# Patient Record
Sex: FEMALE | Race: BLACK OR AFRICAN AMERICAN | NOT HISPANIC OR LATINO | Employment: UNEMPLOYED | URBAN - METROPOLITAN AREA
[De-identification: names, ages, dates, MRNs, and addresses within clinical notes are randomized per-mention and may not be internally consistent; named-entity substitution may affect disease eponyms.]

---

## 2019-06-12 PROBLEM — N18.6 END STAGE RENAL DISEASE: Status: ACTIVE | Noted: 2019-06-12

## 2019-07-01 DIAGNOSIS — Z76.82 ORGAN TRANSPLANT CANDIDATE: Primary | ICD-10-CM

## 2019-07-17 ENCOUNTER — TELEPHONE (OUTPATIENT)
Dept: TRANSPLANT | Facility: CLINIC | Age: 54
End: 2019-07-17

## 2019-08-13 DIAGNOSIS — Z76.82 ORGAN TRANSPLANT CANDIDATE: Primary | ICD-10-CM

## 2019-08-21 ENCOUNTER — HOSPITAL ENCOUNTER (OUTPATIENT)
Dept: RADIOLOGY | Facility: HOSPITAL | Age: 54
Discharge: HOME | End: 2019-08-21
Attending: NURSE PRACTITIONER
Payer: MEDICARE

## 2019-08-21 ENCOUNTER — CLINICAL SUPPORT (OUTPATIENT)
Dept: INFECTIOUS DISEASES | Facility: CLINIC | Age: 54
End: 2019-08-21
Payer: MEDICARE

## 2019-08-21 ENCOUNTER — TELEPHONE (OUTPATIENT)
Dept: TRANSPLANT | Facility: CLINIC | Age: 54
End: 2019-08-21

## 2019-08-21 ENCOUNTER — OFFICE VISIT (OUTPATIENT)
Dept: TRANSPLANT | Facility: CLINIC | Age: 54
End: 2019-08-21
Payer: MEDICARE

## 2019-08-21 VITALS
DIASTOLIC BLOOD PRESSURE: 80 MMHG | HEIGHT: 63 IN | RESPIRATION RATE: 16 BRPM | SYSTOLIC BLOOD PRESSURE: 173 MMHG | WEIGHT: 177 LBS | OXYGEN SATURATION: 100 % | BODY MASS INDEX: 31.36 KG/M2 | TEMPERATURE: 97 F | HEART RATE: 61 BPM

## 2019-08-21 DIAGNOSIS — Z01.818 PRE-TRANSPLANT EVALUATION FOR CHRONIC KIDNEY DISEASE: Primary | ICD-10-CM

## 2019-08-21 DIAGNOSIS — N18.5 DIABETES MELLITUS DUE TO UNDERLYING CONDITION WITH STAGE 5 CHRONIC KIDNEY DISEASE: Chronic | ICD-10-CM

## 2019-08-21 DIAGNOSIS — Z99.2 ESRD ON HEMODIALYSIS: ICD-10-CM

## 2019-08-21 DIAGNOSIS — I15.0 RENOVASCULAR HYPERTENSION: ICD-10-CM

## 2019-08-21 DIAGNOSIS — Z01.818 PRE-TRANSPLANT EVALUATION FOR CHRONIC KIDNEY DISEASE: ICD-10-CM

## 2019-08-21 DIAGNOSIS — N18.6 ESRD ON HEMODIALYSIS: ICD-10-CM

## 2019-08-21 DIAGNOSIS — E08.22 DIABETES MELLITUS DUE TO UNDERLYING CONDITION WITH STAGE 5 CHRONIC KIDNEY DISEASE: Chronic | ICD-10-CM

## 2019-08-21 DIAGNOSIS — Z76.82 ORGAN TRANSPLANT CANDIDATE: ICD-10-CM

## 2019-08-21 DIAGNOSIS — N18.6 ANEMIA IN ESRD (END-STAGE RENAL DISEASE): ICD-10-CM

## 2019-08-21 DIAGNOSIS — D63.1 ANEMIA IN ESRD (END-STAGE RENAL DISEASE): ICD-10-CM

## 2019-08-21 DIAGNOSIS — N13.39 OTHER HYDRONEPHROSIS: ICD-10-CM

## 2019-08-21 PROBLEM — N13.30 HYDRONEPHROSIS: Status: ACTIVE | Noted: 2019-08-21

## 2019-08-21 PROCEDURE — 71046 X-RAY EXAM CHEST 2 VIEWS: CPT | Mod: TC,TXP

## 2019-08-21 PROCEDURE — 99999 PR PBB SHADOW E&M-EST. PATIENT-LVL V: ICD-10-PCS | Mod: PBBFAC,TXP,, | Performed by: NURSE PRACTITIONER

## 2019-08-21 PROCEDURE — 71046 XR CHEST PA AND LATERAL: ICD-10-PCS | Mod: 26,TXP,, | Performed by: RADIOLOGY

## 2019-08-21 PROCEDURE — 71046 X-RAY EXAM CHEST 2 VIEWS: CPT | Mod: 26,TXP,, | Performed by: RADIOLOGY

## 2019-08-21 PROCEDURE — 99205 PR OFFICE/OUTPT VISIT, NEW, LEVL V, 60-74 MIN: ICD-10-PCS | Mod: S$PBB,TXP,, | Performed by: NURSE PRACTITIONER

## 2019-08-21 PROCEDURE — 93978 VASCULAR STUDY: CPT | Mod: 26,TXP,, | Performed by: RADIOLOGY

## 2019-08-21 PROCEDURE — 99215 OFFICE O/P EST HI 40 MIN: CPT | Mod: PBBFAC,25,TXP | Performed by: NURSE PRACTITIONER

## 2019-08-21 PROCEDURE — 72170 XR PELVIS ROUTINE AP: ICD-10-PCS | Mod: 26,TXP,, | Performed by: RADIOLOGY

## 2019-08-21 PROCEDURE — 93978 VASCULAR STUDY: CPT | Mod: TC,TXP

## 2019-08-21 PROCEDURE — 72170 X-RAY EXAM OF PELVIS: CPT | Mod: TC,TXP

## 2019-08-21 PROCEDURE — G0010 ADMIN HEPATITIS B VACCINE: HCPCS | Mod: PBBFAC,TXP

## 2019-08-21 PROCEDURE — 72170 X-RAY EXAM OF PELVIS: CPT | Mod: 26,TXP,, | Performed by: RADIOLOGY

## 2019-08-21 PROCEDURE — 90471 IMMUNIZATION ADMIN: CPT | Mod: PBBFAC,TXP

## 2019-08-21 PROCEDURE — 99204 OFFICE O/P NEW MOD 45 MIN: CPT | Mod: S$PBB,TXP,, | Performed by: PHYSICIAN ASSISTANT

## 2019-08-21 PROCEDURE — 99204 PR OFFICE/OUTPT VISIT, NEW, LEVL IV, 45-59 MIN: ICD-10-PCS | Mod: S$PBB,TXP,, | Performed by: PHYSICIAN ASSISTANT

## 2019-08-21 PROCEDURE — 99205 OFFICE O/P NEW HI 60 MIN: CPT | Mod: S$PBB,TXP,, | Performed by: NURSE PRACTITIONER

## 2019-08-21 PROCEDURE — 99203 OFFICE O/P NEW LOW 30 MIN: CPT | Mod: S$PBB,TXP,, | Performed by: TRANSPLANT SURGERY

## 2019-08-21 PROCEDURE — 99999 PR PBB SHADOW E&M-EST. PATIENT-LVL V: CPT | Mod: PBBFAC,TXP,, | Performed by: NURSE PRACTITIONER

## 2019-08-21 PROCEDURE — 99203 PR OFFICE/OUTPT VISIT, NEW, LEVL III, 30-44 MIN: ICD-10-PCS | Mod: S$PBB,TXP,, | Performed by: TRANSPLANT SURGERY

## 2019-08-21 PROCEDURE — 93978 US DOPP ILIACS BILATERAL: ICD-10-PCS | Mod: 26,TXP,, | Performed by: RADIOLOGY

## 2019-08-21 RX ORDER — CALCIUM ACETATE 667 MG/1
2 CAPSULE ORAL
Refills: 3 | COMMUNITY
Start: 2019-08-01

## 2019-08-21 RX ORDER — ONDANSETRON 4 MG/1
4 TABLET, FILM COATED ORAL EVERY 8 HOURS PRN
Refills: 2 | COMMUNITY
Start: 2019-08-12

## 2019-08-21 RX ORDER — CYCLOBENZAPRINE HCL 10 MG
10 TABLET ORAL 2 TIMES DAILY PRN
COMMUNITY
Start: 2019-08-01

## 2019-08-21 RX ORDER — MONTELUKAST SODIUM 10 MG/1
10 TABLET ORAL DAILY
COMMUNITY
Start: 2018-08-21 | End: 2023-10-04

## 2019-08-21 RX ORDER — FLUTICASONE PROPIONATE 50 MCG
1 SPRAY, SUSPENSION (ML) NASAL DAILY PRN
COMMUNITY
Start: 2017-04-15

## 2019-08-21 RX ORDER — INSULIN GLARGINE 100 [IU]/ML
60 INJECTION, SOLUTION SUBCUTANEOUS DAILY
COMMUNITY
End: 2020-01-28

## 2019-08-21 NOTE — LETTER
August 22, 2019        Lilia Malin  [address] [address] [address]  Phone: [phone]  Fax: [phone]             [facility]  [address]  [facility] [address]  Phone: [phone]   Patient: Kiesha Rocha   MR Number: [id number]   YOB: 1965   Date of Visit: 8/21/2019       Dear Dr. Lilia Malin    Thank you for referring Kiesha Rocha to me for evaluation. Attached you will find relevant portions of my assessment and plan of care.    If you have questions, please do not hesitate to call me. I look forward to following Kiesha Rocha along with you.    Sincerely,    Caitlin Stevens, NP    Enclosure    If you would like to receive this communication electronically, please contact [email or web address] or [phone] to request EpiBone Link access.    EpiBone Link is a tool which provides read-only access to select patient information with whom you have a relationship. Its easy to use and provides real time access to review your patients record including encounter summaries, notes, results, and demographic information.    If you feel you have received this communication in error or would no longer like to receive these types of communications, please e-mail [email or web address]

## 2019-08-21 NOTE — PROGRESS NOTES
INITIAL PATIENT EDUCATION NOTE    Ms. Kiesha Rocha was seen in pre-kidney transplant clinic for evaluation for kidney, kidney/pancreas or pancreas only transplant.  The patient attended a group education session that discussed/reviewed the following aspects of transplantation: evaluation and selection committee process, UNOS waitlist management/multiple listings, types of organs offered (KDPI < 85%, KDPI > 85%, PHS increased risk, DCD, HCV+), financial aspects, surgical procedures, dietary instruction pre- and post-transplant, health maintenance pre- and post-transplant, post-transplant hospitalization and outpatient follow-up, potential to participate in a research protocol, and medication management and side effects.  A question and answer session was provided after the presentation.    The patient was seen by all members of the multi-disciplinary team to include: Nephrologist/PA, Surgeon, , Transplant Coordinator, , Pharmacist and Dietician (if applicable).    The patient reviewed and signed all consents for evaluation which were witnessed and sent to scanning into the EPIC chart.    The patient was given an education book and plan for further evaluation based on her individual assessment.      The patient was encouraged to call with any questions or concerns.

## 2019-08-21 NOTE — PROGRESS NOTES
PHARM.D. PRE-TRANSPLANT NOTE:    This patient's medication therapy was evaluated as part of her pre-transplant evaluation.      The following general pharmacologic concerns were noted:   - Takes Flexeril prn for back pain   - Reports non-compliance with Phoslo, says it is difficult to remember to take it     The following pharmacologic concerns related to HCV therapy were noted: none      This patient's medication profile was reviewed for contraindications for DAA Hepatitis C therapy:    [x]  No current inducers of CYP 3A4 or PGP  [x]  No amiodarone on this patient's EMR profile in the last 24 months  [x]  No past or current atrial fibrillation on this patient's EMR profile       Current Outpatient Medications   Medication Sig Dispense Refill    amLODIPine (NORVASC) 10 MG tablet Take 10 mg by mouth once daily.      calcium acetate (PHOSLO) 667 mg capsule Take 2 capsules by mouth 3 (three) times daily with meals. Take 1 capsule with snacks  3    cetirizine (ZYRTEC) 10 MG tablet Take 10 mg by mouth daily as needed for Allergies.      cyclobenzaprine (FLEXERIL) 10 MG tablet Take 10 mg by mouth 2 (two) times daily as needed.      FLUoxetine 20 MG capsule Take 20 mg by mouth once daily.      fluticasone propionate (FLONASE) 50 mcg/actuation nasal spray 1 spray by Each Nostril route daily as needed.      gabapentin (NEURONTIN) 600 MG tablet Take 600 mg by mouth every evening.       glipiZIDE (GLUCOTROL) 10 MG tablet Take 10 mg by mouth daily with breakfast.       insulin (LANTUS SOLOSTAR U-100 INSULIN) glargine 100 units/mL (3mL) SubQ pen Inject 60 Units into the skin once daily.      lisinopril (PRINIVIL,ZESTRIL) 20 MG tablet Take 20 mg by mouth once daily.      methIMAzole (TAPAZOLE) 5 MG Tab Take 5 mg by mouth once daily.       metoprolol tartrate (LOPRESSOR) 50 MG tablet Take 50 mg by mouth 2 (two) times daily.       montelukast (SINGULAIR) 10 mg tablet Take 10 mg by mouth once daily.      multivitamin  with minerals tablet Take 1 tablet by mouth once daily.      ondansetron (ZOFRAN) 4 MG tablet Take 4 mg by mouth every 8 (eight) hours as needed.  2    sodium bicarbonate 650 MG tablet Take 650 mg by mouth 3 (three) times daily.        No current facility-administered medications for this visit.      Facility-Administered Medications Ordered in Other Visits   Medication Dose Route Frequency Provider Last Rate Last Dose    0.9%  NaCl infusion  20 mL/hr Intravenous Continuous Shashi Krishnan  mL/hr at 06/12/19 1205      mupirocin 2 % ointment   Nasal On Call Procedure Bautista Vasques MD             Currently she is responsible for preparing / administering this patient's medications on a daily basis.  I am available for consultation and can be contacted, as needed by the other members of the Kidney Transplant team.

## 2019-08-21 NOTE — PROGRESS NOTES
Transplant Recipient Adult Psychosocial Assessment    Kiesha Rocha  [address] [facility] [address]  Telephone Information:   Mobile [phone]   Home  [phone] (home)  Work  There is no work phone number on file.  E-mail  No e-mail address on record    Sex: female  YOB: 1965  Age: 53 y.o.    Encounter Date: 2019  [place] Citizen: yes  Primary Language: English   Needed: no    Emergency Contact:  Moo Rodrigez, 55 yo common law , Mila SUAREZ, does drive/own car, works full time at [employer]  home. [phone]  Chika Rocha, 49 yo sister, Caty Suarez, does drive/own car, self employed sitter/. [phone]    Family/Social Support:   Number of dependents/: pt denies  Marital history: with Moo for 20 years  Other family dynamics: pt reports father . Pt reports mother Misa is living well in [facility] and is active. Pt reports is disabled and lives with supportive full time employed  Moo in [facility]. Pt reports supportive employed daughter Darlene and 1 yo grand daughter Ananth live in pt's household. Pt reports having 4 grown children with 2 daughters being most available. Pt's sister Chika and her  Arley in pre transplant appointments today. Sister Chika reports plan to be pt's primary transplant caregiver with other supportive family available as back up transplant caregivers.    Household Composition:  Moo Rodrigez, 55 yo common law , Mila SUAREZ, does drive/own car, works full time at [employer]  home. [phone]  Darlene Rocha, 24 yo daughter, Mila SUAREZ, does drive/own car, works full time as CNA at [employer]. [phone]  Ananth Richards, 1 yo grand daughter      Pt's children:  Fazal Rocha, 30 yo daughter, Caty SUAREZ, does drive/own car, [phone]  Darlene Rocha, 24 yo daughter, Mila SUAREZ, does drive/own car, works full time as CNA  at [employer]. [phone]  Wilfredo Rocha, 27 yo son        Do you and your caregivers have access to reliable transportation? yes  PRIMARY CAREGIVER: Chika Rocha, sister, will be primary caregiver, phone number  [phone]     provided in-depth information to patient and caregiver regarding pre- and post-transplant caregiver role.   strongly encourages patient and caregiver to have concrete plan regarding post-transplant care giving, including back-up caregiver(s) to ensure care giving needs are met as needed.    Patient and Caregiver states understanding all aspects of caregiver role/commitment and is able/willing/committed to being caregiver to the fullest extent necessary.    Patient and Caregiver verbalizes understanding of the education provided today.         remains available. Patient and Caregiver agree to contact  in a timely manner if concerns arise.      Able to take time off work without financial concerns: yes.     Additional Significant Others who will Assist with Transplant:  Tere Rocha, 32 yo daughter, Caty NICOLE, does drive/own car, [phone]  Darlene Rocha, 22 yo daughter, Mila NICOLE, does drive/own car, works full time as CNA at [employer]. [phone]  Moo Rodrigez, 55 yo common law , Mila NICOLE, does drive/own car, works full time at [employer]. [phone]    Living Will: no  Healthcare Power of : no  Advance Directives on file: <<no information> per medical record.  Verbally reviewed LW/HCPA information.   provided patient with copy of LW/HCPA documents and provided education on completion of forms.    Living Donors: Education and resource information given to patient.    Highest Education Level: High School (9-12) or GED completed 11th grade  Reading Ability: 8th grade  Reports difficulty with: eyesight is blurry as per patient  Learns Best By:  Reading  about, seeing and doing new task until proficient     Status: no  VA Benefits: no     Working for Income: No  If no, reason not working: Disability  Patient is disabled housewife.    Spouse/Significant Other Employment:   works full time at [employer].    Disabled: yes due to ESRD    Monthly Income:  $500 per month from SS disability. , kids and family can supplement for medical costs if needed.  Able to afford all costs now and if transplanted, including medications: yes  Patient and Caregiver verbalizes understanding of personal responsibilities related to transplant costs and the importance of having a financial plan to ensure that patients transplant costs are fully covered.       provided fundraising information/education. Patient and Caregiververbalizes understanding.   remains available.    Insurance:   Payor/Plan Subscr  Sex Relation Sub. Ins. ID Effective Group Num   1. MEDICARE - ME* JOSSUE JOSE 1965 Female Self [id number]                                    [address]   2. MEDICAID - HE* JOSSUE JOSE 1965 Female  [id number]                                   [address] [address]     Primary Insurance (for UNOS reporting): Public Insurance - Medicare FFS (Fee For Service)  Secondary Insurance (for UNOS reporting): Public Insurance - Medicaid     Pt reports hope [place] continue [place] for [place]. Pt reports hope [place] Medicaid does not count her common law 's income as hers because she is not legally  and does not believe his income should reflect on [place] Medicaid application. Pt reports plan to confirm with [place] Medicaid continued coverage. Pt reports if any insurance change or problem arises she will contact transplant .    Transplant  sent Epic message to transplant  about above pt concerns.      Patient and  Caregiver verbalizes clear understanding that patient may experience difficulty obtaining and/or be denied insurance coverage post-surgery. This includes and is not limited to disability insurance, life insurance, health insurance, burial insurance, long term care insurance, and other insurances.      Patient and Caregiver also reports understanding that future health concerns related to or unrelated to transplantation may not be covered by patient's insurance.  Resources and information provided and reviewed.     Patient and Caregiver provides verbal permission to release any necessary information to outside resources for patient care and discharge planning.  Resources and information provided are reviewed.      [facility], [phone]. Hemodialysis: T, Th, Sat. 3.5 hours    Dialysis Adherence: Patient and Caregiver reports high dialysis compliance within last 3 months.  Dialysis compliance update form sent to unit for completion.    Infusion Service: patient utilizing? no  Home Health: patient utilizing? no  DME: yes bp cuff, glucometer  Pulmonary/Cardiac Rehab: pt denies  ADLS:  Independent with self care and medication management    Adherence:   Pt reports is highly compliant with all medical appointments and instructions.  Adherence education and counseling provided.     Per History Section:  Past Medical History:   Diagnosis Date    Anemia     Anxiety     Diabetes mellitus     Disorder of kidney and ureter     Encounter for blood transfusion     GERD (gastroesophageal reflux disease)     Hydronephrosis     Hyperlipidemia     Hypertension     Hyperthyroidism     Obesity     Thyroid disease      Social History     Tobacco Use    Smoking status: Never Smoker    Smokeless tobacco: Never Used   Substance Use Topics    Alcohol use: Never     Frequency: Never     Social History     Substance and Sexual Activity   Drug Use Never     Social History     Substance and Sexual Activity   Sexual  "Activity Not on file       Per Today's Psychosocial:  Tobacco: pt reports does "dip" tobacco occasionally. Reports does not believe she will ever quit dipping tobacco.  Alcohol: none, patient denies any use.  Illicit Drugs/Non-prescribed Medications: none, patient denies any use.    Patient and Caregiver states clear understanding of the potential impact of substance use as it relates to transplant candidacy and is aware of possible random substance screening.  Substance abstinence/cessation counseling, education and resources provided and reviewed.     Arrests/DWI/Treatment/Rehab: patient denies    Psychiatric History:    Mental Health: Pt reports suffers from "nerves" and takes Prozac 20 mg prescribed by PCP "Dr. Cecily Arcos". Pt reports has been taking Prozac for 5 years.  Pt reports belief anxiety is best managed by planning ahead so she does not become overwhelmed. Pt reports her sister Chika is the most supportive and knows her best than everyone. Pt reports holden through lauren and prayer and attends [employer] regularly. Pt denies any overwhelming feelings of anxiety or depression surrounding organ transplant process at this time. Pt was in good spirits, engaged and participatory throughout the psychosocial session. Pt denies any need for mental health referral at this time.  Psychiatrist/Counselor: pt denies  Medications:  Prozac 20 mg prescribed by PCP "Dr. Cecily Arcos".  Pt reports has been taking Prozac for 5 years.  Suicide/Homicide Issues: pt denies any si/hi at this time  Safety at home: pt reports living in safe home environment.    Knowledge: Patient and Caregiver states having clear understanding and realistic expectations regarding the potential risks and potential benefits of organ transplantation and organ donation and agrees to discuss with health care team members and support system members, as well as to utilize available resources and express questions and/or concerns in order to " further facilitate the pt informed decision-making.  Resources and information provided and reviewed.    Patient and Caregiver is aware of [facility]'s affiliation and/or partnership with agencies in home health care, LTAC, SNF, [facility], and other hospitals and clinics.    Understanding: Patient and Caregiver reports having a clear understanding of the many lifetime commitments involved with being a transplant recipient, including costs, compliance, medications, lab work, procedures, appointments, concrete and financial planning, preparedness, timely and appropriate communication of concerns, abstinence (ETOH, tobacco, illicit non-prescribed drugs), adherence to all health care team recommendations, support system and caregiver involvement, appropriate and timely resource utilization and follow-through, mental health counseling as needed/recommended, and patient and caregiver responsibilities.  Social Service Handbook, resources and detailed educational information provided and reviewed.  Educational information provided.    Patient and Caregiver also reports current and expected compliance with health care regime and states having a clear understanding of the importance of compliance.      Patient and Caregiver reports a clear understanding that risks and benefits may be involved with organ transplantation and with organ donation.       Patient and Caregiver also reports clear understanding that psychosocial risk factors may affect patient, and include but are not limited to feelings of depression, generalized anxiety, anxiety regarding dependence on others, post traumatic stress disorder, feelings of guilt and other emotional and/or mental concerns, and/or exacerbation of existing mental health concerns.  Detailed resources provided and discussed.      Patient and Caregiver agrees to access appropriate resources in a timely manner as needed and/or as recommended, and to communicate concerns appropriately.  Patient and  Caregiver also reports a clear understanding of treatment options available.     Patient and Caregiver received education in a group setting.   reviewed education, provided additional information, and answered questions.    Feelings or Concerns: Pt reports high motivation to pursue organ transplant.    Coping: Pt reports is coping well over all with kidney disease and need for organ transplant. Pt reports holden best through supportive family encouragement and through lauren and prayer.    Goals: Pt reports hope for successful organ transplant so she may discontinue dialysis and have healthier life. Patient referred to Vocational Rehabilitation.    Interview Behavior: Patient and Caregiver presents as alert and oriented x 4, pleasant, good eye contact, well groomed, recall good, concentration/judgement good, average intelligence, calm, communicative, cooperative and asking and answering questions appropriately. Pt's supportive sister Chika and brother in law Arley in pre transplant appointments with pt's permission.         Transplant Social Work - Candidacy  Assessment/Plan:     Psychosocial Suitability: Patient presents as a suitable candidate for kidney transplant at this time. Based on psychosocial risk factors, patient presents as low risk, due to patient reporting having organ transplant caregiver/transportation plan, medical insurance plan and plan to afford transplant costs all in place.    Recommendations/Additional Comments: Dialysis compliance update needed and compliance form sent to unit for completion. Pt reports her caregivers work and may need employer paperwork completed for any time missed due to transplant. Pt lives away and may need transplant lodging; lodging was reviewed and discussed in detail today. Pt [place] continue [place]. Pt reports hope [place] does not [place] her common law 's income as hers because she is not legally   and does not believe his income should reflect on [place] application. Pt reports plan to confirm with [place] continued coverage. Pt reports if any insurance change or problem arises she will contact transplant .    Final determination of transplant candidacy will be made once work up is complete and reviewed by the selection committee.    Karishma LAYTON LCSW

## 2019-08-21 NOTE — TELEPHONE ENCOUNTER
Reviewed pt transplant labs.  Notified dialysis unit dietitian of the following abnormal labs via fax and requested their most recent nutrition note on this pt.  Once this note is received it will be scanned into pt's chart.    HbA1c 7.7  Chol 222    Phos 7.4

## 2019-08-21 NOTE — PROGRESS NOTES
Pre Transplant Infectious Diseases Consult  Kidney Transplant Recipient Evaluation    Requesting Physician:     Reason for Visit:  Pre Transplant Evaluation    Organ:  Kidney    Etiology of Kidney Disease:  HTN, DMII. Has LUE AVF for HD. No problems or complications.     History of Prior Transplant:  No    Currently taking immunosuppressants/steroids:  No    History of Splenectomy:  No    Infectious History:  Current/recent infections or currently taking antibiotics?  No  History of recurrent infections (sinuses, throat, bladder/kidneys, intestines, skin, dental, lung, catheter (HD/PD) related, or peritonitis/SBP)?  No  Any major hospitalizations due to infection?  No  If diabetic, history of diabetic foot infection/osteomyelitis?  No  History of shingles?  No  History of STDs (syphilis, viral hepatitis, HIV)?  No  Exposure to TB or ever had a positive TB skin test?  No  History of residence in coccidioides endemic areas ([facility]?  No  Any foreign travel?  No  Any associated illness?  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  No    Immunization History:  Childhood vaccines:  Yes  Last Flu shot: yearly   Tetanus/TDAP: 12/2018  Hepatitis A: none  Hepatitis B: none  Prevnar-13: 12/2018  Pneumovax-23: none   Shingles (Zostavax/Shingrix): none  Meningococcal:    Serologies:  No results found for: CMVIGGINTERP, HEPAIGG, HEPBCAB, HEPBSAB, HEPBSAG, HEPBCAB, ZDD25MSTO, TBGOLDPLUS, RPR, STRONGANTIGG, TOXOIGG, TOXOG, VARICELLAINT     Review of Systems   Constitution: Negative for chills, decreased appetite, fever, malaise/fatigue, night sweats, weight gain and weight loss.   HENT: Negative for congestion, ear pain, hearing loss, hoarse voice, sore throat and tinnitus.     Eyes: Negative for blurred vision, pain, vision loss in left eye, vision loss in right eye and visual disturbance.   Cardiovascular: Negative for chest pain, dyspnea on exertion, leg swelling and palpitations.   Respiratory: Negative for cough, shortness of breath, sputum production and wheezing.    Skin: Negative for dry skin, itching, rash and suspicious lesions.   Musculoskeletal: Negative for back pain, joint pain, myalgias and neck pain.   Gastrointestinal: Negative for abdominal pain, constipation, diarrhea, heartburn, nausea and vomiting.   Genitourinary: Negative for dysuria, flank pain, frequency, hematuria, hesitancy and urgency.   Neurological: Negative for dizziness, headaches, numbness, paresthesias and weakness.   Psychiatric/Behavioral: Negative for depression and memory loss. The patient does not have insomnia and is not nervous/anxious.      Physical Exam   Constitutional: She is oriented to person, place, and time. She appears well-developed and well-nourished. No distress.       HENT:   Head: Normocephalic and atraumatic.   Eyes: Pupils are equal, round, and reactive to light. EOM are normal.   Neck: Normal range of motion. Neck supple.   Cardiovascular: Normal rate, regular rhythm, normal heart sounds and intact distal pulses. Exam reveals no gallop and no friction rub.   No murmur heard.  Pulmonary/Chest: Effort normal and breath sounds normal. No respiratory distress. She has no wheezes. She has no rales. She exhibits no tenderness.   Abdominal: Soft. Bowel sounds are normal. She exhibits no distension and no mass. There is no tenderness. There is no rebound and no guarding. No hernia.   Musculoskeletal: Normal range of motion. She exhibits no edema, tenderness or deformity.   Neurological: She is alert and oriented to person, place, and time. No cranial nerve deficit. Coordination normal.   Skin: Skin is warm and dry. She is not diaphoretic. No erythema. No pallor.   Psychiatric: She has a  normal mood and affect. Her behavior is normal. Thought content normal.   Nursing note and vitals reviewed.           Counseling:   I discussed with the patient the risk for increased susceptibility to infections following transplantation including increased risk for infection right after transplant and if rejection should occur.  The patient has been counseled on the importance of vaccinations including but not limited to a yearly flu vaccine. Patient was also instructed to encourage that family/caretakers receive their flu vaccine yearly. The patient was encouraged to contact us about any problems that may develop after immunizations and possible side effects were reviewed.     Specific guidance has been provided to the patient regarding the patient's occupation, hobbies and activities to avoid future infectious complications. These include but are not limited to: avoiding raw/undercooked meats and seafood, avoiding unpasteurized milk/cheeses, proper (hand) hygiene, contact with animals and appropriate vaccination of animals, use of mosquito/tick precautions, avoiding walking barefoot, avoiding sick contacts, and seeking medical advice prior to foreign travel (specifically developing countries).     Transplant Candidacy: Based on available information, there are no identified significant barriers to transplantation from an infectious disease standpoint pending acceptable serologies and subject to recommendations below.     Final determination of transplant candidacy will be made once evaluation is complete and reviewed by the Transplant Selection Committee.      ID recommendations:      Quantiferon gold, HIV, Strongyloides, and RPR pending. If positive, please refer to ID clinic.    Vaccines today:  Hepatitis A vaccine today, 6 months  Heplisav B vaccine today, 1 month  shingrix rx given due day 0, 2-6 months

## 2019-08-21 NOTE — PROGRESS NOTES
Transplant Nephrology  Kidney Transplant Recipient Evaluation    Referring Physician: Lilia Malin  Current Nephrologist: Lilia Malin    Subjective:   CC:  Initial evaluation of kidney transplant candidacy.    HPI:  Ms. Rocha is a 53 y.o. year old Black or  female who has presented to be evaluated as a potential kidney transplant recipient.  She has ESRD secondary to diabetic nephropathy.  Patient is currently on hemodialysis started on 2/27/2019. Patient is dialyzing on TTS schedule.  Patient reports that she is tolerating dialysis well.. She has a LUE AV fistula for dialysis access. Dialyzes for 3 1/2 hours     DM 2  Diagnosed~ 1992  On insulin     She walks ~ 2 x/ week for ~ 10-15 minutes, which is about 1 mile.   Denies problems with leg pain, chest pain or SOB.     Previous Transplant: no    Hydronephrosis s/p ureteral stent placement bilaterally in 10/208  Pt f/u with Dr Kim / urology . She reports ureteral stents have been removed  2/20/2019 CTA/P without ( care everywhere)  FINDINGS:  There is diffuse decreased density of the liver consistent with fatty  infiltration.   No focal hepatic lesions.   The gallbladder spleen  pancreas and adrenal glands have a normal noncontrast appearance.      Both kidneys have a lobulated contour. There is moderate  hydronephrosis on the left and prominent left renal pelvis. No  obstructing stone or mass evident. The left ureter is not dilated.  Cortex left kidney appears thinned. No perinephric stranding.    No hydronephrosis on the right.  Appendix is normal. Bowel is not obstructed.  Uterus has been removed.  Abdominal aorta is normal caliber.   No free fluid free air or  abnormal fluid collections.   Bladder appears grossly normal. No acute  bone findings. 3 millimeter noncalcified nodule evident in the  lingula, unchanged.        Care every where  HX Hyperthyroid --Takes tapazole daily   Does not f/u with endocrinology       3/7/2016  care every where Dr YOANDY Wilder         Breast biopsy left   [facility] in [facility]2015 Pt reports a benign breast lesion and MMG since acceptable      2/2/2018 L heart cath for angina (care every where)   CONCLUSIONS:  Normal coronary arteries.    Kidney bx--no  Donors --no       Past Medical and Surgical History: Ms. Rocha  has a past medical history of Anemia, Anxiety, Diabetes mellitus, Disorder of kidney and ureter, Encounter for blood transfusion, GERD (gastroesophageal reflux disease), Hydronephrosis, Hyperlipidemia, Hypertension, Hyperthyroidism, Obesity, and Thyroid disease.  She has a past surgical history that includes Breast surgery (Left); Hysterectomy; Arthroscopy of knee (Right); AV fistula placement (Left, 6/12/2019); Ureteral stent placement (Bilateral); Knee surgery (Right); and Breast biopsy (Left).    Past Social and Family History: Ms. Rocha reports that she has never smoked. She does not have any smokeless tobacco history on file. She reports that she does not drink alcohol or use drugs. Her family history includes Diabetes in her brother and father; Heart disease in her father; Hypertension in her father.    Review of Systems   Constitutional: Positive for fatigue and unexpected weight change. Negative for activity change, appetite change, chills and fever.   HENT: Negative for congestion, facial swelling, postnasal drip, rhinorrhea, sinus pressure, sore throat and trouble swallowing.    Eyes: Positive for visual disturbance. Negative for pain and redness.        Wears glasses    Respiratory: Negative for cough, chest tightness, shortness of breath and wheezing.    Cardiovascular: Positive for leg swelling. Negative for chest pain and palpitations.   Gastrointestinal: Negative for abdominal pain, diarrhea, nausea and vomiting.   Genitourinary: Positive for decreased urine volume. Negative for dysuria, flank pain and urgency.   Musculoskeletal: Positive for arthralgias. Negative for gait  "problem, neck pain and neck stiffness.   Skin: Negative for rash.   Allergic/Immunologic: Negative for environmental allergies, food allergies and immunocompromised state.   Neurological: Negative for dizziness, weakness, light-headedness and headaches.   Psychiatric/Behavioral: Positive for sleep disturbance. Negative for agitation and confusion. The patient is nervous/anxious.         Hx depression       Objective:   Blood pressure (!) 173/80, pulse 61, temperature 96.9 °F (36.1 °C), temperature source Oral, resp. rate 16, height 5' 3.23" (1.606 m), weight 80.3 kg (177 lb 0.5 oz), SpO2 100 %.body mass index is 31.13 kg/m².    Physical Exam   Constitutional: She is oriented to person, place, and time. She appears well-developed and well-nourished.   HENT:   Head: Normocephalic.   Mouth/Throat: Oropharynx is clear and moist. No oropharyngeal exudate.   Eyes: Pupils are equal, round, and reactive to light. Conjunctivae and EOM are normal. No scleral icterus.   Neck: Normal range of motion. Neck supple.   Cardiovascular: Normal rate, regular rhythm and normal heart sounds.   Pulmonary/Chest: Effort normal and breath sounds normal.   Abdominal: Soft. Normal appearance and bowel sounds are normal. She exhibits no distension and no mass. There is no splenomegaly or hepatomegaly. There is no tenderness. There is no rebound, no guarding, no CVA tenderness, no tenderness at McBurney's point and negative Steele's sign.   Musculoskeletal: Normal range of motion. She exhibits no edema.        Arms:  Lymphadenopathy:     She has no cervical adenopathy.   Neurological: She is alert and oriented to person, place, and time. She exhibits normal muscle tone. Coordination normal.   Skin: Skin is warm and dry.   Psychiatric: She has a normal mood and affect. Her behavior is normal.   Vitals reviewed.      Labs:  Lab Results   Component Value Date    WBC 7.09 08/21/2019    HGB 9.5 (L) 08/21/2019    HCT 29.5 (L) 08/21/2019     " 08/21/2019    K 3.8 08/21/2019    CL 95 08/21/2019    CO2 26 08/21/2019    BUN 36 (H) 08/21/2019    CREATININE 6.4 (H) 08/21/2019    EGFRNONAA 6.9 (A) 08/21/2019    CALCIUM 10.0 08/21/2019    PHOS 7.4 (H) 08/21/2019    ALBUMIN 3.7 08/21/2019    AST 14 08/21/2019    ALT 10 08/21/2019    .0 (H) 08/21/2019       No results found for: PREALBUMIN, BILIRUBINUA, GGT, AMYLASE, LIPASE, PROTEINUA, NITRITE, RBCUA, WBCUA    No results found for: HLAABCTYPE    Labs were reviewed with the patient.    Assessment:     1. Pre-transplant evaluation for chronic kidney disease    2. ESRD on hemodialysis    3. Renovascular hypertension    4. Anemia in ESRD (end-stage renal disease)    5. Other hydronephrosis    6. Diabetes mellitus due to underlying condition with stage 5 chronic kidney disease    7. BMI 31.0-31.9,adult        Plan:   2/20/2019 CTA/P without ( care everywhere)--get imaging   Hyperthyroid , takes tapazole daily, endocrinology clearance, any c/o with txp   Add thyroid panel   Urology clearance and any c/o with txp--hydronephrosis bilaterally       Transplant Candidacy:   Based on available information, Ms. Rocha is a suitable kidney transplant candidate.   Meets center eligibility for accepting HCV+ donor offer - yes.  Patient educated on HCV+ donors. Kiesha is willing to accept HCV+ donor offer - no , pt declines for now.   Patient is a candidate for KDPI > 85 kidney donor offer - no d/t weight.  Final determination of transplant candidacy will be made once workup is complete and reviewed by the selection committee.    Caitlin Stevens, COLTEN       UNOS Patient Status  Functional Status: 60% - Requires occasional assistance but is able to care for needs  Physical Capacity: No Limitations

## 2019-08-22 ENCOUNTER — TELEPHONE (OUTPATIENT)
Dept: TRANSPLANT | Facility: CLINIC | Age: 54
End: 2019-08-22

## 2019-08-22 DIAGNOSIS — Z76.82 ORGAN TRANSPLANT CANDIDATE: Primary | ICD-10-CM

## 2019-09-05 DIAGNOSIS — Z76.82 ORGAN TRANSPLANT CANDIDATE: Primary | ICD-10-CM

## 2019-09-09 ENCOUNTER — TELEPHONE (OUTPATIENT)
Dept: CARDIOLOGY | Facility: CLINIC | Age: 54
End: 2019-09-09

## 2019-09-11 ENCOUNTER — CLINICAL SUPPORT (OUTPATIENT)
Dept: CARDIOLOGY | Facility: CLINIC | Age: 54
End: 2019-09-11
Attending: NURSE PRACTITIONER
Payer: MEDICARE

## 2019-09-11 ENCOUNTER — HOSPITAL ENCOUNTER (OUTPATIENT)
Dept: CARDIOLOGY | Facility: CLINIC | Age: 54
Discharge: HOME | End: 2019-09-11
Attending: NURSE PRACTITIONER
Payer: MEDICARE

## 2019-09-11 VITALS
BODY MASS INDEX: 31.36 KG/M2 | DIASTOLIC BLOOD PRESSURE: 90 MMHG | SYSTOLIC BLOOD PRESSURE: 160 MMHG | WEIGHT: 177 LBS | HEART RATE: 84 BPM | HEIGHT: 63 IN

## 2019-09-11 VITALS — BODY MASS INDEX: 31.36 KG/M2 | WEIGHT: 177 LBS | HEIGHT: 63 IN

## 2019-09-11 DIAGNOSIS — Z76.82 ORGAN TRANSPLANT CANDIDATE: ICD-10-CM

## 2019-09-11 LAB
ASCENDING AORTA: 2.83 CM
AV INDEX (PROSTH): 0.72
AV MEAN GRADIENT: 7 MMHG
AV PEAK GRADIENT: 17 MMHG
AV VALVE AREA: 2.33 CM2
AV VELOCITY RATIO: 0.68
BSA FOR ECHO PROCEDURE: 1.89 M2
CV ECHO LV RWT: 0.48 CM
CV PHARM DOSE: 0.4 MG
CV STRESS BASE HR: 75 BPM
DIASTOLIC BLOOD PRESSURE: 91 MMHG
DOP CALC AO PEAK VEL: 2.04 M/S
DOP CALC AO VTI: 40.11 CM
DOP CALC LVOT AREA: 3.2 CM2
DOP CALC LVOT DIAMETER: 2.03 CM
DOP CALC LVOT PEAK VEL: 1.39 M/S
DOP CALC LVOT STROKE VOLUME: 93.36 CM3
DOP CALCLVOT PEAK VEL VTI: 28.86 CM
E WAVE DECELERATION TIME: 273.59 MSEC
E/A RATIO: 0.93
E/E' RATIO: 19.57 M/S
ECHO LV POSTERIOR WALL: 1.1 CM (ref 0.6–1.1)
END DIASTOLIC INDEX-HIGH: 170 ML/M2
END SYSTOLIC INDEX-HIGH: 70 ML/M2
FRACTIONAL SHORTENING: 31 % (ref 28–44)
INTERVENTRICULAR SEPTUM: 1 CM (ref 0.6–1.1)
LA MAJOR: 5.76 CM
LA MINOR: 5.8 CM
LA WIDTH: 4.53 CM
LEFT ATRIUM SIZE: 3.85 CM
LEFT ATRIUM VOLUME INDEX: 46.7 ML/M2
LEFT ATRIUM VOLUME: 85.68 CM3
LEFT INTERNAL DIMENSION IN SYSTOLE: 3.16 CM (ref 2.1–4)
LEFT VENTRICLE DIASTOLIC VOLUME INDEX: 53.01 ML/M2
LEFT VENTRICLE DIASTOLIC VOLUME: 97.32 ML
LEFT VENTRICLE MASS INDEX: 93 G/M2
LEFT VENTRICLE SYSTOLIC VOLUME INDEX: 21.6 ML/M2
LEFT VENTRICLE SYSTOLIC VOLUME: 39.74 ML
LEFT VENTRICULAR INTERNAL DIMENSION IN DIASTOLE: 4.6 CM (ref 3.5–6)
LEFT VENTRICULAR MASS: 169.85 G
LV LATERAL E/E' RATIO: 17.13 M/S
LV SEPTAL E/E' RATIO: 22.83 M/S
MV PEAK A VEL: 1.48 M/S
MV PEAK E VEL: 1.37 M/S
NUC REST DIASTOLIC VOLUME INDEX: 111
NUC REST EJECTION FRACTION: 59
NUC REST SYSTOLIC VOLUME INDEX: 45
OHS CV CPX 1 MINUTE RECOVERY HEART RATE: 95 BPM
OHS CV CPX 85 PERCENT MAX PREDICTED HEART RATE MALE: 137
OHS CV CPX MAX PREDICTED HEART RATE: 161
OHS CV CPX PATIENT IS FEMALE: 1
OHS CV CPX PATIENT IS MALE: 0
OHS CV CPX PEAK DIASTOLIC BLOOD PRESSURE: 91 MMHG
OHS CV CPX PEAK HEAR RATE: 90 BPM
OHS CV CPX PEAK RATE PRESSURE PRODUCT: NORMAL
OHS CV CPX PEAK SYSTOLIC BLOOD PRESSURE: 190 MMHG
OHS CV CPX PERCENT MAX PREDICTED HEART RATE ACHIEVED: 56
OHS CV CPX RATE PRESSURE PRODUCT PRESENTING: NORMAL
PISA TR MAX VEL: 2.72 M/S
RA MAJOR: 3.79 CM
RA PRESSURE: 3 MMHG
RA WIDTH: 2.75 CM
RETIRED EF AND QEF - SEE NOTES: 51 %
RIGHT VENTRICULAR END-DIASTOLIC DIMENSION: 3.13 CM
SINUS: 2.86 CM
STJ: 2.03 CM
STRESS ECHO TARGET HR: 143.65 BPM
STRESS ST DEPRESSION: 1 MM
SYSTOLIC BLOOD PRESSURE: 190 MMHG
TDI LATERAL: 0.08 M/S
TDI SEPTAL: 0.06 M/S
TDI: 0.07 M/S
TR MAX PG: 30 MMHG
TRICUSPID ANNULAR PLANE SYSTOLIC EXCURSION: 2.18 CM
TV REST PULMONARY ARTERY PRESSURE: 33 MMHG

## 2019-09-11 PROCEDURE — 99999 PR PBB SHADOW E&M-EST. PATIENT-LVL I: ICD-10-PCS | Mod: PBBFAC,TXP,,

## 2019-09-11 PROCEDURE — 93016 CV STRESS TEST SUPVJ ONLY: CPT | Mod: S$PBB,TXP,, | Performed by: INTERNAL MEDICINE

## 2019-09-11 PROCEDURE — 93018 STRESS TEST WITH MYOCARDIAL PERFUSION (CUPID ONLY): ICD-10-PCS | Mod: S$PBB,TXP,, | Performed by: INTERNAL MEDICINE

## 2019-09-11 PROCEDURE — 93306 TTE W/DOPPLER COMPLETE: CPT | Mod: 26,TXP,, | Performed by: INTERNAL MEDICINE

## 2019-09-11 PROCEDURE — 93016 STRESS TEST WITH MYOCARDIAL PERFUSION (CUPID ONLY): ICD-10-PCS | Mod: S$PBB,TXP,, | Performed by: INTERNAL MEDICINE

## 2019-09-11 PROCEDURE — 93018 CV STRESS TEST I&R ONLY: CPT | Mod: S$PBB,TXP,, | Performed by: INTERNAL MEDICINE

## 2019-09-11 PROCEDURE — 78452 STRESS TEST WITH MYOCARDIAL PERFUSION (CUPID ONLY): ICD-10-PCS | Mod: 26,S$PBB,TXP, | Performed by: INTERNAL MEDICINE

## 2019-09-11 PROCEDURE — 99999 PR PBB SHADOW E&M-EST. PATIENT-LVL I: CPT | Mod: PBBFAC,TXP,,

## 2019-09-11 PROCEDURE — 78452 HT MUSCLE IMAGE SPECT MULT: CPT | Mod: PBBFAC,TXP | Performed by: INTERNAL MEDICINE

## 2019-09-11 PROCEDURE — 93306 TRANSTHORACIC ECHO (TTE) COMPLETE (CUPID ONLY): ICD-10-PCS | Mod: 26,TXP,, | Performed by: INTERNAL MEDICINE

## 2019-09-11 PROCEDURE — 99211 OFF/OP EST MAY X REQ PHY/QHP: CPT | Mod: PBBFAC,25,TXP

## 2019-09-11 PROCEDURE — 93306 TTE W/DOPPLER COMPLETE: CPT | Mod: TXP

## 2019-09-11 RX ORDER — REGADENOSON 0.08 MG/ML
0.4 INJECTION, SOLUTION INTRAVENOUS
Status: COMPLETED | OUTPATIENT
Start: 2019-09-11 | End: 2019-09-11

## 2019-09-11 RX ADMIN — REGADENOSON 0.4 MG: 0.08 INJECTION, SOLUTION INTRAVENOUS at 11:09

## 2019-09-18 ENCOUNTER — TELEPHONE (OUTPATIENT)
Dept: TRANSPLANT | Facility: CLINIC | Age: 54
End: 2019-09-18

## 2019-10-25 NOTE — PROGRESS NOTES
Subjective:      Patient ID: Kiesha Rocha is a 53 y.o. female.    Chief Complaint:  Thyroid Problem (New patient )    History of Present Illness  Kiesha Rocha presents today for evaluation & management of hyperthyroidism and Type 2 DM.  This is her first visit with me.     Patient is a poor historian.  She was found to have hyperthyroidism around 2010 and she was seeing endocrinology at another facility (unable to remember where). She has not seen an endo in awhile. Thinks she was just communicating with endo online. She is unsure if she has seen an endo. She was referred by her PCP as she needs kidney transplant.     On initial diagnosis, she presented with a goiter. She reported dysphagia, weight loss of about 40 pounds over the last 2 years, palpitations, heat intolerance, frequent BMs, and tremors on initial diagnosis. Current medication MMI 5 mg daily. States she has been on for the last 10 years. She has been on fluctuating doses.     Denies family history of thyroid disease.     current symptoms  + palpations; seeing cardio today   + weight loss  + frequent bm  + Hair loss  + Brittle nails  No skin changes  + tremor   + anxiety  + dysphagia  + mental fogginess    Denies Biotin usage    Denies any recent in the 6-12 months iodine or contrast   Thyroid tenderness and dysphagia. Denies radiation exposure to head or neck. No family history of thyroid disease. She reports she has not had thyroid ultrasound or thyroid uptake and scan.   Smoke-denies     Ref. Range 9/11/2019 11:38   TSH Latest Ref Range: 0.400 - 4.000 uIU/mL 0.323 (L)   T3, Total Latest Ref Range: 60 - 180 ng/dL 57 (L)   T4 Total Latest Ref Range: 4.5 - 11.5 ug/dL 6.1   Free T4 Latest Ref Range: 0.71 - 1.51 ng/dL 1.02     With regards to her Type 2 DM,     Diagnosed: in 1995 she had gestational diabetes and continued with Type 2 DM  Known complications:   DKA- none  RN-none  PN +   Nephropathy +-HD -TTHSat    Current regimen: On initial  visit in Oct 2019, she is taking glipizide 10 mg daily and Lantus 60 units daily.    Missed doses: Forgets Lantus at times but does not take when her BGs are <80. She reports she misses dose of Lantus once or twice monthly.     Other medications tried: Metformin stopped due to diarrhea. She has been on Actos in the past and stopped for unknown reasons.     3 times a day testing  Log reviewed: no, none Oral recall BGs range ; during the middle of the day, reports her BG is in 200's. BG's range 200-400 at night.    Hypoglycemic event? None in awhile  Knows how to correct with 15 grams of carbs- juice, coke, or a peppermint.     Eats 3 meals a day: 1 time daily   Snacks: sometimes on peanut butter jelly sandwich. Discussed that is a meal and not a snack. She is also snacking on fruits and candy bars.   Drinks: water, juice, and diet root beer.     Exercise - tried to stay active. She is sometimes walking.   Education - last visit: states 3-4 months ago (outside facility)    Has a Medic alert tag? none  Glucagon: none    Diabetes Management Status  Statin: Not taking  ACE/ARB: Taking    Denies family history of thyroid cancer or personal history of pancreatitis.     Lab Results   Component Value Date    HGBA1C 7.7 (H) 08/21/2019    HGBA1C 9.4 (H) 06/12/2019     Screening or Prevention Patient's value Goal Complete/Controlled?   HgA1C Testing and Control   Lab Results   Component Value Date    HGBA1C 7.7 (H) 08/21/2019      Annually/Less than 8% Yes   Lipid profile : 08/21/2019 Annually Yes   LDL control Lab Results   Component Value Date    LDLCALC 107.4 08/21/2019    Annually/Less than 100 mg/dl  No   Nephropathy screening No results found for: LABMICR  No results found for: PROTEINUA Annually No   Blood pressure BP Readings from Last 1 Encounters:   10/29/19 (!) 140/82    Less than 140/90 No   Dilated retinal exam Most Recent Eye Exam Date: Not Found Annually Yes   Foot exam   : 10/29/2019 Annually Yes  "    Review of Systems   Constitutional: Positive for fatigue and unexpected weight change (loss).   Eyes: Negative for visual disturbance.   Respiratory: Negative for shortness of breath.    Cardiovascular: Positive for palpitations. Negative for chest pain.   Gastrointestinal: Positive for diarrhea. Negative for abdominal pain.   Musculoskeletal: Negative for arthralgias.   Skin: Negative for wound.   Neurological: Positive for tremors (hand). Negative for headaches.   Hematological: Does not bruise/bleed easily.   Psychiatric/Behavioral: Positive for agitation. Negative for sleep disturbance. The patient is nervous/anxious and is hyperactive.      Objective:   Physical Exam   Constitutional: She is oriented to person, place, and time. She appears well-developed. No distress.   HENT:   Right Ear: External ear normal.   Left Ear: External ear normal.   Hearing Normal     Neck: No tracheal deviation present. No thyromegaly present.   No nodules.   Cardiovascular: Normal rate.   No murmur heard.  No edema present   Pulmonary/Chest: Effort normal and breath sounds normal.   Abdominal: Soft. She exhibits no mass. No hernia.   Genitourinary:   Genitourinary Comments: Left arm fistula + thrill and bruit   Musculoskeletal: Normal range of motion. She exhibits no edema.   Neurological: She is alert and oriented to person, place, and time. No sensory deficit. Coordination normal.   Skin: No rash noted. No pallor.   Psychiatric: She has a normal mood and affect. Judgment normal.   Vitals reviewed.  Diabetes Foot Exam:   Feet no cuts or  scratches  Shoes appropriate  sensation intact to vibration and monofilament    Visit Vitals  BP (!) 140/82   Pulse 99   Ht 5' 4" (1.626 m)   Wt 82.2 kg (181 lb 1.7 oz)   BMI 31.09 kg/m²        Lab Review:   Lab Results   Component Value Date    HGBA1C 7.7 (H) 08/21/2019     Lab Results   Component Value Date    CHOL 222 (H) 08/21/2019    HDL 46 08/21/2019    LDLCALC 107.4 08/21/2019    TRIG " 343 (H) 08/21/2019    CHOLHDL 20.7 08/21/2019     Lab Results   Component Value Date     08/21/2019    K 3.8 08/21/2019    CL 95 08/21/2019    CO2 26 08/21/2019     (H) 08/21/2019    BUN 36 (H) 08/21/2019    CREATININE 6.4 (H) 08/21/2019    CALCIUM 10.0 08/21/2019    PROT 8.4 08/21/2019    ALBUMIN 3.7 08/21/2019    BILITOT 0.6 08/21/2019    ALKPHOS 121 08/21/2019    AST 14 08/21/2019    ALT 10 08/21/2019    ANIONGAP 17 (H) 08/21/2019    ESTGFRAFRICA 8.0 (A) 08/21/2019    EGFRNONAA 6.9 (A) 08/21/2019    TSH 0.323 (L) 09/11/2019     No results found for: [id number]  Assessment and Plan     1. Diabetes mellitus due to underlying condition with stage 5 chronic kidney disease  Comprehensive metabolic panel    Ambulatory Referral to Diabetes Education    linaGLIPtin (TRADJENTA) 5 mg Tab tablet    insulin degludec (TRESIBA FLEXTOUCH U-200) 200 unit/mL (3 mL) In    Comprehensive metabolic panel    Hemoglobin A1c    glucagon, human recombinant, (GLUCAGON EMERGENCY KIT, HUMAN,) 1 mg SolR   2. BMI 31.0-31.9,adult     3. End stage renal disease     4. Hyperthyroidism  TSH    Thyrotropin receptor antibody    Thyroid stimulating immunoglobulin    Thyroid peroxidase antibody    T4, free    CBC auto differential    NM Thyroid Uptake and Scan   5. Hypertriglyceridemia       Diabetes mellitus due to underlying condition with stage 5 chronic kidney disease  -- Reviewed goals of therapy are to get the best control we can without hypoglycemia  -- Refer to diabetes education  Medication changes:   Switch Lantus to Tresiba and decrease to 52 units daily  Start: Tradjenta 5 mg daily  Continue: glipizide 10 mg daily  -- Reviewed patient's current insulin regimen. Clarified proper insulin dose and timing in relation to meals, etc. Insulin injection sites and proper rotation instructed.    -- Advised frequent self blood glucose monitoring.  Patient encouraged to document glucose results and bring them to every clinic visit     -- Hypoglycemia precautions discussed. Instructed on precautions before driving.    -- Call for Bg repeatedly < 90 or > 180.   -- Close adherence to lifestyle changes recommended.   -- Periodic follow ups for eye evaluations, foot care and dental care suggested.  --Encouraged exercise per ADA guidelines of 150 minutes weekly.   --Discussed plate method  --Given list of low carb snacks.    BMI 31.0-31.9,adult  -Encouraged renal and diabetic diet and exercise     End stage renal disease  -Follow up with nephro     Hyperthyroidism  -- Differential diagnosis includes Graves, MNG/toxic nodule, thyroiditis.    -- due to patient being symptomatic, continue methimazole 5 mg daily.  Encouraged her to let me know if he develops a rash, fever, or sore throat while taking this medication.    -- Plan repeat labs with thyroid antibodies on RTC  -- Plan thyroid scan and uptake, instructed to stop taking methimazole 5 days prior to scan.   -- Cause-specific treatment options and associated risks discussed with patient.   -- Reviewed possible options of methimazole, I131 therapy and surgery   -- Handout provided on a low iodine diet and discussed to follow for 7 days prior, day of, and 3 days after for a total of 10 days. Patient verbalized understanding.   -- Handout provided on outpatient i131 treamtent. All questions were answered.  -- RTC after labs and scan done        Hypertriglyceridemia  -Not controlled   -Not on statin per ADA recommendations  -Encouraged to avoid fried fatty foods   -Encouraged exercise       Follow up in about 3 months (around 1/29/2020).  Labs prior to next appointment with me   Thyroid uptake and scan before next visit

## 2019-10-28 ENCOUNTER — TELEPHONE (OUTPATIENT)
Dept: CARDIOLOGY | Facility: CLINIC | Age: 54
End: 2019-10-28

## 2019-10-28 DIAGNOSIS — R00.2 PALPITATIONS: Primary | ICD-10-CM

## 2019-10-29 ENCOUNTER — OFFICE VISIT (OUTPATIENT)
Dept: CARDIOLOGY | Facility: CLINIC | Age: 54
End: 2019-10-29
Payer: MEDICARE

## 2019-10-29 ENCOUNTER — HOSPITAL ENCOUNTER (OUTPATIENT)
Dept: RADIOLOGY | Facility: HOSPITAL | Age: 54
Discharge: HOME | End: 2019-10-29
Attending: NURSE PRACTITIONER
Payer: MEDICARE

## 2019-10-29 ENCOUNTER — OFFICE VISIT (OUTPATIENT)
Dept: ENDOCRINOLOGY | Facility: CLINIC | Age: 54
End: 2019-10-29
Payer: MEDICARE

## 2019-10-29 ENCOUNTER — TELEPHONE (OUTPATIENT)
Dept: ENDOCRINOLOGY | Facility: CLINIC | Age: 54
End: 2019-10-29

## 2019-10-29 VITALS
DIASTOLIC BLOOD PRESSURE: 82 MMHG | HEIGHT: 64 IN | WEIGHT: 181.13 LBS | BODY MASS INDEX: 30.92 KG/M2 | HEART RATE: 99 BPM | SYSTOLIC BLOOD PRESSURE: 140 MMHG

## 2019-10-29 VITALS
WEIGHT: 181.69 LBS | SYSTOLIC BLOOD PRESSURE: 156 MMHG | HEART RATE: 70 BPM | HEIGHT: 64 IN | BODY MASS INDEX: 31.02 KG/M2 | DIASTOLIC BLOOD PRESSURE: 69 MMHG

## 2019-10-29 DIAGNOSIS — N18.5 DIABETES MELLITUS DUE TO UNDERLYING CONDITION WITH STAGE 5 CHRONIC KIDNEY DISEASE: Chronic | ICD-10-CM

## 2019-10-29 DIAGNOSIS — E08.22 DIABETES MELLITUS DUE TO UNDERLYING CONDITION WITH STAGE 5 CHRONIC KIDNEY DISEASE: Chronic | ICD-10-CM

## 2019-10-29 DIAGNOSIS — N18.6 END STAGE RENAL DISEASE: Primary | ICD-10-CM

## 2019-10-29 DIAGNOSIS — E05.90 HYPERTHYROIDISM: ICD-10-CM

## 2019-10-29 DIAGNOSIS — E78.1 HYPERTRIGLYCERIDEMIA: ICD-10-CM

## 2019-10-29 DIAGNOSIS — Z76.82 ORGAN TRANSPLANT CANDIDATE: ICD-10-CM

## 2019-10-29 DIAGNOSIS — N18.6 END STAGE RENAL DISEASE: ICD-10-CM

## 2019-10-29 PROCEDURE — 99214 OFFICE O/P EST MOD 30 MIN: CPT | Mod: PBBFAC,TXP | Performed by: NURSE PRACTITIONER

## 2019-10-29 PROCEDURE — 77067 SCR MAMMO BI INCL CAD: CPT | Mod: TC,TXP

## 2019-10-29 PROCEDURE — 77067 SCR MAMMO BI INCL CAD: CPT | Mod: 26,TXP,, | Performed by: RADIOLOGY

## 2019-10-29 PROCEDURE — 99204 PR OFFICE/OUTPT VISIT, NEW, LEVL IV, 45-59 MIN: ICD-10-PCS | Mod: S$PBB,TXP,, | Performed by: NURSE PRACTITIONER

## 2019-10-29 PROCEDURE — 99999 PR PBB SHADOW E&M-EST. PATIENT-LVL IV: CPT | Mod: PBBFAC,TXP,, | Performed by: NURSE PRACTITIONER

## 2019-10-29 PROCEDURE — 99204 PR OFFICE/OUTPT VISIT, NEW, LEVL IV, 45-59 MIN: ICD-10-PCS | Mod: S$PBB,TXP,, | Performed by: INTERNAL MEDICINE

## 2019-10-29 PROCEDURE — 99999 PR PBB SHADOW E&M-EST. PATIENT-LVL IV: ICD-10-PCS | Mod: PBBFAC,TXP,, | Performed by: NURSE PRACTITIONER

## 2019-10-29 PROCEDURE — 99204 OFFICE O/P NEW MOD 45 MIN: CPT | Mod: S$PBB,TXP,, | Performed by: NURSE PRACTITIONER

## 2019-10-29 PROCEDURE — 99999 PR PBB SHADOW E&M-EST. PATIENT-LVL III: CPT | Mod: PBBFAC,TXP,, | Performed by: INTERNAL MEDICINE

## 2019-10-29 PROCEDURE — 99213 OFFICE O/P EST LOW 20 MIN: CPT | Mod: PBBFAC,27,TXP | Performed by: INTERNAL MEDICINE

## 2019-10-29 PROCEDURE — 99204 OFFICE O/P NEW MOD 45 MIN: CPT | Mod: S$PBB,TXP,, | Performed by: INTERNAL MEDICINE

## 2019-10-29 PROCEDURE — 77067 MAMMO DIGITAL SCREENING BILAT WITH CAD: ICD-10-PCS | Mod: 26,TXP,, | Performed by: RADIOLOGY

## 2019-10-29 PROCEDURE — 99999 PR PBB SHADOW E&M-EST. PATIENT-LVL III: ICD-10-PCS | Mod: PBBFAC,TXP,, | Performed by: INTERNAL MEDICINE

## 2019-10-29 RX ORDER — METHOCARBAMOL 500 MG/1
TABLET, FILM COATED ORAL
COMMUNITY
Start: 2018-10-26 | End: 2023-10-04

## 2019-10-29 RX ORDER — INSULIN DEGLUDEC 200 U/ML
52 INJECTION, SOLUTION SUBCUTANEOUS DAILY
Qty: 5 SYRINGE | Refills: 3 | Status: SHIPPED | OUTPATIENT
Start: 2019-10-29 | End: 2020-01-28

## 2019-10-29 RX ORDER — PEN NEEDLE, DIABETIC 30 GX3/16"
NEEDLE, DISPOSABLE MISCELLANEOUS
COMMUNITY

## 2019-10-29 RX ORDER — FUROSEMIDE 40 MG/1
TABLET ORAL
COMMUNITY
Start: 2019-08-01

## 2019-10-29 RX ORDER — OLOPATADINE HYDROCHLORIDE 2 MG/ML
SOLUTION/ DROPS OPHTHALMIC
Refills: 5 | COMMUNITY
Start: 2019-10-14

## 2019-10-29 RX ORDER — METOPROLOL SUCCINATE 100 MG/1
200 TABLET, EXTENDED RELEASE ORAL DAILY
COMMUNITY
Start: 2015-03-02 | End: 2023-10-04

## 2019-10-29 NOTE — ASSESSMENT & PLAN NOTE
-- Reviewed goals of therapy are to get the best control we can without hypoglycemia  -- Refer to diabetes education  Medication changes:   Switch Lantus to Tresiba and decrease to 52 units daily  Start: Tradjenta 5 mg daily  Continue: glipizide 10 mg daily  -- Reviewed patient's current insulin regimen. Clarified proper insulin dose and timing in relation to meals, etc. Insulin injection sites and proper rotation instructed.    -- Advised frequent self blood glucose monitoring.  Patient encouraged to document glucose results and bring them to every clinic visit    -- Hypoglycemia precautions discussed. Instructed on precautions before driving.    -- Call for Bg repeatedly < 90 or > 180.   -- Close adherence to lifestyle changes recommended.   -- Periodic follow ups for eye evaluations, foot care and dental care suggested.  --Encouraged exercise per ADA guidelines of 150 minutes weekly.   --Discussed plate method  --Given list of low carb snacks.

## 2019-10-29 NOTE — ASSESSMENT & PLAN NOTE
-Not controlled   -Not on statin per ADA recommendations  -Encouraged to avoid fried fatty foods   -Encouraged exercise

## 2019-10-29 NOTE — LETTER
October 29, 2019      Caitlin Stevens, COLTEN  [address]  [facility]  1st Floor Clinic  [facility] [address]           [facility]  [address]  [facility] [address]  Phone: [phone]          Patient: Kiesha Rocha   MR Number: [id number]   YOB: 1965   Date of Visit: 10/29/2019       Dear Caitlin Stevens:    Thank you for referring Kiesha Rocha to me for evaluation. Attached you will find relevant portions of my assessment and plan of care.    If you have questions, please do not hesitate to call me. I look forward to following Kiesha Rocha along with you.    Sincerely,    Shashi Harrison MD    Enclosure  CC:  No Recipients    If you would like to receive this communication electronically, please contact [email or web address] or [phone] to request more information on [employer]Check[employer] Link access.    For providers and/or their staff who would like to refer a patient to [facility], please contact us through our one-stop-shop provider referral line, [facility], at [phone].    If you feel you have received this communication in error or would no longer like to receive these types of communications, please e-mail [email or web address]

## 2019-10-29 NOTE — PATIENT INSTRUCTIONS
Snacks can be an important part of a balanced, healthy meal plan. They allow you to eat more frequently, feeling full and satisfied throughout the day. Also, they allow you to spread carbohydrates evenly, which may stabilize blood sugars.  Plus, snacks are enjoyable!     The amount of carbohydrate needed at snacks varies. Generally, about 15-30 grams of carbohydrate per snack is recommended.  Below you will find some tasty treats.       0-5 gm carb   Crystal Light   Vitamin Water Zero   Herbal tea, unsweetened   2 tsp peanut butter on celery   1./2 cup sugar-free jell-o   1 sugar-free popsicle   ¼ cup blueberries   8oz Blue Karley unsweetened almond milk   5 baby carrots & celery sticks, cucumbers, bell peppers dipped in ¼ cup salsa, 2Tbsp light ranch dressing or 2Tbsp plain Greek yogurt   10 Goldfish crackers   ½ oz low-fat cheese or string cheese   1 closed handful of nuts, unsalted   1 Tbsp of sunflower seeds, unsalted   1 cup Smart Pop popcorn   1 whole grain brown rice cake        15 gm carb   1 small piece of fruit or ½ banana or 1/2 cup lite canned fruit   3 amina cracker squares   3 cups Smart Pop popcorn, top spray butter, [place] lite salt or cinnamon and Truvia   5 Vanilla Wafers   ½ cup low fat, no added sugar ice cream or frozen yogurt (Blue bell, Blue Bunny, Weight Watchers, Skinny Cow)   ½ turkey, ham, or chicken sandwich   ½ c fruit with ½ c Cottage cheese   4-6 unsalted wheat crackers with 1 oz low fat cheese or 1 tbsp peanut butter    30-45 goldfish crackers (depending on flavor)    7-8 Gnosticist mini brown rice cakes (caramel, apple cinnamon, chocolate)    12 Gnosticist mini brown rice cakes (cheddar, bbq, ranch)    1/3 cup hummus dip with raw veg   1/2 whole wheat penny, 1Tbsp hummus   Mini Pizza (1/2 whole wheat English muffin, low-fat  cheese, tomato sauce)   100 calorie snack pack (Oreo, Chips Ahoy, Ritz Mix, Baked Cheetos)   4-6 oz. light or Greek Style yogurt  (Choleylai, Yoplaimedina, [facility])   ½ cup sugar-free pudding     6 in. wheat tortilla or penny oven toasted chips (topped with spray butter flavoring, cinnamon, Truvia OR spray butter, garlic powder, chili powder)    18 BBQ Popchips (available at Target, Whole Foods, Fresh Market)     Switch Lantus to Tresiba and decrease to 52 units   Start Tradjenta 5 mg daily  We will do a thyroid uptake and scan. Stop Methimazole 5 days prior to scan.

## 2019-10-29 NOTE — PROGRESS NOTES
Subjective:    Patient ID:  Kiesha Rocha is a 53 y.o. female who presents for evaluation of Pre-op Exam (kidney transplant )      HPI   Ms. Rocha is a 53 y.o. year old female with insulin dependent diabetes  presents for CV evaluation as a potential kidney transplant recipient.  She has ESRD secondary to diabetic nephropathy.She denies chest pain or FULLER, is a non smoker and has no family history of CAD. She is active walks and exercises regularly.      Conclusion 9/11/19    · Normal left ventricular systolic function. The estimated ejection fraction is 65%  · Concentric left ventricular remodeling.  · Grade II (moderate) left ventricular diastolic dysfunction consistent with pseudonormalization.  · Elevated left atrial pressure.  · No wall motion abnormalities.  · Normal right ventricular systolic function.  · Moderate left atrial enlargement.  · The estimated PA systolic pressure is 33 mm Hg  · Normal central venous pressure (3 mm Hg).        Conclusion 9/11/19         The perfusion scan is free of evidence from myocardial ischemia or injury.    Gated perfusion images showed an ejection fraction of 61.0 % at rest. Normal is >51%.    There is normal wall motion at rest.    LV cavity size is normal at rest.    The EKG portion of this study is abnormal but not diagnostic.    There were no arrhythmias during stress.    The patient reported no chest pain during the stress test.    There are no prior studies for comparison.            Lab Results   Component Value Date     08/21/2019    K 3.8 08/21/2019    CL 95 08/21/2019    CO2 26 08/21/2019    BUN 36 (H) 08/21/2019    CREATININE 6.4 (H) 08/21/2019     (H) 08/21/2019    HGBA1C 7.7 (H) 08/21/2019    AST 14 08/21/2019    ALT 10 08/21/2019    ALBUMIN 3.7 08/21/2019    PROT 8.4 08/21/2019    BILITOT 0.6 08/21/2019    WBC 7.09 08/21/2019    HGB 9.5 (L) 08/21/2019    HCT 29.5 (L) 08/21/2019    MCV 94 08/21/2019     08/21/2019    INR 0.9  08/21/2019    TSH 0.323 (L) 09/11/2019         Lab Results   Component Value Date    CHOL 222 (H) 08/21/2019    HDL 46 08/21/2019    TRIG 343 (H) 08/21/2019       Lab Results   Component Value Date    LDLCALC 107.4 08/21/2019       Past Medical History:   Diagnosis Date    Anemia     Anxiety     Diabetes mellitus     Disorder of kidney and ureter     Encounter for blood transfusion     GERD (gastroesophageal reflux disease)     Hydronephrosis     Hyperlipidemia     Hypertension     Hyperthyroidism     Obesity     Thyroid disease        Current Outpatient Medications:     amLODIPine (NORVASC) 10 MG tablet, Take 10 mg by mouth once daily., Disp: , Rfl:     calcium acetate (PHOSLO) 667 mg capsule, Take 2 capsules by mouth 3 (three) times daily with meals. Take 1 capsule with snacks, Disp: , Rfl: 3    cetirizine (ZYRTEC) 10 MG tablet, Take 10 mg by mouth daily as needed for Allergies., Disp: , Rfl:     cyclobenzaprine (FLEXERIL) 10 MG tablet, Take 10 mg by mouth 2 (two) times daily as needed., Disp: , Rfl:     FLUoxetine 20 MG capsule, Take 20 mg by mouth once daily., Disp: , Rfl:     fluticasone propionate (FLONASE) 50 mcg/actuation nasal spray, 1 spray by Each Nostril route daily as needed., Disp: , Rfl:     furosemide (LASIX) 40 MG tablet, TAKE 1 TABLET BY MOUTH DAILY, Disp: , Rfl:     gabapentin (NEURONTIN) 600 MG tablet, Take 600 mg by mouth every evening. , Disp: , Rfl:     glipiZIDE (GLUCOTROL) 10 MG tablet, Take 10 mg by mouth daily with breakfast. , Disp: , Rfl:     glucagon, human recombinant, (GLUCAGON EMERGENCY KIT, HUMAN,) 1 mg SolR, Inject 1 mg into the muscle as needed., Disp: 1 each, Rfl: 0    insulin (LANTUS SOLOSTAR U-100 INSULIN) glargine 100 units/mL (3mL) SubQ pen, Inject 60 Units into the skin once daily., Disp: , Rfl:     insulin degludec (TRESIBA FLEXTOUCH U-200) 200 unit/mL (3 mL) InPn, Inject 52 Units into the skin once daily., Disp: 5 Syringe, Rfl: 3    linaGLIPtin  "(TRADJENTA) 5 mg Tab tablet, Take 1 tablet (5 mg total) by mouth once daily., Disp: 90 tablet, Rfl: 3    lisinopril (PRINIVIL,ZESTRIL) 20 MG tablet, Take 20 mg by mouth once daily., Disp: , Rfl:     methIMAzole (TAPAZOLE) 5 MG Tab, Take 5 mg by mouth once daily. , Disp: , Rfl:     methocarbamol (ROBAXIN) 500 MG Tab, , Disp: , Rfl:     metoprolol succinate (TOPROL-XL) 100 MG 24 hr tablet, Take 200 mg by mouth once daily. , Disp: , Rfl:     montelukast (SINGULAIR) 10 mg tablet, Take 10 mg by mouth once daily., Disp: , Rfl:     multivitamin with minerals tablet, Take 1 tablet by mouth once daily., Disp: , Rfl:     olopatadine (PATADAY) 0.2 % Drop, place 1 DROP IN EACH EYE EVERY DAY, Disp: , Rfl: 5    ondansetron (ZOFRAN) 4 MG tablet, Take 4 mg by mouth every 8 (eight) hours as needed., Disp: , Rfl: 2    pen needle, diabetic (BD ULTRA-FINE SHORT PEN NEEDLE) 31 gauge x 5/16" Ndle, BD Ultra-Fine Short Pen Needle 31 gauge x 5/16", Disp: , Rfl:     sodium bicarbonate 650 MG tablet, Take 650 mg by mouth 3 (three) times daily. , Disp: , Rfl:     metoprolol tartrate (LOPRESSOR) 50 MG tablet, Take 50 mg by mouth 2 (two) times daily. , Disp: , Rfl:   No current facility-administered medications for this visit.     Facility-Administered Medications Ordered in Other Visits:     0.9%  NaCl infusion, 20 mL/hr, Intravenous, Continuous, Shashi Krishnan MD, Last Rate: 150 mL/hr at 06/12/19 1205    mupirocin 2 % ointment, , Nasal, On Call Procedure, Bautista Vasques MD          Review of Systems   Constitution: Negative for decreased appetite, diaphoresis, fever, malaise/fatigue, weight gain and weight loss.   HENT: Negative for congestion, ear discharge, ear pain and nosebleeds.    Eyes: Negative for blurred vision, double vision and visual disturbance.   Cardiovascular: Negative for chest pain, claudication, cyanosis, dyspnea on exertion, irregular heartbeat, leg swelling, near-syncope, orthopnea, palpitations, " "paroxysmal nocturnal dyspnea and syncope.   Respiratory: Negative for cough, hemoptysis, shortness of breath, sleep disturbances due to breathing, snoring, sputum production and wheezing.    Endocrine: Negative for polydipsia, polyphagia and polyuria.   Hematologic/Lymphatic: Negative for adenopathy and bleeding problem. Does not bruise/bleed easily.   Skin: Negative for color change, nail changes, poor wound healing and rash.   Musculoskeletal: Negative for muscle cramps and muscle weakness.   Gastrointestinal: Negative for abdominal pain, anorexia, change in bowel habit, hematochezia, nausea and vomiting.   Genitourinary: Negative for dysuria, frequency and hematuria.   Neurological: Negative for brief paralysis, difficulty with concentration, excessive daytime sleepiness, dizziness, focal weakness, headaches, light-headedness, seizures, vertigo and weakness.   Psychiatric/Behavioral: Negative for altered mental status and depression.   Allergic/Immunologic: Negative for persistent infections.        Objective:BP (!) 156/69 (BP Location: Right arm, Patient Position: Sitting, BP Method: Large (Automatic))   Pulse 70   Ht 5' 4" (1.626 m)   Wt 82.4 kg (181 lb 10.5 oz)   BMI 31.18 kg/m²             Physical Exam   Constitutional: She is oriented to person, place, and time. She appears well-developed and well-nourished.   HENT:   Head: Normocephalic.   Right Ear: External ear normal.   Left Ear: External ear normal.   Nose: Nose normal.   Inspection of lips, teeth and gums normal   Eyes: Pupils are equal, round, and reactive to light. Conjunctivae and EOM are normal. No scleral icterus.   Neck: Normal range of motion. No JVD present. No tracheal deviation present. No thyromegaly present.   Cardiovascular: Normal rate, regular rhythm, S1 normal, S2 normal and intact distal pulses. Exam reveals no gallop and no friction rub.   No murmur heard.  Pulses:       Dorsalis pedis pulses are 2+ on the right side, and 2+ on " the left side.   Pulmonary/Chest: Effort normal and breath sounds normal. No respiratory distress. She has no wheezes. She has no rales. She exhibits no tenderness.   Abdominal: Soft. Bowel sounds are normal. She exhibits no distension. There is no hepatosplenomegaly. There is no tenderness. There is no guarding.   Musculoskeletal: Normal range of motion. She exhibits no edema or tenderness.   Lymphadenopathy:   Palpation of lymph nodes of neck and groin normal   Neurological: She is oriented to person, place, and time. No cranial nerve deficit. She exhibits normal muscle tone. Coordination normal.   Skin: Skin is warm and dry. No rash noted. No erythema. No pallor.        No ankle nor pretibial edema   Psychiatric: She has a normal mood and affect. Her behavior is normal. Judgment and thought content normal.         Assessment:       1. End stage renal disease    2. Diabetes mellitus due to underlying condition with stage 5 chronic kidney disease    3. BMI 31.0-31.9,adult    4. Hyperthyroidism         Plan:       Kiesha was seen today for pre-op exam.    Diagnoses and all orders for this visit:    End stage renal disease    Diabetes mellitus due to underlying condition with stage 5 chronic kidney disease    BMI 31.0-31.9,adult    Hyperthyroidism    acceptable CV risk for moderate risk surgery

## 2019-10-29 NOTE — ASSESSMENT & PLAN NOTE
-- Differential diagnosis includes Graves, MNG/toxic nodule, thyroiditis.    -- due to patient being symptomatic, continue methimazole 5 mg daily.  Encouraged her to let me know if he develops a rash, fever, or sore throat while taking this medication.    -- Plan repeat labs with thyroid antibodies on RTC  -- Plan thyroid scan and uptake, instructed to stop taking methimazole 5 days prior to scan.   -- Cause-specific treatment options and associated risks discussed with patient.   -- Reviewed possible options of methimazole, I131 therapy and surgery   -- Handout provided on a low iodine diet and discussed to follow for 7 days prior, day of, and 3 days after for a total of 10 days. Patient verbalized understanding.   -- Handout provided on outpatient i131 treamtent. All questions were answered.  -- RTC after labs and scan done

## 2019-10-29 NOTE — TELEPHONE ENCOUNTER
Pt need apt for Thyroid uptake scan . Pt will be doing dayalisys on Tuesday, Thursday and sat. . Please scheduled different days.   Let me know once pt scheduled.     Thank you.

## 2019-10-30 ENCOUNTER — TELEPHONE (OUTPATIENT)
Dept: ENDOCRINOLOGY | Facility: CLINIC | Age: 54
End: 2019-10-30

## 2019-10-30 NOTE — TELEPHONE ENCOUNTER
Called pt and notified both apt date for NM thyroid scan and pt stated that she cant make it for 2 days . Pt having issues with transportation. Pt will call back with time and date that she arrange transportation  schedule.

## 2019-11-07 ENCOUNTER — HOSPITAL ENCOUNTER (OUTPATIENT)
Dept: RADIOLOGY | Facility: HOSPITAL | Age: 54
Discharge: HOME | End: 2019-11-07
Attending: NURSE PRACTITIONER
Payer: MEDICARE

## 2019-11-07 DIAGNOSIS — E05.90 HYPERTHYROIDISM: ICD-10-CM

## 2019-11-11 ENCOUNTER — CLINICAL SUPPORT (OUTPATIENT)
Dept: DIABETES | Facility: CLINIC | Age: 54
End: 2019-11-11
Payer: MEDICARE

## 2019-11-11 DIAGNOSIS — E08.22 DIABETES MELLITUS DUE TO UNDERLYING CONDITION WITH STAGE 5 CHRONIC KIDNEY DISEASE: Chronic | ICD-10-CM

## 2019-11-11 DIAGNOSIS — N18.5 DIABETES MELLITUS DUE TO UNDERLYING CONDITION WITH STAGE 5 CHRONIC KIDNEY DISEASE: Chronic | ICD-10-CM

## 2019-11-11 PROCEDURE — 99999 PR PBB SHADOW E&M-EST. PATIENT-LVL I: ICD-10-PCS | Mod: PBBFAC,,, | Performed by: DIETITIAN, REGISTERED

## 2019-11-11 PROCEDURE — G0108 DIAB MANAGE TRN  PER INDIV: HCPCS | Mod: PBBFAC | Performed by: DIETITIAN, REGISTERED

## 2019-11-11 PROCEDURE — 99999 PR PBB SHADOW E&M-EST. PATIENT-LVL I: CPT | Mod: PBBFAC,,, | Performed by: DIETITIAN, REGISTERED

## 2019-11-11 PROCEDURE — 99211 OFF/OP EST MAY X REQ PHY/QHP: CPT | Mod: PBBFAC | Performed by: DIETITIAN, REGISTERED

## 2019-11-11 NOTE — PROGRESS NOTES
Diabetes Education  Author: Lois Butcher RD, CDE  Date: 11/11/2019    Diabetes Care Management Summary  Diabetes Education Record Assessment: Initial    Current Diabetes Risk Level: Moderate     Last A1c:   Lab Results   Component Value Date    HGBA1C 7.7 (H) 08/21/2019     Last visit with Diabetes Educator: none noted; does meet regularly with BA APODACA    Diabetes Type : Type II  Diabetes Diagnosis: >10 years      Current Treatment: Oral Medication, Insulin  tradjenta 5 mg daily;   tresiba 42-52 units nightly;   glipizide 10 mg daily (stopped about a week ago)            Reviewed Problem List with Patient: Yes    Health Maintenance was reviewed today with patient. Discussed with patient importance of routine eye exams, foot exams/foot care, blood work (i.e.: A1c, microalbumin, and lipid), dental visits, yearly flu vaccine, and pneumonia vaccine as indicated by PCP. Patient verbalized understanding.     Health Maintenance Topics with due status: Not Due       Topic Last Completion Date    Lipid Panel 08/21/2019    Foot Exam 10/29/2019    Mammogram 10/29/2019     Health Maintenance Due   Topic Date Due    TETANUS VACCINE  12/11/1985    Colonoscopy  12/11/2017    Pneumococcal Vaccine (Highest Risk) (2 of 3 - PPSV23) 01/30/2019       Nutrition  Meal Planning: (1 meal daily; snacking on rice krispie treats, candy)  What type of beverages do you drink?: regular soda/tea    Monitoring   Self Monitoring : (SMBG 3-4 times daily)  Blood Glucose Logs: No  Do you use a personal continuous glucose monitor?: No  In the last month, how often have you had a low blood sugar reaction?: once (reports hypoglycemia a few times since changing to Tresiba 52 units nightly; has been lowering Tresiba dose based on evening BG's so she will not drop overnight)    Social History  Preferred Learning Method: Face to Face, Demonstration, Hands On  Primary Support: Self, Daughter (daughter present at visit)  Smoking Status: Never a  Smoker  Barriers to Change: None  Learning Challenges : None  Readiness to Learn : Acceptance  Cultural Influences: No        Diabetes Education Assessment/Progress  -Diabetes Disease Process (diabetes disease process and treatment options): Discussion, Instructed, Individual Session, Written Materials Provided, Comprehends Key Points  Reviewed disease process and general progression. Discussed pt's most likely cause of recently elevated Hgb A1c. Reviewed current treatment plan and discussed all treatment options available, especially dietary and lifestyle modifications, as well as medication additions and/or changes.    -Nutrition (Incorporating nutritional management into one's lifestyle): Discussion, Instructed, Individual Session, Written Materials Provided, Comprehends Key Points  Emphasized importance of eliminating all SSB. Discussed SF drink options. Reviewed all carb vs non-carb containing foods and discussed the appropriate amounts of carb to have at meals vs snacks. Reviewed need to limit total/saturated fats despite lesser effect on BG increase. Encouraged carb sources primarily from whole grains, fresh/frozen fruits, and low-fat milk and yogurt. Discussed meal plans and snack ideas amenable to pt.     -Medications (states correct name, dose, onset, peak, duration, side effects & timing of meds): Discussion, Instructed, Individual Session, Written Materials Provided, Comprehends Key Points  Discussed timing and MOA of tradjenta. Discussed possible side effects and how to avoid these.  Reviewed reported BG's with endo NP S. Scobel - recommended to decrease Tresiba to 42 units nightly and resume glipizide with biggest meal daily. Reviewed need for rotation of injection sites, appropriate insulin storage, and insulin pen use.     -Acute Complications (preventing, detecting, and treating acute complications): Discussion, Instructed, Individual Session, Written Materials Provided, Comprehends Key  Points  Discussed hypoglycemia vs hyperglycemia symptoms and discussed appropriate treatments for each. Reviewed that pt is at high risk of hypo with current medication regimen. Discussed general vs severe hyperglycemia and risk of DKA.    -Chronic Complications (preventing, detecting, and treating chronic complications): Discussion, Instructed, Individual Session, Written Materials Provided, Comprehends Key Points  Reviewed annual diabetes care schedule and patient priorities.    -Clinical (diabetes, other pertinent medical history, and relevant comorbidities reviewed during visit): Discussion    -Cognitive (knowledge of self-management skills, functional health literacy): Individual Session  Arrives with general health management knowledge - little specific knowledge of diabetes management. Leaves with increased knowledge base - will benefit from f/u in 3 months.    -Psychosocial (emotional response to diabetes): Individual Session  No negative feelings toward diabetes dx or disease management noted.    -Behavioral (readiness for change, lifestyle practices, self-care behaviors): Individual Session  Question motivation to make recommended changes.         Goals  Patient has selected/evaluated goals during today's session: Yes, selected    Healthy Eating: Set (Eliminate all SSB)  Start Date: 11/11/19  Target Date: 11/11/20         Diabetes Care Plan/Intervention  Education Plan/Intervention: Individual Follow-Up DSMT      Diabetes Meal Plan  Restrictions: Restricted Carbohydrate, Fluid Restriction, Low Fat, Low Sodium, Low Potassium        Today's Self-Management Care Plan was developed with the patient's input and is based on barriers identified during today's assessment.    The long and short-term goals in the care plan were written with the patient/caregiver's input. The patient has agreed to work toward these goals to improve her overall diabetes control.      The patient received a copy of today's  self-management plan and verbalized understanding of the care plan, goals, and all of today's instructions.      The patient was encouraged to communicate with her physician and care team regarding her condition(s) and treatment.  I provided the patient with my contact information today and encouraged her to contact me via phone or patient portal as needed.         Education Units of Time   Time Spent: 60 min

## 2019-11-13 ENCOUNTER — HOSPITAL ENCOUNTER (OUTPATIENT)
Dept: RADIOLOGY | Facility: HOSPITAL | Age: 54
Discharge: HOME | End: 2019-11-13
Attending: NURSE PRACTITIONER
Payer: MEDICARE

## 2019-11-13 PROCEDURE — A9516 IODINE I-123 SOD IODIDE MIC: HCPCS | Mod: NTX

## 2019-11-13 PROCEDURE — 78014 NM THYROID UPTAKE AND SCAN: ICD-10-PCS | Mod: 26,NTX,, | Performed by: RADIOLOGY

## 2019-11-13 PROCEDURE — 78014 THYROID IMAGING W/BLOOD FLOW: CPT | Mod: 26,NTX,, | Performed by: RADIOLOGY

## 2019-11-14 ENCOUNTER — HOSPITAL ENCOUNTER (OUTPATIENT)
Dept: RADIOLOGY | Facility: HOSPITAL | Age: 54
Discharge: HOME | End: 2019-11-14
Attending: NURSE PRACTITIONER
Payer: MEDICARE

## 2019-11-14 ENCOUNTER — TELEPHONE (OUTPATIENT)
Dept: ENDOCRINOLOGY | Facility: CLINIC | Age: 54
End: 2019-11-14

## 2019-11-14 DIAGNOSIS — E05.90 HYPERTHYROIDISM: Primary | ICD-10-CM

## 2019-11-14 RX ORDER — METHIMAZOLE 5 MG/1
TABLET ORAL
Qty: 180 TABLET | Refills: 11 | Status: SHIPPED | OUTPATIENT
Start: 2019-11-14 | End: 2022-05-05

## 2019-12-27 ENCOUNTER — TELEPHONE (OUTPATIENT)
Dept: ENDOCRINOLOGY | Facility: CLINIC | Age: 54
End: 2019-12-27

## 2019-12-27 NOTE — TELEPHONE ENCOUNTER
----- Message from Tere Mcdonough sent at 12/27/2019  3:35 PM CST -----  Contact: pt  Reason: Pt missed labs today and ask if 01/28/2020 will be too late to get them done?    Communication: [phone]

## 2020-01-02 ENCOUNTER — TELEPHONE (OUTPATIENT)
Dept: TRANSPLANT | Facility: CLINIC | Age: 55
End: 2020-01-02

## 2020-01-02 NOTE — TELEPHONE ENCOUNTER
----- Message from Angelica Garcia sent at 1/2/2020  3:32 PM CST -----  Contact: Marcia patricio/Dr Julio Kennedy stated she sent the letter [facility] Letter back already she wants to know if we do not receive it    Marcia contact [phone]

## 2020-01-22 ENCOUNTER — TELEPHONE (OUTPATIENT)
Dept: ENDOCRINOLOGY | Facility: CLINIC | Age: 55
End: 2020-01-22

## 2020-01-22 DIAGNOSIS — N18.5 DIABETES MELLITUS DUE TO UNDERLYING CONDITION WITH STAGE 5 CHRONIC KIDNEY DISEASE: ICD-10-CM

## 2020-01-22 DIAGNOSIS — E08.22 DIABETES MELLITUS DUE TO UNDERLYING CONDITION WITH STAGE 5 CHRONIC KIDNEY DISEASE: ICD-10-CM

## 2020-01-22 DIAGNOSIS — E05.90 HYPERTHYROIDISM: Primary | ICD-10-CM

## 2020-01-22 NOTE — TELEPHONE ENCOUNTER
----- Message from Afshan Hernandez NP sent at 1/22/2020 10:01 AM CST -----  I will do lab work at this time and let you know.    Hina, please set up labs asap.    Thank you,   Afshan     ----- Message -----  From: Leonora Ritchie  Sent: 1/22/2020   9:47 AM CST  To: Afshan Hernandez NP, COLTEN Tan/Katherine,    This patient is being considered for kidney transplantation. Is she cleared from you all standpoint to be listed for transplant? I know that she has to come back for labs and treatment options but is this contingent for transplant?  Leonora

## 2020-01-23 ENCOUNTER — TELEPHONE (OUTPATIENT)
Dept: TRANSPLANT | Facility: CLINIC | Age: 55
End: 2020-01-23

## 2020-01-24 ENCOUNTER — TELEPHONE (OUTPATIENT)
Dept: TRANSPLANT | Facility: CLINIC | Age: 55
End: 2020-01-24

## 2020-01-24 NOTE — PROGRESS NOTES
Subjective:      Patient ID: Kiesha Rocha is a 54 y.o. female.    Chief Complaint:  Diabetes    History of Present Illness  Kiesha Rocha presents today for follow up of hyperthyroidism and Type 2 DM.     Patient is a poor historian.  She was found to have hyperthyroidism around 2010 and she was seeing endocrinology at another facility (unable to remember where). She has not seen an endo in awhile. Thinks she was just communicating with endo online. She is unsure if she has seen an endo. She was referred by her PCP as she needs kidney transplant.     On initial diagnosis, she presented with a goiter. She reported dysphagia, weight loss of about 40 pounds over the last 2 years, palpitations, heat intolerance, frequent BMs, and tremors on initial diagnosis.     Current medication MMI 7.5 mg daily.    Denies family history of thyroid disease.     current symptoms  + palpations; sees cardio   - weight loss  + frequent bm  + Hair loss  + Brittle nails  No skin changes  - tremor   + anxiety-- son was shot yesterday  + dysphagia- occ   + mental fogginess    Denies Biotin usage    Denies any recent in the 6-12 months iodine or contrast   Thyroid tenderness and dysphagia. Denies radiation exposure to head or neck. No family history of thyroid disease. She reports she has not had thyroid ultrasound or thyroid uptake and scan.   Smoke-denies     Ref. Range 1/28/2020 10:28   TSH Latest Ref Range: 0.400 - 4.000 uIU/mL 0.387 (L)   Free T4 Latest Ref Range: 0.71 - 1.51 ng/dL 1.17   Thyroperoxidase Antibodies Latest Ref Range: <6.0 IU/mL <6.0     Nuclear medicine thyroid uptake & scan 11/14/2019:  FINDINGS:  The 24 hour uptake is elevated at 34.9 % (normal 10-30%).    Normal in size and contour with normal distribution of radiotracer throughout both lobes.  No evidence of hyper or hypofunctioning nodules.      Impression     1. Elevated 24 hour radioiodine uptake by the thyroid, findings could represent Grave's disease.  Correlate findings with clinical lab values.  2. Normal scintigraphic appearance of the thyroid.  I, Nahid Tolentino MD, attest that I reviewed and interpreted the images.     With regards to her Type 2 DM,     Diagnosed: in 1995 she had gestational diabetes and continued with Type 2 DM  Known complications:   DKA- none  RN-none  PN +   Nephropathy +-HD -TTHSat    Current regimen:   Tresiba 42u daily- will forget the injection once or twice a week   Tradejnta 5 mg daily  Glipizide 10 mg daily AM- without food    Other medications tried: Metformin stopped due to diarrhea. She has been on Actos in the past and stopped for unknown reasons.     3 times a day testing  Log reviewed: no, none Oral recall   BGs range 120-140; during the middle of the day, reports her BG is in 200's. BG's range 200-400 at night.    Hypoglycemic event? None in awhile  Knows how to correct with 15 grams of carbs- juice, coke, or a peppermint.     Eats 3 meals a day.  dialysis days: sausage biscuit chicken sandwich rice & gravy  Non Dialysis days: milk & unhealthy cereal rice & gravy, cornbread, red beans & rice.   Snacks: sometimes on peanut butter jelly sandwich. Discussed that is a meal and not a snack. She is also snacking on fruits and candy bars.   Drinks: water, juice, and diet root beer.     Exercise - tried to stay active. She is sometimes walking.   Education - last visit: states 3-4 months ago (outside facility)    Has a Medic alert tag? none  Glucagon: none    Diabetes Management Status  Statin: Not taking  ACE/ARB: Taking  Denies family history of thyroid cancer or personal history of pancreatitis.   Lab Results   Component Value Date    HGBA1C 8.9 (H) 01/28/2020    HGBA1C 7.7 (H) 08/21/2019    HGBA1C 9.4 (H) 06/12/2019     Screening or Prevention Patient's value Goal Complete/Controlled?   HgA1C Testing and Control   Lab Results   Component Value Date    HGBA1C 8.9 (H) 01/28/2020      Annually/Less than 8% Yes   Lipid profile :  "08/21/2019 Annually Yes   LDL control Lab Results   Component Value Date    LDLCALC 107.4 08/21/2019    Annually/Less than 100 mg/dl  No   Nephropathy screening No results found for: LABMICR  No results found for: PROTEINUA Annually No   Blood pressure BP Readings from Last 1 Encounters:   01/28/20 (!) 148/90    Less than 140/90 No   Dilated retinal exam Most Recent Eye Exam Date: Not Found Annually Yes   Foot exam   : 10/29/2019 Annually Yes     Review of Systems   Constitutional: Negative for fatigue.   Eyes: Negative for visual disturbance.   Respiratory: Negative for shortness of breath.    Cardiovascular: Positive for palpitations (occ). Negative for chest pain.   Gastrointestinal: Negative for abdominal pain.   Musculoskeletal: Negative for arthralgias.   Skin: Negative for wound.   Neurological: Negative for headaches.   Hematological: Does not bruise/bleed easily.   Psychiatric/Behavioral: Negative for sleep disturbance.     Objective:   Physical Exam   Constitutional: She appears well-developed.   Neck: No thyromegaly present.   Cardiovascular: Normal rate.   Pulmonary/Chest: Effort normal.   Abdominal: Soft.   Vitals reviewed.  Appropriate footwear, Foot exam deferred, done 10/2019.   No ulcers or wounds.   injection sites are ok. No lipo hypertropthy or atrophy    Visit Vitals  BP (!) 148/90   Pulse 68   Ht 5' 4" (1.626 m)   Wt 82.8 kg (182 lb 8.7 oz)   BMI 31.33 kg/m²        Lab Review:   Lab Results   Component Value Date    HGBA1C 8.9 (H) 01/28/2020     Lab Results   Component Value Date    CHOL 222 (H) 08/21/2019    HDL 46 08/21/2019    LDLCALC 107.4 08/21/2019    TRIG 343 (H) 08/21/2019    CHOLHDL 20.7 08/21/2019     Lab Results   Component Value Date     01/28/2020     01/28/2020    K 4.0 01/28/2020    K 4.0 01/28/2020    CL 98 01/28/2020    CL 98 01/28/2020    CO2 29 01/28/2020    CO2 29 01/28/2020     (H) 01/28/2020     (H) 01/28/2020    BUN 31 (H) 01/28/2020    BUN 31 " (H) 01/28/2020    CREATININE 7.6 (H) 01/28/2020    CREATININE 7.6 (H) 01/28/2020    CALCIUM 9.5 01/28/2020    CALCIUM 9.5 01/28/2020    PROT 8.3 01/28/2020    PROT 8.3 01/28/2020    ALBUMIN 3.8 01/28/2020    ALBUMIN 3.8 01/28/2020    BILITOT 0.6 01/28/2020    BILITOT 0.6 01/28/2020    ALKPHOS 86 01/28/2020    ALKPHOS 86 01/28/2020    AST 15 01/28/2020    AST 15 01/28/2020    ALT 14 01/28/2020    ALT 14 01/28/2020    ANIONGAP 12 01/28/2020    ANIONGAP 12 01/28/2020    ESTGFRAFRICA 6.4 (A) 01/28/2020    ESTGFRAFRICA 6.4 (A) 01/28/2020    EGFRNONAA 5.6 (A) 01/28/2020    EGFRNONAA 5.6 (A) 01/28/2020    TSH 0.387 (L) 01/28/2020     No results found for: WHRAPJJH79DK  Assessment and Plan     1. Diabetes mellitus due to underlying condition with stage 5 chronic kidney disease  insulin lispro (HUMALOG KWIKPEN INSULIN) 100 unit/mL pen    insulin degludec (TRESIBA FLEXTOUCH U-200) 200 unit/mL (3 mL) InPn    Hemoglobin A1c    Comprehensive metabolic panel    Fructosamine   2. Hyperthyroidism  TSH    CBC auto differential   3. Hypertriglyceridemia     4. BMI 31.0-31.9,adult     5. End stage renal disease       Diabetes mellitus due to underlying condition with stage 5 chronic kidney disease  -- Reviewed goals of therapy are to get the best control we can without hypoglycemia  -- sees education  Medication changes:   Decrease Tresiba  to 35 units daily  Continue Tradjenta 5 mg daily  Start humalog 8u AC  Stop  glipzide  -- Reviewed patient's current insulin regimen. Clarified proper insulin dose and timing in relation to meals, etc. Insulin injection sites and proper rotation instructed.    -- Advised frequent self blood glucose monitoring.  Patient encouraged to document glucose results and bring them to every clinic visit    -- Hypoglycemia precautions discussed. Instructed on precautions before driving.    -- Call for Bg repeatedly < 90 or > 180.   -- Close adherence to lifestyle changes recommended.   -- Periodic follow ups  for eye evaluations, foot care and dental care suggested.  --Encouraged exercise per ADA guidelines of 150 minutes weekly.   --Discussed plate method  --Given list of low carb snacks.    Hyperthyroidism  -- continue methimazole 7.55 mg daily.  Encouraged her to let me know if he develops a rash, fever, or sore throat while taking this medication.    -- Plan repeat labs with thyroid antibodies on RTC  -- patient is not interested in GERARD.     Hypertriglyceridemia  -Not controlled   -Not on statin per ADA recommendations  -Encouraged to avoid fried fatty foods   -Encouraged exercise       BMI 31.0-31.9,adult  -Encouraged renal and diabetic diet and exercise     End stage renal disease  -Follow up with nephro   - discussed avoiding insulin stacking    Follow up in about 3 months (around 4/28/2020).  Labs prior to next appointment with me

## 2020-01-24 NOTE — TELEPHONE ENCOUNTER
----- Message from Lena Estrada sent at 1/22/2020  6:16 PM CST -----  Contact: Pt      ----- Message -----  From: Darcie Marin  Sent: 1/22/2020  12:05 PM CST  To: [facility] Pre-Kidney Transplant Clinical    Pt was calling to speak with nurse. Pt has appt at [facility] on 1/27/2020 at 10:30am    Pt# [phone]

## 2020-01-28 ENCOUNTER — OFFICE VISIT (OUTPATIENT)
Dept: ENDOCRINOLOGY | Facility: CLINIC | Age: 55
End: 2020-01-28
Payer: MEDICARE

## 2020-01-28 ENCOUNTER — LAB VISIT (OUTPATIENT)
Dept: LAB | Facility: HOSPITAL | Age: 55
End: 2020-01-28
Payer: MEDICARE

## 2020-01-28 VITALS
DIASTOLIC BLOOD PRESSURE: 90 MMHG | BODY MASS INDEX: 31.17 KG/M2 | WEIGHT: 182.56 LBS | HEIGHT: 64 IN | HEART RATE: 68 BPM | SYSTOLIC BLOOD PRESSURE: 148 MMHG

## 2020-01-28 DIAGNOSIS — E05.90 HYPERTHYROIDISM: ICD-10-CM

## 2020-01-28 DIAGNOSIS — E78.1 HYPERTRIGLYCERIDEMIA: ICD-10-CM

## 2020-01-28 DIAGNOSIS — N18.5 DIABETES MELLITUS DUE TO UNDERLYING CONDITION WITH STAGE 5 CHRONIC KIDNEY DISEASE: Chronic | ICD-10-CM

## 2020-01-28 DIAGNOSIS — N18.6 END STAGE RENAL DISEASE: ICD-10-CM

## 2020-01-28 DIAGNOSIS — E08.22 DIABETES MELLITUS DUE TO UNDERLYING CONDITION WITH STAGE 5 CHRONIC KIDNEY DISEASE: Chronic | ICD-10-CM

## 2020-01-28 DIAGNOSIS — E08.22 DIABETES MELLITUS DUE TO UNDERLYING CONDITION WITH STAGE 5 CHRONIC KIDNEY DISEASE: Primary | Chronic | ICD-10-CM

## 2020-01-28 DIAGNOSIS — N18.5 DIABETES MELLITUS DUE TO UNDERLYING CONDITION WITH STAGE 5 CHRONIC KIDNEY DISEASE: Primary | Chronic | ICD-10-CM

## 2020-01-28 LAB
ALBUMIN SERPL BCP-MCNC: 3.8 G/DL (ref 3.5–5.2)
ALBUMIN SERPL BCP-MCNC: 3.8 G/DL (ref 3.5–5.2)
ALP SERPL-CCNC: 86 U/L (ref 55–135)
ALP SERPL-CCNC: 86 U/L (ref 55–135)
ALT SERPL W/O P-5'-P-CCNC: 14 U/L (ref 10–44)
ALT SERPL W/O P-5'-P-CCNC: 14 U/L (ref 10–44)
ANION GAP SERPL CALC-SCNC: 12 MMOL/L (ref 8–16)
ANION GAP SERPL CALC-SCNC: 12 MMOL/L (ref 8–16)
AST SERPL-CCNC: 15 U/L (ref 10–40)
AST SERPL-CCNC: 15 U/L (ref 10–40)
BASOPHILS # BLD AUTO: 0.06 K/UL (ref 0–0.2)
BASOPHILS NFR BLD: 0.5 % (ref 0–1.9)
BILIRUB SERPL-MCNC: 0.6 MG/DL (ref 0.1–1)
BILIRUB SERPL-MCNC: 0.6 MG/DL (ref 0.1–1)
BUN SERPL-MCNC: 31 MG/DL (ref 6–20)
BUN SERPL-MCNC: 31 MG/DL (ref 6–20)
CALCIUM SERPL-MCNC: 9.5 MG/DL (ref 8.7–10.5)
CALCIUM SERPL-MCNC: 9.5 MG/DL (ref 8.7–10.5)
CHLORIDE SERPL-SCNC: 98 MMOL/L (ref 95–110)
CHLORIDE SERPL-SCNC: 98 MMOL/L (ref 95–110)
CO2 SERPL-SCNC: 29 MMOL/L (ref 23–29)
CO2 SERPL-SCNC: 29 MMOL/L (ref 23–29)
CREAT SERPL-MCNC: 7.6 MG/DL (ref 0.5–1.4)
CREAT SERPL-MCNC: 7.6 MG/DL (ref 0.5–1.4)
DIFFERENTIAL METHOD: ABNORMAL
EOSINOPHIL # BLD AUTO: 0.3 K/UL (ref 0–0.5)
EOSINOPHIL NFR BLD: 2.5 % (ref 0–8)
ERYTHROCYTE [DISTWIDTH] IN BLOOD BY AUTOMATED COUNT: 14.2 % (ref 11.5–14.5)
EST. GFR  (AFRICAN AMERICAN): 6.4 ML/MIN/1.73 M^2
EST. GFR  (AFRICAN AMERICAN): 6.4 ML/MIN/1.73 M^2
EST. GFR  (NON AFRICAN AMERICAN): 5.6 ML/MIN/1.73 M^2
EST. GFR  (NON AFRICAN AMERICAN): 5.6 ML/MIN/1.73 M^2
ESTIMATED AVG GLUCOSE: 209 MG/DL (ref 68–131)
GLUCOSE SERPL-MCNC: 241 MG/DL (ref 70–110)
GLUCOSE SERPL-MCNC: 241 MG/DL (ref 70–110)
HBA1C MFR BLD HPLC: 8.9 % (ref 4–5.6)
HCT VFR BLD AUTO: 36.5 % (ref 37–48.5)
HGB BLD-MCNC: 11.5 G/DL (ref 12–16)
IMM GRANULOCYTES # BLD AUTO: 0.07 K/UL (ref 0–0.04)
IMM GRANULOCYTES NFR BLD AUTO: 0.6 % (ref 0–0.5)
LYMPHOCYTES # BLD AUTO: 3.8 K/UL (ref 1–4.8)
LYMPHOCYTES NFR BLD: 34.5 % (ref 18–48)
MCH RBC QN AUTO: 29.8 PG (ref 27–31)
MCHC RBC AUTO-ENTMCNC: 31.5 G/DL (ref 32–36)
MCV RBC AUTO: 95 FL (ref 82–98)
MONOCYTES # BLD AUTO: 0.6 K/UL (ref 0.3–1)
MONOCYTES NFR BLD: 5.8 % (ref 4–15)
NEUTROPHILS # BLD AUTO: 6.2 K/UL (ref 1.8–7.7)
NEUTROPHILS NFR BLD: 56.1 % (ref 38–73)
NRBC BLD-RTO: 0 /100 WBC
PLATELET # BLD AUTO: 276 K/UL (ref 150–350)
PMV BLD AUTO: 12.3 FL (ref 9.2–12.9)
POTASSIUM SERPL-SCNC: 4 MMOL/L (ref 3.5–5.1)
POTASSIUM SERPL-SCNC: 4 MMOL/L (ref 3.5–5.1)
PROT SERPL-MCNC: 8.3 G/DL (ref 6–8.4)
PROT SERPL-MCNC: 8.3 G/DL (ref 6–8.4)
RBC # BLD AUTO: 3.86 M/UL (ref 4–5.4)
SODIUM SERPL-SCNC: 139 MMOL/L (ref 136–145)
SODIUM SERPL-SCNC: 139 MMOL/L (ref 136–145)
T4 FREE SERPL-MCNC: 1.17 NG/DL (ref 0.71–1.51)
THYROPEROXIDASE IGG SERPL-ACNC: <6 IU/ML
TSH SERPL DL<=0.005 MIU/L-ACNC: 0.39 UIU/ML (ref 0.4–4)
WBC # BLD AUTO: 11.03 K/UL (ref 3.9–12.7)

## 2020-01-28 PROCEDURE — 99999 PR PBB SHADOW E&M-EST. PATIENT-LVL III: CPT | Mod: PBBFAC,TXP,, | Performed by: NURSE PRACTITIONER

## 2020-01-28 PROCEDURE — 85025 COMPLETE CBC W/AUTO DIFF WBC: CPT | Mod: NTX

## 2020-01-28 PROCEDURE — 84439 ASSAY OF FREE THYROXINE: CPT | Mod: TXP

## 2020-01-28 PROCEDURE — 80053 COMPREHEN METABOLIC PANEL: CPT | Mod: NTX

## 2020-01-28 PROCEDURE — 36415 COLL VENOUS BLD VENIPUNCTURE: CPT | Mod: TXP

## 2020-01-28 PROCEDURE — 83036 HEMOGLOBIN GLYCOSYLATED A1C: CPT | Mod: TXP

## 2020-01-28 PROCEDURE — 99214 OFFICE O/P EST MOD 30 MIN: CPT | Mod: S$PBB,NTX,, | Performed by: NURSE PRACTITIONER

## 2020-01-28 PROCEDURE — 99999 PR PBB SHADOW E&M-EST. PATIENT-LVL III: ICD-10-PCS | Mod: PBBFAC,TXP,, | Performed by: NURSE PRACTITIONER

## 2020-01-28 PROCEDURE — 84445 ASSAY OF TSI GLOBULIN: CPT | Mod: TXP

## 2020-01-28 PROCEDURE — 99214 PR OFFICE/OUTPT VISIT, EST, LEVL IV, 30-39 MIN: ICD-10-PCS | Mod: S$PBB,NTX,, | Performed by: NURSE PRACTITIONER

## 2020-01-28 PROCEDURE — 84443 ASSAY THYROID STIM HORMONE: CPT | Mod: TXP

## 2020-01-28 PROCEDURE — 99213 OFFICE O/P EST LOW 20 MIN: CPT | Mod: PBBFAC,NTX | Performed by: NURSE PRACTITIONER

## 2020-01-28 RX ORDER — INSULIN DEGLUDEC 200 U/ML
35 INJECTION, SOLUTION SUBCUTANEOUS DAILY
Qty: 5 SYRINGE | Refills: 11 | Status: SHIPPED | OUTPATIENT
Start: 2020-01-28 | End: 2022-04-14

## 2020-01-28 RX ORDER — INSULIN LISPRO 100 [IU]/ML
8 INJECTION, SOLUTION INTRAVENOUS; SUBCUTANEOUS DAILY
Qty: 1 BOX | Refills: 11 | Status: SHIPPED | OUTPATIENT
Start: 2020-01-28 | End: 2021-01-27

## 2020-01-28 NOTE — ASSESSMENT & PLAN NOTE
-- Reviewed goals of therapy are to get the best control we can without hypoglycemia  -- sees education  Medication changes:   Decrease Tresiba  to 35 units daily  Continue Tradjenta 5 mg daily  Start humalog 8u AC  Stop  glipzide  -- Reviewed patient's current insulin regimen. Clarified proper insulin dose and timing in relation to meals, etc. Insulin injection sites and proper rotation instructed.    -- Advised frequent self blood glucose monitoring.  Patient encouraged to document glucose results and bring them to every clinic visit    -- Hypoglycemia precautions discussed. Instructed on precautions before driving.    -- Call for Bg repeatedly < 90 or > 180.   -- Close adherence to lifestyle changes recommended.   -- Periodic follow ups for eye evaluations, foot care and dental care suggested.  --Encouraged exercise per ADA guidelines of 150 minutes weekly.   --Discussed plate method  --Given list of low carb snacks.

## 2020-01-28 NOTE — ASSESSMENT & PLAN NOTE
-- continue methimazole 7.55 mg daily.  Encouraged her to let me know if he develops a rash, fever, or sore throat while taking this medication.    -- Plan repeat labs with thyroid antibodies on RTC  -- patient is not interested in GERARD.

## 2020-01-29 ENCOUNTER — TELEPHONE (OUTPATIENT)
Dept: TRANSPLANT | Facility: CLINIC | Age: 55
End: 2020-01-29

## 2020-01-29 LAB — TSH RECEP AB SER-ACNC: <1 IU/L (ref 0–1.75)

## 2020-01-29 NOTE — TELEPHONE ENCOUNTER
"Dialysis Adherence:    Cee, nurse at 's dialysis unit reports over the last three months that patient has had 0 AMAs, 0 no shows and no issues with labs, taking medications, transportation or caregiver support. Nurse denied any concerns. Nurse reports "she's a very compliant patient".     SW remains available at [phone].   "

## 2020-01-30 LAB — TSI SER-ACNC: <0.1 IU/L

## 2020-01-31 ENCOUNTER — COMMITTEE REVIEW (OUTPATIENT)
Dept: TRANSPLANT | Facility: CLINIC | Age: 55
End: 2020-01-31

## 2020-01-31 DIAGNOSIS — Z76.82 ORGAN TRANSPLANT CANDIDATE: Primary | ICD-10-CM

## 2020-01-31 NOTE — LETTER
February 4, 2020    Lilia Malin  [id number] [address] [address]     Dear Dr. Malin:    Patient: Kiesha Rocha   MR Number: [id number]   YOB: 1965     Your patient, Kiesha Rocha, was recently discussed at the [facility] Selection Committee meeting on 1/31/2020. I am happy to inform you that Kiesha has been approved for transplantation.  She has met selection criteria for a kidney transplant related to ESRD secondary to primary diagnosis of Diabetes Mellitus - Type II. Your patient will be placed on the cadaveric wait list pending final financial approval from insurance company.     We appreciate your confidence in allowing us to participate in your patients care.  If you have any questions or concerns, please do not hesitate to contact me.    Sincerely,    Rosa Domingo MD  Medical Director, Kidney & Kidney/Pancreas Transplantation    Cc: Lilia Malin MD         [facility]    tj/Enclosure

## 2020-01-31 NOTE — COMMITTEE REVIEW
Native Organ Dx: Diabetes Mellitus - Type II      SELECTION COMMITTEE NOTE    Kiesha Rocha was presented at selection committee on 1/31/2020.  Patient met selection criteria for kidney transplant related to ESRD due to   Diabetes Mellitus - Type II.  No absolute contraindications to transplant at this time.  Patient will be placed on the cadaveric wait list pending final financial approval from insurance company.  Patient will return to clinic for routine appointment in 1 year(s). Patient does not meet criteria for High KDPI kidney offer due to weight. Patient meets HCV+ kidney offer, but has declined at this time. Patient does not meet criteria for dual/enbloc, due to weight.          Note written by Leonora Ritchie RN     ===============================================    I was present at the meeting and attest to the decision of the committee.    Froylan Bowie  02/04/2020

## 2020-02-12 ENCOUNTER — TELEPHONE (OUTPATIENT)
Dept: TRANSPLANT | Facility: CLINIC | Age: 55
End: 2020-02-12

## 2020-02-12 DIAGNOSIS — Z76.82 ORGAN TRANSPLANT CANDIDATE: Primary | ICD-10-CM

## 2020-02-12 NOTE — LETTER
2020  Kiesha Aj  [address]  [facility] [address]    Dear Kiesha Rocha:  MRN: [id number]    Congratulations! On 2020, you were placed on  the waiting list for a  donor transplant.    Your candidacy for kidney transplant is based on the following criteria: ESRD due to Diabetes Mellitus - Type II.    Your transplant coordinator while on the waiting list is Ciarra Brooke RN. They can be reached at [phone] or [phone] with any questions.      What to do now?    ? Ask your living donors to call and begin testing   Give your donors our phone number, [phone]   Make sure your donors have your name and date of birth when they call   You will get transplanted much faster if you have a living donor    ? Have your blood sent to our Transplant Lab every month   If you are on dialysis - our Transplant Lab will work with your dialysis unit to send your blood every month   If you are not on dialysis   - If you live near an [facility] lab, we will schedule you to have blood drawn every month  - If you do not live near an [facility] lab, you will be sent blood kits in the mail. You will need to take a kit to your local lab or doctor to have your blood drawn every month and mail to the Transplant Lab.     ? Call us with ANY CHANGES   Phone numbers - we must be able to reach you anytime of the day or night when a kidney is available   Address   Insurance coverage   Dialysis unit or kidney doctor   [place]: if you have surgery, stay in the hospital, have to get blood, or have an infection    ? Review your Kidney/Kidney-Pancreas Transplant Guide    This will give you detailed information about what happens when  - you are on the waiting list   - you are called when a kidney is available     The [facility] has a transplant surgeon and physician available 365  days a year, 24 hours a day to coordinate organ acceptance, procurement, surgical placement and  to  address urgent patient care issues.  You will be notified in writing of any changes to our Transplant  Centers staffing plan that would impact your ability to receive a transplant.     Attached is a letter from the United Network for Organ Sharing (UNOS). It describes the services and  information offered to patients by UNOS and the Organ Procurement and Transplant Network. We look  forward to working with you while on the waiting list.      Congratulations,     Your Transplant Partner   [facility]    [address] [place] [id number]   [phone]                              tj/Enclosure           The Organ Procurement and Transplantation Network   Toll-free patient services line: Your resource for organ transplant information     If you have a question regarding your own medical care, you always should call your transplant hospital first. However, for general organ transplant-related information, you can call the Organ Procurement and Transplantation Network (OPTN) toll-free patient services line at [phone].     Anyone, including potential transplant candidates, candidates, recipients, family members, friends, living donors, and donor family members, can call this number to:     · Talk about organ donation, living donation, the transplant process, the donation process, and transplant policies.   · Get a free patient information kit with helpful booklets, waiting list and transplant information, and a list of all transplant hospitals.   · Ask questions about the OPTN website (https://optn.transplant.hrsa.gov/), the United Network for Organ Sharings (UNOS) website (https://unos.org/), or the UNOS website for living donors and transplant recipients. (https://www.transplantliving.org/).   · Learn how the OPTN can help you.   · Talk about any concerns that you may have with a transplant hospital.     The nations transplant system, the OPTN, is managed under  federal contract by the United Network for Organ Sharing (UNOS), which is a non-profit charitable organization. The OPTN helps create and define organ sharing policies that make the best use of donated organs. This process continuously evaluating new advances and discoveries so policies can be adapted to best serve patients waiting for transplants. To do so, the OPTN works closely with transplant professionals, transplant patients, transplant candidates, donor families, living donors, and the public. All transplant programs and organ procurement organizations throughout the country are OPTN members and are obligated to follow the policies the OPTN creates for allocating organs.     The OPTN also is responsible for:   · Providing educational material for patients, the public, and professionals.   · Raising awareness of the need for donated organs and tissue.   · Coordinating organ procurement, matching, and placement.   · Collecting information about every organ transplant and donation that occurs in the [place].     Remember, you should contact your transplant hospital directly if you have questions or concerns about your own medical care including medical records, work-up progress, and test results.     We are not your transplant hospital, and our staff will not be able to answer questions about your case, so please keep your transplant hospitals phone number handy.   However, while you research your transplant needs and learn as much as you can about transplantation and donation, we welcome your call to our toll-free patient services line at [phone].

## 2020-02-12 NOTE — TELEPHONE ENCOUNTER
"  KIDNEY WAIT LISTING NOTE    Date of Financial clearance to list: 20    [facility]:     Organ: Kidney  Name:       Kiesha Rocha   : 1965          Gender:     female    MRN#: [id number]                                 State of Permanent Residence:  [address] [address]  Ethnicity: /Black   Race:      Black or     CLINICAL INFORMATION   Candidate Medical Urgency Status: Active  Number of Previous Kidney Transplants: 0  Number of Previous Solid Organ Transplants: 0  Did you enter number of previous kidney or other solid organ transplants? yes  Is this Candidate a Prior Living Donor: no  (If yes, please generate letter to UNOS with patient's date of donation, recipient SSN, signed by Surgical Director after patient is listed in order to receive priority points).      ABO  ABO Blood Group:   O POS     ABO Confirmation: (THESE DATES MUST BE PRIOR TO THE LIST DATE AND SUPPORTED BY SEPARATE LAB REPORTS)    Internal Results    Lab Results   Component Value Date    GROUPTRH O POS 2019    GROUPTRH O POS 2019     No results found for: ABO    External Results    ABO Date 1:    ABO Date 2  Are either of these ABO results based on External Labs? yes  (If Yes, STOP and go to source document in Media Tab for verification).    VITALS  Height:  5' 3"  Weight:177 lbs    (Use height from Transplant clinic visits only).  Did you enter height/weight? Yes    HLA    Class I:  Lab Results   Component Value Date    CWPY1SZ 2 2019    BONN6HB 23 2019    GZHR7MS 7 2019    ZFJV1GZ 71 2019    YPOIY6FJ 6 2019    KJDCZ5CN XX 2019    LNUIA6IL 10 2019    RRDQF5VB 7 2019       Class II:  Lab Results   Component Value Date    KCPQZD99PO 10 2019    WIYGMR77FZ 11 2019    KABVPX048QC 52 2019    CLHOPD9450 XX 2019    YLIBQ7OI 7 2019    SEALK3DL 5 2019       Tested for HLA Antibodies: Yes, antibodies " "detected     If result is "Positive" antibodies are detected     If result is "Negative or questionable" no antibodies detected    Lab Results   Component Value Date    CIPRAS Positive 08/21/2019    CIIPRAS Positive 08/21/2019       DIALYSIS INFORMATION  Is patient Pre-Dialysis: No     GFR Information  Report GFR being used as the criteria for placement on the kidney list. If not, leave blank  GFR < or = 20 ml/min? n/a  If Yes, Specify value  ___   ml/min     Initial date GFR became 20 or less:   Is GFR obtained from an Outside lab Result? n/a  (If YES verify with source document scanned into media)    If patient on Dialysis:    Is candidate currently on dialysis for ESRD? Yes  If Yes,  Date Chronic Dialysis Started:   2/27/2019  (verify with source document in Media Tab)   Dialysis Unit Name: [facility]  [address] [place] [id number]                        Physician Name:  Dr. Rosa Powers  NPI#: [id number]    DIABETES INFORMATION  Primary Native Kidney Diagnosis: Diabetes Mellitus - Type II  C-Peptide Value - No results found for: CPEPTIDE  Current Diabetes Status: Type II    FOR NON-KIDNEY DEPARTMENT USE ONLY:  Additional Organs Registered? none    Maximum Acceptable Number of HLA Mismatches  ABDR:     6      (0-6)               AB:               (0-4)  ADR:   _____  (0-4)              BDR: _____ (0-4)  A:        _____  (0-2)              B:      _____ (0-2)          DR: ______ (0-2)    Will Recipient Accept?   Accept HBcAB Positive Organ:            Yes  Accept HBV LORNE Positive Organ:        no  Accept HCV Antibody Positive Organ: no   Accept HCV LORNE Positive Organ: no    Dual Kidney and En Bloc Opt In : No  Dual  Local:   No  Dual Import:   No  En Bloc Local:   No  En Bloc Import: No     Accept KDPI > 85: Single: No     Local: No     Import: No  Accept KDPI > 85: Dual: No     Local: No     Import: No      ### NURSE TO VERIFY CONSENT AND MAKE ANY NECESSARY CHANGES NEEDED IN UNET AT THE " TIME OF VERIFICATION ###    Unacceptible Antigens  If yes, list     Lab Results   Component Value Date    CIABCLM Inconclusive 08/21/2019    CIABCLM WEAK CW15 08/21/2019    CIIAB DR15,DR16 08/21/2019    ABCMT Inconclusive 08/21/2019       ### DO NOT LIST IF ANTIGEN VALUE WEAK ###

## 2020-02-13 DIAGNOSIS — Z76.82 ORGAN TRANSPLANT CANDIDATE: Primary | ICD-10-CM

## 2020-04-29 ENCOUNTER — LAB VISIT (OUTPATIENT)
Dept: LAB | Facility: HOSPITAL | Age: 55
End: 2020-04-29
Attending: NURSE PRACTITIONER
Payer: MEDICARE

## 2020-04-29 DIAGNOSIS — E05.90 HYPERTHYROIDISM: ICD-10-CM

## 2020-04-29 DIAGNOSIS — N18.5 DIABETES MELLITUS DUE TO UNDERLYING CONDITION WITH STAGE 5 CHRONIC KIDNEY DISEASE: Chronic | ICD-10-CM

## 2020-04-29 DIAGNOSIS — E08.22 DIABETES MELLITUS DUE TO UNDERLYING CONDITION WITH STAGE 5 CHRONIC KIDNEY DISEASE: Chronic | ICD-10-CM

## 2020-04-29 LAB
ALBUMIN SERPL BCP-MCNC: 3.7 G/DL (ref 3.5–5.2)
ALP SERPL-CCNC: 87 U/L (ref 55–135)
ALT SERPL W/O P-5'-P-CCNC: 11 U/L (ref 10–44)
ANION GAP SERPL CALC-SCNC: 12 MMOL/L (ref 8–16)
AST SERPL-CCNC: 13 U/L (ref 10–40)
BASOPHILS # BLD AUTO: 0.06 K/UL (ref 0–0.2)
BASOPHILS NFR BLD: 0.6 % (ref 0–1.9)
BILIRUB SERPL-MCNC: 0.7 MG/DL (ref 0.1–1)
BUN SERPL-MCNC: 29 MG/DL (ref 6–20)
CALCIUM SERPL-MCNC: 9.5 MG/DL (ref 8.7–10.5)
CHLORIDE SERPL-SCNC: 97 MMOL/L (ref 95–110)
CO2 SERPL-SCNC: 33 MMOL/L (ref 23–29)
CREAT SERPL-MCNC: 6.2 MG/DL (ref 0.5–1.4)
DIFFERENTIAL METHOD: ABNORMAL
EOSINOPHIL # BLD AUTO: 0.2 K/UL (ref 0–0.5)
EOSINOPHIL NFR BLD: 1.9 % (ref 0–8)
ERYTHROCYTE [DISTWIDTH] IN BLOOD BY AUTOMATED COUNT: 13.6 % (ref 11.5–14.5)
EST. GFR  (AFRICAN AMERICAN): 8.2 ML/MIN/1.73 M^2
EST. GFR  (NON AFRICAN AMERICAN): 7.2 ML/MIN/1.73 M^2
ESTIMATED AVG GLUCOSE: 163 MG/DL (ref 68–131)
GLUCOSE SERPL-MCNC: 200 MG/DL (ref 70–110)
HBA1C MFR BLD HPLC: 7.3 % (ref 4–5.6)
HCT VFR BLD AUTO: 33.7 % (ref 37–48.5)
HGB BLD-MCNC: 10.3 G/DL (ref 12–16)
IMM GRANULOCYTES # BLD AUTO: 0.06 K/UL (ref 0–0.04)
IMM GRANULOCYTES NFR BLD AUTO: 0.6 % (ref 0–0.5)
LYMPHOCYTES # BLD AUTO: 2.3 K/UL (ref 1–4.8)
LYMPHOCYTES NFR BLD: 22.5 % (ref 18–48)
MCH RBC QN AUTO: 30.1 PG (ref 27–31)
MCHC RBC AUTO-ENTMCNC: 30.6 G/DL (ref 32–36)
MCV RBC AUTO: 99 FL (ref 82–98)
MONOCYTES # BLD AUTO: 0.8 K/UL (ref 0.3–1)
MONOCYTES NFR BLD: 7.9 % (ref 4–15)
NEUTROPHILS # BLD AUTO: 6.7 K/UL (ref 1.8–7.7)
NEUTROPHILS NFR BLD: 66.5 % (ref 38–73)
NRBC BLD-RTO: 0 /100 WBC
PLATELET # BLD AUTO: 286 K/UL (ref 150–350)
PMV BLD AUTO: 12.4 FL (ref 9.2–12.9)
POTASSIUM SERPL-SCNC: 4.7 MMOL/L (ref 3.5–5.1)
PROT SERPL-MCNC: 9 G/DL (ref 6–8.4)
RBC # BLD AUTO: 3.42 M/UL (ref 4–5.4)
SODIUM SERPL-SCNC: 142 MMOL/L (ref 136–145)
TSH SERPL DL<=0.005 MIU/L-ACNC: 0.72 UIU/ML (ref 0.4–4)
WBC # BLD AUTO: 10.08 K/UL (ref 3.9–12.7)

## 2020-04-29 PROCEDURE — 80053 COMPREHEN METABOLIC PANEL: CPT | Mod: NTX

## 2020-04-29 PROCEDURE — 83036 HEMOGLOBIN GLYCOSYLATED A1C: CPT | Mod: NTX,59

## 2020-04-29 PROCEDURE — 85025 COMPLETE CBC W/AUTO DIFF WBC: CPT | Mod: NTX

## 2020-04-29 PROCEDURE — 82985 ASSAY OF GLYCATED PROTEIN: CPT | Mod: TXP

## 2020-04-29 PROCEDURE — 84443 ASSAY THYROID STIM HORMONE: CPT | Mod: TXP

## 2020-05-04 ENCOUNTER — TELEPHONE (OUTPATIENT)
Dept: ENDOCRINOLOGY | Facility: CLINIC | Age: 55
End: 2020-05-04

## 2020-05-04 LAB — FRUCTOSAMINE SERPL-SCNC: 547 UMOL /L (ref 151–300)

## 2020-06-05 DIAGNOSIS — Z76.82 ORGAN TRANSPLANT CANDIDATE: Primary | ICD-10-CM

## 2020-06-11 ENCOUNTER — TELEPHONE (OUTPATIENT)
Dept: TRANSPLANT | Facility: CLINIC | Age: 55
End: 2020-06-11

## 2020-07-20 NOTE — PROGRESS NOTES
Subjective:      Patient ID: Kiesha Rocha is a 54 y.o. female.    Chief Complaint:  Follow-up    History of Present Illness  Kiesha Rocha presents today for follow up of hyperthyroidism and Type 2 DM.     Patient is a poor historian.  She was found to have hyperthyroidism around 2010 and she was seeing endocrinology at another facility (unable to remember where). She has not seen an endo in awhile. Thinks she was just communicating with endo online. She is unsure if she has seen an endo. She was referred by her PCP as she needs kidney transplant.     On initial diagnosis, she presented with a goiter. She reported dysphagia, weight loss of about 40 pounds over the last 2 years, palpitations, heat intolerance, frequent BMs, and tremors on initial diagnosis.     Current medication MMI 7.5 mg daily.    Denies family history of thyroid disease.     current symptoms  + palpations; sees cardio   - weight loss  + frequent bm  + Hair loss  + Brittle nails  No skin changes  - tremor   - anxiety  + dysphagia- occ   + mental fog    Denies Biotin usage    Denies any recent in the 6-12 months iodine or contrast   Thyroid tenderness and dysphagia. Denies radiation exposure to head or neck. No family history of thyroid disease. She reports she has not had thyroid ultrasound or thyroid uptake and scan.   Smoke-denies     Ref. Range 1/28/2020 10:28   TSH Latest Ref Range: 0.400 - 4.000 uIU/mL 0.387 (L)   Free T4 Latest Ref Range: 0.71 - 1.51 ng/dL 1.17   Thyroperoxidase Antibodies Latest Ref Range: <6.0 IU/mL <6.0     Nuclear medicine thyroid uptake & scan 11/14/2019:  FINDINGS:  The 24 hour uptake is elevated at 34.9 % (normal 10-30%).    Normal in size and contour with normal distribution of radiotracer throughout both lobes.  No evidence of hyper or hypofunctioning nodules.      Impression     1. Elevated 24 hour radioiodine uptake by the thyroid, findings could represent Grave's disease. Correlate findings with clinical  lab values.  2. Normal scintigraphic appearance of the thyroid.  I, Nahid Tolentino MD, attest that I reviewed and interpreted the images.     With regards to her Type 2 DM,     Diagnosed: in 1995 she had gestational diabetes and continued with Type 2 DM  Known complications:   DKA- none  RN-none  PN +   Nephropathy +-HD -TTHSat    Current regimen:   Tresiba 42u daily  Tradejnta 5 mg daily  Humalog 4u TID AC     Other medications tried: Metformin stopped due to diarrhea. She has been on Actos in the past and stopped for unknown reasons.     3 times a day testing  Log reviewed: none, Oral recall   BGs range 103-120; during the middle of the day, reports her BG is in 200's.     Hypoglycemic event? None in awhile  Knows how to correct with 15 grams of carbs- juice, coke, or glucose tablets.    Eats 3 meals a day.  dialysis days: sausage biscuit chicken sandwich rice & gravy  Non Dialysis days: milk & unhealthy cereal rice & gravy, cornbread, red beans & rice.   Snacks: sometimes on peanut butter jelly sandwich. Discussed that is a meal and not a snack. She is also snacking on fruits and candy bars.   Drinks: water, juice, and diet root beer.     Exercise - tried to stay active. She is sometimes walking.   Education - last visit: states 3-4 months ago (outside facility)    Has a Medic alert tag? none  Glucagon: none    Diabetes Management Status  Statin: Not taking  ACE/ARB: Taking  Denies family history of thyroid cancer or personal history of pancreatitis.   Lab Results   Component Value Date    HGBA1C 7.3 (H) 04/29/2020    HGBA1C 8.9 (H) 01/28/2020    HGBA1C 7.7 (H) 08/21/2019     Screening or Prevention Patient's value Goal Complete/Controlled?   HgA1C Testing and Control   Lab Results   Component Value Date    HGBA1C 7.3 (H) 04/29/2020      Annually/Less than 8% Yes   Lipid profile : 08/21/2019 Annually Yes   LDL control Lab Results   Component Value Date    LDLCALC 107.4 08/21/2019    Annually/Less than 100 mg/dl  No  "  Nephropathy screening No results found for: LABMICR  No results found for: PROTEINUA Annually No   Blood pressure BP Readings from Last 1 Encounters:   07/22/20 (!) 142/64    Less than 140/90 No   Dilated retinal exam Most Recent Eye Exam Date: Not Found Annually Yes   Foot exam   : 10/29/2019 Annually Yes     Review of Systems   Constitutional: Negative for fatigue.   Eyes: Negative for visual disturbance.   Respiratory: Negative for shortness of breath.    Cardiovascular: Positive for palpitations (occ). Negative for chest pain.   Gastrointestinal: Negative for abdominal pain.   Musculoskeletal: Negative for arthralgias.   Skin: Negative for wound.   Neurological: Negative for headaches.   Hematological: Does not bruise/bleed easily.   Psychiatric/Behavioral: Negative for sleep disturbance.     Objective:   Physical Exam  Vitals signs reviewed.   Constitutional:       Appearance: She is well-developed.   Neck:      Thyroid: No thyromegaly.   Cardiovascular:      Rate and Rhythm: Normal rate.   Pulmonary:      Effort: Pulmonary effort is normal.   Abdominal:      Palpations: Abdomen is soft.     Appropriate footwear, Foot exam deferred, done 10/2019.   No ulcers or wounds.   injection sites are ok. No lipo hypertropthy or atrophy    Visit Vitals  BP (!) 142/64   Pulse 68   Ht 5' 5" (1.651 m)   Wt 81.9 kg (180 lb 8.9 oz)   BMI 30.05 kg/m²        Lab Review:   Lab Results   Component Value Date    HGBA1C 7.3 (H) 04/29/2020     Lab Results   Component Value Date    CHOL 222 (H) 08/21/2019    HDL 46 08/21/2019    LDLCALC 107.4 08/21/2019    TRIG 343 (H) 08/21/2019    CHOLHDL 20.7 08/21/2019     Lab Results   Component Value Date     04/29/2020    K 4.7 04/29/2020    CL 97 04/29/2020    CO2 33 (H) 04/29/2020     (H) 04/29/2020    BUN 29 (H) 04/29/2020    CREATININE 6.2 (H) 04/29/2020    CALCIUM 9.5 04/29/2020    PROT 9.0 (H) 04/29/2020    ALBUMIN 3.7 04/29/2020    BILITOT 0.7 04/29/2020    ALKPHOS 87 " 04/29/2020    AST 13 04/29/2020    ALT 11 04/29/2020    ANIONGAP 12 04/29/2020    ESTGFRAFRICA 8.2 (A) 04/29/2020    EGFRNONAA 7.2 (A) 04/29/2020    TSH 0.722 04/29/2020     No results found for: [id number]  Assessment and Plan     1. Hyperthyroidism  TSH    T4, free    Thyroid stimulating immunoglobulin    Thyroid Peroxidase Antibody   2. Hypertriglyceridemia     3. BMI 31.0-31.9,adult     4. End stage renal disease     5. Type 2 diabetes mellitus without complication, with long-term current use of insulin  Hemoglobin A1C    Comprehensive metabolic panel   6. Diabetes mellitus due to underlying condition with stage 5 chronic kidney disease       Above labs today  Hyperthyroidism  -- continue methimazole 7.55 mg daily.  Encouraged her to let me know if he develops a rash, fever, or sore throat while taking this medication.    -- Plan repeat labs with thyroid antibodies today  -- patient is not interested in GERARD.     Hypertriglyceridemia  -Not controlled   -Not on statin per ADA recommendations  -Encouraged to avoid fried fatty foods   -Encouraged exercise       BMI 31.0-31.9,adult  -Encouraged renal and diabetic diet and exercise     End stage renal disease  -Follow up with nephro   - discussed avoiding insulin stacking    Diabetes mellitus due to underlying condition with stage 5 chronic kidney disease  -- Reviewed goals of therapy are to get the best control we can without hypoglycemia  -- sees education  -- she is a poor historian of BG but states that her fasting BG are lower than her sugars throughout the day.   -- A1c goal 7% due to comorbidities. She is severely mismatched in her insulin doses (plan is to slowly titrate her basal down as she is very hesitant in me decreasing her basal doses)   Medication changes:   Decrease Tresiba  to 35 units daily  Continue Tradjenta 5 mg daily   Increas Novolog 5u AC  -- Reviewed patient's current insulin regimen. Clarified proper insulin dose and timing in relation to  meals, etc. Insulin injection sites and proper rotation instructed.    -- Advised frequent self blood glucose monitoring.  Patient encouraged to document glucose results and bring them to every clinic visit    -- Hypoglycemia precautions discussed. Instructed on precautions before driving.    -- Call for Bg repeatedly < 90 or > 180.   -- Close adherence to lifestyle changes recommended.   -- Periodic follow ups for eye evaluations, foot care and dental care suggested.  --Encouraged exercise per ADA guidelines of 150 minutes weekly.   --Discussed plate method  --Given list of low carb snacks.    Follow up in about 3 months (around 10/22/2020).  Labs prior to next appointment with me

## 2020-07-22 ENCOUNTER — LAB VISIT (OUTPATIENT)
Dept: LAB | Facility: HOSPITAL | Age: 55
End: 2020-07-22
Payer: MEDICARE

## 2020-07-22 ENCOUNTER — OFFICE VISIT (OUTPATIENT)
Dept: ENDOCRINOLOGY | Facility: CLINIC | Age: 55
End: 2020-07-22
Payer: MEDICARE

## 2020-07-22 VITALS
SYSTOLIC BLOOD PRESSURE: 142 MMHG | HEIGHT: 65 IN | WEIGHT: 180.56 LBS | BODY MASS INDEX: 30.08 KG/M2 | DIASTOLIC BLOOD PRESSURE: 64 MMHG | HEART RATE: 68 BPM

## 2020-07-22 DIAGNOSIS — E78.1 HYPERTRIGLYCERIDEMIA: ICD-10-CM

## 2020-07-22 DIAGNOSIS — E05.90 HYPERTHYROIDISM: ICD-10-CM

## 2020-07-22 DIAGNOSIS — E11.9 TYPE 2 DIABETES MELLITUS WITHOUT COMPLICATION, WITH LONG-TERM CURRENT USE OF INSULIN: ICD-10-CM

## 2020-07-22 DIAGNOSIS — E05.90 HYPERTHYROIDISM: Primary | ICD-10-CM

## 2020-07-22 DIAGNOSIS — Z79.4 TYPE 2 DIABETES MELLITUS WITHOUT COMPLICATION, WITH LONG-TERM CURRENT USE OF INSULIN: ICD-10-CM

## 2020-07-22 DIAGNOSIS — N18.6 END STAGE RENAL DISEASE: ICD-10-CM

## 2020-07-22 DIAGNOSIS — N18.5 DIABETES MELLITUS DUE TO UNDERLYING CONDITION WITH STAGE 5 CHRONIC KIDNEY DISEASE: Chronic | ICD-10-CM

## 2020-07-22 DIAGNOSIS — E08.22 DIABETES MELLITUS DUE TO UNDERLYING CONDITION WITH STAGE 5 CHRONIC KIDNEY DISEASE: Chronic | ICD-10-CM

## 2020-07-22 LAB
ALBUMIN SERPL BCP-MCNC: 3.6 G/DL (ref 3.5–5.2)
ALP SERPL-CCNC: 93 U/L (ref 55–135)
ALT SERPL W/O P-5'-P-CCNC: 9 U/L (ref 10–44)
ANION GAP SERPL CALC-SCNC: 11 MMOL/L (ref 8–16)
AST SERPL-CCNC: 10 U/L (ref 10–40)
BILIRUB SERPL-MCNC: 0.8 MG/DL (ref 0.1–1)
BUN SERPL-MCNC: 43 MG/DL (ref 6–20)
CALCIUM SERPL-MCNC: 9.3 MG/DL (ref 8.7–10.5)
CHLORIDE SERPL-SCNC: 99 MMOL/L (ref 95–110)
CO2 SERPL-SCNC: 30 MMOL/L (ref 23–29)
CREAT SERPL-MCNC: 6.3 MG/DL (ref 0.5–1.4)
EST. GFR  (AFRICAN AMERICAN): 8.1 ML/MIN/1.73 M^2
EST. GFR  (NON AFRICAN AMERICAN): 7 ML/MIN/1.73 M^2
ESTIMATED AVG GLUCOSE: 140 MG/DL (ref 68–131)
GLUCOSE SERPL-MCNC: 264 MG/DL (ref 70–110)
HBA1C MFR BLD HPLC: 6.5 % (ref 4–5.6)
POTASSIUM SERPL-SCNC: 3.9 MMOL/L (ref 3.5–5.1)
PROT SERPL-MCNC: 8 G/DL (ref 6–8.4)
SODIUM SERPL-SCNC: 140 MMOL/L (ref 136–145)
T4 FREE SERPL-MCNC: 0.94 NG/DL (ref 0.71–1.51)
THYROPEROXIDASE IGG SERPL-ACNC: <6 IU/ML
TSH SERPL DL<=0.005 MIU/L-ACNC: 0.51 UIU/ML (ref 0.4–4)

## 2020-07-22 PROCEDURE — 99999 PR PBB SHADOW E&M-EST. PATIENT-LVL V: CPT | Mod: PBBFAC,TXP,, | Performed by: NURSE PRACTITIONER

## 2020-07-22 PROCEDURE — 84439 ASSAY OF FREE THYROXINE: CPT | Mod: TXP

## 2020-07-22 PROCEDURE — 36415 COLL VENOUS BLD VENIPUNCTURE: CPT | Mod: TXP

## 2020-07-22 PROCEDURE — 99999 PR PBB SHADOW E&M-EST. PATIENT-LVL V: ICD-10-PCS | Mod: PBBFAC,TXP,, | Performed by: NURSE PRACTITIONER

## 2020-07-22 PROCEDURE — 80053 COMPREHEN METABOLIC PANEL: CPT | Mod: TXP

## 2020-07-22 PROCEDURE — 99214 OFFICE O/P EST MOD 30 MIN: CPT | Mod: S$PBB,NTX,, | Performed by: NURSE PRACTITIONER

## 2020-07-22 PROCEDURE — 99215 OFFICE O/P EST HI 40 MIN: CPT | Mod: PBBFAC,NTX | Performed by: NURSE PRACTITIONER

## 2020-07-22 PROCEDURE — 83036 HEMOGLOBIN GLYCOSYLATED A1C: CPT | Mod: TXP

## 2020-07-22 PROCEDURE — 84445 ASSAY OF TSI GLOBULIN: CPT | Mod: NTX

## 2020-07-22 PROCEDURE — 84443 ASSAY THYROID STIM HORMONE: CPT | Mod: TXP

## 2020-07-22 PROCEDURE — 99214 PR OFFICE/OUTPT VISIT, EST, LEVL IV, 30-39 MIN: ICD-10-PCS | Mod: S$PBB,NTX,, | Performed by: NURSE PRACTITIONER

## 2020-07-22 NOTE — ASSESSMENT & PLAN NOTE
-- continue methimazole 7.55 mg daily.  Encouraged her to let me know if he develops a rash, fever, or sore throat while taking this medication.    -- Plan repeat labs with thyroid antibodies today  -- patient is not interested in GERARD.

## 2020-07-22 NOTE — ASSESSMENT & PLAN NOTE
-- Reviewed goals of therapy are to get the best control we can without hypoglycemia  -- sees education  -- she is a poor historian of BG but states that her fasting BG are lower than her sugars throughout the day.   -- A1c goal 7% due to comorbidities. She is severely mismatched in her insulin doses (plan is to slowly titrate her basal down as she is very hesitant in me decreasing her basal doses)   Medication changes:   Decrease Tresiba  to 35 units daily  Continue Tradjenta 5 mg daily   Increas Novolog 5u AC  -- Reviewed patient's current insulin regimen. Clarified proper insulin dose and timing in relation to meals, etc. Insulin injection sites and proper rotation instructed.    -- Advised frequent self blood glucose monitoring.  Patient encouraged to document glucose results and bring them to every clinic visit    -- Hypoglycemia precautions discussed. Instructed on precautions before driving.    -- Call for Bg repeatedly < 90 or > 180.   -- Close adherence to lifestyle changes recommended.   -- Periodic follow ups for eye evaluations, foot care and dental care suggested.  --Encouraged exercise per ADA guidelines of 150 minutes weekly.   --Discussed plate method  --Given list of low carb snacks.

## 2020-07-29 LAB — TSI SER-ACNC: <0.1 IU/L

## 2020-07-30 ENCOUNTER — TELEPHONE (OUTPATIENT)
Dept: ENDOCRINOLOGY | Facility: CLINIC | Age: 55
End: 2020-07-30

## 2020-07-30 NOTE — TELEPHONE ENCOUNTER
----- Message from Delores Sherman RN sent at 7/30/2020  6:42 AM CDT -----    ----- Message -----  From: Katherien Roldan NP  Sent: 7/30/2020   6:15 AM CDT  To: [facility] Staff    Please call patient and let her know  1. She does not have Graves or Hashimoto's disease   2. Her thyroid level is normal-continue same dose of methimazole   3. Her electrolytes were normal  4,. Her kidney function is abnormal   5 Her blood sugar was high at 264  6 Her liver function was normal  7. Her A1c is falsely low at 6.5% because of her kidney disease

## 2020-07-30 NOTE — TELEPHONE ENCOUNTER
Called pt and notified her lab result.      . She does not have Graves or Hashimoto's disease   2. Her thyroid level is normal-continue same dose of methimazole   3. Her electrolytes were normal   4,. Her kidney function is abnormal   5 Her blood sugar was high at 264   6 Her liver function was normal   7. Her A1c is falsely low at 6.5% because of her kidney disease.     Pt verbalized understand.

## 2020-09-01 DIAGNOSIS — Z76.82 ORGAN TRANSPLANT CANDIDATE: Primary | ICD-10-CM

## 2020-09-03 ENCOUNTER — HOSPITAL ENCOUNTER (OUTPATIENT)
Dept: RADIOLOGY | Facility: HOSPITAL | Age: 55
Discharge: HOME | End: 2020-09-03
Attending: NURSE PRACTITIONER
Payer: MEDICARE

## 2020-09-03 ENCOUNTER — HOSPITAL ENCOUNTER (OUTPATIENT)
Dept: CARDIOLOGY | Facility: HOSPITAL | Age: 55
Discharge: HOME | End: 2020-09-03
Attending: NURSE PRACTITIONER
Payer: MEDICARE

## 2020-09-03 ENCOUNTER — OFFICE VISIT (OUTPATIENT)
Dept: TRANSPLANT | Facility: CLINIC | Age: 55
End: 2020-09-03
Payer: MEDICARE

## 2020-09-03 VITALS
OXYGEN SATURATION: 95 % | TEMPERATURE: 97 F | HEIGHT: 65 IN | BODY MASS INDEX: 29.49 KG/M2 | RESPIRATION RATE: 18 BRPM | WEIGHT: 177 LBS | SYSTOLIC BLOOD PRESSURE: 175 MMHG | HEART RATE: 98 BPM | DIASTOLIC BLOOD PRESSURE: 79 MMHG

## 2020-09-03 VITALS
BODY MASS INDEX: 30.16 KG/M2 | HEIGHT: 65 IN | SYSTOLIC BLOOD PRESSURE: 140 MMHG | WEIGHT: 181 LBS | HEART RATE: 88 BPM | DIASTOLIC BLOOD PRESSURE: 66 MMHG

## 2020-09-03 DIAGNOSIS — E05.90 HYPERTHYROIDISM: ICD-10-CM

## 2020-09-03 DIAGNOSIS — E78.1 HYPERTRIGLYCERIDEMIA: ICD-10-CM

## 2020-09-03 DIAGNOSIS — Z76.82 ORGAN TRANSPLANT CANDIDATE: ICD-10-CM

## 2020-09-03 DIAGNOSIS — E08.22 DIABETES MELLITUS DUE TO UNDERLYING CONDITION WITH STAGE 5 CHRONIC KIDNEY DISEASE: Chronic | ICD-10-CM

## 2020-09-03 DIAGNOSIS — N18.5 DIABETES MELLITUS DUE TO UNDERLYING CONDITION WITH STAGE 5 CHRONIC KIDNEY DISEASE: Chronic | ICD-10-CM

## 2020-09-03 DIAGNOSIS — N18.6 END STAGE RENAL DISEASE: ICD-10-CM

## 2020-09-03 DIAGNOSIS — Z76.82 PATIENT ON WAITING LIST FOR KIDNEY TRANSPLANT: Primary | ICD-10-CM

## 2020-09-03 LAB
ASCENDING AORTA: 2.97 CM
AV INDEX (PROSTH): 0.77
AV MEAN GRADIENT: 8 MMHG
AV PEAK GRADIENT: 16 MMHG
AV VALVE AREA: 2.37 CM2
AV VELOCITY RATIO: 0.77
BSA FOR ECHO PROCEDURE: 1.94 M2
CV ECHO LV RWT: 0.39 CM
DOP CALC AO PEAK VEL: 2.01 M/S
DOP CALC AO VTI: 38.37 CM
DOP CALC LVOT AREA: 3.1 CM2
DOP CALC LVOT DIAMETER: 1.98 CM
DOP CALC LVOT PEAK VEL: 1.54 M/S
DOP CALC LVOT STROKE VOLUME: 91 CM3
DOP CALCLVOT PEAK VEL VTI: 29.57 CM
E WAVE DECELERATION TIME: 114.18 MSEC
E/A RATIO: 1
E/E' RATIO: 17.22 M/S
ECHO LV POSTERIOR WALL: 0.93 CM (ref 0.6–1.1)
FRACTIONAL SHORTENING: 30 % (ref 28–44)
INTERVENTRICULAR SEPTUM: 0.87 CM (ref 0.6–1.1)
IVRT: 55.19 MSEC
LA MAJOR: 5.9 CM
LA MINOR: 6.13 CM
LA WIDTH: 4.65 CM
LEFT ATRIUM SIZE: 4.49 CM
LEFT ATRIUM VOLUME INDEX: 56.3 ML/M2
LEFT ATRIUM VOLUME: 106.71 CM3
LEFT INTERNAL DIMENSION IN SYSTOLE: 3.36 CM (ref 2.1–4)
LEFT VENTRICLE DIASTOLIC VOLUME INDEX: 55.79 ML/M2
LEFT VENTRICLE DIASTOLIC VOLUME: 105.77 ML
LEFT VENTRICLE MASS INDEX: 77 G/M2
LEFT VENTRICLE SYSTOLIC VOLUME INDEX: 24.3 ML/M2
LEFT VENTRICLE SYSTOLIC VOLUME: 45.98 ML
LEFT VENTRICULAR INTERNAL DIMENSION IN DIASTOLE: 4.77 CM (ref 3.5–6)
LEFT VENTRICULAR MASS: 146.25 G
LV LATERAL E/E' RATIO: 17.22 M/S
LV SEPTAL E/E' RATIO: 17.22 M/S
MV PEAK A VEL: 1.55 M/S
MV PEAK E VEL: 1.55 M/S
MV STENOSIS PRESSURE HALF TIME: 33.11 MS
MV VALVE AREA P 1/2 METHOD: 6.64 CM2
PISA TR MAX VEL: 2.85 M/S
PULM VEIN S/D RATIO: 1.01
PV PEAK D VEL: 0.87 M/S
PV PEAK S VEL: 0.88 M/S
RA MAJOR: 5.78 CM
RA PRESSURE: 15 MMHG
RA WIDTH: 4.41 CM
RIGHT VENTRICULAR END-DIASTOLIC DIMENSION: 4.1 CM
RV TISSUE DOPPLER FREE WALL SYSTOLIC VELOCITY 1 (APICAL 4 CHAMBER VIEW): 16.06 CM/S
SINUS: 2.55 CM
TDI LATERAL: 0.09 M/S
TDI SEPTAL: 0.09 M/S
TDI: 0.09 M/S
TR MAX PG: 32 MMHG
TRICUSPID ANNULAR PLANE SYSTOLIC EXCURSION: 3.56 CM
TV REST PULMONARY ARTERY PRESSURE: 47 MMHG

## 2020-09-03 PROCEDURE — 76770 US EXAM ABDO BACK WALL COMP: CPT | Mod: 26,TXP,, | Performed by: RADIOLOGY

## 2020-09-03 PROCEDURE — 99999 PR PBB SHADOW E&M-EST. PATIENT-LVL V: CPT | Mod: PBBFAC,TXP,, | Performed by: NURSE PRACTITIONER

## 2020-09-03 PROCEDURE — 71046 X-RAY EXAM CHEST 2 VIEWS: CPT | Mod: 26,TXP,, | Performed by: RADIOLOGY

## 2020-09-03 PROCEDURE — 71046 XR CHEST PA AND LATERAL: ICD-10-PCS | Mod: 26,TXP,, | Performed by: RADIOLOGY

## 2020-09-03 PROCEDURE — 76770 US EXAM ABDO BACK WALL COMP: CPT | Mod: TC,TXP

## 2020-09-03 PROCEDURE — 99215 OFFICE O/P EST HI 40 MIN: CPT | Mod: PBBFAC,25,TXP | Performed by: NURSE PRACTITIONER

## 2020-09-03 PROCEDURE — 76770 US RETROPERITONEAL COMPLETE: ICD-10-PCS | Mod: 26,TXP,, | Performed by: RADIOLOGY

## 2020-09-03 PROCEDURE — 93306 TTE W/DOPPLER COMPLETE: CPT | Mod: TXP

## 2020-09-03 PROCEDURE — 99999 PR PBB SHADOW E&M-EST. PATIENT-LVL V: ICD-10-PCS | Mod: PBBFAC,TXP,, | Performed by: NURSE PRACTITIONER

## 2020-09-03 PROCEDURE — 93306 TTE W/DOPPLER COMPLETE: CPT | Mod: 26,TXP,, | Performed by: INTERNAL MEDICINE

## 2020-09-03 PROCEDURE — 93306 ECHO (CUPID ONLY): ICD-10-PCS | Mod: 26,TXP,, | Performed by: INTERNAL MEDICINE

## 2020-09-03 PROCEDURE — 71046 X-RAY EXAM CHEST 2 VIEWS: CPT | Mod: TC,TXP

## 2020-09-03 PROCEDURE — 99215 OFFICE O/P EST HI 40 MIN: CPT | Mod: S$PBB,TXP,, | Performed by: NURSE PRACTITIONER

## 2020-09-03 PROCEDURE — 99215 PR OFFICE/OUTPT VISIT, EST, LEVL V, 40-54 MIN: ICD-10-PCS | Mod: S$PBB,TXP,, | Performed by: NURSE PRACTITIONER

## 2020-09-03 RX ORDER — HYDRALAZINE HYDROCHLORIDE 50 MG/1
50 TABLET, FILM COATED ORAL 3 TIMES DAILY
COMMUNITY

## 2020-09-03 NOTE — PROGRESS NOTES
Kidney Transplant Recipient Reevalulation    Referring Physician: Lilia Malin  Current Nephrologist: Lilia Malin  Waitlist Status: active  Dialysis Start Date: 2/27/2019    Subjective:     CC:  Annual reassessment of kidney transplant candidacy.    HPI:  Ms. Rocha is a 54 y.o. year old Black or  female with ESRD secondary to diabetic nephropathy.  She has been on the wait list for a kidney transplant at [facility] since 2/27/2019. Patient is currently on hemodialysis started on 2/2019. Patient is dialyzing on TTS schedule.  Patient reports that she is tolerating dialysis well.. She has a LUE AV fistula. Patient denies any recent hospitalizations or ED visits.    Was Hospitalized from Aug 5th-Aug 31 for COVID-19. Required supplemental oxygen and Bipap. Was inpatient rehab for a short period of time. She is now participating in outpatient therapy (4 weeks). Reports fatigue and SOB from walking from hospital to imaging center.     Reports dental problems: wisdom teeth with pain and loose teeth on left side.      CXR: 9/3/20  There is a reticulonodular pattern present throughout both lungs raising the question of interstitial lung disease; some subtle streaky opacity at the lateral aspect of the left pulmonary apex overlying the anterior portion of the 1st rib at the posterior portions of the 2nd and 3rd intercostal spaces  PXR: 8/21/19 per surgery results favorable for transplant   Renal US: 9/3/20  Left simple cyst  Iliacs : 8/21/19 per surgery results favorable for transplant   Echo: 9/3/20  EF 60%  PAP 47  Stress: 9/2019  No ischemia  Colonoscopy: 1/2017 normal  PTH: pending  PAP: 5/9/17 negative  MMG: 10/29/19: benign repeat in 1 year  GYN follow-up: < 6 months ago        Past Medical History:   Diagnosis Date    Anemia     Anxiety     Diabetes mellitus     Disorder of kidney and ureter     Encounter for blood transfusion     GERD (gastroesophageal reflux disease)      Hydronephrosis     Hyperlipidemia     Hypertension     Hyperthyroidism     Obesity     Thyroid disease        Review of Systems   Constitutional: Positive for fatigue and unexpected weight change. Negative for activity change, appetite change, chills and fever.   HENT: Negative for congestion, facial swelling, postnasal drip, rhinorrhea, sinus pressure, sore throat and trouble swallowing.    Eyes: Positive for visual disturbance. Negative for pain and redness.        Wears glasses    Respiratory: Negative for cough, chest tightness, shortness of breath and wheezing.    Cardiovascular: Positive for leg swelling. Negative for chest pain and palpitations.   Gastrointestinal: Negative for abdominal pain, diarrhea, nausea and vomiting.   Genitourinary: Positive for decreased urine volume. Negative for dysuria, flank pain and urgency.   Musculoskeletal: Positive for arthralgias. Negative for gait problem, neck pain and neck stiffness.   Skin: Negative for rash.   Allergic/Immunologic: Negative for environmental allergies, food allergies and immunocompromised state.   Neurological: Negative for dizziness, weakness, light-headedness and headaches.   Psychiatric/Behavioral: Positive for sleep disturbance. Negative for agitation and confusion. The patient is nervous/anxious.         Hx depression       Objective:   body mass index is 29.46 kg/m².  Wt Readings from Last 3 Encounters:   09/03/20 80.3 kg (177 lb 0.5 oz)   09/03/20 82.1 kg (181 lb)   07/22/20 81.9 kg (180 lb 8.9 oz)     Temp Readings from Last 3 Encounters:   09/03/20 97.2 °F (36.2 °C) (Oral)   08/21/19 96.9 °F (36.1 °C) (Oral)   06/12/19 98 °F (36.7 °C) (Oral)     BP Readings from Last 3 Encounters:   09/03/20 (!) 175/79   09/03/20 (!) 140/66   07/22/20 (!) 142/64     Pulse Readings from Last 3 Encounters:   09/03/20 98   09/03/20 88   07/22/20 68       Physical Exam  Vitals signs reviewed.   Constitutional:       Appearance: Normal appearance. She is  well-developed.   HENT:      Head: Normocephalic.      Mouth/Throat:      Pharynx: No oropharyngeal exudate.   Eyes:      General: No scleral icterus.     Conjunctiva/sclera: Conjunctivae normal.      Pupils: Pupils are equal, round, and reactive to light.   Neck:      Musculoskeletal: Normal range of motion and neck supple.   Cardiovascular:      Rate and Rhythm: Normal rate and regular rhythm.      Heart sounds: Normal heart sounds.   Pulmonary:      Effort: Pulmonary effort is normal.      Breath sounds: Normal breath sounds.   Abdominal:      General: Bowel sounds are normal. There is no distension.      Palpations: Abdomen is soft. There is no hepatomegaly, splenomegaly or mass.      Tenderness: There is no abdominal tenderness. There is no guarding or rebound. Negative signs include Steele's sign and McBurney's sign.   Musculoskeletal: Normal range of motion.        Arms:    Lymphadenopathy:      Cervical: No cervical adenopathy.   Skin:     General: Skin is warm and dry.   Neurological:      Mental Status: She is alert and oriented to person, place, and time.      Motor: No abnormal muscle tone.      Coordination: Coordination normal.   Psychiatric:         Behavior: Behavior normal.         Labs:  Lab Results   Component Value Date    WBC 10.08 04/29/2020    HGB 10.3 (L) 04/29/2020    HCT 33.7 (L) 04/29/2020     07/22/2020    K 3.9 07/22/2020    CL 99 07/22/2020    CO2 30 (H) 07/22/2020    BUN 43 (H) 07/22/2020    CREATININE 6.3 (H) 07/22/2020    EGFRNONAA 7.0 (A) 07/22/2020    CALCIUM 9.3 07/22/2020    PHOS 7.4 (H) 08/21/2019    ALBUMIN 3.6 07/22/2020    AST 10 07/22/2020    ALT 9 (L) 07/22/2020    .0 (H) 08/21/2019       No results found for: PREALBUMIN, BILIRUBINUA, GGT, AMYLASE, LIPASE, PROTEINUA, NITRITE, RBCUA, WBCUA    No results found for: HLAABCTYPE    Lab Results   Component Value Date    CPRA 29 06/23/2020    CIABCLM WEAK---CW2, CW15 06/23/2020    CIIAB DR15,DR16 06/23/2020    ABCMT  WEAK DR51, DR7 04/14/2020       Labs were reviewed with the patient.    Pre-transplant Workup:   Reviewed with the patient.    Assessment:     1. Patient on waiting list for kidney transplant    2. End stage renal disease    3. Diabetes mellitus due to underlying condition with stage 5 chronic kidney disease    4. Hyperthyroidism    5. Hypertriglyceridemia        Plan:   PTH pending  CXR with  There is a reticulonodular pattern present throughout both lungs raising the question of interstitial lung disease; some subtle streaky opacity at the lateral aspect of the left pulmonary apex overlying the anterior portion of the 1st rib at the posterior portions of the 2nd and 3rd intercostal spaces  Requires CT chest without contrast for evaluation; Pulmonary evaluation    Please make future appointments and testing on non-dialysis; patient did not go to dialysis due to today's appointment    MMG due 10/2020    GYN: obtain records    Needs dental evaluation, set time frame of 6 months      Transplant Candidacy:   Ms. Rocha is an unacceptable kidney transplant candidate.  Meets center eligibility for accepting HCV+ donor offer - yes.  Patient educated on HCV+ donors. Kiesha is willing  to accept HCV+ donor offer -  yes   Patient is a candidate for KDPI > 85 kidney donor offer - no. Due to weight  She has experienced health changes that warrant further investigation.  Patient will be placed in an inactive status at present. Hospitalized for COVID-19 now with changes in CXR. Pending CT of chest and Pulmonary evaluation. Patient will loose teeth and has not been seen by a dentist. Instructed to follow-up with dentist for evaluation.     Bridget Betts NP       Follow-up:   In addition to the tests noted in the plan, Ms. Rocha will continue to have reevaluation as per the standing pre-kidney transplant protocol:  1. Monthly blood for PRA  2. Annual return to clinic, except HIV positive, > 65 years of age, or pancreas  transplant candidates who will be scheduled to see transplant every 6 months while in pre-transplant phase  3. Annual re-testing: CXR, EKG, yearly mammograms for women over 40 and PSA for males over 40, cardiology follow-up as recommended by initial cardiology pre-transplant evaluation  4. Renal ultrasound every 2 years  5. Baseline colonoscopy after age 50 and repeated as recommended    UNOS Patient Status  Functional Status: 60% - Requires occasional assistance but is able to care for needs  Physical Capacity: No Limitations

## 2020-09-03 NOTE — PROGRESS NOTES
Kiesha Rocha medical records reviewed with Bridget Betts NP. Provider determined that due to having potential safety issues that the patient will be made inactive on waitlist at this time.   Covid + symptoms requiring PT & further evaluation with CT chest & Pulm consult    Kiesha Rocha notified of safety concerns related to transplant at this time. Patients dialysis unit and Nephrologist notified of above concerns by letter.

## 2020-09-03 NOTE — LETTER
September 8, 2020        Lilia Malin  [address] [address] [address]  Phone: [phone]  Fax: [phone]             [facility] Trans[facility] 1st Fl  [address]  [facility] [address]  Phone: [phone]   Patient: Kiesha Rocha   MR Number: [id number]   YOB: 1965   Date of Visit: 9/3/2020       Dear Dr. Lilia Malin    Thank you for referring Kiesha Rocha to me for evaluation. Attached you will find relevant portions of my assessment and plan of care.    If you have questions, please do not hesitate to call me. I look forward to following Kiesha Rocha along with you.    Sincerely,    Bridget Betts, NP    Enclosure    If you would like to receive this communication electronically, please contact [email or web address] or [phone] to request [employer] access.    [employer] is a tool which provides read-only access to select patient information with whom you have a relationship. Its easy to use and provides real time access to review your patients record including encounter summaries, notes, results, and demographic information.    If you feel you have received this communication in error or would no longer like to receive these types of communications, please e-mail [email or web address]

## 2020-09-03 NOTE — LETTER
September 3, 2020    Kiesha Rocha  [address]  [place] [address]    Dear Kiesha Rocha:  MRN: [id number]    The [facility] team reviewed your transplant candidacy.  It is our programs opinion that you are temporarily not a transplant candidate at our facility as of 9/3/20 because of Covid positive symptoms that require physical therapy and further follow-up such as CT chest and Pulmonology consult.  You will remain listed on the wait list, but will not be able to receive a transplant until this issue is resolved.      Attached is a letter from the United Network for Organ Sharing (UNOS).  It describes the services and information offered to patients by UNOS and the Organ Procurement and Transplant Network.    The [facility] team remains available to answer any questions.  Should you have any questions regarding this decision, please do not hesitate to contact our pre-transplant office.      Sincerely,        oRsa Domingo MD  Medical Director, Kidney & Kidney/Pancreas Transplantation  lh/enclosure    Faxed to:  Dr. Lilia Pavon             The Organ Procurement and Transplantation Network   Toll-free patient services line: Your resource for organ transplant information     If you have a question regarding your own medical care, you always should call your transplant hospital first. However, for general organ transplant-related information, you can call the Organ Procurement and Transplantation Network (OPTN) toll-free patient services line at [phone].     Anyone, including potential transplant candidates, candidates, recipients, family members, friends, living donors, and donor family members, can call this number to:     · Talk about organ donation, living donation, the transplant process, the donation process, and transplant policies.   · Get a free patient information kit with helpful booklets, waiting list and transplant  information, and a list of all transplant hospitals.   · Ask questions about the OPTN website (https://optn.transplant.hrsa.gov/), the United Network for Organ Sharings (UNOS) website (https://unos.org/), or the UNOS website for living donors and transplant recipients. (https://www.transplantliving.org/).   · Learn how the OPTN can help you.   · Talk about any concerns that you may have with a transplant hospital.     The nations transplant system, the OPTN, is managed under federal contract by the United Network for Organ Sharing (UNOS), which is a non-profit charitable organization. The OPTN helps create and define organ sharing policies that make the best use of donated organs. This process continuously evaluating new advances and discoveries so policies can be adapted to best serve patients waiting for transplants. To do so, the OPTN works closely with transplant professionals, transplant patients, transplant candidates, donor families, living donors, and the public. All transplant programs and organ procurement organizations throughout the country are OPTN members and are obligated to follow the policies the OPTN creates for allocating organs.     The OPTN also is responsible for:   · Providing educational material for patients, the public, and professionals.   · Raising awareness of the need for donated organs and tissue.   · Coordinating organ procurement, matching, and placement.   · Collecting information about every organ transplant and donation that occurs in the [place].     Remember, you should contact your transplant hospital directly if you have questions or concerns about your own medical care including medical records, work-up progress, and test results.     We are not your transplant hospital, and our staff will not be able to answer questions about your case, so please keep your transplant hospitals phone number handy.   However, while you research your transplant needs and learn as much  as you can about transplantation and donation, we welcome your call to our toll-free patient services line at [phone].

## 2020-09-04 ENCOUNTER — TELEPHONE (OUTPATIENT)
Dept: TRANSPLANT | Facility: CLINIC | Age: 55
End: 2020-09-04

## 2020-09-04 DIAGNOSIS — Z76.82 ORGAN TRANSPLANT CANDIDATE: Primary | ICD-10-CM

## 2020-09-09 NOTE — PROGRESS NOTES
Transplant Recipient Adult Psychosocial Assessment Update    Kiesha Rocha  [address]  [place] [address]  Telephone Information:   Mobile [phone]   Mobile [phone]   Home  [phone] (home)  Work  There is no work phone number on file.  E-mail  [email or web address]    Sex: female  YOB: 1965  Age: 54 y.o.    Encounter Date: 9/3/2020  [place] Citizen: yes  Primary Language: English   Needed: no    Emergency Contact:  Moo Rodrigez, 56 yo , [place], does drive/own car, works full time at [employer] home. [phone]  Chika Rocha, 50 yo sister, Caty Suarez, does drive/own car, self employed sitter/. [phone]    Family/Social Support:   Number of dependents/: pt denies  Marital history: with Moo since .  Other family dynamics: pt reports father . Pt reports mother Misa is living well in [facility] and is active. Pt reports is disabled and lives with supportive full time employed  Moo in [facility]. Pt reports supportive employed daughter Darlene and 3 yo grand daughter Ananth live in pt's household. Pt reports having 4 grown children with 2 daughters being most available.     Household Composition:  Moo Rodrigez, 56 yo , [place], does drive/own car, works full time at [employer] home. [phone]  Darlene Rocha, 26 yo daughter, [place], does drive/own car, works full time as CNA at [employer]. [phone]  Ananth Richards, 3 yo grand daughter      Pt's children:  Fazal Rocha, 31 yo daughter, [place], does drive/own car, [phone]  Darlene Rocha, 26 yo daughter, [place], does drive/own car, works full time as CNA at [employer]. [phone]  Wilfredo Rocha, 28 yo son        Do you and your caregivers have access to reliable transportation? yes  PRIMARY CAREGIVER: Chika Rocha, sister, will be primary caregiver, phone number   [phone]     provided in-depth information to patient and caregiver regarding pre- and post-transplant caregiver role.   strongly encourages patient and caregiver to have concrete plan regarding post-transplant care giving, including back-up caregiver(s) to ensure care giving needs are met as needed.    Patient and Caregiver states understanding all aspects of caregiver role/commitment and is able/willing/committed to being caregiver to the fullest extent necessary.    Patient and Caregiver verbalizes understanding of the education provided today.         remains available. Patient and Caregiver agree to contact  in a timely manner if concerns arise.      Able to take time off work without financial concerns: yes.     Additional Significant Others who will Assist with Transplant:  Tere Rocha, 33 yo daughter, Caty NICOLE, does drive/own car, [phone]  Darlene Rocha, 24 yo daughter, [place], does drive/own car, works full time as CNA at [employer]. [phone]  Moo Rodrigez, 58 yo , [place], does drive/own car, works full time at [employer]. [phone]    Living Will: no  Healthcare Power of : no  Advance Directives on file: <<no information> per medical record.  Verbally reviewed LW/HCPA information.   provided patient with copy of LW/HCPA documents and provided education on completion of forms.    Living Donors: Education and resource information given to patient.    Highest Education Level: High School (9-12) or GED completed 11th grade  Reading Ability: 8th grade  Reports difficulty with: eyesight is blurry as per patient  Learns Best By:  Reading about, seeing and doing new task until proficient     Status: no  VA Benefits: no     Working for Income: No  If no, reason not working: Disability  Patient is disabled housewife.    Spouse/Significant Other Employment:    works full time at [employer].    Disabled: yes due to ESRD    Monthly Income:  Household total:  $1694  Able to afford all costs now and if transplanted, including medications: yes  Patient and Caregiver verbalizes understanding of personal responsibilities related to transplant costs and the importance of having a financial plan to ensure that patients transplant costs are fully covered.       provided fundraising information/education. Patient and Caregiververbalizes understanding.   remains available.    Insurance:   Payor/Plan Subscr  Sex Relation Sub. Ins. ID Effective Group Num   1. [place] - [place]* JOSSUE JOSE 1965 Female Self [id number]                                    [address]   2. [place] 1965 Female  [id number]* [id number]                                    [address] [address]     Primary Insurance (for UNOS reporting): Public Insurance - Medicare FFS (Fee For Service)  Secondary Insurance (for UNOS reporting): Public Insurance - Medicaid     SW reviewed policy regarding Medicare and Medicaid ending after transplant.  Pt verbalized understanding.      Patient and Caregiver verbalizes clear understanding that patient may experience difficulty obtaining and/or be denied insurance coverage post-surgery. This includes and is not limited to disability insurance, life insurance, health insurance, burial insurance, long term care insurance, and other insurances.      Patient and Caregiver also reports understanding that future health concerns related to or unrelated to transplantation may not be covered by patient's insurance.  Resources and information provided and reviewed.     Patient and Caregiver provides verbal permission to release any necessary information to outside resources for patient care and discharge planning.  Resources and information provided are reviewed.      [facility], [phone].  "Hemodialysis: T, Th, Sat. 3.5 hours    Dialysis Adherence: Patient and Caregiver reports high dialysis compliance within last 3 months.  Dialysis compliance update form sent to unit for completion.    Infusion Service: patient utilizing? no  Home Health: patient utilizing? no  DME: yes bp cuff, glucometer, walker; Pt hospitalized in mid August 2020 for COVID-19 and became deconditioned requiring rehab and left hospital using a walker.  She states she is getting stronger with therapy.  Pulmonary/Cardiac Rehab: pt denies  ADLS:  Independent with self care and medication management    Adherence:   Pt reports is highly compliant with all medical appointments and instructions.  Adherence education and counseling provided.     Per History Section:  Past Medical History:   Diagnosis Date    Anemia     Anxiety     Diabetes mellitus     Disorder of kidney and ureter     Encounter for blood transfusion     GERD (gastroesophageal reflux disease)     Hydronephrosis     Hyperlipidemia     Hypertension     Hyperthyroidism     Obesity     Thyroid disease      Social History     Tobacco Use    Smoking status: Never Smoker    Smokeless tobacco: Never Used   Substance Use Topics    Alcohol use: Never     Frequency: Never     Social History     Substance and Sexual Activity   Drug Use Never     Social History     Substance and Sexual Activity   Sexual Activity Not on file       Per Today's Psychosocial:  Tobacco: pt denies tobacco use.  States she dipped tobacco in the past, but it was "a long time ago".   Alcohol: none, patient denies any use.  Illicit Drugs/Non-prescribed Medications: none, patient denies any use.    Patient and Caregiver states clear understanding of the potential impact of substance use as it relates to transplant candidacy and is aware of possible random substance screening.  Substance abstinence/cessation counseling, education and resources provided and reviewed.     Arrests/DWI/Treatment/Rehab: " "patient denies    Psychiatric History:    Mental Health: Pt reports suffers from "nerves" and takes Prozac 20 mg prescribed by PCP "Dr. Cecily Arcos". Pt reports has been taking Prozac for 5 years.  Pt reports belief anxiety is best managed by planning ahead so she does not become overwhelmed. Pt reports her sister Chika is the most supportive and knows her best than everyone. Pt reports holden through lauren and prayer and attends [employer] regularly. Pt denies any overwhelming feelings of anxiety or depression surrounding organ transplant process at this time. Pt was in good spirits, engaged and participatory throughout the psychosocial session. Pt denies any need for mental health referral at this time.  Psychiatrist/Counselor: pt denies  Medications:  Prozac 20 mg prescribed by PCP "Dr. Cecily Arcos".  Pt reports has been taking Prozac for 5 years.  Suicide/Homicide Issues: Pt denies current or past suicidal/homicidal ideation.  Safety at home: Pt denies any home safety concerns.    Knowledge: Patient and Caregiver states having clear understanding and realistic expectations regarding the potential risks and potential benefits of organ transplantation and organ donation and agrees to discuss with health care team members and support system members, as well as to utilize available resources and express questions and/or concerns in order to further facilitate the pt informed decision-making.  Resources and information provided and reviewed.    Patient and Caregiver is aware of [facility] affiliation and/or partnership with agencies in home health care, LTAC, SNF, [facility], and other hospitals and clinics.    Understanding: Patient and Caregiver reports having a clear understanding of the many lifetime commitments involved with being a transplant recipient, including costs, compliance, medications, lab work, procedures, appointments, concrete and financial planning, preparedness, timely and appropriate " communication of concerns, abstinence (ETOH, tobacco, illicit non-prescribed drugs), adherence to all health care team recommendations, support system and caregiver involvement, appropriate and timely resource utilization and follow-through, mental health counseling as needed/recommended, and patient and caregiver responsibilities.  Social Service Handbook, resources and detailed educational information provided and reviewed.  Educational information provided.    Patient and Caregiver also reports current and expected compliance with health care regime and states having a clear understanding of the importance of compliance.      Patient and Caregiver reports a clear understanding that risks and benefits may be involved with organ transplantation and with organ donation.       Patient and Caregiver also reports clear understanding that psychosocial risk factors may affect patient, and include but are not limited to feelings of depression, generalized anxiety, anxiety regarding dependence on others, post traumatic stress disorder, feelings of guilt and other emotional and/or mental concerns, and/or exacerbation of existing mental health concerns.  Detailed resources provided and discussed.      Patient and Caregiver agrees to access appropriate resources in a timely manner as needed and/or as recommended, and to communicate concerns appropriately.  Patient and Caregiver also reports a clear understanding of treatment options available.     Patient and Caregiver received education in a group setting.   reviewed education, provided additional information, and answered questions.    Feelings or Concerns: Pt reports high motivation to pursue organ transplant.    Coping: Pt reports is coping well over all with kidney disease and need for organ transplant. Pt reports holden best through supportive family encouragement and through lauren and prayer.  States she enjoys reading, shoppint and attending [employer] where  sister in law is a .    Goals: Pt reports hope for successful organ transplant so she may discontinue dialysis and have healthier life. Patient referred to Vocational Rehabilitation.    Interview Behavior: Patient and Caregiver presents as alert and oriented x 4, pleasant, good eye contact, well groomed, recall good, concentration/judgement good, average intelligence, calm, communicative, cooperative and asking and answering questions appropriately.  present during appointment and presents as supportive.           Transplant Social Work - Candidacy  Assessment/Plan:     Psychosocial Suitability: Patient presents as a suitable candidate for kidney transplant at this time. Based on psychosocial risk factors, patient presents as low risk, due to patient reporting having organ transplant caregiver/transportation plan, medical insurance plan and plan to afford transplant costs all in place.    Recommendations/Additional Comments: Dialysis compliance update needed and compliance form sent to unit for completion. Pt reports her caregivers work and may need employer paperwork completed for any time missed due to transplant. Pt lives away and may need transplant lodging; lodging was reviewed and discussed in detail today. Pt reports hope to continue as [place] Medicaid [place] for organ transplant. Pt reports hope [place] Medicaid does not count her common law 's income as hers because she is not legally  and does not believe his income should reflect on [place] Medicaid application. Pt reports plan to confirm with [place] Medicaid continued coverage. Pt reports if any insurance change or problem arises she will contact transplant .    Final determination of transplant candidacy will be made once work up is complete and reviewed by the selection committee.    Linda Cary, CHRISTIANAW MSW LCSW

## 2020-10-28 ENCOUNTER — TELEPHONE (OUTPATIENT)
Dept: PULMONOLOGY | Facility: CLINIC | Age: 55
End: 2020-10-28

## 2020-10-28 NOTE — TELEPHONE ENCOUNTER
Attempted to contact patient in regards to rescheduling her appointment due to bad weather but no one answered patient telephone and no voicemail has been set up to leave patient a message.

## 2020-11-03 NOTE — PROGRESS NOTES
Subjective:      Patient ID: Kiesha Rocha is a 54 y.o. female.    Chief Complaint:  Hyperthyroidism and Diabetes    History of Present Illness  Kiesha Rocha presents today for follow up of hyperthyroidism and Type 2 DM complicated by HD T TH Sat. Previous patient of JORDI Hernandez NP. Last visit in July 2020.     She had COVID in July 2020-was in hospital in [place] for one month. She had PT, OT, and ST for one month.    With regards to hyperthyroidism,     She was found to have hyperthyroidism around 2010 and she was seeing endocrinology at another facility (unable to remember where). She has not seen an endo in awhile. Thinks she was just communicating with endo online. She is unsure if she has seen an endo. She was referred by her PCP as she needs kidney transplant.     On initial diagnosis, she presented with a goiter. She reported dysphagia, weight loss of about 40 pounds over the last 2 years, palpitations, heat intolerance, frequent BMs, and tremors on initial diagnosis.     Current medication MMI 7.5 mg daily.    Denies family history of thyroid disease.     current symptoms  - palpations; sees cardio   + weight loss of 20 pounds since COVID  + frequent bm  + Hair loss  + Brittle nails  No skin changes  + tremor   + anxiety  + dysphagia- occ   + mental fog    Denies Biotin usage    Denies any recent in the 6-12 months iodine or contrast   Thyroid tenderness and dysphagia. Denies radiation exposure to head or neck. No family history of thyroid disease. She reports she has not had thyroid ultrasound or thyroid uptake and scan.   Smoke-denies     Ref. Range 7/22/2020 13:50   TSH Latest Ref Range: 0.400 - 4.000 uIU/mL 0.508   Free T4 Latest Ref Range: 0.71 - 1.51 ng/dL 0.94   Thyroperoxidase Antibodies Latest Ref Range: <6.0 IU/mL <6.0   Thyroid-Stim IG Quantitative Latest Ref Range: <0.10 IU/L <0.10     Nuclear medicine thyroid uptake & scan 11/14/2019:  FINDINGS:  The 24 hour uptake is elevated at 34.9 %  (normal 10-30%).    Normal in size and contour with normal distribution of radiotracer throughout both lobes.  No evidence of hyper or hypofunctioning nodules.      Impression     1. Elevated 24 hour radioiodine uptake by the thyroid, findings could represent Grave's disease. Correlate findings with clinical lab values.  2. Normal scintigraphic appearance of the thyroid.  I, Nahid Tolentino MD, attest that I reviewed and interpreted the images.     With regards to her Type 2 DM,     Diagnosed: in 1995 she had gestational diabetes and continued with Type 2 DM  Known complications:   DKA- none  RN-none  PN +   Nephropathy +-HD -TTHSat    Current regimen:   Lantus 27 u daily  Tradejnta 5 mg daily  Humalog 8 units TID AC     She does not take humalog when blood sugar is normal as she reports she would drop    Other medications tried: Metformin stopped due to diarrhea. She has been on Actos in the past and stopped for unknown reasons.     3 times a day testing  Log reviewed: none, Oral recall   BGs range 160-170's; during the middle of the day, reports her BG is in 200's.     Hypoglycemic event- None in awhile  Knows how to correct with 15 grams of carbs- juice, coke, or glucose tablets.    Eats 3 meals a day.  dialysis days: sausage biscuit chicken sandwich rice & gravy  Non Dialysis days: milk & unhealthy cereal rice & gravy, cornbread, red beans & rice.   Snacks: sometimes on peanut butter jelly sandwich. Discussed that is a meal and not a snack. She is also snacking on fruits and candy bars.   Drinks: water, juice, and diet root beer.     Exercise - tried to stay active. She is sometimes walking.   Education - last visit: states in 2019 (outside facility)    Has a Medic alert tag; does not want  Glucagon: none    Diabetes Management Status  Statin: Not taking  ACE/ARB: Taking    Denies family history of thyroid cancer or personal history of pancreatitis.   Lab Results   Component Value Date    HGBA1C 6.5 (H) 07/22/2020  "   HGBA1C 7.3 (H) 04/29/2020    HGBA1C 8.9 (H) 01/28/2020     Screening or Prevention Patient's value Goal Complete/Controlled?   HgA1C Testing and Control   Lab Results   Component Value Date    HGBA1C 6.5 (H) 07/22/2020      Annually/Less than 8% Yes   Lipid profile : 08/21/2019 Annually Yes   LDL control Lab Results   Component Value Date    LDLCALC 107.4 08/21/2019    Annually/Less than 100 mg/dl  No   Nephropathy screening No results found for: LABMICR  No results found for: PROTEINUA Annually No   Blood pressure BP Readings from Last 1 Encounters:   11/05/20 (!) 140/82    Less than 140/90 No   Dilated retinal exam Most Recent Eye Exam Date: Not Found Annually Yes   Foot exam   : 11/05/2020 Annually Yes     Review of Systems   Constitutional: Negative for fatigue.   Eyes: Negative for visual disturbance.   Respiratory: Negative for shortness of breath.    Cardiovascular: Positive for palpitations (occ). Negative for chest pain.   Gastrointestinal: Negative for abdominal pain.   Musculoskeletal: Negative for arthralgias.   Skin: Negative for wound.   Neurological: Negative for headaches.   Hematological: Does not bruise/bleed easily.   Psychiatric/Behavioral: Negative for sleep disturbance.     Objective:   Physical Exam  Vitals signs reviewed.   Constitutional:       Appearance: She is well-developed.   Neck:      Thyroid: No thyromegaly.   Cardiovascular:      Rate and Rhythm: Normal rate.      Heart sounds: Murmur present.   Pulmonary:      Effort: Pulmonary effort is normal.   Abdominal:      Palpations: Abdomen is soft.     injection sites are ok. No lipo hypertropthy or atrophy  Diabetes Foot Exam:  Feet no cuts or scratches  Shoes appropriate  sensation intact to vibration and monofilament    Visit Vitals  BP (!) 140/82   Ht 5' 5" (1.651 m)   Wt 76.4 kg (168 lb 5.1 oz)   BMI 28.01 kg/m²        Lab Review:   Lab Results   Component Value Date    HGBA1C 6.5 (H) 07/22/2020     Lab Results   Component Value " Date    CHOL 222 (H) 08/21/2019    HDL 46 08/21/2019    LDLCALC 107.4 08/21/2019    TRIG 343 (H) 08/21/2019    CHOLHDL 20.7 08/21/2019     Lab Results   Component Value Date     07/22/2020    K 3.9 07/22/2020    CL 99 07/22/2020    CO2 30 (H) 07/22/2020     (H) 07/22/2020    BUN 43 (H) 07/22/2020    CREATININE 6.3 (H) 07/22/2020    CALCIUM 9.3 07/22/2020    PROT 8.0 07/22/2020    ALBUMIN 3.6 07/22/2020    BILITOT 0.8 07/22/2020    ALKPHOS 93 07/22/2020    AST 10 07/22/2020    ALT 9 (L) 07/22/2020    ANIONGAP 11 07/22/2020    ESTGFRAFRICA 8.1 (A) 07/22/2020    EGFRNONAA 7.0 (A) 07/22/2020    TSH 0.508 07/22/2020     No results found for: [id number]  Assessment and Plan     1. Diabetes mellitus due to underlying condition with stage 5 chronic kidney disease  Comprehensive Metabolic Panel    Hemoglobin A1C    blood-glucose meter,continuous (DEXCOM G6 ) Misc    blood-glucose sensor (DEXCOM G6 SENSOR) Janet    blood-glucose transmitter (DEXCOM G6 TRANSMITTER) Janet   2. Hyperthyroidism  TSH    T4, Free   3. Hypertriglyceridemia     4. BMI 28.0-28.9,adult     5. End stage renal disease       Diabetes mellitus due to underlying condition with stage 5 chronic kidney disease  Complicated by neuropathy and ESRD   -- Reviewed goals of therapy are to get the best control we can without hypoglycemia  -- she is a poor historian of BG  -- A1c goal 7% due to comorbidities. She is severely mismatched in her insulin doses (plan is to slowly titrate her basal down as she is very hesitant in me decreasing her basal doses)   -- A1c does not match self report of BGs, likely related to A1c falsely low due to anemia  Medication changes:   Continue     Lantus 27 u daily   Tradejnta 5 mg daily   Humalog 8 units TID AC   -- Encouraged to give insulin prior to every meal as her BGs are above goal  -- Cannot adjust insulin doses today as we have no data. Consider CGMS in future  -- Because of the COVID -19 pandemic, the  dexcom system is medically necessary for proper glycemic control to prevent hypoglycemic events. Since the pandemic, Medicare has lifted the requirements due to COVID 19.    -- Reviewed patient's current insulin regimen. Clarified proper insulin dose and timing in relation to meals, etc. Insulin injection sites and proper rotation instructed.    -- Advised frequent self blood glucose monitoring.  Patient encouraged to document glucose results and bring them to every clinic visit    -- Hypoglycemia precautions discussed. Instructed on precautions before driving.    -- Call for Bg repeatedly < 90 or > 180.   -- Close adherence to lifestyle changes recommended.   -- Periodic follow ups for eye evaluations, foot care and dental care suggested.  --Encouraged exercise per ADA guidelines of 150 minutes weekly.   -- Previously given list of low carb snacks.    Insulin doses decreased with weight loss     Hyperthyroidism  -- continue methimazole 7.5 mg daily.  Encouraged her to let me know if he develops a rash, fever, or sore throat while taking this medication.    -- Plan repeat labs with thyroid antibodies today  -- patient is not interested in GERARD.     Hypertriglyceridemia  -Not controlled   -Not on statin per ADA recommendations  -Encouraged to avoid fried fatty foods   -Encouraged exercise       BMI 28.0-28.9,adult  -Encouraged renal and diabetic diet and exercise   Body mass index is 28.01 kg/m².      End stage renal disease  -Follow up with nephro   - discussed avoiding insulin stacking    Follow up in about 3 months (around 2/5/2021).  Labs today

## 2020-11-05 ENCOUNTER — TELEPHONE (OUTPATIENT)
Dept: ENDOCRINOLOGY | Facility: CLINIC | Age: 55
End: 2020-11-05

## 2020-11-05 ENCOUNTER — LAB VISIT (OUTPATIENT)
Dept: LAB | Facility: HOSPITAL | Age: 55
End: 2020-11-05
Payer: MEDICARE

## 2020-11-05 ENCOUNTER — OFFICE VISIT (OUTPATIENT)
Dept: ENDOCRINOLOGY | Facility: CLINIC | Age: 55
End: 2020-11-05
Payer: MEDICARE

## 2020-11-05 VITALS
WEIGHT: 168.31 LBS | SYSTOLIC BLOOD PRESSURE: 140 MMHG | DIASTOLIC BLOOD PRESSURE: 82 MMHG | BODY MASS INDEX: 28.04 KG/M2 | HEIGHT: 65 IN

## 2020-11-05 DIAGNOSIS — E08.22 DIABETES MELLITUS DUE TO UNDERLYING CONDITION WITH STAGE 5 CHRONIC KIDNEY DISEASE: Chronic | ICD-10-CM

## 2020-11-05 DIAGNOSIS — N18.5 DIABETES MELLITUS DUE TO UNDERLYING CONDITION WITH STAGE 5 CHRONIC KIDNEY DISEASE: Chronic | ICD-10-CM

## 2020-11-05 DIAGNOSIS — E78.1 HYPERTRIGLYCERIDEMIA: ICD-10-CM

## 2020-11-05 DIAGNOSIS — N18.6 END STAGE RENAL DISEASE: ICD-10-CM

## 2020-11-05 DIAGNOSIS — E05.90 HYPERTHYROIDISM: ICD-10-CM

## 2020-11-05 LAB
ALBUMIN SERPL BCP-MCNC: 3.6 G/DL (ref 3.5–5.2)
ALP SERPL-CCNC: 72 U/L (ref 55–135)
ALT SERPL W/O P-5'-P-CCNC: 9 U/L (ref 10–44)
ANION GAP SERPL CALC-SCNC: 11 MMOL/L (ref 8–16)
AST SERPL-CCNC: 10 U/L (ref 10–40)
BILIRUB SERPL-MCNC: 1.2 MG/DL (ref 0.1–1)
BUN SERPL-MCNC: 35 MG/DL (ref 6–20)
CALCIUM SERPL-MCNC: 8.4 MG/DL (ref 8.7–10.5)
CHLORIDE SERPL-SCNC: 98 MMOL/L (ref 95–110)
CO2 SERPL-SCNC: 29 MMOL/L (ref 23–29)
CREAT SERPL-MCNC: 7.5 MG/DL (ref 0.5–1.4)
EST. GFR  (AFRICAN AMERICAN): 6.6 ML/MIN/1.73 M^2
EST. GFR  (NON AFRICAN AMERICAN): 5.7 ML/MIN/1.73 M^2
ESTIMATED AVG GLUCOSE: 105 MG/DL (ref 68–131)
GLUCOSE SERPL-MCNC: 130 MG/DL (ref 70–110)
HBA1C MFR BLD HPLC: 5.3 % (ref 4–5.6)
POTASSIUM SERPL-SCNC: 3.6 MMOL/L (ref 3.5–5.1)
PROT SERPL-MCNC: 8.2 G/DL (ref 6–8.4)
SODIUM SERPL-SCNC: 138 MMOL/L (ref 136–145)
T4 FREE SERPL-MCNC: 1 NG/DL (ref 0.71–1.51)
TSH SERPL DL<=0.005 MIU/L-ACNC: 0.8 UIU/ML (ref 0.4–4)

## 2020-11-05 PROCEDURE — 99999 PR PBB SHADOW E&M-EST. PATIENT-LVL IV: CPT | Mod: PBBFAC,TXP,, | Performed by: NURSE PRACTITIONER

## 2020-11-05 PROCEDURE — 80053 COMPREHEN METABOLIC PANEL: CPT | Mod: NTX

## 2020-11-05 PROCEDURE — 84439 ASSAY OF FREE THYROXINE: CPT | Mod: NTX

## 2020-11-05 PROCEDURE — 99214 OFFICE O/P EST MOD 30 MIN: CPT | Mod: PBBFAC,TXP | Performed by: NURSE PRACTITIONER

## 2020-11-05 PROCEDURE — 83036 HEMOGLOBIN GLYCOSYLATED A1C: CPT | Mod: NTX

## 2020-11-05 PROCEDURE — 99999 PR PBB SHADOW E&M-EST. PATIENT-LVL IV: ICD-10-PCS | Mod: PBBFAC,TXP,, | Performed by: NURSE PRACTITIONER

## 2020-11-05 PROCEDURE — 84443 ASSAY THYROID STIM HORMONE: CPT | Mod: TXP

## 2020-11-05 PROCEDURE — 99214 PR OFFICE/OUTPT VISIT, EST, LEVL IV, 30-39 MIN: ICD-10-PCS | Mod: S$PBB,NTX,, | Performed by: NURSE PRACTITIONER

## 2020-11-05 PROCEDURE — 99214 OFFICE O/P EST MOD 30 MIN: CPT | Mod: S$PBB,NTX,, | Performed by: NURSE PRACTITIONER

## 2020-11-05 PROCEDURE — 36415 COLL VENOUS BLD VENIPUNCTURE: CPT | Mod: NTX

## 2020-11-05 RX ORDER — BLOOD-GLUCOSE,RECEIVER,CONT
1 EACH MISCELLANEOUS CONTINUOUS PRN
Qty: 1 EACH | Status: SHIPPED | OUTPATIENT
Start: 2020-11-05 | End: 2022-02-03

## 2020-11-05 RX ORDER — BLOOD-GLUCOSE SENSOR
3 EACH MISCELLANEOUS CONTINUOUS PRN
Qty: 3 EACH | Status: SHIPPED | OUTPATIENT
Start: 2020-11-05 | End: 2023-10-04

## 2020-11-05 RX ORDER — BLOOD-GLUCOSE TRANSMITTER
1 EACH MISCELLANEOUS CONTINUOUS PRN
Qty: 1 EACH | Status: SHIPPED | OUTPATIENT
Start: 2020-11-05 | End: 2023-10-04

## 2020-11-05 NOTE — PATIENT INSTRUCTIONS
Hyperthyroidism   Continue methimazole 7.5 mg daily   Check labs     Diabetes  Continue     Lantus 27 u daily   Tradejnta 5 mg daily   Humalog 8 units TID AC     Goal blood sugar is  fasting   Goal blood sugar 1 hour after meal is less than 180  Goal blood sugar 2 hour after meal is less than 140    Discussed Dexcom. Agrees to call us for education if she receives.

## 2020-11-05 NOTE — PROGRESS NOTES
Please call patient and let her know   1 Her A1c reduced from 6.5 to 5.3%-controlled but makes me worry she is on too much insulin. Please send me a log with blood sugar checks before meals next week. Can call in if needed  2/ Her thyroid level is normal on methimazole 7.5 mg daily-continue current dosage  3. Her electrolytes were normal but liver function slightly decreased  4. Her kidneys are not functioning well-she is on dialysis  5. Her bilirubin is elevated-please follow up with primary care for this

## 2020-11-05 NOTE — ASSESSMENT & PLAN NOTE
-- continue methimazole 7.5 mg daily.  Encouraged her to let me know if he develops a rash, fever, or sore throat while taking this medication.    -- Plan repeat labs with thyroid antibodies today  -- patient is not interested in GERARD.

## 2020-11-05 NOTE — ASSESSMENT & PLAN NOTE
Complicated by neuropathy and ESRD   -- Reviewed goals of therapy are to get the best control we can without hypoglycemia  -- she is a poor historian of BG  -- A1c goal 7% due to comorbidities. She is severely mismatched in her insulin doses (plan is to slowly titrate her basal down as she is very hesitant in me decreasing her basal doses)   -- A1c does not match self report of BGs, likely related to A1c falsely low due to anemia  Medication changes:   Continue     Lantus 27 u daily   Tradejnta 5 mg daily   Humalog 8 units TID AC   -- Encouraged to give insulin prior to every meal as her BGs are above goal  -- Cannot adjust insulin doses today as we have no data. Consider CGMS in future  -- Because of the COVID -19 pandemic, the dexcom system is medically necessary for proper glycemic control to prevent hypoglycemic events. Since the pandemic, Medicare has lifted the requirements due to COVID 19.    -- Reviewed patient's current insulin regimen. Clarified proper insulin dose and timing in relation to meals, etc. Insulin injection sites and proper rotation instructed.    -- Advised frequent self blood glucose monitoring.  Patient encouraged to document glucose results and bring them to every clinic visit    -- Hypoglycemia precautions discussed. Instructed on precautions before driving.    -- Call for Bg repeatedly < 90 or > 180.   -- Close adherence to lifestyle changes recommended.   -- Periodic follow ups for eye evaluations, foot care and dental care suggested.  --Encouraged exercise per ADA guidelines of 150 minutes weekly.   -- Previously given list of low carb snacks.    Insulin doses decreased with weight loss

## 2020-11-12 ENCOUNTER — TELEPHONE (OUTPATIENT)
Dept: TRANSPLANT | Facility: CLINIC | Age: 55
End: 2020-11-12

## 2021-02-05 ENCOUNTER — TELEPHONE (OUTPATIENT)
Dept: ENDOCRINOLOGY | Facility: CLINIC | Age: 56
End: 2021-02-05

## 2021-02-20 NOTE — ASSESSMENT & PLAN NOTE
-Not controlled   -Not on statin per ADA recommendations  -Encouraged to avoid fried fatty foods   -Encouraged exercise      Discharge Planning Assessment  [facility]     Patient Name: Gregorio Alejandro  MRN: [id number]  Today's Date: 2/20/2021    Admit Date: 2/4/2021    Discharge Needs Assessment    No documentation.       Discharge Plan     Row Name 02/20/21 1522       Plan    Plan Comments  Zeenat/daughter called and states that her fathers wheel chair was left when he was transported to the facility yesterday and she will come and pick it up. Notified Chrissy/[place] to place patient's name and his daughter's name on it and she states she will. PARVEEN Rob, RN, CCP.    Final Discharge Disposition Code  04 - intermediate care facility        Continued Care and Services - Discharged on 2/19/2021 Admission date: 2/4/2021 - Discharge disposition: Skilled Nursing Facility (DC - External)    Destination Coordination complete    Service Provider Request Status Selected Services Address Phone Fax Patient Preferred    [facility]   Selected Intermediate Care [address] [facility] [address] [phone] --    [facility]  Accepted N/A [address] [facility] [id number] [phone] --    [facility]  Pending - Request Sent N/A [address] [facility] [id number] [phone] --    [facility]  Pending - Request Sent N/A [address] [facility] [id number] [phone] --    [facility]  P[facility] - [facility] Sent N/A [address] [facility] [id number] [phone] --    [facility]  Pending - Request Sent N/A [address] [facility] [facility] [id number] [phone] --    [facility]  Pending - Request Sent N/A [address] [facility] [id number] [phone] --    [facility]  Pending - Request Sent N/A [address] [facility] [address] [phone] --    [facility]  LIV[facility]  Pending - Request Sent N/A [address] [facility] [id number] [phone] --    [facility]  Declined  Patient Insurance Not Accepted N/A [address] [facility] [address] [phone] --    [facility]  Declined  Patient Insurance Not Accepted N/A [address] [facility] [address] [phone] --    TONYA REYEZ  Declined  wife declined facility N/A [address] [facility] [id number] [phone] --    [facility]  Declined  Patient Insurance Not Accepted N/A [address] [facility] [address] [phone] --            Selected Continued Care - Prior Encounters Includes selections from prior encounters from 11/6/2020 to 2/19/2021    Discharged on 12/23/2020 Admission date: 12/18/2020 - [place] [facility]     Service Provider Selected Services Address Phone Fax Patient Preferred    [facility]  [facility] IV [facility] [address] [facility] [address] [phone] --          Home Medical Care     Service Provider Selected Services Address Phone Fax Patient Preferred    [facility] [place]  Home Health Services [address] [address] [phone] --                    Expected Discharge Date and Time     Expected Discharge Date Expected Discharge Time    Feb 19, 2021  6:35 PM        Demographic Summary    No documentation.       Functional Status    No documentation.       Psychosocial    No documentation.       Abuse/Neglect    No documentation.       Legal    No documentation.       Substance Abuse    No documentation.       Patient Forms    No documentation.           Linda Rob, RN

## 2021-03-03 ENCOUNTER — EPISODE CHANGES (OUTPATIENT)
Dept: TRANSPLANT | Facility: CLINIC | Age: 56
End: 2021-03-03

## 2021-03-09 ENCOUNTER — TELEPHONE (OUTPATIENT)
Dept: TRANSPLANT | Facility: CLINIC | Age: 56
End: 2021-03-09

## 2021-07-19 ENCOUNTER — TELEPHONE (OUTPATIENT)
Dept: TRANSPLANT | Facility: CLINIC | Age: 56
End: 2021-07-19

## 2021-07-19 DIAGNOSIS — Z76.82 ORGAN TRANSPLANT CANDIDATE: Primary | ICD-10-CM

## 2021-07-19 DIAGNOSIS — R06.02 SHORTNESS OF BREATH: ICD-10-CM

## 2021-08-10 DIAGNOSIS — Z76.82 ORGAN TRANSPLANT CANDIDATE: Primary | ICD-10-CM

## 2021-08-17 ENCOUNTER — HOSPITAL ENCOUNTER (OUTPATIENT)
Dept: RADIOLOGY | Facility: HOSPITAL | Age: 56
Discharge: HOME | End: 2021-08-17
Attending: NURSE PRACTITIONER
Payer: MEDICAID

## 2021-08-17 DIAGNOSIS — R06.02 SHORTNESS OF BREATH: ICD-10-CM

## 2021-08-17 PROCEDURE — 71250 CT THORAX DX C-: CPT | Mod: 26,TXP,, | Performed by: RADIOLOGY

## 2021-08-17 PROCEDURE — 71250 CT THORAX DX C-: CPT | Mod: TC,NTX

## 2021-08-17 PROCEDURE — 71250 CT CHEST WITHOUT CONTRAST: ICD-10-PCS | Mod: 26,TXP,, | Performed by: RADIOLOGY

## 2021-10-19 ENCOUNTER — LAB VISIT (OUTPATIENT)
Dept: LAB | Facility: HOSPITAL | Age: 56
End: 2021-10-19
Payer: MEDICAID

## 2021-10-19 ENCOUNTER — OFFICE VISIT (OUTPATIENT)
Dept: ENDOCRINOLOGY | Facility: CLINIC | Age: 56
End: 2021-10-19
Payer: MEDICARE

## 2021-10-19 VITALS
DIASTOLIC BLOOD PRESSURE: 80 MMHG | WEIGHT: 177.56 LBS | HEIGHT: 65 IN | SYSTOLIC BLOOD PRESSURE: 132 MMHG | BODY MASS INDEX: 29.58 KG/M2

## 2021-10-19 DIAGNOSIS — E78.1 HYPERTRIGLYCERIDEMIA: ICD-10-CM

## 2021-10-19 DIAGNOSIS — N18.6 END STAGE RENAL DISEASE: ICD-10-CM

## 2021-10-19 DIAGNOSIS — N18.5 DIABETES MELLITUS DUE TO UNDERLYING CONDITION WITH STAGE 5 CHRONIC KIDNEY DISEASE: Chronic | ICD-10-CM

## 2021-10-19 DIAGNOSIS — E08.22 DIABETES MELLITUS DUE TO UNDERLYING CONDITION WITH STAGE 5 CHRONIC KIDNEY DISEASE: Chronic | ICD-10-CM

## 2021-10-19 DIAGNOSIS — E05.90 HYPERTHYROIDISM: ICD-10-CM

## 2021-10-19 DIAGNOSIS — R06.02 SHORTNESS OF BREATH: ICD-10-CM

## 2021-10-19 LAB
T4 FREE SERPL-MCNC: 0.76 NG/DL (ref 0.71–1.51)
TSH SERPL DL<=0.005 MIU/L-ACNC: 2.65 UIU/ML (ref 0.4–4)

## 2021-10-19 PROCEDURE — 84439 ASSAY OF FREE THYROXINE: CPT | Mod: NTX | Performed by: STUDENT IN AN ORGANIZED HEALTH CARE EDUCATION/TRAINING PROGRAM

## 2021-10-19 PROCEDURE — 99999 PR PBB SHADOW E&M-EST. PATIENT-LVL V: CPT | Mod: PBBFAC,GC,TXP, | Performed by: STUDENT IN AN ORGANIZED HEALTH CARE EDUCATION/TRAINING PROGRAM

## 2021-10-19 PROCEDURE — 84443 ASSAY THYROID STIM HORMONE: CPT | Mod: NTX | Performed by: STUDENT IN AN ORGANIZED HEALTH CARE EDUCATION/TRAINING PROGRAM

## 2021-10-19 PROCEDURE — 99999 PR PBB SHADOW E&M-EST. PATIENT-LVL V: ICD-10-PCS | Mod: PBBFAC,GC,TXP, | Performed by: STUDENT IN AN ORGANIZED HEALTH CARE EDUCATION/TRAINING PROGRAM

## 2021-10-19 PROCEDURE — 99214 OFFICE O/P EST MOD 30 MIN: CPT | Mod: S$PBB,GC,TXP, | Performed by: STUDENT IN AN ORGANIZED HEALTH CARE EDUCATION/TRAINING PROGRAM

## 2021-10-19 PROCEDURE — 99215 OFFICE O/P EST HI 40 MIN: CPT | Mod: PBBFAC,TXP | Performed by: STUDENT IN AN ORGANIZED HEALTH CARE EDUCATION/TRAINING PROGRAM

## 2021-10-19 PROCEDURE — 99214 PR OFFICE/OUTPT VISIT, EST, LEVL IV, 30-39 MIN: ICD-10-PCS | Mod: S$PBB,GC,TXP, | Performed by: STUDENT IN AN ORGANIZED HEALTH CARE EDUCATION/TRAINING PROGRAM

## 2021-10-20 LAB — TSH RECEP AB SER-ACNC: <1.1 IU/L (ref 0–1.75)

## 2021-11-11 DIAGNOSIS — Z76.82 ORGAN TRANSPLANT CANDIDATE: Primary | ICD-10-CM

## 2021-11-15 ENCOUNTER — DOCUMENTATION ONLY (OUTPATIENT)
Dept: TRANSPLANT | Facility: CLINIC | Age: 56
End: 2021-11-15
Payer: MEDICAID

## 2022-01-24 ENCOUNTER — OFFICE VISIT (OUTPATIENT)
Dept: OBSTETRICS AND GYNECOLOGY | Facility: CLINIC | Age: 57
End: 2022-01-24
Payer: MEDICARE

## 2022-01-24 VITALS
DIASTOLIC BLOOD PRESSURE: 78 MMHG | BODY MASS INDEX: 29.75 KG/M2 | HEIGHT: 65 IN | SYSTOLIC BLOOD PRESSURE: 130 MMHG | WEIGHT: 178.56 LBS

## 2022-01-24 DIAGNOSIS — Z01.419 ROUTINE GYNECOLOGICAL EXAMINATION: Primary | ICD-10-CM

## 2022-01-24 PROCEDURE — 88175 CYTOPATH C/V AUTO FLUID REDO: CPT | Performed by: OBSTETRICS & GYNECOLOGY

## 2022-01-24 PROCEDURE — 99999 PR PBB SHADOW E&M-EST. PATIENT-LVL IV: ICD-10-PCS | Mod: PBBFAC,,, | Performed by: OBSTETRICS & GYNECOLOGY

## 2022-01-24 PROCEDURE — 1160F PR REVIEW ALL MEDS BY PRESCRIBER/CLIN PHARMACIST DOCUMENTED: ICD-10-PCS | Mod: CPTII,S$GLB,, | Performed by: OBSTETRICS & GYNECOLOGY

## 2022-01-24 PROCEDURE — 3008F PR BODY MASS INDEX (BMI) DOCUMENTED: ICD-10-PCS | Mod: CPTII,S$GLB,, | Performed by: OBSTETRICS & GYNECOLOGY

## 2022-01-24 PROCEDURE — G0101 PR CA SCREEN;PELVIC/BREAST EXAM: ICD-10-PCS | Mod: S$GLB,,, | Performed by: OBSTETRICS & GYNECOLOGY

## 2022-01-24 PROCEDURE — 1159F MED LIST DOCD IN RCRD: CPT | Mod: CPTII,S$GLB,, | Performed by: OBSTETRICS & GYNECOLOGY

## 2022-01-24 PROCEDURE — 3075F PR MOST RECENT SYSTOLIC BLOOD PRESS GE 130-139MM HG: ICD-10-PCS | Mod: CPTII,S$GLB,, | Performed by: OBSTETRICS & GYNECOLOGY

## 2022-01-24 PROCEDURE — 3075F SYST BP GE 130 - 139MM HG: CPT | Mod: CPTII,S$GLB,, | Performed by: OBSTETRICS & GYNECOLOGY

## 2022-01-24 PROCEDURE — 99999 PR PBB SHADOW E&M-EST. PATIENT-LVL IV: CPT | Mod: PBBFAC,,, | Performed by: OBSTETRICS & GYNECOLOGY

## 2022-01-24 PROCEDURE — 3008F BODY MASS INDEX DOCD: CPT | Mod: CPTII,S$GLB,, | Performed by: OBSTETRICS & GYNECOLOGY

## 2022-01-24 PROCEDURE — 3078F PR MOST RECENT DIASTOLIC BLOOD PRESSURE < 80 MM HG: ICD-10-PCS | Mod: CPTII,S$GLB,, | Performed by: OBSTETRICS & GYNECOLOGY

## 2022-01-24 PROCEDURE — 3078F DIAST BP <80 MM HG: CPT | Mod: CPTII,S$GLB,, | Performed by: OBSTETRICS & GYNECOLOGY

## 2022-01-24 PROCEDURE — 1160F RVW MEDS BY RX/DR IN RCRD: CPT | Mod: CPTII,S$GLB,, | Performed by: OBSTETRICS & GYNECOLOGY

## 2022-01-24 PROCEDURE — G0101 CA SCREEN;PELVIC/BREAST EXAM: HCPCS | Mod: S$GLB,,, | Performed by: OBSTETRICS & GYNECOLOGY

## 2022-01-24 PROCEDURE — 1159F PR MEDICATION LIST DOCUMENTED IN MEDICAL RECORD: ICD-10-PCS | Mod: CPTII,S$GLB,, | Performed by: OBSTETRICS & GYNECOLOGY

## 2022-01-24 RX ORDER — TOPIRAMATE 25 MG/1
1 TABLET ORAL 2 TIMES DAILY
COMMUNITY
Start: 2021-05-04 | End: 2022-05-05

## 2022-01-24 RX ORDER — CYPROHEPTADINE HYDROCHLORIDE 4 MG/1
TABLET ORAL
COMMUNITY

## 2022-01-24 RX ORDER — ESTRADIOL 1 MG/1
TABLET ORAL
COMMUNITY
End: 2023-10-04

## 2022-01-24 RX ORDER — INSULIN LISPRO 100 [IU]/ML
INJECTION, SOLUTION INTRAVENOUS; SUBCUTANEOUS
COMMUNITY
Start: 2021-09-15

## 2022-01-24 RX ORDER — ATORVASTATIN CALCIUM 20 MG/1
1 TABLET, FILM COATED ORAL DAILY
COMMUNITY
Start: 2021-03-02 | End: 2022-05-05 | Stop reason: SDUPTHER

## 2022-01-24 RX ORDER — FLUTICASONE PROPIONATE AND SALMETEROL 100; 50 UG/1; UG/1
POWDER RESPIRATORY (INHALATION)
COMMUNITY
End: 2022-05-05 | Stop reason: CLARIF

## 2022-01-24 RX ORDER — HYDROCODONE BITARTRATE AND ACETAMINOPHEN 5; 325 MG/1; MG/1
1 TABLET ORAL EVERY 6 HOURS PRN
COMMUNITY
Start: 2021-02-10 | End: 2022-10-06

## 2022-01-24 RX ORDER — ALBUTEROL SULFATE 90 UG/1
2 AEROSOL, METERED RESPIRATORY (INHALATION) EVERY 6 HOURS PRN
COMMUNITY
Start: 2021-01-30

## 2022-01-24 RX ORDER — VITAMIN E 268 MG
CAPSULE ORAL
COMMUNITY
End: 2023-10-04

## 2022-01-24 RX ORDER — ECHINACEA 400 MG
CAPSULE ORAL
COMMUNITY

## 2022-01-24 RX ORDER — GLUCAGON 1 MG
VIAL (EA) INJECTION
COMMUNITY

## 2022-01-24 RX ORDER — TRAZODONE HYDROCHLORIDE 100 MG/1
100 TABLET ORAL NIGHTLY
COMMUNITY
Start: 2021-10-18 | End: 2023-10-04

## 2022-01-24 RX ORDER — PEN NEEDLE, DIABETIC 30 GX3/16"
NEEDLE, DISPOSABLE MISCELLANEOUS
COMMUNITY

## 2022-01-24 RX ORDER — SEVELAMER CARBONATE 800 MG/1
TABLET, FILM COATED ORAL
COMMUNITY
Start: 2021-10-19 | End: 2023-10-04

## 2022-01-24 RX ORDER — ASPIRIN 325 MG
TABLET, DELAYED RELEASE (ENTERIC COATED) ORAL
COMMUNITY

## 2022-01-24 RX ORDER — LACTULOSE 10 G/15ML
SOLUTION ORAL; RECTAL
COMMUNITY
End: 2023-10-04

## 2022-01-24 RX ORDER — ALBUTEROL SULFATE 90 UG/1
AEROSOL, METERED RESPIRATORY (INHALATION)
COMMUNITY
Start: 2021-12-11 | End: 2022-05-05 | Stop reason: SDUPTHER

## 2022-01-24 RX ORDER — SYRINGE,SAFETY WITH NEEDLE,1ML 25GX1"
SYRINGE (EA) MISCELLANEOUS
COMMUNITY

## 2022-01-24 RX ORDER — MIRTAZAPINE 15 MG/1
TABLET, FILM COATED ORAL
COMMUNITY
End: 2023-10-04

## 2022-01-24 RX ORDER — LORATADINE 10 MG/1
1 TABLET ORAL DAILY
COMMUNITY
Start: 2021-07-26

## 2022-01-24 RX ORDER — GARLIC 1000 MG
1000 CAPSULE ORAL
COMMUNITY

## 2022-01-24 RX ORDER — LABETALOL 100 MG/1
1 TABLET, FILM COATED ORAL DAILY
COMMUNITY
Start: 2021-03-02

## 2022-01-24 NOTE — PROGRESS NOTES
Pt here for annual.  ON TRANSPLANT LIST.    ROS:  GENERAL: No fever, chills, fatigability or weight loss.  VULVAR: No pain, no lesions and no itching.  VAGINAL: No relaxation, no itching, no discharge, no abnormal bleeding and no lesions.  ABDOMEN: No abdominal pain. Denies nausea. Denies vomiting. No diarrhea. No constipation  BREAST: Denies pain. No lumps. No discharge.  URINARY: No incontinence, no nocturia, no frequency and no dysuria.  CARDIOVASCULAR: No chest pain. No shortness of breath. No leg cramps.  NEUROLOGICAL: No headaches. No vision changes.  The remainder of the review of systems was negative.    PE:   General Appearance: overweight Well developed. Well nourished. In no acute distress.  Urethral Meatus: Normal size. Normal location. No lesions. No prolapse.  Vulva: Atrophic. Lesions: No.  Urethra: No masses. No tenderness. No prolapse. No scarring.  Bladder: No masses. No tenderness.  Vagina: Mucosa NI: yes Discharge: no Atrophic: yes Rectocele: no Cystocele: no Vaginal cuff intact: yes  Cervix: Absent.  Uterus: Absent.  Adnexa: Masses: No Tenderness: No       CDS Nodularity: No   Abdomen: overweight  No masses. No tenderness.  Breasts: No bilateral masses. No bilateral discharge. No bilateral tenderness. No bilateral fibrocystic changes.  Neck: No thyroid enlargement. No thyroid masses.  Skin: Rashes: No    PROCEDURES:    DIAGNOSIS:  1. Routine gynecological examination        PLAN:     MEDICATIONS & ORDERS:       Patient was counseled today on the new ACS guidelines for cervical cytology screening as well as the current recommendations for breast cancer screening. She was counseled to follow up with her PCP for other routine health maintenance. Counseling session lasted approximately 10 minutes, and all her questions were answered.         FOLLOW-UP: With me PRN

## 2022-01-25 DIAGNOSIS — Z76.82 ORGAN TRANSPLANT CANDIDATE: Primary | ICD-10-CM

## 2022-01-25 NOTE — PROGRESS NOTES
Attempted to call pt to confirm appt & dental follow-up. Pt did not answer and voice mail not set up.

## 2022-01-29 LAB
FINAL PATHOLOGIC DIAGNOSIS: NORMAL
Lab: NORMAL

## 2022-02-02 NOTE — ASSESSMENT & PLAN NOTE
Key History and Diagnostic Findings    - Differential diagnosis includes autoimmune negative Graves (TRAb < 1.0 on 10/19/2021), MNG/toxic nodule, thyroiditis  - Duration: Since at least 2009  - Etiology determined to be: Ongoing workup  - Current relevant medications:  1. Methimazole 7.5 mg  2. Metoprolol succinate 200 mg daily  - Takes thyroid medication properly    - Normal TSH 2.6 with corresponding normal free T4 of 0.76 on 10/19/2021  - TRAb < 1.0 on 10/19/2021  - Prior thyroid imaging: Has not yet had thyroid ultrasound performed which was ordered 10/19/2021 and again 11/11/2021 by primary  - Last BMD: None on record    Plan    - Repeat TFTs today, if still euthyroid will decrease methimazole dose from 7.5 down to 5 mg daily  - Schedule thyroid scan and uptake to assess for nodule  - Will have thyroid ultrasound today  - Continue methimazole 7.5 mg daily for now  - Placed dermatology referral as patient has increasing concerns of hair loss    - Discussed the chance of Graves remission (30%) after 2 years of ATD therapy with a 50% chance of recurrence  - Cause-specific treatment options (methimazole, Iodine 131 therapy, surgery) and their associated risks were discussed with patient

## 2022-02-02 NOTE — ASSESSMENT & PLAN NOTE
- Caution with longer insulin half-life and greater potential for hypoglycemia  - Patient receives hemodialysis

## 2022-02-02 NOTE — PROGRESS NOTES
Subjective:      Patient ID: Kiesha Rocha is a 56 y.o. female.    Chief Complaint:  Hyperthyroidism    History of Present Illness  55-year-old female with history of hyperthyroidism, type 2 diabetes, ESRD on HD, hyperlipidemia, hypertension presents today for follow up.     Regarding Hyperthyroidism:    - Duration:   - Etiology determined to be:  Workup ongoing  - Current relevant medications:  1. Methimazole 7.5 mg  2. Metoprolol succinate 200 mg daily  - Takes thyroid medication properly    - Normal TSH 2.6 with corresponding normal free T4 of 0.76 on 10/19/2021  - TRAb < 1.0 on 10/19/2021    - Prior thyroid imaging: Has not yet had thyroid ultrasound performed which was ordered 10/19/2021 and again 11/11/2021 by primary    - Last BMD: None on record    - Current symptoms:  Fatigue, anxiety, hair loss, no exophthalmos for palpitations    Regarding Diabetes Mellitus:    - Initially diagnosed with Type 2 diabetes mellitus:  Will ask chronicity at next visit  -A1c 6.7 on 10/21/2021 worsened from 5.8 on 06/22/2021    - Current symptoms:  None  - Denies:  Polyuria/polydipsia, nausea/vomiting, abdominal discomfort, numbness/tingling, visual changes, or weight changes    - Pertinent factors:  - Denies: History of frequent UTI/yeast infections, recent DKA, ketogenic diet, diuretics, history of pancreatitis, recurrent gallstones, daily alcohol use, MEN syndrome, pancreatic tumors, or gastroparesis    - Known diabetic complications: nephropathy and peripheral neuropathy  - Cardiovascular risk factors: diabetes mellitus, dyslipidemia, family history of premature cardiovascular disease and hypertension    - Current diabetic medications include:   1.  Tresiba 35 units daily  2. Lispro 4 units TIDWM  3. Linagliptin 5 mg daily    - Current diet: not asked  - Current exercise: not asked  - Current glucose monitoring regimen:    - Any episodes of hypoglycemia? no    BP Readings from Last 3 Encounters:   02/03/22 120/80  "  01/24/22 130/78   10/19/21 132/80       Wt Readings from Last 10 Encounters:   02/03/22 81.4 kg (179 lb 7.3 oz)   01/24/22 81 kg (178 lb 9.2 oz)   10/19/21 80.6 kg (177 lb 9.3 oz)   11/05/20 76.4 kg (168 lb 5.1 oz)   09/03/20 82.1 kg (181 lb)   09/03/20 80.3 kg (177 lb 0.5 oz)   07/22/20 81.9 kg (180 lb 8.9 oz)   01/28/20 82.8 kg (182 lb 8.7 oz)   10/29/19 82.4 kg (181 lb 10.5 oz)   10/29/19 82.2 kg (181 lb 1.7 oz)       Diabetes Management Status    - Statin: Taking atorvastatin 20 mg every night  - ACE/ARB: Taking lisinopril 20 mg daily  - Seen Optometry/Ophthalmology within last 12 months: no      Screening or Prevention Patient's value Goal Complete/Controlled?   HgA1C Testing and Control   Lab Results   Component Value Date    HGBA1C 6.7 (H) 10/21/2021      Annually/Less than 8% Yes   Lipid profile : 05/04/2021 Annually No   LDL control Lab Results   Component Value Date    LDLCALC 52 05/04/2021    Annually/Less than 100 mg/dl  Yes   Nephropathy screening No results found for: LABMICR  No results found for: PROTEINUA Annually No   Blood pressure BP Readings from Last 1 Encounters:   02/03/22 120/80    Less than 140/90 Yes   Dilated retinal exam Most Recent Eye Exam Date: Not Found Annually Yes   Foot exam   : 02/03/2022 Will need foot exam at next visit No        A complete 14 point review of systems was conducted and negative except for what is stated above.      Review of Systems   Cardiovascular: Negative for palpitations.   Endocrine: Negative for cold intolerance, heat intolerance, polydipsia, polyphagia and polyuria.   Psychiatric/Behavioral: Positive for decreased concentration. Negative for self-injury and suicidal ideas. The patient is nervous/anxious.        Social and family history reviewed  Current medications and allergies reviewed    Objective:   /80   Ht 5' 5" (1.651 m)   Wt 81.4 kg (179 lb 7.3 oz)   BMI 29.86 kg/m²   Physical Exam  Constitutional:       Appearance: Normal appearance. "   Neck:      Comments: Mild goiter with negative [place] sign  Cardiovascular:      Rate and Rhythm: Normal rate and regular rhythm.      Pulses: Normal pulses.      Heart sounds: Normal heart sounds.   Pulmonary:      Effort: Pulmonary effort is normal.      Breath sounds: Normal breath sounds.   Musculoskeletal:      Cervical back: Normal range of motion and neck supple. No tenderness.   Lymphadenopathy:      Cervical: No cervical adenopathy.   Neurological:      General: No focal deficit present.      Mental Status: She is alert and oriented to person, place, and time.   Psychiatric:         Mood and Affect: Mood normal.         Behavior: Behavior normal.       BP Readings from Last 1 Encounters:   02/03/22 120/80      Wt Readings from Last 1 Encounters:   02/03/22 1005 81.4 kg (179 lb 7.3 oz)     Body mass index is 29.86 kg/m².    Lab Review:   Lab Results   Component Value Date    HGBA1C 6.7 (H) 10/21/2021     Lab Results   Component Value Date    CHOL 139 05/04/2021    HDL 42 05/04/2021    LDLCALC 52 05/04/2021    TRIG 226 (H) 05/04/2021    CHOLHDL 3.31 05/04/2021     Lab Results   Component Value Date     11/05/2020    K 3.6 11/05/2020    CL 98 11/05/2020    CO2 29 11/05/2020     (H) 11/05/2020    BUN 35 (H) 11/05/2020    CREATININE 7.5 (H) 11/05/2020    CALCIUM 8.4 (L) 11/05/2020    PROT 8.2 11/05/2020    ALBUMIN 3.6 11/05/2020    BILITOT 1.2 (H) 11/05/2020    ALKPHOS 72 11/05/2020    AST 10 11/05/2020    ALT 9 (L) 11/05/2020    ANIONGAP 11 11/05/2020    ESTGFRAFRICA 6.6 (A) 11/05/2020    EGFRNONAA 5.7 (A) 11/05/2020    TSH 2.650 10/19/2021       All pertinent labs reviewed    Assessment and Plan     Hyperthyroidism  Key History and Diagnostic Findings    - Differential diagnosis includes autoimmune negative Graves (TRAb < 1.0 on 10/19/2021), MNG/toxic nodule, thyroiditis  - Duration: Since at least 2009  - Etiology determined to be: Ongoing workup  - Current relevant  medications:  3. Methimazole 7.5 mg  4. Metoprolol succinate 200 mg daily  - Takes thyroid medication properly    - Normal TSH 2.6 with corresponding normal free T4 of 0.76 on 10/19/2021  - TRAb < 1.0 on 10/19/2021  - Prior thyroid imaging: Has not yet had thyroid ultrasound performed which was ordered 10/19/2021 and again 2021 by primary  - Last BMD: None on record    Plan    - Repeat TFTs today, if still euthyroid will decrease methimazole dose from 7.5 down to 5 mg daily  - Schedule thyroid scan and uptake to assess for nodule  - Will have thyroid ultrasound today  - Continue methimazole 7.5 mg daily for now  - Placed dermatology referral as patient has increasing concerns of hair loss    - Discussed the chance of Graves remission (30%) after 2 years of ATD therapy with a 50% chance of recurrence  - Cause-specific treatment options (methimazole, Iodine 131 therapy, surgery) and their associated risks were discussed with patient    Diabetes mellitus due to underlying condition with stage 5 chronic kidney disease  Key History and Diagnostic Findings  - A1C:  6.7 on 10/21/2021    - Current regimen: Lantus 27 units at night and lispro 4 units t.i.d. with meals and linagliptin 5 daily  - Weight based dosin kg x 0.3 (ESRD) =22 TDD x 0.5 = 11 basal and 11 prandial  - 1700/TDD = 77 (likely much greater given ESRD status)    - Foot exam performed today:   Not done, will perform at next visit  - Optometry seen within 1 year:    No retinopathy per patient  - ANNE not attainable, anuric   On lisinopril 20 mg daily   - The 10-year ASCVD risk score is: The 10-year ASCVD risk score ([place] SALMA Jr., et al., 2013) is: 2.9%    Values used to calculate the score:      Age: 56 years      Sex: Female      Is Non- : Yes      Diabetic: No      Tobacco smoker: No      Systolic Blood Pressure: 130 mmHg      Is BP treated: No      HDL Cholesterol: 42 mg/dL      Total Cholesterol: 139 mg/dL   - Lipids  reviewed      On atorvastatin 20 mg at night    Plan  - Continue Lantus 27 units at night  - Continue lispro 4 units with meals  - Continue Tradjenta (linagliptin) 5 mg daily  - Placed new orders for Dexcom, will follow with Diabetes Education  - Patient having increased stressors at home with increased depressive symptoms, denies suicidal ideation or thoughts of self-harm.  Placed psychiatric referral after discussing benefits with patient.  - Placed optometry referral for dilated eye exam     - Reviewed patient's current insulin regimen  - Insulin injection sites and proper rotation instructed, bolus mealtime insulin 10-15 minutes before meals     - Advised frequent self blood glucose monitoring, document glucose results and bring them to every clinic visit  - Hypoglycemia precautions discussed, correct hypoglycemias with 15g of carbs   - Diet, exercise, lifestyle modifications and A1c Goals discussed     Supplies/Refills:  - No need for refills at this time    Labs Ordered:  - A1c     End stage renal disease  - Caution with longer insulin half-life and greater potential for hypoglycemia  - Patient receives hemodialysis    BMI 28.0-28.9,adult  - Discussed appropriate diet and exercise    Hypertriglyceridemia  - LDL of 52 in May 2021    - Continue atorvastatin 20 mg at night  - Encouraged to avoid fried fatty foods             - Encouraged exercise       Prasanna Ruelas DO  [facility], [address]th [address]  [address] [place], [place], [address]    Office: [phone]  Fax: [phone]    Disclaimer: This note has been generated using voice-recognition software. There may be typographical errors that have been missed during proof-reading.    The above history labs imaging impression and plan were discussed with attending physician who is in agreement and also took part in this patient's care.  I personally reviewed all of the patients available medications, labs, imaging, vitals,  allergies, medical history.

## 2022-02-02 NOTE — ASSESSMENT & PLAN NOTE
Key History and Diagnostic Findings  - A1C:  6.7 on 10/21/2021    - Current regimen: Lantus 27 units at night and lispro 4 units t.i.d. with meals and linagliptin 5 daily  - Weight based dosin kg x 0.3 (ESRD) =22 TDD x 0.5 = 11 basal and 11 prandial  - 1700/TDD = 77 (likely much greater given ESRD status)    - Foot exam performed today:   Not done, will perform at next visit  - Optometry seen within 1 year:    No retinopathy per patient  - ANNE not attainable, anuric   On lisinopril 20 mg daily   - The 10-year ASCVD risk score is: The 10-year ASCVD risk score ([place]ry[place] MARSH Jr., et al., 2013) is: 2.9%    Values used to calculate the score:      Age: 56 years      Sex: Female      Is Non- : Yes      Diabetic: No      Tobacco smoker: No      Systolic Blood Pressure: 130 mmHg      Is BP treated: No      HDL Cholesterol: 42 mg/dL      Total Cholesterol: 139 mg/dL   - Lipids reviewed      On atorvastatin 20 mg at night    Plan  - Continue Lantus 27 units at night  - Continue lispro 4 units with meals  - Continue Tradjenta (linagliptin) 5 mg daily  - Placed new orders for Dexcom, will follow with Diabetes Education. Patient has diabetes mellitus and manages diabetes with intensive insulin regimen with three or more insulin injections daily or CSII.  Patient requires a therapeutic CGM and is willing to use therapeutic CGM for the necessary frequent adjustments of insulin therapy. Patient has been using SMBG for frequent glucose monitoring (4x/day). I have completed an in-person visit during the previous 6 months and will continue to have in-person visits every 6 months to assess adherence to their CGM regimen and diabetes treatment plan.  Due to COVID pandemic and need for remote monitoring this tool is medically necessary  - Patient having increased stressors at home with increased depressive symptoms, denies suicidal ideation or thoughts of self-harm.  Placed psychiatric referral after discussing  benefits with patient.  - Placed optometry referral for dilated eye exam     - Reviewed patient's current insulin regimen  - Insulin injection sites and proper rotation instructed, bolus mealtime insulin 10-15 minutes before meals     - Advised frequent self blood glucose monitoring, document glucose results and bring them to every clinic visit  - Hypoglycemia precautions discussed, correct hypoglycemias with 15g of carbs   - Diet, exercise, lifestyle modifications and A1c Goals discussed     Supplies/Refills:  - No need for refills at this time    Labs Ordered:  - A1c

## 2022-02-02 NOTE — ASSESSMENT & PLAN NOTE
- LDL of 52 in May 2021    - Continue atorvastatin 20 mg at night  - Encouraged to avoid fried fatty foods             - Encouraged exercise

## 2022-02-03 ENCOUNTER — HOSPITAL ENCOUNTER (OUTPATIENT)
Dept: RADIOLOGY | Facility: HOSPITAL | Age: 57
Discharge: HOME | End: 2022-02-03
Attending: NURSE PRACTITIONER
Payer: MEDICARE

## 2022-02-03 ENCOUNTER — OFFICE VISIT (OUTPATIENT)
Dept: ENDOCRINOLOGY | Facility: CLINIC | Age: 57
End: 2022-02-03
Payer: MEDICARE

## 2022-02-03 ENCOUNTER — HOSPITAL ENCOUNTER (OUTPATIENT)
Dept: ENDOCRINOLOGY | Facility: CLINIC | Age: 57
Discharge: HOME | End: 2022-02-03
Attending: STUDENT IN AN ORGANIZED HEALTH CARE EDUCATION/TRAINING PROGRAM
Payer: MEDICARE

## 2022-02-03 VITALS
BODY MASS INDEX: 29.9 KG/M2 | DIASTOLIC BLOOD PRESSURE: 80 MMHG | HEIGHT: 65 IN | SYSTOLIC BLOOD PRESSURE: 120 MMHG | WEIGHT: 179.44 LBS

## 2022-02-03 DIAGNOSIS — Z76.82 ORGAN TRANSPLANT CANDIDATE: ICD-10-CM

## 2022-02-03 DIAGNOSIS — E08.22 DIABETES MELLITUS DUE TO UNDERLYING CONDITION WITH STAGE 5 CHRONIC KIDNEY DISEASE: Chronic | ICD-10-CM

## 2022-02-03 DIAGNOSIS — E05.90 HYPERTHYROIDISM: ICD-10-CM

## 2022-02-03 DIAGNOSIS — N18.5 DIABETES MELLITUS DUE TO UNDERLYING CONDITION WITH STAGE 5 CHRONIC KIDNEY DISEASE: Chronic | ICD-10-CM

## 2022-02-03 DIAGNOSIS — L65.9 HAIR LOSS: ICD-10-CM

## 2022-02-03 DIAGNOSIS — E78.1 HYPERTRIGLYCERIDEMIA: ICD-10-CM

## 2022-02-03 DIAGNOSIS — N18.6 END STAGE RENAL DISEASE: ICD-10-CM

## 2022-02-03 DIAGNOSIS — F32.A DEPRESSION, UNSPECIFIED DEPRESSION TYPE: Primary | ICD-10-CM

## 2022-02-03 PROCEDURE — 76536 US EXAM OF HEAD AND NECK: CPT | Mod: NTX,S$GLB,, | Performed by: INTERNAL MEDICINE

## 2022-02-03 PROCEDURE — 71046 XR CHEST PA AND LATERAL: ICD-10-PCS | Mod: 26,TXP,, | Performed by: RADIOLOGY

## 2022-02-03 PROCEDURE — 71046 X-RAY EXAM CHEST 2 VIEWS: CPT | Mod: 26,TXP,, | Performed by: RADIOLOGY

## 2022-02-03 PROCEDURE — 99999 PR PBB SHADOW E&M-EST. PATIENT-LVL V: ICD-10-PCS | Mod: PBBFAC,GC,TXP, | Performed by: STUDENT IN AN ORGANIZED HEALTH CARE EDUCATION/TRAINING PROGRAM

## 2022-02-03 PROCEDURE — 99999 PR PBB SHADOW E&M-EST. PATIENT-LVL V: CPT | Mod: PBBFAC,GC,TXP, | Performed by: STUDENT IN AN ORGANIZED HEALTH CARE EDUCATION/TRAINING PROGRAM

## 2022-02-03 PROCEDURE — 99214 PR OFFICE/OUTPT VISIT, EST, LEVL IV, 30-39 MIN: ICD-10-PCS | Mod: GC,NTX,S$GLB, | Performed by: GENERAL ACUTE CARE HOSPITAL

## 2022-02-03 PROCEDURE — 76536 US SOFT TISSUE HEAD NECK THYROID: ICD-10-PCS | Mod: NTX,S$GLB,, | Performed by: INTERNAL MEDICINE

## 2022-02-03 PROCEDURE — 71046 X-RAY EXAM CHEST 2 VIEWS: CPT | Mod: TC,TXP

## 2022-02-03 PROCEDURE — 99214 OFFICE O/P EST MOD 30 MIN: CPT | Mod: GC,NTX,S$GLB, | Performed by: GENERAL ACUTE CARE HOSPITAL

## 2022-02-03 RX ORDER — BLOOD-GLUCOSE,RECEIVER,CONT
EACH MISCELLANEOUS
Qty: 1 EACH | Refills: 0 | Status: SHIPPED | OUTPATIENT
Start: 2022-02-03 | End: 2023-10-04

## 2022-02-03 NOTE — PATIENT INSTRUCTIONS
- Thyroid ultrasound today  - Need to schedule thyroid uptake scan to check for over-productive nodules; please do not take methimazole for 5 days before the scan so it will be accurate  - Recheck TSH and free T4 (thyroid function labs) today; will decrease methimazole dose from 7.5 mg down to 5 mg daily    - Start Dexcom once you get the reader, continue same diabetes medications at this time    - Dermatology referral made to evaluate hair loss  - Psychiatry referral made to help with depression  - Optometry referral made to do yearly eye exam  - A1c, TSH, and free T4 bloodwork today  - Please consider thyroid surgery as a possible cure for both hyperthyroidism and compressive symptoms (swallowing difficulties); will have further information after ultrasound        INTRODUCTION OF HYPERTHYROIDISM    - Hyperthyroidism is the medical term for an overactive thyroid (hyper = excessive). In people with hyperthyroidism, the thyroid gland produces too much thyroid hormone. When this occurs, the body's metabolism is increased, which can cause a variety of symptoms.  - The thyroid gland is a butterfly-shaped gland in the middle of the neck, located below the larynx (voice box) and above the clavicles (collarbones). The thyroid produces two hormones, triiodothyronine (T3) and thyroxine (T4), that regulate how the body uses and stores energy (also known as the body's metabolism).   - The thyroid is controlled by a gland just below the brain, known as the pituitary gland. The pituitary produces thyroid-stimulating hormone (TSH), which stimulates the thyroid to produce T3 and T4.      SYMPTOMS OF HYPERTHYROIDISM    - Anxiety, irritability, trouble sleeping  - Weakness (in particular of the upper arms and thighs, making it difficult to lift heavy items or climb stairs or get up from a chair)  - Tremors (of the hands)  - Perspiring more than normal, difficulty tolerating hot weather  - Rapid, forceful, or irregular heartbeats  -  "Fatigue  - Weight loss in spite of a normal or increased appetite  - Frequent bowel movements      CAUSES OF HYPERTHYROIDISM    - Graves' disease is the most common cause of hyperthyroidism. It is not clear why Graves' disease develops in most people, although it is more common in certain families.  In people with Graves' disease, the immune system produces an antibody that stimulates the thyroid gland to produce too much thyroid hormone. This is most common in women between the ages of 20 and 40 years but can occur at any age in men or women. The thyroid gland enlarges (called a goiter) and makes excessive amounts of thyroid hormone, causing symptoms of hyperthyroidism. Some people develop eye problems (called Graves' ophthalmopathy or orbitopathy), which causes dry, irritated, or red eyes and, in severe cases, may cause double vision. Others develop swelling behind or around the eyes that causes the eyes to bulge out or inflammation of muscle in the eyelids that can cause excessive lid opening . The more severe manifestations of Graves' eye disease are uncommon, except in smokers. In its most severe form, people with Graves' ophthalmopathy can develop inflammation of the optic nerves, which can result in loss of vision.    - One or more thyroid nodules (small growths or lumps in the thyroid gland) can produce too much thyroid hormone. The nodule is then called a hot nodule, toxic nodule, or, when there is more than one, a toxic nodular goiter.    - Painless ("silent or lymphocytic") thyroiditis and postpartum thyroiditis are disorders in which the thyroid becomes temporarily inflamed and releases thyroid hormone into the bloodstream, causing hyperthyroidism. Postpartum thyroiditis can occur several months in women after delivering a baby. The hyperthyroid symptoms may last for several months, often followed by several months of hypothyroid symptoms, such as fatigue, muscle cramps, bloating, and weight gain.    - " Subacute (granulomatous) thyroiditis is thought to be caused by a virus. It causes a painful, tender, enlarged thyroid gland. The thyroid becomes inflamed and releases thyroid hormone into the blood stream; the hyperthyroidism resolves when the viral infection improves and may also be followed by several months of hypothyroid symptoms.    - Taking too much thyroid hormone medication for hypothyroidism increases blood levels into the range seen in patients with hyperthyroidism.      DIAGNOSIS OF HYPERTHYROIDISM    - Hyperthyroidism can be diagnosed with blood tests that measure the amount of thyroid hormone and thyroid-stimulating hormone (TSH). Typically, the thyroid hormone level is high, and the TSH level is low. A thyroid scan or a blood test may also be recommended to help determine the cause of hyperthyroidism (Graves' disease, toxic nodular goiter, or thyroiditis).      TREATMENT OF HYPERTHYROIDISM    - Hyperthyroidism can be treated using medicine, radioiodine, or surgery. Many factors, such as your age and the severity and type of hyperthyroidism, as well as your preferences, are important in determining which treatment is best.    - The two main types of medicines used to treat hyperthyroidism are antithyroid drugs and beta blockers. Antithyroid drugs, such as methimazole and propylthiouracil, work by decreasing how much thyroid hormone the body makes. Both are very effective, but methimazole is preferred because of a greater risk of serious side effects with PTU.  - While beta blockers do not reduce thyroid hormone production, they can control many of the bothersome symptoms, such as rapid heart rate, tremors, anxiety, and heat intolerance. Once the hyperthyroidism is under control (with antithyroid drugs, surgery, or radioactive iodine), the beta blocker is stopped.    - Radioiodine ablation is a permanent way to treat hyperthyroidism. The amount of radiation used is small and does not cause cancer,  infertility, or birth defects. Radioiodine is given in liquid or capsule form, and it works by destroying much of the thyroid tissue. This takes approximately 6 to 18 weeks. People with severe symptoms, older adults, and people with heart problems should first be treated with an antithyroid drug to control symptoms. Most people who take radioiodine develop hypothyroidism and will need to take thyroid hormone supplements for the rest of their lives. Risks of radioactive iodine: sometimes, after apparently successful treatment, hyperthyroidism returns, and further treatment is needed. Approximately 14% of people who use radioiodine treatment require a second dose. (These people usually have severe hyperthyroidism or a very large goiter.) Twice as many patients who receive radioiodine experience worsening of their eye disease compared with patients who have surgery. People who are treated with radioiodine should avoid close physical contact, especially with young children and pregnant women, for five to seven days after treatment because of the possibility of exposing them to low doses of radiation.    - Surgical removal of the thyroid is a permanent cure for hyperthyroidism, but it is used less often than antithyroid drugs because of the risks (and expense) associated with thyroid surgery. The risks include damage to the nerves to the voice box and damage to the parathyroid glands, which regulate the body's calcium balance.  However, surgery is recommended when: a large goiter blocks the airways, making it difficult to breathe, you cannot tolerate antithyroid drugs and you do not want to use radioiodine, if there is a nodule in the thyroid gland that could be cancer, or if you have active Graves' eye disease    - People who have very mild Graves' disease may have as high as a 50 to 70 percent chance of remission. It is possible to have a relapse years later, and some people will need to eventually consider permanent  treatment with radioactive iodine or surgery.

## 2022-02-03 NOTE — PROGRESS NOTES
I have seen the patient, reviewed the Fellow's history and physical, assessment, and plan. I have personally interviewed and examined the patient at bedside and agree with the findings.   Will proceed with uptake and scan after holding methimazole for 5 days and neck US that is scheduled for today to evaluate cause of hyperthyroidism due to negative thyroid antibodies in the past and patient having goiter on exam with questionable nodules.  [place] sign negative. No real compressive symptoms.  Pt is not interested in surgery,  will c/w methimazole 7.5mg for now and  to lowest effective dose.  If hot nodules will offer GERARD. If cold nodules seen on uptake and scan, will correlate with Thyroid US and if nodules meet FNA criteria will offer at that time.        No DM medication changes as DM is controlled.  Will send DEXCOM prescription with  and DM education for training.        Orlando Tipton MD   Endocrinology   2/3/2022 11:28 AM

## 2022-02-07 ENCOUNTER — TELEPHONE (OUTPATIENT)
Dept: TRANSPLANT | Facility: CLINIC | Age: 57
End: 2022-02-07
Payer: MEDICARE

## 2022-02-11 ENCOUNTER — EPISODE CHANGES (OUTPATIENT)
Dept: TRANSPLANT | Facility: CLINIC | Age: 57
End: 2022-02-11

## 2022-04-12 ENCOUNTER — TELEPHONE (OUTPATIENT)
Dept: CARDIOLOGY | Facility: HOSPITAL | Age: 57
End: 2022-04-12
Payer: MEDICARE

## 2022-04-12 NOTE — PROGRESS NOTES
Spoke with pt to confirm scheduled appt on 4/14/22. Pt confirmed and stated she had all top teeth extracted and has top plate dentures. I will attempt to get dental clearance from [facility] in [place], [phone].

## 2022-04-14 ENCOUNTER — HOSPITAL ENCOUNTER (OUTPATIENT)
Dept: RADIOLOGY | Facility: HOSPITAL | Age: 57
Discharge: HOME | End: 2022-04-14
Attending: NURSE PRACTITIONER
Payer: MEDICARE

## 2022-04-14 ENCOUNTER — HOSPITAL ENCOUNTER (OUTPATIENT)
Dept: CARDIOLOGY | Facility: HOSPITAL | Age: 57
Discharge: HOME | End: 2022-04-14
Attending: NURSE PRACTITIONER
Payer: MEDICARE

## 2022-04-14 ENCOUNTER — OFFICE VISIT (OUTPATIENT)
Dept: TRANSPLANT | Facility: CLINIC | Age: 57
End: 2022-04-14
Payer: MEDICARE

## 2022-04-14 VITALS
HEIGHT: 64 IN | OXYGEN SATURATION: 97 % | RESPIRATION RATE: 16 BRPM | BODY MASS INDEX: 30.83 KG/M2 | TEMPERATURE: 97 F | HEART RATE: 90 BPM | SYSTOLIC BLOOD PRESSURE: 176 MMHG | WEIGHT: 180.56 LBS | DIASTOLIC BLOOD PRESSURE: 77 MMHG

## 2022-04-14 VITALS
SYSTOLIC BLOOD PRESSURE: 160 MMHG | HEIGHT: 65 IN | HEIGHT: 65 IN | WEIGHT: 179 LBS | HEART RATE: 87 BPM | DIASTOLIC BLOOD PRESSURE: 88 MMHG | SYSTOLIC BLOOD PRESSURE: 181 MMHG | BODY MASS INDEX: 29.82 KG/M2 | HEART RATE: 82 BPM | WEIGHT: 179 LBS | BODY MASS INDEX: 29.82 KG/M2 | DIASTOLIC BLOOD PRESSURE: 84 MMHG

## 2022-04-14 DIAGNOSIS — E05.90 HYPERTHYROIDISM: ICD-10-CM

## 2022-04-14 DIAGNOSIS — I15.0 RENOVASCULAR HYPERTENSION: ICD-10-CM

## 2022-04-14 DIAGNOSIS — N18.6 ESRD ON HEMODIALYSIS: ICD-10-CM

## 2022-04-14 DIAGNOSIS — N18.6 ANEMIA IN ESRD (END-STAGE RENAL DISEASE): ICD-10-CM

## 2022-04-14 DIAGNOSIS — Z76.82 ORGAN TRANSPLANT CANDIDATE: ICD-10-CM

## 2022-04-14 DIAGNOSIS — Z76.82 PATIENT ON WAITING LIST FOR KIDNEY TRANSPLANT: Primary | ICD-10-CM

## 2022-04-14 DIAGNOSIS — N18.5 DIABETES MELLITUS DUE TO UNDERLYING CONDITION WITH STAGE 5 CHRONIC KIDNEY DISEASE: Chronic | ICD-10-CM

## 2022-04-14 DIAGNOSIS — Z99.2 ESRD ON HEMODIALYSIS: ICD-10-CM

## 2022-04-14 DIAGNOSIS — D63.1 ANEMIA IN ESRD (END-STAGE RENAL DISEASE): ICD-10-CM

## 2022-04-14 DIAGNOSIS — E08.22 DIABETES MELLITUS DUE TO UNDERLYING CONDITION WITH STAGE 5 CHRONIC KIDNEY DISEASE: Chronic | ICD-10-CM

## 2022-04-14 LAB
ASCENDING AORTA: 2.48 CM
AV INDEX (PROSTH): 0.72
AV MEAN GRADIENT: 8 MMHG
AV PEAK GRADIENT: 16 MMHG
AV VALVE AREA: 2.3 CM2
AV VELOCITY RATIO: 0.6
BSA FOR ECHO PROCEDURE: 1.93 M2
CV ECHO LV RWT: 0.46 CM
CV STRESS BASE HR: 87 BPM
DIASTOLIC BLOOD PRESSURE: 103 MMHG
DOP CALC AO PEAK VEL: 1.97 M/S
DOP CALC AO VTI: 43.26 CM
DOP CALC LVOT AREA: 3.2 CM2
DOP CALC LVOT DIAMETER: 2.01 CM
DOP CALC LVOT PEAK VEL: 1.19 M/S
DOP CALC LVOT STROKE VOLUME: 99.39 CM3
DOP CALCLVOT PEAK VEL VTI: 31.34 CM
E WAVE DECELERATION TIME: 185.17 MSEC
E/A RATIO: 1.04
E/E' RATIO: 27.09 M/S
ECHO LV POSTERIOR WALL: 1.13 CM (ref 0.6–1.1)
EJECTION FRACTION- HIGH: 65 %
EJECTION FRACTION: 65 %
END DIASTOLIC INDEX-HIGH: 153 ML/M2
END DIASTOLIC INDEX-LOW: 93 ML/M2
END SYSTOLIC INDEX-HIGH: 71 ML/M2
END SYSTOLIC INDEX-LOW: 31 ML/M2
FRACTIONAL SHORTENING: 33 % (ref 28–44)
INTERVENTRICULAR SEPTUM: 0.98 CM (ref 0.6–1.1)
LA MAJOR: 5.43 CM
LA MINOR: 5.1 CM
LA WIDTH: 4.46 CM
LEFT ATRIUM SIZE: 3.55 CM
LEFT ATRIUM VOLUME INDEX MOD: 36.6 ML/M2
LEFT ATRIUM VOLUME INDEX: 37.5 ML/M2
LEFT ATRIUM VOLUME MOD: 69.08 CM3
LEFT ATRIUM VOLUME: 70.79 CM3
LEFT INTERNAL DIMENSION IN SYSTOLE: 3.29 CM (ref 2.1–4)
LEFT VENTRICLE DIASTOLIC VOLUME INDEX: 59.67 ML/M2
LEFT VENTRICLE DIASTOLIC VOLUME: 112.78 ML
LEFT VENTRICLE MASS INDEX: 100 G/M2
LEFT VENTRICLE SYSTOLIC VOLUME INDEX: 23.1 ML/M2
LEFT VENTRICLE SYSTOLIC VOLUME: 43.65 ML
LEFT VENTRICULAR INTERNAL DIMENSION IN DIASTOLE: 4.9 CM (ref 3.5–6)
LEFT VENTRICULAR MASS: 189.32 G
LV LATERAL E/E' RATIO: 18.63 M/S
LV SEPTAL E/E' RATIO: 49.67 M/S
MV A" WAVE DURATION": 9.42 MSEC
MV PEAK A VEL: 1.43 M/S
MV PEAK E VEL: 1.49 M/S
MV STENOSIS PRESSURE HALF TIME: 53.7 MS
MV VALVE AREA P 1/2 METHOD: 4.1 CM2
OHS CV CPX 85 PERCENT MAX PREDICTED HEART RATE MALE: 135
OHS CV CPX MAX PREDICTED HEART RATE: 158
OHS CV CPX PATIENT IS FEMALE: 1
OHS CV CPX PATIENT IS MALE: 0
OHS CV CPX PEAK DIASTOLIC BLOOD PRESSURE: 85 MMHG
OHS CV CPX PEAK HEAR RATE: 97 BPM
OHS CV CPX PEAK RATE PRESSURE PRODUCT: NORMAL
OHS CV CPX PEAK SYSTOLIC BLOOD PRESSURE: 154 MMHG
OHS CV CPX PERCENT MAX PREDICTED HEART RATE ACHIEVED: 61
OHS CV CPX RATE PRESSURE PRODUCT PRESENTING: NORMAL
PISA TR MAX VEL: 2.4 M/S
PULM VEIN S/D RATIO: 0.99
PV PEAK D VEL: 0.8 M/S
PV PEAK S VEL: 0.79 M/S
RA MAJOR: 4.81 CM
RA PRESSURE: 3 MMHG
RA WIDTH: 3.67 CM
RETIRED EF AND QEF - SEE NOTES: 53 %
RIGHT VENTRICULAR END-DIASTOLIC DIMENSION: 3.53 CM
RV TISSUE DOPPLER FREE WALL SYSTOLIC VELOCITY 1 (APICAL 4 CHAMBER VIEW): 13.94 CM/S
SINUS: 2.74 CM
STJ: 2.33 CM
SYSTOLIC BLOOD PRESSURE: 174 MMHG
TDI LATERAL: 0.08 M/S
TDI SEPTAL: 0.03 M/S
TDI: 0.06 M/S
TR MAX PG: 23 MMHG
TRICUSPID ANNULAR PLANE SYSTOLIC EXCURSION: 3.24 CM
TV REST PULMONARY ARTERY PRESSURE: 26 MMHG

## 2022-04-14 PROCEDURE — 99215 OFFICE O/P EST HI 40 MIN: CPT | Mod: S$GLB,TXP,, | Performed by: NURSE PRACTITIONER

## 2022-04-14 PROCEDURE — 93018 STRESS TEST WITH MYOCARDIAL PERFUSION (CUPID ONLY): ICD-10-PCS | Mod: TXP,,, | Performed by: INTERNAL MEDICINE

## 2022-04-14 PROCEDURE — 93306 ECHO (CUPID ONLY): ICD-10-PCS | Mod: 26,TXP,, | Performed by: INTERNAL MEDICINE

## 2022-04-14 PROCEDURE — 72170 XR PELVIS ROUTINE AP: ICD-10-PCS | Mod: 26,TXP,, | Performed by: RADIOLOGY

## 2022-04-14 PROCEDURE — 72170 X-RAY EXAM OF PELVIS: CPT | Mod: 26,TXP,, | Performed by: RADIOLOGY

## 2022-04-14 PROCEDURE — 99999 PR PBB SHADOW E&M-EST. PATIENT-LVL V: ICD-10-PCS | Mod: PBBFAC,TXP,, | Performed by: NURSE PRACTITIONER

## 2022-04-14 PROCEDURE — 78452 STRESS TEST WITH MYOCARDIAL PERFUSION (CUPID ONLY): ICD-10-PCS | Mod: 26,TXP,, | Performed by: INTERNAL MEDICINE

## 2022-04-14 PROCEDURE — 99215 PR OFFICE/OUTPT VISIT, EST, LEVL V, 40-54 MIN: ICD-10-PCS | Mod: S$GLB,TXP,, | Performed by: NURSE PRACTITIONER

## 2022-04-14 PROCEDURE — 93306 TTE W/DOPPLER COMPLETE: CPT | Mod: 26,TXP,, | Performed by: INTERNAL MEDICINE

## 2022-04-14 PROCEDURE — 93306 TTE W/DOPPLER COMPLETE: CPT | Mod: TXP

## 2022-04-14 PROCEDURE — 93016 STRESS TEST WITH MYOCARDIAL PERFUSION (CUPID ONLY): ICD-10-PCS | Mod: TXP,,, | Performed by: INTERNAL MEDICINE

## 2022-04-14 PROCEDURE — 76770 US EXAM ABDO BACK WALL COMP: CPT | Mod: 26,TXP,, | Performed by: RADIOLOGY

## 2022-04-14 PROCEDURE — 93016 CV STRESS TEST SUPVJ ONLY: CPT | Mod: TXP,,, | Performed by: INTERNAL MEDICINE

## 2022-04-14 PROCEDURE — 99999 PR PBB SHADOW E&M-EST. PATIENT-LVL V: CPT | Mod: PBBFAC,TXP,, | Performed by: NURSE PRACTITIONER

## 2022-04-14 PROCEDURE — 76770 US EXAM ABDO BACK WALL COMP: CPT | Mod: TC,TXP

## 2022-04-14 PROCEDURE — 72170 X-RAY EXAM OF PELVIS: CPT | Mod: TC,TXP

## 2022-04-14 PROCEDURE — 93018 CV STRESS TEST I&R ONLY: CPT | Mod: TXP,,, | Performed by: INTERNAL MEDICINE

## 2022-04-14 PROCEDURE — 63600175 PHARM REV CODE 636 W HCPCS: Mod: TXP | Performed by: NURSE PRACTITIONER

## 2022-04-14 PROCEDURE — 76770 US RETROPERITONEAL COMPLETE: ICD-10-PCS | Mod: 26,TXP,, | Performed by: RADIOLOGY

## 2022-04-14 PROCEDURE — 78452 HT MUSCLE IMAGE SPECT MULT: CPT | Mod: 26,TXP,, | Performed by: INTERNAL MEDICINE

## 2022-04-14 PROCEDURE — A9502 TC99M TETROFOSMIN: HCPCS | Mod: TXP

## 2022-04-14 RX ORDER — INSULIN GLARGINE 100 [IU]/ML
35 INJECTION, SOLUTION SUBCUTANEOUS
COMMUNITY
Start: 2022-02-15 | End: 2023-10-04

## 2022-04-14 RX ORDER — AMINOPHYLLINE 25 MG/ML
75 INJECTION, SOLUTION INTRAVENOUS ONCE
Status: COMPLETED | OUTPATIENT
Start: 2022-04-14 | End: 2022-04-14

## 2022-04-14 RX ORDER — REGADENOSON 0.08 MG/ML
0.4 INJECTION, SOLUTION INTRAVENOUS ONCE
Status: COMPLETED | OUTPATIENT
Start: 2022-04-14 | End: 2022-04-14

## 2022-04-14 RX ORDER — INSULIN GLARGINE 100 [IU]/ML
INJECTION, SOLUTION SUBCUTANEOUS
COMMUNITY
Start: 2022-02-22 | End: 2022-04-14

## 2022-04-14 RX ADMIN — AMINOPHYLLINE 75 MG: 25 INJECTION, SOLUTION INTRAVENOUS at 09:04

## 2022-04-14 RX ADMIN — REGADENOSON 0.4 MG: 0.08 INJECTION, SOLUTION INTRAVENOUS at 09:04

## 2022-04-14 NOTE — LETTER
April 14, 2022        Lilia Malin  [address] [address] [address]  Phone: [phone]  Fax: [phone]             [facility]- Transplant 1st Fl  [address]  [facility] [address]  Phone: [phone]   Patient: Kiesha Rocha   MR Number: [id number]   YOB: 1965   Date of Visit: 4/14/2022       Dear Dr. Lilia Malin    Thank you for referring iKesha Rocha to me for evaluation. Attached you will find relevant portions of my assessment and plan of care.    If you have questions, please do not hesitate to call me. I look forward to following Kiesha Rocha along with you.    Sincerely,    Caitlin Stevens, NP    Enclosure    If you would like to receive this communication electronically, please contact [email or web address] or [phone] to request [facility] access.    [facility] is a tool which provides read-only access to select patient information with whom you have a relationship. Its easy to use and provides real time access to review your patients record including encounter summaries, notes, results, and demographic information.    If you feel you have received this communication in error or would no longer like to receive these types of communications, please e-mail [email or web address]

## 2022-04-14 NOTE — PROGRESS NOTES
YEARLY LIST MANAGEMENT NOTE    Kiesha Rocha's kidney transplant listing status reviewed.  Patient is due for follow-up appointments on April 2023.  Appointments will be scheduled per protocol.

## 2022-04-14 NOTE — PROGRESS NOTES
Transplant Recipient Adult Psychosocial Assessment Update    Kiesha Rocha  [address]  [place] [address]  Telephone Information:   Mobile [phone]   Mobile [phone]   Home  [phone] (home)  Work  There is no work phone number on file.  E-mail  [email or web address]    Sex: female  YOB: 1965  Age: 56 y.o.    Encounter Date: 2022  [place] Citizen: yes  Primary Language: English   Needed: no    Emergency Contact:  Moo Rodrigez, 58 yo , [place], does drive/own car, works full time at [employer]. [phone]      Family/Social Support:   Number of dependents/: pt denies  Marital history: with Moo since .  Other family dynamics: pt reports father . Pt reports mother Misa is living well in [facility] and is active. Pt reports is disabled and lives with supportive full time employed  Moo in [facility]. Pt reports supportive employed daughter Darlene and 3 yo grand daughter Ananth live in pt's household. Pt reports having 4 grown children with 2 daughters being most available.     Household Composition:  Moo Rodrigez, 58 yo , [place], does drive/own car, works full time at [employer]. [phone]  Darlene Rocha, 24 yo daughter, [place], does drive/own car, works full time as CNA at [employer]. [phone]  Ananth Richards, 3 yo grand daughter      Pt's children:  Fazal Rocha, 33 yo daughter, Caty [place], does drive/own car, [phone]  Darlene Rocha, 24 yo daughter, [place], does drive/own car, works full time as CNA at [employer]. [phone]  Wilfredo Rocha, 26 yo son        Do you and your caregivers have access to reliable transportation? yes  PRIMARY CAREGIVER: Tere Rocha [phone] will be the primary caregiver(daughter)   Pantera Adams [phone](son-in-law)    provided in-depth information to patient and caregiver  regarding pre- and post-transplant caregiver role.   strongly encourages patient and caregiver to have concrete plan regarding post-transplant care giving, including back-up caregiver(s) to ensure care giving needs are met as needed.    Patient and Caregiver states understanding all aspects of caregiver role/commitment and is able/willing/committed to being caregiver to the fullest extent necessary.    Patient and Caregiver verbalizes understanding of the education provided today.         remains available. Patient and Caregiver agree to contact  in a timely manner if concerns arise.      Able to take time off work without financial concerns: yes.     Additional Significant Others who will Assist with Transplant:  Tere Rocha, 31 yo daughter, Caty [place], does drive/own car, [phone]  Darlene Rocha, 26 yo daughter, [place], does drive/own car, works full time as CNA at [employer]. [phone]  Moo Rodrigez, 58 yo , [place], does drive/own car, works full time at [employer]. [phone]    Living Will: no  Healthcare Power of : no  Advance Directives on file: <<no information> per medical record.  Verbally reviewed LW/HCPA information.   provided patient with copy of LW/HCPA documents and provided education on completion of forms.    Living Donors: Education and resource information given to patient.    Highest Education Level: High School (9-12) or GED completed 11th grade  Reading Ability: 8th grade  Reports difficulty with: eyesight is blurry as per patient  Learns Best By:  Reading about, seeing and doing new task until proficient     Status: no  VA Benefits: no     Working for Income: No  If no, reason not working: Disability  Patient is disabled housewife.    Spouse/Significant Other Employment:   works full time at [employer].    Disabled: yes due to  ESRD    Monthly Income:  Household total:  $1694  Able to afford all costs now and if transplanted, including medications: yes  Patient and Caregiver verbalizes understanding of personal responsibilities related to transplant costs and the importance of having a financial plan to ensure that patients transplant costs are fully covered.       provided fundraising information/education. Patient and Caregiververbalizes understanding.   remains available.    Insurance:   Payor/Plan Subscr  Sex Relation Sub. Ins. ID Effective Group Num   1. [place] - [place]* JOSSUE JOSE 1965 Female Self [id number]                                    [address]   2. [place]SE 1965 Female  [id number]                                    [address] [address]     Primary Insurance (for UNOS reporting): Public Insurance - Medicare FFS (Fee For Service)  Secondary Insurance (for UNOS reporting): Public Insurance - Medicaid     SW reviewed policy regarding Medicare and Medicaid ending after transplant.  Pt verbalized understanding.      Patient and Caregiver verbalizes clear understanding that patient may experience difficulty obtaining and/or be denied insurance coverage post-surgery. This includes and is not limited to disability insurance, life insurance, health insurance, burial insurance, long term care insurance, and other insurances.      Patient and Caregiver also reports understanding that future health concerns related to or unrelated to transplantation may not be covered by patient's insurance.  Resources and information provided and reviewed.     Patient and Caregiver provides verbal permission to release any necessary information to outside resources for patient care and discharge planning.  Resources and information provided are reviewed.      [facility], [phone]. Hemodialysis: T, Th, Sat. 3.5 hours    Dialysis Adherence: Patient and Caregiver  "reports high dialysis compliance within last 3 months.  Dialysis compliance update form sent to unit for completion.    Infusion Service: patient utilizing? no  Home Health: patient utilizing? no  DME: yes bp cuff, glucometer, walker; Pt hospitalized in mid August 2020 for COVID-19 and became deconditioned requiring rehab and left hospital using a walker.  She states she is getting stronger with therapy.  Pulmonary/Cardiac Rehab: pt denies  ADLS:  Independent with self care and medication management    Adherence:   Pt reports is highly compliant with all medical appointments and instructions.  Adherence education and counseling provided.     Per History Section:  Past Medical History:   Diagnosis Date    Anemia     Anxiety     Diabetes mellitus     Disorder of kidney and ureter     Encounter for blood transfusion     GERD (gastroesophageal reflux disease)     Hydronephrosis     Hyperlipidemia     Hypertension     Hyperthyroidism     Obesity     Thyroid disease      Social History     Tobacco Use    Smoking status: Never Smoker    Smokeless tobacco: Never Used   Substance Use Topics    Alcohol use: Never     Social History     Substance and Sexual Activity   Drug Use Never     Social History     Substance and Sexual Activity   Sexual Activity Not on file       Per Today's Psychosocial:  Tobacco: pt denies tobacco use.  States she dipped tobacco in the past, but it was "a long time ago".   Alcohol: none, patient denies any use.  Illicit Drugs/Non-prescribed Medications: none, patient denies any use.    Patient and Caregiver states clear understanding of the potential impact of substance use as it relates to transplant candidacy and is aware of possible random substance screening.  Substance abstinence/cessation counseling, education and resources provided and reviewed.     Arrests/DWI/Treatment/Rehab: patient denies    Psychiatric History:    Mental Health: Pt reports suffers from "nerves" and takes " "Prozac 20 mg prescribed by PCP "Dr. Cecily Acros". Pt reports has been taking Prozac for 5 years.  Pt reports belief anxiety is best managed by planning ahead so she does not become overwhelmed. Pt reports her sister Chika is the most supportive and knows her best than everyone. Pt reports holden through lauren and prayer and attends [employer] regularly. Pt denies any overwhelming feelings of anxiety or depression surrounding organ transplant process at this time. Pt was in good spirits, engaged and participatory throughout the psychosocial session. Pt denies any need for mental health referral at this time.  Psychiatrist/Counselor: pt denies  Medications:  Prozac 20 mg prescribed by PCP "Dr. Cecily Arcos".  Pt reports has been taking Prozac for 5 years.  Suicide/Homicide Issues: Pt denies current or past suicidal/homicidal ideation.  Safety at home: Pt denies any home safety concerns.    Knowledge: Patient and Caregiver states having clear understanding and realistic expectations regarding the potential risks and potential benefits of organ transplantation and organ donation and agrees to discuss with health care team members and support system members, as well as to utilize available resources and express questions and/or concerns in order to further facilitate the pt informed decision-making.  Resources and information provided and reviewed.    Patient and Caregiver is aware of [facility]'s affiliation and/or partnership with agencies in home health care, LTAC, SNF, [facility], and other hospitals and clinics.    Understanding: Patient and Caregiver reports having a clear understanding of the many lifetime commitments involved with being a transplant recipient, including costs, compliance, medications, lab work, procedures, appointments, concrete and financial planning, preparedness, timely and appropriate communication of concerns, abstinence (ETOH, tobacco, illicit non-prescribed drugs), adherence to all health " care team recommendations, support system and caregiver involvement, appropriate and timely resource utilization and follow-through, mental health counseling as needed/recommended, and patient and caregiver responsibilities.  Social Service Handbook, resources and detailed educational information provided and reviewed.  Educational information provided.    Patient and Caregiver also reports current and expected compliance with health care regime and states having a clear understanding of the importance of compliance.      Patient and Caregiver reports a clear understanding that risks and benefits may be involved with organ transplantation and with organ donation.       Patient and Caregiver also reports clear understanding that psychosocial risk factors may affect patient, and include but are not limited to feelings of depression, generalized anxiety, anxiety regarding dependence on others, post traumatic stress disorder, feelings of guilt and other emotional and/or mental concerns, and/or exacerbation of existing mental health concerns.  Detailed resources provided and discussed.      Patient and Caregiver agrees to access appropriate resources in a timely manner as needed and/or as recommended, and to communicate concerns appropriately.  Patient and Caregiver also reports a clear understanding of treatment options available.     Patient and Caregiver received education in a group setting.   reviewed education, provided additional information, and answered questions.    Feelings or Concerns: Pt reports high motivation to pursue organ transplant.    Coping: Pt reported difficulty with coping due to sister passing away recently. Pt reported son-in-law is a  and has been assisting pt with counseling.     Goals: Pt reports hope for successful organ transplant so she may discontinue dialysis and have healthier life. Patient referred to Vocational Rehabilitation.    Interview Behavior: Patient and  Caregiver presents as alert and oriented x 4, pleasant, good eye contact, well groomed, recall good, concentration/judgement good, average intelligence, calm, communicative, cooperative and asking and answering questions appropriately.  present during appointment and presents as supportive.           Transplant Social Work - Candidacy  Assessment/Plan:     Psychosocial Suitability: Patient presents as a suitable candidate for kidney transplant at this time. Based on psychosocial risk factors, patient presents as low risk, due to patient reporting having organ transplant caregiver/transportation plan, medical insurance plan and plan to afford transplant costs all in place.    Recommendations/Additional Comments: Dialysis compliance update needed and compliance form sent to unit for completion. Pt reports her caregivers work and may need employer paperwork completed for any time missed due to transplant. Pt lives away and may need transplant lodging; lodging was reviewed and discussed in detail today. Pt reports hope to continue [place] for [place]. Pt reports hope [place] Medicaid does not count her common law 's income as hers because she is not legally  and does not believe his income should reflect on [place] Medicaid application. Pt reports plan to confirm with [place] Medicaid continued coverage. Pt reports if any insurance change or problem arises she will contact transplant .    Final determination of transplant candidacy will be made once work up is complete and reviewed by the selection committee.    Joan Mane LMSW MSW LCSW

## 2022-04-14 NOTE — PROGRESS NOTES
Kidney Transplant Recipient Reevalulation    Referring Physician: Lilia Malin  Current Nephrologist: Lilia Malin  Waitlist Status: inactive  Dialysis Start Date: 2/27/2019    Subjective:     CC:  Annual reassessment of kidney transplant candidacy.    HPI:  Ms. Rocha is a 56 y.o. year old Black or  female with ESRD secondary to diabetic nephropathy.  She has been on the wait list for a kidney transplant at [facility] since 2/27/2019. Patient is currently on hemodialysis started on 2/2019. Patient is dialyzing on MWF schedule.  Patient reports that she is tolerating dialysis well. She dialyzes for 3 1/2 hours per session. She has a LUE AV fistula. Patient denies any recent hospitalizations or ED visits.     Has been inactive since 9/3/20 because of Covid positive symptoms that require physical therapy and further follow-up such as CT chest and Pulmonology consult.       2/3/2022 CXR FINDINGS:  Previous venous congestion and patchy consolidative opacities are no longer seen.  The cardiomediastinal silhouette appears mildly enlarged.  There is mild calcification of the aortic knob consistent with atherosclerotic stigmata.  The bones appear unremarkable for the age of the patient, with mild hypertrophic and/or degenerative changes.  No other significant interval changes are noted.      fx assessment  Reports her sister passed away last week and she is very tearful and is  still adjusting emotionally to this.   Otherwise  feels well. No health concerns today.   Denies chest pain, SOB, leg pain, abdominal pain or LUTs.  Does not appear frail.    Hyperthyroid--Pt is following with Endocrinology   NM Thyroid Uptake and Scan --not done to date   Currently taking Methimazole   Lab Results   Component Value Date    TSH 2.650 10/19/2021     Thyroid US 2/3/2022  1.  Thyroid gland is normal in size with homogenous echotexture.  2.  Scattered subcentimeter simple cysts in both lobes.            Lab  /diagnostic results reviewed with patient today.      CXR 2/3/2022 No acute pulmonary pathology  PXR:  4/14/2022-No acute process seen  Renal US: 4/14/2022 -Mild left hydronephrosis, new from prior exam, noting bilateral ureteral jets were visualized.  Stable left simple renal cyst  Iliacs : 8/21/19 per surgery results favorable for transplant    Colonoscopy: 1/2017 normal  PAP: 1/24/2022 negative  MMG: 10/29/19: benign repeat in 1 year  GYN follow-up: < 6 months ago      4/14/2022 TTE  · The left ventricle is normal in size with mild concentric hypertrophy and normal systolic function.  · The estimated ejection fraction is 65%.  · Grade II left ventricular diastolic dysfunction.  · Mild left atrial enlargement.  · Normal right ventricular size with normal right ventricular systolic function.  · Normal central venous pressure (3 mmHg).  · The estimated PA systolic pressure is 26 mmHg.  · Trivial posterior pericardial effusion.    4/14/2022 Lexiscan -pending       Lab Results   Component Value Date    HGBA1C 6.8 (H) 03/29/2022       Past Medical History:   Diagnosis Date    Anemia     Anxiety     Diabetes mellitus     Disorder of kidney and ureter     Encounter for blood transfusion     GERD (gastroesophageal reflux disease)     Hydronephrosis     Hyperlipidemia     Hypertension     Hyperthyroidism     Obesity     Thyroid disease        Review of Systems   Constitutional: Positive for fatigue and unexpected weight change. Negative for activity change, appetite change, chills and fever.   HENT: Negative for congestion, facial swelling, postnasal drip, rhinorrhea, sinus pressure, sore throat and trouble swallowing.    Eyes: Positive for visual disturbance. Negative for pain and redness.        Wears glasses    Respiratory: Negative for cough, chest tightness, shortness of breath and wheezing.    Cardiovascular: Negative for chest pain, palpitations and leg swelling.   Gastrointestinal: Negative for  "abdominal pain, diarrhea, nausea and vomiting.   Genitourinary: Positive for decreased urine volume. Negative for dysuria, flank pain and urgency.   Musculoskeletal: Positive for arthralgias. Negative for gait problem, neck pain and neck stiffness.   Skin: Negative for rash.   Allergic/Immunologic: Negative for environmental allergies, food allergies and immunocompromised state.   Neurological: Negative for dizziness, weakness, light-headedness and headaches.   Psychiatric/Behavioral: Positive for sleep disturbance. Negative for agitation and confusion. The patient is nervous/anxious.         Hx depression  tearful today        Objective:   body mass index is 30.99 kg/m².    BP (!) 176/77 (BP Location: Right arm, Patient Position: Sitting, BP Method: Large (Automatic))   Pulse 90   Temp 97.2 °F (36.2 °C) (Temporal)   Resp 16   Ht 5' 4" (1.626 m)   Wt 81.9 kg (180 lb 8.9 oz)   SpO2 97%   BMI 30.99 kg/m²         Physical Exam  Vitals reviewed.   Constitutional:       Appearance: Normal appearance. She is well-developed.      Comments: tearful   HENT:      Head: Normocephalic.      Mouth/Throat:      Pharynx: No oropharyngeal exudate.   Eyes:      General: No scleral icterus.     Conjunctiva/sclera: Conjunctivae normal.      Pupils: Pupils are equal, round, and reactive to light.   Cardiovascular:      Rate and Rhythm: Normal rate and regular rhythm.      Heart sounds: Normal heart sounds.   Pulmonary:      Effort: Pulmonary effort is normal.      Breath sounds: Normal breath sounds.   Abdominal:      General: Bowel sounds are normal. There is no distension.      Palpations: Abdomen is soft. There is no hepatomegaly, splenomegaly or mass.      Tenderness: There is no abdominal tenderness. There is no guarding or rebound. Negative signs include Steele's sign and McBurney's sign.   Musculoskeletal:         General: Normal range of motion.        Arms:       Cervical back: Normal range of motion and neck supple. "   Lymphadenopathy:      Cervical: No cervical adenopathy.   Skin:     General: Skin is warm and dry.   Neurological:      Mental Status: She is alert and oriented to person, place, and time.      Motor: No abnormal muscle tone.      Coordination: Coordination normal.   Psychiatric:         Behavior: Behavior normal.         Labs:  Lab Results   Component Value Date    WBC 10.08 04/29/2020    HGB 10.3 (L) 04/29/2020    HCT 33.7 (L) 04/29/2020     11/05/2020    K 3.6 11/05/2020    CL 98 11/05/2020    CO2 29 11/05/2020    BUN 35 (H) 11/05/2020    CREATININE 7.5 (H) 11/05/2020    EGFRNONAA 5.7 (A) 11/05/2020    CALCIUM 8.4 (L) 11/05/2020    PHOS 7.4 (H) 08/21/2019    ALBUMIN 3.6 11/05/2020    AST 10 11/05/2020    ALT 9 (L) 11/05/2020    PTH 37.0 09/03/2020       No results found for: PREALBUMIN, BILIRUBINUA, GGT, AMYLASE, LIPASE, PROTEINUA, NITRITE, RBCUA, WBCUA    No results found for: HLAABCTYPE    Lab Results   Component Value Date    CPRA 29 01/17/2022    MC2AEBM Negative 11/10/2021    CIABCLM WEAK Cw2 01/17/2022    CIIAB DR15,DR16 01/17/2022    ABCMT WEAK DR7, DRB3*03:01, DR51 05/12/2021       Labs were reviewed with the patient.    Pre-transplant Workup:   Reviewed with the patient.    Assessment:     1. Patient on waiting list for kidney transplant    2. ESRD on hemodialysis    3. Diabetes mellitus due to underlying condition with stage 5 chronic kidney disease    4. Renovascular hypertension    5. Anemia in ESRD (end-stage renal disease)    6. Hyperthyroidism        Plan:        Lexiscan --results pending    MMG over due last one on wkup 10/2019--needs to be scheduled      Hyperthyroid  NM Thyroid Uptake and Scan --not done to date   Remains inactive until cleared by endocrinology -->clearance--any c/o with txp /recs          Transplant Candidacy:   Ms. Rocha is an unacceptable kidney transplant candidate, see above.  Meets center eligibility for accepting HCV+ donor offer - yes.  Patient educated on  HCV+ donors. Kiesha is willing  to accept HCV+ donor offer -  yes   Patient is a candidate for KDPI > 85 kidney donor offer - no. Due to weight  She has experienced health changes that warrant further investigation.  Patient will be placed in an inactive status at present. Hospitalized for COVID-19 now with changes in CXR. Pending CT of chest and Pulmonary evaluation. Patient will loose teeth and has not been seen by a dentist. Instructed to follow-up with dentist for evaluation.     Caitlin Stevens NP       Follow-up:   In addition to the tests noted in the plan, Ms. Rocha will continue to have reevaluation as per the standing pre-kidney transplant protocol:  1. Monthly blood for PRA  2. Annual return to clinic, except HIV positive, > 65 years of age, or pancreas transplant candidates who will be scheduled to see transplant every 6 months while in pre-transplant phase  3. Annual re-testing: CXR, EKG, yearly mammograms for women over 40 and PSA for males over 40, cardiology follow-up as recommended by initial cardiology pre-transplant evaluation  4. Renal ultrasound every 2 years  5. Baseline colonoscopy after age 50 and repeated as recommended    UNOS Patient Status  Functional Status: 60% - Requires occasional assistance but is able to care for needs  Physical Capacity: No Limitations

## 2022-04-14 NOTE — NURSING
"Pt noted SR/ST during stress testing.  Pt c/o SOB, chest tightness, and headache with no significant ST elevation/depression noted.  Pt c/o "being anxious and thinking about family who  from heart problems in past"  Pt noted anxious with tapping/movement of right foot and leg.  Pt denied any complaints after Aminophylline given x 2.  Pt noted with tears prior to post imaging and after post imaging stating "just thinking about family."  Pt denies any complaints prior to discharge.  IV left in for patient for next testing at hospital to avoid being stuck again for IV access.  NAD, VSS, ASG.   "

## 2022-04-18 NOTE — PROGRESS NOTES
Patient's chart reviewed today. Patient due for follow-up in April 2023. Appointments to be scheduled per protocol. HLA sample current, in lab, and being received on a regular basis.

## 2022-05-04 NOTE — PROGRESS NOTES
Subjective:      Patient ID: Kiesha Rocha is a 56 y.o. female.    Chief Complaint: Hyperthyroidism     History of Present Illness  55-year-old female with history of hyperthyroidism, type 2 diabetes, ESRD on HD (being evaluated for renal transplant), hyperlipidemia, hypertension presents today for follow up regarding hyperthyroidism and type 2 diabetes.    - Last seen in Endocrine Clinic on 02/02/2022 at which time evaluation for hyperthyroidism was undertaken with thyroid uptake and scan which has not been done yet and thyroid ultrasound which looks normal aside from scattered subcentimeter simple cysts. Of note, patient's sister passed away in early April. Patient is also scheduled for a pulmonology appointment this afternoon  - Has been out of methimazole for about 1 week and having some palpitations  - Had hysterectomy about 4 years prior, will get DEXA    Regarding Hyperthyroidism:    - Duration:  Since at least late 2019 (has been taking methimazole for roughly 5 years [2015/2016])  - Etiology determined to be: Workup ongoing  - Current relevant medications: Methimazole 7.5 mg (has been off for approximately 1 week since running out) and Metoprolol succinate 200 mg daily  - Normal TSH 2.6 with corresponding normal free T4 of 0.76 on 10/19/2021  - TRAb < 1.0 on 10/19/2021  - TSH has not been updated since last visit  Lab Results   Component Value Date    TSH 2.650 10/19/2021    TSH 0.803 11/05/2020    TSH 0.508 07/22/2020    FREET4 0.76 10/19/2021    FREET4 1.00 11/05/2020    FREET4 0.94 07/22/2020     - Thyroid ultrasound obtained 02/03/2022 unremarkable    - Last BMD: None on record    - Current symptoms: Fatigue, anxiety, hair loss, no exophthalmos or palpitations    Regarding Diabetes Mellitus:    - Initially diagnosed with Type 2 diabetes mellitus: over 25 years prior during pregnancy  - A1c of 6.8 on 03/29/2022 from 6.7 on 10/21/2021  Lab Results   Component Value Date    HGBA1C 6.8 (H) 03/29/2022     HGBA1C 6.7 (H) 10/21/2021    HGBA1C 5.8 (H) 2021     (H) 2020     (H) 2020     (H) 2020     - Current regimen: Lantus 27 units at night and lispro 4 units t.i.d. with meals and linagliptin 5 daily  - Weight based dosin kg x 0.3 (ESRD) =22 TDD x 0.5 = 11 basal and 11 prandial  - 1700/TDD = 77 (likely much greater given ESRD status)  - Occasional morning hypoglycemia (maybe 1-2 episodes a month)    - Current symptoms:  None  - Denies:  Polyuria/polydipsia, nausea/vomiting, abdominal discomfort, numbness/tingling, visual changes, or weight changes    - Pertinent factors:  - Denies: History of frequent UTI/yeast infections, recent DKA, ketogenic diet, diuretics, history of pancreatitis, recurrent gallstones, daily alcohol use, MEN syndrome, pancreatic tumors, or gastroparesis    - Known diabetic complications: nephropathy and peripheral neuropathy  - Cardiovascular risk factors: diabetes mellitus, dyslipidemia, family history of premature cardiovascular disease and hypertension    - Current diabetic medications include: Tresiba 35 units daily, Lispro 4 units TIDWM, and Linagliptin 5 mg daily    - Current diet: not asked  - Current exercise: not asked  - Current glucose monitoring regimen:    - Any episodes of hypoglycemia? no    BP Readings from Last 3 Encounters:   22 (!) 176/77   22 (!) 160/84   22 (!) 181/88       Wt Readings from Last 10 Encounters:   22 81.9 kg (180 lb 8.9 oz)   22 81.2 kg (179 lb)   22 81.2 kg (179 lb)   22 81.4 kg (179 lb 7.3 oz)   22 81 kg (178 lb 9.2 oz)   10/19/21 80.6 kg (177 lb 9.3 oz)   20 76.4 kg (168 lb 5.1 oz)   20 82.1 kg (181 lb)   20 80.3 kg (177 lb 0.5 oz)   20 81.9 kg (180 lb 8.9 oz)       Diabetes Management Status    - Statin: Taking atorvastatin 20 mg every night  - ACE/ARB: Not Taking (per nephrology)  - Seen Optometry/Ophthalmology within last 12 months:  Yes, seen at inde      Screening or Prevention Patient's value Goal Complete/Controlled?   HgA1C Testing and Control   Lab Results   Component Value Date    HGBA1C 6.8 (H) 03/29/2022      Annually/Less than 8% Yes   Lipid profile : 03/29/2022 Annually No   LDL control Lab Results   Component Value Date    LDLCALC 149 (H) 03/29/2022    Annually/Less than 100 mg/dl  Yes   Nephropathy screening No results found for: LABMICR  No results found for: PROTEINUA Annually No   Blood pressure BP Readings from Last 1 Encounters:   04/14/22 (!) 176/77    Less than 140/90 Yes   Dilated retinal exam Most Recent Eye Exam Date: Not Found Annually Yes   Foot exam   : 02/03/2022 Will need foot exam at next visit No        A complete 14 point review of systems was conducted and negative except for what is stated above.    Social and family history reviewed  Current medications and allergies reviewed    Objective:   There were no vitals taken for this visit.  Physical Exam  Constitutional:       Appearance: Normal appearance.   Neck:      Comments: Mild goiter with negative Lee sign  Cardiovascular:      Rate and Rhythm: Normal rate and regular rhythm.      Pulses: Normal pulses.      Heart sounds: Normal heart sounds.   Pulmonary:      Effort: Pulmonary effort is normal.      Breath sounds: Normal breath sounds.   Musculoskeletal:      Cervical back: Normal range of motion and neck supple. No tenderness.   Lymphadenopathy:      Cervical: No cervical adenopathy.   Neurological:      General: No focal deficit present.      Mental Status: She is alert and oriented to person, place, and time.   Psychiatric:         Mood and Affect: Mood normal.         Behavior: Behavior normal.       BP Readings from Last 1 Encounters:   04/14/22 (!) 176/77      Wt Readings from Last 1 Encounters:   04/14/22 1258 81.9 kg (180 lb 8.9 oz)     There is no height or weight on file to calculate BMI.    Lab Review:   Lab Results   Component Value Date     HGBA1C 6.8 (H) 2022     Lab Results   Component Value Date    CHOL 253 (H) 2022    HDL 50 2022    LDLCALC 149 (H) 2022    TRIG 270 (H) 2022    CHOLHDL 5.06 (H) 2022     Lab Results   Component Value Date     2020    K 3.6 2020    CL 98 2020    CO2 29 2020     (H) 2020    BUN 35 (H) 2020    CREATININE 7.5 (H) 2020    CALCIUM 8.4 (L) 2020    PROT 8.2 2020    ALBUMIN 3.6 2020    BILITOT 1.2 (H) 2020    ALKPHOS 72 2020    AST 10 2020    ALT 9 (L) 2020    ANIONGAP 11 2020    ESTGFRAFRICA 6.6 (A) 2020    EGFRNONAA 5.7 (A) 2020    TSH 2.650 10/19/2021       All pertinent labs reviewed    Assessment and Plan     Hyperthyroidism  Key History and Diagnostic Findings      Plan    - Repeat TFTs today, if still euthyroid will decrease methimazole dose from 7.5 down to 5 mg daily  - Schedule thyroid scan and uptake (hold methimazole 7 days prior) to assess for nodule    Diabetes mellitus due to underlying condition with stage 5 chronic kidney disease  Key History and Diagnostic Findings  - A1C:  6.8 on 10/21/2021    - Current regimen: Lantus 27 units at night and lispro 4 units t.i.d. with meals and linagliptin 5 daily  - Weight based dosin kg x 0.3 (ESRD) =22 TDD x 0.5 = 11 basal and 11 prandial  - 1700/TDD = 77 (likely much greater given ESRD status)    Plan  - Continue Lantus 27 units at night  - Continue lispro 4 units with meals  - Continue Tradjenta (linagliptin) 5 mg daily  - Continue Dexcom, obtain share code    BMI 29.0-29.9,adult  - Discussed appropriate diet and exercise    Hypertriglyceridemia  - LDL of 52 in May 2021    - Continue atorvastatin 20 mg at night  - Encouraged to avoid fried fatty foods             - Encouraged exercise       Prasanna Ruelas DO  [facility], [address]th [address]  [address], [place],  [address]    Office: [phone]  Fax: [phone]    Disclaimer: This note has been generated using voice-recognition software. There may be typographical errors that have been missed during proof-reading.    The above history labs imaging impression and plan were discussed with attending physician who is in agreement and also took part in this patient's care.  I personally reviewed all of the patients available medications, labs, imaging, vitals, allergies, medical history.

## 2022-05-05 ENCOUNTER — OFFICE VISIT (OUTPATIENT)
Dept: PULMONOLOGY | Facility: CLINIC | Age: 57
End: 2022-05-05
Payer: MEDICARE

## 2022-05-05 ENCOUNTER — OFFICE VISIT (OUTPATIENT)
Dept: ENDOCRINOLOGY | Facility: CLINIC | Age: 57
End: 2022-05-05
Payer: MEDICARE

## 2022-05-05 ENCOUNTER — LAB VISIT (OUTPATIENT)
Dept: LAB | Facility: HOSPITAL | Age: 57
End: 2022-05-05
Payer: MEDICARE

## 2022-05-05 VITALS
HEART RATE: 95 BPM | WEIGHT: 177.69 LBS | OXYGEN SATURATION: 97 % | DIASTOLIC BLOOD PRESSURE: 78 MMHG | HEIGHT: 64 IN | SYSTOLIC BLOOD PRESSURE: 139 MMHG | BODY MASS INDEX: 30.34 KG/M2

## 2022-05-05 VITALS
DIASTOLIC BLOOD PRESSURE: 82 MMHG | HEIGHT: 64 IN | BODY MASS INDEX: 29.9 KG/M2 | WEIGHT: 175.13 LBS | SYSTOLIC BLOOD PRESSURE: 150 MMHG

## 2022-05-05 DIAGNOSIS — N18.5 DIABETES MELLITUS DUE TO UNDERLYING CONDITION WITH STAGE 5 CHRONIC KIDNEY DISEASE: Chronic | ICD-10-CM

## 2022-05-05 DIAGNOSIS — J30.1 SEASONAL ALLERGIC RHINITIS DUE TO POLLEN: ICD-10-CM

## 2022-05-05 DIAGNOSIS — M81.0 OSTEOPOROSIS, UNSPECIFIED OSTEOPOROSIS TYPE, UNSPECIFIED PATHOLOGICAL FRACTURE PRESENCE: ICD-10-CM

## 2022-05-05 DIAGNOSIS — E08.22 DIABETES MELLITUS DUE TO UNDERLYING CONDITION WITH STAGE 5 CHRONIC KIDNEY DISEASE: Chronic | ICD-10-CM

## 2022-05-05 DIAGNOSIS — Z76.82 ORGAN TRANSPLANT CANDIDATE: ICD-10-CM

## 2022-05-05 DIAGNOSIS — Z99.2 ESRD ON DIALYSIS: ICD-10-CM

## 2022-05-05 DIAGNOSIS — Z13.820 OSTEOPOROSIS SCREENING: Primary | ICD-10-CM

## 2022-05-05 DIAGNOSIS — E05.90 HYPERTHYROIDISM: ICD-10-CM

## 2022-05-05 DIAGNOSIS — N18.6 ESRD ON DIALYSIS: ICD-10-CM

## 2022-05-05 DIAGNOSIS — R06.02 SHORTNESS OF BREATH: Primary | ICD-10-CM

## 2022-05-05 DIAGNOSIS — R91.8 MULTIPLE PULMONARY NODULES: ICD-10-CM

## 2022-05-05 DIAGNOSIS — I70.90 ATHEROSCLEROSIS: ICD-10-CM

## 2022-05-05 DIAGNOSIS — E78.1 HYPERTRIGLYCERIDEMIA: ICD-10-CM

## 2022-05-05 PROBLEM — R91.1 SOLITARY PULMONARY NODULE: Status: ACTIVE | Noted: 2022-05-05

## 2022-05-05 LAB
ESTIMATED AVG GLUCOSE: 114 MG/DL (ref 68–131)
HBA1C MFR BLD: 5.6 % (ref 4–5.6)
T4 FREE SERPL-MCNC: 0.96 NG/DL (ref 0.71–1.51)
TSH SERPL DL<=0.005 MIU/L-ACNC: 1.22 UIU/ML (ref 0.4–4)

## 2022-05-05 PROCEDURE — 3078F PR MOST RECENT DIASTOLIC BLOOD PRESSURE < 80 MM HG: ICD-10-PCS | Mod: CPTII,S$GLB,TXP, | Performed by: INTERNAL MEDICINE

## 2022-05-05 PROCEDURE — 3075F SYST BP GE 130 - 139MM HG: CPT | Mod: CPTII,S$GLB,TXP, | Performed by: INTERNAL MEDICINE

## 2022-05-05 PROCEDURE — 36415 COLL VENOUS BLD VENIPUNCTURE: CPT | Mod: TXP,NTX | Performed by: STUDENT IN AN ORGANIZED HEALTH CARE EDUCATION/TRAINING PROGRAM

## 2022-05-05 PROCEDURE — 3075F PR MOST RECENT SYSTOLIC BLOOD PRESS GE 130-139MM HG: ICD-10-PCS | Mod: CPTII,S$GLB,TXP, | Performed by: INTERNAL MEDICINE

## 2022-05-05 PROCEDURE — 99999 PR PBB SHADOW E&M-EST. PATIENT-LVL V: CPT | Mod: PBBFAC,TXP,, | Performed by: INTERNAL MEDICINE

## 2022-05-05 PROCEDURE — 3008F PR BODY MASS INDEX (BMI) DOCUMENTED: ICD-10-PCS | Mod: CPTII,S$GLB,TXP, | Performed by: INTERNAL MEDICINE

## 2022-05-05 PROCEDURE — 3078F DIAST BP <80 MM HG: CPT | Mod: CPTII,S$GLB,TXP, | Performed by: INTERNAL MEDICINE

## 2022-05-05 PROCEDURE — 3008F BODY MASS INDEX DOCD: CPT | Mod: CPTII,S$GLB,TXP, | Performed by: INTERNAL MEDICINE

## 2022-05-05 PROCEDURE — 84443 ASSAY THYROID STIM HORMONE: CPT | Mod: TXP,NTX | Performed by: STUDENT IN AN ORGANIZED HEALTH CARE EDUCATION/TRAINING PROGRAM

## 2022-05-05 PROCEDURE — 84439 ASSAY OF FREE THYROXINE: CPT | Mod: TXP,NTX | Performed by: STUDENT IN AN ORGANIZED HEALTH CARE EDUCATION/TRAINING PROGRAM

## 2022-05-05 PROCEDURE — 99214 OFFICE O/P EST MOD 30 MIN: CPT | Mod: GC,S$GLB,TXP, | Performed by: STUDENT IN AN ORGANIZED HEALTH CARE EDUCATION/TRAINING PROGRAM

## 2022-05-05 PROCEDURE — 1159F MED LIST DOCD IN RCRD: CPT | Mod: CPTII,S$GLB,TXP, | Performed by: INTERNAL MEDICINE

## 2022-05-05 PROCEDURE — 99999 PR PBB SHADOW E&M-EST. PATIENT-LVL V: ICD-10-PCS | Mod: PBBFAC,TXP,, | Performed by: INTERNAL MEDICINE

## 2022-05-05 PROCEDURE — 99213 OFFICE O/P EST LOW 20 MIN: CPT | Mod: S$GLB,TXP,, | Performed by: INTERNAL MEDICINE

## 2022-05-05 PROCEDURE — 1159F PR MEDICATION LIST DOCUMENTED IN MEDICAL RECORD: ICD-10-PCS | Mod: CPTII,S$GLB,TXP, | Performed by: INTERNAL MEDICINE

## 2022-05-05 PROCEDURE — 99214 PR OFFICE/OUTPT VISIT, EST, LEVL IV, 30-39 MIN: ICD-10-PCS | Mod: GC,S$GLB,TXP, | Performed by: STUDENT IN AN ORGANIZED HEALTH CARE EDUCATION/TRAINING PROGRAM

## 2022-05-05 PROCEDURE — 99999 PR PBB SHADOW E&M-EST. PATIENT-LVL V: CPT | Mod: PBBFAC,GC,TXP, | Performed by: STUDENT IN AN ORGANIZED HEALTH CARE EDUCATION/TRAINING PROGRAM

## 2022-05-05 PROCEDURE — 99213 PR OFFICE/OUTPT VISIT, EST, LEVL III, 20-29 MIN: ICD-10-PCS | Mod: S$GLB,TXP,, | Performed by: INTERNAL MEDICINE

## 2022-05-05 PROCEDURE — 83036 HEMOGLOBIN GLYCOSYLATED A1C: CPT | Mod: TXP | Performed by: STUDENT IN AN ORGANIZED HEALTH CARE EDUCATION/TRAINING PROGRAM

## 2022-05-05 PROCEDURE — 99999 PR PBB SHADOW E&M-EST. PATIENT-LVL V: ICD-10-PCS | Mod: PBBFAC,GC,TXP, | Performed by: STUDENT IN AN ORGANIZED HEALTH CARE EDUCATION/TRAINING PROGRAM

## 2022-05-05 RX ORDER — METHIMAZOLE 5 MG/1
5 TABLET ORAL DAILY
Qty: 90 TABLET | Refills: 3 | Status: SHIPPED | OUTPATIENT
Start: 2022-05-05 | End: 2023-10-04 | Stop reason: SDUPTHER

## 2022-05-05 RX ORDER — ATORVASTATIN CALCIUM 20 MG/1
20 TABLET, FILM COATED ORAL DAILY
Qty: 90 TABLET | Refills: 3 | Status: SHIPPED | OUTPATIENT
Start: 2022-05-05

## 2022-05-05 RX ORDER — FLUOROMETHOLONE 1 MG/ML
SUSPENSION/ DROPS OPHTHALMIC
COMMUNITY
Start: 2022-03-08 | End: 2022-10-06

## 2022-05-05 NOTE — ASSESSMENT & PLAN NOTE
Key History and Diagnostic Findings  - Duration: Since at least late 2019 (has been taking methimazole for roughly 5 years [2015/2016])  - Etiology determined to be: Workup ongoing  - Current relevant medications: Methimazole 7.5 mg (has been off for approximately 1 week since running out) and Metoprolol succinate 200 mg daily  - Normal TSH 2.6 with corresponding normal free T4 of 0.76 on 10/19/2021  - TRAb < 1.0 on 10/19/2021  - TSH has not been updated since last visit  - Thyroid ultrasound obtained 02/03/2022 unremarkable  - Last BMD: None on record  - Possibly antibody negative Graves disease    Plan  - Repeat TFTs today, decrease methimazole dose from 7.5 down to 5 mg daily (refill sent) (TFTs may be abnormal as she has been out of methimazole for approximately 1 week)  - Schedule thyroid scan and uptake (hold methimazole 7 days prior) to assess for nodule  - Schedule DEXA scan

## 2022-05-05 NOTE — ASSESSMENT & PLAN NOTE
Key History and Diagnostic Findings  - A1C:  6.8 on 10/21/2021    - Current regimen: Lantus 27 units at night and lispro 4 units t.i.d. with meals and linagliptin 5 daily  - Weight based dosin kg x 0.3 (ESRD) =22 TDD x 0.5 = 11 basal and 11 prandial  - 1700/TDD = 77 (likely much greater given ESRD status)    Plan  - Continue Lantus 27 units at night  - Continue lispro 4 units with meals  - Continue Tradjenta (linagliptin) 5 mg daily  - Continue Dexcom, obtain share code

## 2022-05-05 NOTE — PATIENT INSTRUCTIONS
- Repeat TSH and free T4 today, will decrease methimazole dose from 7.5 mg down to 5 mg if labs return normal  - Schedule thyroid uptake and scan to complete diagnostic workup regarding cause of hyperthyroidism  - DEXA bone density scan to assess for osteoporosis  - Refills for atorvastatin and methimazole sent to pharmacy

## 2022-05-05 NOTE — PROGRESS NOTES
I have seen the patient, reviewed the Fellow's history and physical, assessment, and plan. I have personally interviewed and examined the patient at bedside and agree with the findings.       MD Kenny Killian [facility]

## 2022-05-05 NOTE — PROGRESS NOTES
Subjective:      Patient ID: Kiesha Rocha is a 56 y.o. female.    Chief Complaint: Hyperthyroidism     History of Present Illness  55-year-old female with history of hyperthyroidism, type 2 diabetes, ESRD on HD (being evaluated for renal transplant), hyperlipidemia, hypertension presents today for follow up regarding hyperthyroidism and type 2 diabetes.    - Last seen in Endocrine Clinic on 02/02/2022 at which time evaluation for hyperthyroidism was undertaken with thyroid uptake and scan which has not been done yet and thyroid ultrasound which looks normal aside from scattered subcentimeter simple cysts. Of note, patient's sister passed away in early April. Patient is also scheduled for a pulmonology appointment this afternoon  - Has been out of methimazole for about 1 week and having some palpitations  - Had hysterectomy about 4 years prior, will get DEXA    Regarding Hyperthyroidism:    - Duration:  Since at least late 2019 (has been taking methimazole for roughly 5 years [2015/2016])  - Etiology determined to be: Workup ongoing  - Current relevant medications: Methimazole 7.5 mg (has been off for approximately 1 week since running out) and Metoprolol succinate 200 mg daily  - Normal TSH 2.6 with corresponding normal free T4 of 0.76 on 10/19/2021  - TRAb < 1.0 on 10/19/2021  - TSH has not been updated since last visit  Lab Results   Component Value Date    TSH 2.650 10/19/2021    TSH 0.803 11/05/2020    TSH 0.508 07/22/2020    FREET4 0.76 10/19/2021    FREET4 1.00 11/05/2020    FREET4 0.94 07/22/2020     - Thyroid ultrasound obtained 02/03/2022 unremarkable    - Last BMD: None on record    - Current symptoms: Fatigue, anxiety, hair loss, no exophthalmos or palpitations    Regarding Diabetes Mellitus:    - Initially diagnosed with Type 2 diabetes mellitus: over 25 years prior during pregnancy  - A1c of 6.8 on 03/29/2022 from 6.7 on 10/21/2021  Lab Results   Component Value Date    HGBA1C 6.8 (H) 03/29/2022     HGBA1C 6.7 (H) 10/21/2021    HGBA1C 5.8 (H) 2021     (H) 2020     (H) 2020     (H) 2020     - Current regimen: Lantus 27 units at night and lispro 4 units t.i.d. with meals and linagliptin 5 daily  - Weight based dosin kg x 0.3 (ESRD) =22 TDD x 0.5 = 11 basal and 11 prandial  - 1700/TDD = 77 (likely much greater given ESRD status)  - Occasional morning hypoglycemia (maybe 1-2 episodes a month)    - Current symptoms:  None  - Denies:  Polyuria/polydipsia, nausea/vomiting, abdominal discomfort, numbness/tingling, visual changes, or weight changes    - Pertinent factors:  - Denies: History of frequent UTI/yeast infections, recent DKA, ketogenic diet, diuretics, history of pancreatitis, recurrent gallstones, daily alcohol use, MEN syndrome, pancreatic tumors, or gastroparesis    - Known diabetic complications: nephropathy and peripheral neuropathy  - Cardiovascular risk factors: diabetes mellitus, dyslipidemia, family history of premature cardiovascular disease and hypertension    - Current diabetic medications include: Tresiba 35 units daily, Lispro 4 units TIDWM, and Linagliptin 5 mg daily    - Current diet: not asked  - Current exercise: not asked  - Current glucose monitoring regimen:    - Any episodes of hypoglycemia? no    BP Readings from Last 3 Encounters:   22 (!) 150/82   22 (!) 176/77   22 (!) 160/84       Wt Readings from Last 10 Encounters:   22 79.5 kg (175 lb 2.5 oz)   22 81.9 kg (180 lb 8.9 oz)   22 81.2 kg (179 lb)   22 81.2 kg (179 lb)   22 81.4 kg (179 lb 7.3 oz)   22 81 kg (178 lb 9.2 oz)   10/19/21 80.6 kg (177 lb 9.3 oz)   20 76.4 kg (168 lb 5.1 oz)   20 82.1 kg (181 lb)   20 80.3 kg (177 lb 0.5 oz)       Diabetes Management Status    - Statin: Taking atorvastatin 20 mg every night  - ACE/ARB: Not Taking (per nephrology)  - Seen Optometry/Ophthalmology within last 12 months:  "Yes, seen at inde      Screening or Prevention Patient's value Goal Complete/Controlled?   HgA1C Testing and Control   Lab Results   Component Value Date    HGBA1C 6.8 (H) 03/29/2022      Annually/Less than 8% Yes   Lipid profile : 03/29/2022 Annually No   LDL control Lab Results   Component Value Date    LDLCALC 149 (H) 03/29/2022    Annually/Less than 100 mg/dl  Yes   Nephropathy screening No results found for: LABMICR  No results found for: PROTEINUA Annually No   Blood pressure BP Readings from Last 1 Encounters:   05/05/22 (!) 150/82    Less than 140/90 Yes   Dilated retinal exam Most Recent Eye Exam Date: Not Found Annually Yes   Foot exam   : 02/03/2022 Will need foot exam at next visit No        A complete 14 point review of systems was conducted and negative except for what is stated above.    Social and family history reviewed  Current medications and allergies reviewed    Objective:   BP (!) 150/82 (BP Location: Right arm, Patient Position: Sitting, BP Method: Medium (Manual))   Ht 5' 4" (1.626 m)   Wt 79.5 kg (175 lb 2.5 oz)   BMI 30.07 kg/m²   Physical Exam  Constitutional:       Appearance: Normal appearance.   Cardiovascular:      Rate and Rhythm: Normal rate and regular rhythm.      Pulses: Normal pulses.      Heart sounds: Normal heart sounds.   Pulmonary:      Effort: Pulmonary effort is normal.      Breath sounds: Normal breath sounds.   Musculoskeletal:      Cervical back: Normal range of motion and neck supple. No tenderness.   Lymphadenopathy:      Cervical: No cervical adenopathy.   Neurological:      General: No focal deficit present.      Mental Status: She is alert and oriented to person, place, and time.   Psychiatric:         Mood and Affect: Mood normal.         Behavior: Behavior normal.       BP Readings from Last 1 Encounters:   05/05/22 (!) 150/82      Wt Readings from Last 1 Encounters:   05/05/22 0948 79.5 kg (175 lb 2.5 oz)     Body mass index is 30.07 kg/m².    Lab Review: "   Lab Results   Component Value Date    HGBA1C 6.8 (H) 03/29/2022     Lab Results   Component Value Date    CHOL 253 (H) 03/29/2022    HDL 50 03/29/2022    LDLCALC 149 (H) 03/29/2022    TRIG 270 (H) 03/29/2022    CHOLHDL 5.06 (H) 03/29/2022     Lab Results   Component Value Date     11/05/2020    K 3.6 11/05/2020    CL 98 11/05/2020    CO2 29 11/05/2020     (H) 11/05/2020    BUN 35 (H) 11/05/2020    CREATININE 7.5 (H) 11/05/2020    CALCIUM 8.4 (L) 11/05/2020    PROT 8.2 11/05/2020    ALBUMIN 3.6 11/05/2020    BILITOT 1.2 (H) 11/05/2020    ALKPHOS 72 11/05/2020    AST 10 11/05/2020    ALT 9 (L) 11/05/2020    ANIONGAP 11 11/05/2020    ESTGFRAFRICA 6.6 (A) 11/05/2020    EGFRNONAA 5.7 (A) 11/05/2020    TSH 2.650 10/19/2021       All pertinent labs reviewed    Assessment and Plan     Hyperthyroidism  Key History and Diagnostic Findings  - Duration: Since at least late 2019 (has been taking methimazole for roughly 5 years [2015/2016])  - Etiology determined to be: Workup ongoing  - Current relevant medications: Methimazole 7.5 mg (has been off for approximately 1 week since running out) and Metoprolol succinate 200 mg daily  - Normal TSH 2.6 with corresponding normal free T4 of 0.76 on 10/19/2021  - TRAb < 1.0 on 10/19/2021  - TSH has not been updated since last visit  - Thyroid ultrasound obtained 02/03/2022 unremarkable  - Last BMD: None on record  - Possibly antibody negative Graves disease    Plan  - Repeat TFTs today, decrease methimazole dose from 7.5 down to 5 mg daily (refill sent) (TFTs may be abnormal as she has been out of methimazole for approximately 1 week)  - Schedule thyroid scan and uptake (hold methimazole 7 days prior) to assess for nodule  - Schedule DEXA scan    Diabetes mellitus due to underlying condition with stage 5 chronic kidney disease  Key History and Diagnostic Findings  - A1C:  6.8 on 10/21/2021    - Current regimen: Lantus 27 units at night and lispro 4 units t.i.d. with meals  and linagliptin 5 daily  - Weight based dosin kg x 0.3 (ESRD) =22 TDD x 0.5 = 11 basal and 11 prandial  - 1700/TDD = 77 (likely much greater given ESRD status)    Plan  - Continue Lantus 27 units at night  - Continue lispro 4 units with meals  - Continue Tradjenta (linagliptin) 5 mg daily  - Continue Dexcom, obtain share code    BMI 29.0-29.9,adult  - Discussed appropriate diet and exercise    Hypertriglyceridemia  - LDL of 52 in May 2021    - Continue atorvastatin 20 mg at night  - Encouraged to avoid fried fatty foods             - Encouraged exercise     ESRD on dialysis  - Caution with longer insulin half-life and greater potential for hypoglycemia  - Patient receives hemodialysis      Prasanna Ruelas DO  [facility], [address]th [address]  [address] [place], [place], [address]    Office: [phone]  Fax: [phone]    Disclaimer: This note has been generated using voice-recognition software. There may be typographical errors that have been missed during proof-reading.    The above history labs imaging impression and plan were discussed with attending physician who is in agreement and also took part in this patient's care.  I personally reviewed all of the patients available medications, labs, imaging, vitals, allergies, medical history.

## 2022-05-05 NOTE — ASSESSMENT & PLAN NOTE
Some intermittent shortness of breath that is not related to exertion but can occur at any time.   - never smoker, she does have allergic rhinitis and seasonal allergies  - PFTs/6 minute walk test ordered

## 2022-05-05 NOTE — ASSESSMENT & PLAN NOTE
Obesity is a complicating factor- she sees a dietician at her dialysis center that is helping her with nutrition

## 2022-05-05 NOTE — PROGRESS NOTES
"Subjective:   Patient ID:  Kiesha Rocha is a 56 y.o. female    Reason for visit: shortness of breath     Ms. Rocha is a 57 yo with ESRD (HD 3 days a week), DM, hyperthyroidism, hypertriglyceridemia and allergic rhinitis that presents to "get her lungs checked". She denies any previous diagnosis of lung disease, shortness of breath or issues prior to having covid in 2020. Since having covid, she has had back pain, some intermittent shortness of breath, and overall feeling poorly. Her back pain is in the upper center of her back in between her shoulder blades. It is not worsened by specific movements but is very bothersome at night. She takes flexeril as needed. She is worried the pain is from her lungs, although it is not changed by her breathing. Her shortness of breath is random and not associated with any specific activity. She did mention that she is worried her nerves are shot too and maybe that is making it worse.   Her sister recently passed away and she soemtimes feels overwhelmed with all her appointments.          Review of Systems   Constitutional: Negative for chills, diaphoresis, fever, malaise/fatigue and weight loss.   HENT: Positive for congestion. Negative for hearing loss, nosebleeds and sore throat.    Eyes: Negative for pain and discharge.   Respiratory: Positive for shortness of breath. Negative for cough, sputum production, wheezing and stridor.    Cardiovascular: Positive for palpitations. Negative for chest pain and leg swelling.   Gastrointestinal: Negative for abdominal pain, nausea and vomiting.   Genitourinary: Negative.    Musculoskeletal: Positive for back pain. Negative for myalgias and neck pain.   Skin: Positive for itching. Negative for rash.   Neurological: Negative for dizziness, tingling, focal weakness, weakness and headaches.   Endo/Heme/Allergies: Negative.    Psychiatric/Behavioral: The patient is nervous/anxious.       Objective:     Vitals:    05/05/22 1301   BP: " "139/78   BP Location: Right arm   Patient Position: Sitting   Pulse: 95   SpO2: 97%   Weight: 80.6 kg (177 lb 11.1 oz)   Height: 5' 4" (1.626 m)         Physical Exam  Constitutional:       General: She is not in acute distress.     Appearance: She is not diaphoretic.   HENT:      Head: Normocephalic and atraumatic.      Right Ear: External ear normal.      Left Ear: External ear normal.   Eyes:      Conjunctiva/sclera: Conjunctivae normal.      Pupils: Pupils are equal, round, and reactive to light.   Neck:      Trachea: No tracheal deviation.   Cardiovascular:      Rate and Rhythm: Normal rate and regular rhythm.      Heart sounds: Normal heart sounds. No murmur heard.  Pulmonary:      Effort: Pulmonary effort is normal. No respiratory distress.      Breath sounds: Normal breath sounds. No stridor. No wheezing or rales.   Abdominal:      General: Bowel sounds are normal. There is no distension.      Palpations: Abdomen is soft.      Tenderness: There is no abdominal tenderness.   Musculoskeletal:         General: Normal range of motion.      Cervical back: Normal range of motion and neck supple.   Skin:     General: Skin is warm and dry.      Findings: Lesion present. No erythema.      Comments: L UE fistula  R chest wall keloids noted from previous catheter   Neurological:      Mental Status: She is alert and oriented to person, place, and time.      Gait: Gait is intact.   Psychiatric:         Mood and Affect: Mood and affect normal.         Cognition and Memory: Memory normal.         Judgment: Judgment normal.          Personal Diagnostic Review and Interpretation  Reviewed previous CT chest that reveals bilateral solid pulmonary nodules - no follow up has been obtained yet       Pertinent Studies Reviewed and Interpreted:     Pulmonary Function Tests:   Pending     6 Minute Walk Tests:   Pending     Echocardiograms:   Previous echo with diastolic dysfunction and evidence of PH with elevated PASP "     Assessment:     1. Shortness of breath  Complete PFT w/ bronchodilator    Stress test, pulmonary   2. Organ transplant candidate  Ambulatory consult to Pulmonology   3. Multiple pulmonary nodules  CT Chest Without Contrast    Complete PFT w/ bronchodilator   4. Seasonal allergic rhinitis due to pollen     5. BMI 29.0-29.9,adult     6. Atherosclerosis         Current Outpatient Medications   Medication Instructions    albuterol (PROVENTIL/VENTOLIN HFA) 90 mcg/actuation inhaler 2 puffs, Inhalation, Every 6 hours PRN    amLODIPine (NORVASC) 10 mg, Oral, Daily    atorvastatin (LIPITOR) 20 mg, Oral, Daily    blood-glucose meter,continuous (DEXCOM G6 ) Misc 1 device to monitor blood glucose with dexcom    blood-glucose sensor (DEXCOM G6 SENSOR) Janet 3 each, Misc.(Non-Drug; Combo Route), Continuous PRN    blood-glucose transmitter (DEXCOM G6 TRANSMITTER) Janet 1 each, Misc.(Non-Drug; Combo Route), Continuous PRN    calcium acetate (PHOSLO) 667 mg capsule 2 capsules, Oral, 3 times daily with meals, Take 1 capsule with snacks    cetirizine (ZYRTEC) 10 mg, Oral, Daily PRN    cholecalciferol, vitamin D3, 1,250 mcg (50,000 unit) capsule cholecalciferol (vitamin D3) 1,250 mcg (50,000 unit) capsule   TAKE 1 CAPSULE BY MOUTH EVERY WEEK    cyclobenzaprine (FLEXERIL) 10 mg, Oral, 2 times daily PRN    cyproheptadine (PERIACTIN) 4 mg tablet cyproheptadine 4 mg tablet   TAKE 1 TABLET BY MOUTH THREE TIMES DAILY BEFORE MEALS    elderberry fruit and flower 460-115 mg Cap elderberry fruit 460 mg-elderberry flower 115 mg capsule   Take 1 capsule by oral route.    estradioL (ESTRACE) 1 MG tablet estradiol 1 mg tablet    fluorometholone 0.1% (FML) 0.1 % DrpS INSTILL ONE DROP IN EACH EYE FOUR TIMES DAILY    FLUoxetine 20 mg, Oral, Daily    fluticasone propionate (FLONASE) 50 mcg/actuation nasal spray 1 spray, Each Nostril, Daily PRN    furosemide (LASIX) 40 MG tablet TAKE 1 TABLET BY MOUTH DAILY    gabapentin  "(NEURONTIN) 600 mg, Oral, Nightly    garlic 1,000 mg    glucagon (GLUCAGON EMERGENCY KIT, HUMAN,) 1 mg SolR glucagon emergency kit 1 mg kit    hydrALAZINE (APRESOLINE) 50 mg, Oral, 3 times daily    HYDROcodone-acetaminophen (NORCO) 5-325 mg per tablet 1 tablet, Oral, Every 6 hours PRN    insulin lispro 100 unit/mL pen INJECT 4 UNITS INTO THE SKIN DAILY *    insulin syringe-needle U-100 1 mL 31 gauge x 5/16 Syrg UltiCare 1 mL 31 gauge x 5/16" syringe    INV INSULIN GLARGINE, LANTUS, 100U/ML SOLN FOR INJ SOLOSTAR lantus solostar 100 unit/ml sopn    labetaloL (NORMODYNE) 100 MG tablet 1 tablet, Oral, Daily    lactulose (CHRONULAC) 10 gram/15 mL solution lactulose 20 gram/30 mL oral solution    LANTUS SOLOSTAR U-100 INSULIN 35 Units, Subcutaneous    linaGLIPtin (TRADJENTA) 5 mg, Oral, Daily    lisinopriL (PRINIVIL,ZESTRIL) 20 mg, Oral, Daily    loratadine (CLARITIN) 10 mg tablet 1 tablet, Oral, Daily    methIMAzole (TAPAZOLE) 5 mg, Oral, Daily    methocarbamol (ROBAXIN) 500 MG Tab No dose, route, or frequency recorded.    metoprolol succinate (TOPROL-XL) 200 mg, Oral, Daily    metoprolol tartrate (LOPRESSOR) 50 mg, Oral, 2 times daily    mirtazapine (REMERON) 15 MG tablet mirtazapine 15 mg tablet    montelukast (SINGULAIR) 10 mg, Oral, Daily    multivitamin with minerals tablet 1 tablet, Oral, Daily    olopatadine (PATADAY) 0.2 % Drop place 1 DROP IN EACH EYE EVERY DAY    ondansetron (ZOFRAN) 4 mg, Oral, Every 8 hours PRN    pen needle, diabetic 31 gauge x 5/16" Ndle BD Ultra-Fine Short Pen Needle 31 gauge x 5/16"    pen needle, diabetic 31 gauge x 5/16" Ndle UltiCare Pen Needle 31 gauge x 5/16"    sevelamer carbonate (RENVELA) 800 mg Tab TAKE 2 TABLETS BY MOUTH THREE TIMES DAILY WITH MEALS    sodium bicarbonate 650 mg, Oral, 3 times daily    topiramate (TOPAMAX) 25 MG tablet 1 tablet, Oral, 2 times daily    traZODone (DESYREL) 100 mg, Oral, Nightly    vitamin E 400 UNIT capsule vitamin E " 400 unit capsule   Take 400 units by oral route.    zinc 50 mg Tab zinc 50 mg tablet   Take 1 tablet by oral route.      Kiesha Rocha had no medications administered during this visit.     Orders Placed This Encounter   Procedures    CT Chest Without Contrast     Standing Status:   Future     Standing Expiration Date:   5/5/2023     Order Specific Question:   May the Radiologist modify the order per protocol to meet the clinical needs of the patient?     Answer:   Yes    Complete PFT w/ bronchodilator     Standing Status:   Future     Standing Expiration Date:   5/5/2023     Order Specific Question:   Release to patient     Answer:   Immediate    Stress test, pulmonary     Standing Status:   Future     Standing Expiration Date:   5/5/2023     Order Specific Question:   Reason for study     Answer:   Functional status     Order Specific Question:   Release to patient     Answer:   Immediate      Plan:       Shortness of breath  Some intermittent shortness of breath that is not related to exertion but can occur at any time.   - never smoker, she does have allergic rhinitis and seasonal allergies  - PFTs/6 minute walk test ordered     Seasonal allergic rhinitis due to pollen  Continue flonase and as needed OTC claritin    BMI 29.0-29.9,adult  Obesity is a complicating factor- she sees a dietician at her dialysis center that is helping her with nutrition     Atherosclerosis  Noted on most recent x ray on lipitor     Carrie Copeland M.D.  Pulmonary/Critical Care

## 2022-05-11 ENCOUNTER — TELEPHONE (OUTPATIENT)
Dept: ENDOCRINOLOGY | Facility: HOSPITAL | Age: 57
End: 2022-05-11
Payer: MEDICARE

## 2022-05-11 DIAGNOSIS — E05.90 HYPERTHYROIDISM: Primary | ICD-10-CM

## 2022-05-11 NOTE — TELEPHONE ENCOUNTER
Spoke with patient on the telephone regarding recent thyroid blood work and discussed that we would proceed with plan discussed at visit going from methimazole 7.5 mg daily down to 5 mg daily and retesting TSH and free T4 in 3 months

## 2022-07-27 ENCOUNTER — TELEPHONE (OUTPATIENT)
Dept: PULMONOLOGY | Facility: CLINIC | Age: 57
End: 2022-07-27
Payer: MEDICARE

## 2022-07-27 DIAGNOSIS — R06.02 SOB (SHORTNESS OF BREATH): Primary | ICD-10-CM

## 2022-08-02 ENCOUNTER — TELEPHONE (OUTPATIENT)
Dept: TRANSPLANT | Facility: CLINIC | Age: 57
End: 2022-08-02
Payer: MEDICARE

## 2022-08-22 NOTE — PROGRESS NOTES
Attempted to call pt regarding WL status. Pt has several items that needs to be addressed for re-activation. Unable to leave msg, mailbox is full.  Endocrinology ordered thyroid scan & uptake, DEXA bone scan - no-showed  CT chest, 6MWT, PFTs, mmg - no-showed  LM for pt with [facility] staff.

## 2022-08-23 DIAGNOSIS — Z76.82 ORGAN TRANSPLANT CANDIDATE: Primary | ICD-10-CM

## 2022-08-24 ENCOUNTER — TELEPHONE (OUTPATIENT)
Dept: ENDOCRINOLOGY | Facility: CLINIC | Age: 57
End: 2022-08-24
Payer: MEDICARE

## 2022-08-24 NOTE — TELEPHONE ENCOUNTER
----- Message from Mi Flynn MA sent at 8/24/2022  3:41 PM CDT -----  Regarding: FW: thyroid uptake and scan and DEXA scan    ----- Message -----  From: Mi Flynn MA  Sent: 8/23/2022   4:38 PM CDT  To: Mi Flynn MA  Subject: FW: thyroid uptake and scan and DEXA scan          ----- Message -----  From: Prasanna Ruelas DO  Sent: 8/23/2022  12:12 PM CDT  To: [facility] Staff  Subject: thyroid uptake and scan and DEXA scan            Please call patient to reschedule thyroid uptake and scan and DEXA scan    ----- Message -----  From: Ciarra Brooke RN  Sent: 8/23/2022  10:15 AM CDT  To: Orlando Hidalgo MD, Prasanna Ruelas,     Good Morning,    This patient is on the kidney transplant waitlist, currently inactive. She was seen by Dr. Ruelas 5/2022 and was scheduled for thyroid uptake & scan for hyperthyroidism evaluation and DEXA scan for hysterectomy about 4 years ago. The pt missed the appt due to death in the family and would like to re-schedule. She also needs to be cleared for re-activation on the kidney transplant waitlist. Can your department reach out to pt to re-schedule.    Thank You in Advance  Ridge Brooke RN  Kidney Transplant Coordinator

## 2022-08-26 ENCOUNTER — TELEPHONE (OUTPATIENT)
Dept: PULMONOLOGY | Facility: CLINIC | Age: 57
End: 2022-08-26
Payer: MEDICARE

## 2022-08-26 DIAGNOSIS — R06.02 SOB (SHORTNESS OF BREATH): Primary | ICD-10-CM

## 2022-09-08 ENCOUNTER — OFFICE VISIT (OUTPATIENT)
Dept: OPTOMETRY | Facility: CLINIC | Age: 57
End: 2022-09-08
Payer: MEDICARE

## 2022-09-08 ENCOUNTER — OFFICE VISIT (OUTPATIENT)
Dept: DERMATOLOGY | Facility: CLINIC | Age: 57
End: 2022-09-08
Payer: MEDICARE

## 2022-09-08 DIAGNOSIS — J30.1 SEASONAL ALLERGIC RHINITIS DUE TO POLLEN: ICD-10-CM

## 2022-09-08 DIAGNOSIS — L81.8 POST INFLAMMATORY HYPOPIGMENTATION: ICD-10-CM

## 2022-09-08 DIAGNOSIS — H25.13 NS (NUCLEAR SCLEROSIS), BILATERAL: ICD-10-CM

## 2022-09-08 DIAGNOSIS — L21.9 SEBORRHEIC DERMATITIS: ICD-10-CM

## 2022-09-08 DIAGNOSIS — N18.5 DIABETES MELLITUS DUE TO UNDERLYING CONDITION WITH STAGE 5 CHRONIC KIDNEY DISEASE: Primary | Chronic | ICD-10-CM

## 2022-09-08 DIAGNOSIS — L65.9 HAIR LOSS: ICD-10-CM

## 2022-09-08 DIAGNOSIS — H35.9 RETINAL PIGMENT EPITHELIUM ABNORMALITY: ICD-10-CM

## 2022-09-08 DIAGNOSIS — H52.4 PRESBYOPIA: ICD-10-CM

## 2022-09-08 DIAGNOSIS — E08.22 DIABETES MELLITUS DUE TO UNDERLYING CONDITION WITH STAGE 5 CHRONIC KIDNEY DISEASE: Primary | Chronic | ICD-10-CM

## 2022-09-08 DIAGNOSIS — E11.3299 NPDR (NONPROLIFERATIVE DIABETIC RETINOPATHY): ICD-10-CM

## 2022-09-08 DIAGNOSIS — L66.9 CICATRICIAL ALOPECIA: Primary | ICD-10-CM

## 2022-09-08 PROCEDURE — 92004 PR EYE EXAM, NEW PATIENT,COMPREHESV: ICD-10-PCS | Mod: S$GLB,,, | Performed by: OPTOMETRIST

## 2022-09-08 PROCEDURE — 3044F PR MOST RECENT HEMOGLOBIN A1C LEVEL <7.0%: ICD-10-PCS | Mod: CPTII,S$GLB,, | Performed by: OPTOMETRIST

## 2022-09-08 PROCEDURE — 3044F HG A1C LEVEL LT 7.0%: CPT | Mod: CPTII,S$GLB,, | Performed by: OPTOMETRIST

## 2022-09-08 PROCEDURE — 3044F PR MOST RECENT HEMOGLOBIN A1C LEVEL <7.0%: ICD-10-PCS | Mod: CPTII,S$GLB,, | Performed by: DERMATOLOGY

## 2022-09-08 PROCEDURE — 99999 PR PBB SHADOW E&M-EST. PATIENT-LVL IV: CPT | Mod: PBBFAC,,, | Performed by: DERMATOLOGY

## 2022-09-08 PROCEDURE — 3044F HG A1C LEVEL LT 7.0%: CPT | Mod: CPTII,S$GLB,, | Performed by: DERMATOLOGY

## 2022-09-08 PROCEDURE — 99204 PR OFFICE/OUTPT VISIT, NEW, LEVL IV, 45-59 MIN: ICD-10-PCS | Mod: S$GLB,,, | Performed by: DERMATOLOGY

## 2022-09-08 PROCEDURE — 92250 COLOR FUNDUS PHOTOGRAPHY - OU - BOTH EYES: ICD-10-PCS | Mod: S$GLB,,, | Performed by: OPTOMETRIST

## 2022-09-08 PROCEDURE — 1160F RVW MEDS BY RX/DR IN RCRD: CPT | Mod: CPTII,S$GLB,, | Performed by: OPTOMETRIST

## 2022-09-08 PROCEDURE — 1160F PR REVIEW ALL MEDS BY PRESCRIBER/CLIN PHARMACIST DOCUMENTED: ICD-10-PCS | Mod: CPTII,S$GLB,, | Performed by: OPTOMETRIST

## 2022-09-08 PROCEDURE — 92004 COMPRE OPH EXAM NEW PT 1/>: CPT | Mod: S$GLB,,, | Performed by: OPTOMETRIST

## 2022-09-08 PROCEDURE — 99999 PR PBB SHADOW E&M-EST. PATIENT-LVL IV: CPT | Mod: PBBFAC,,, | Performed by: OPTOMETRIST

## 2022-09-08 PROCEDURE — 1159F PR MEDICATION LIST DOCUMENTED IN MEDICAL RECORD: ICD-10-PCS | Mod: CPTII,S$GLB,, | Performed by: OPTOMETRIST

## 2022-09-08 PROCEDURE — 92015 PR REFRACTION: ICD-10-PCS | Mod: S$GLB,,, | Performed by: OPTOMETRIST

## 2022-09-08 PROCEDURE — 1159F PR MEDICATION LIST DOCUMENTED IN MEDICAL RECORD: ICD-10-PCS | Mod: CPTII,S$GLB,, | Performed by: DERMATOLOGY

## 2022-09-08 PROCEDURE — 1159F MED LIST DOCD IN RCRD: CPT | Mod: CPTII,S$GLB,, | Performed by: DERMATOLOGY

## 2022-09-08 PROCEDURE — 1159F MED LIST DOCD IN RCRD: CPT | Mod: CPTII,S$GLB,, | Performed by: OPTOMETRIST

## 2022-09-08 PROCEDURE — 92015 DETERMINE REFRACTIVE STATE: CPT | Mod: S$GLB,,, | Performed by: OPTOMETRIST

## 2022-09-08 PROCEDURE — 92250 FUNDUS PHOTOGRAPHY W/I&R: CPT | Mod: S$GLB,,, | Performed by: OPTOMETRIST

## 2022-09-08 PROCEDURE — 99999 PR PBB SHADOW E&M-EST. PATIENT-LVL IV: ICD-10-PCS | Mod: PBBFAC,,, | Performed by: OPTOMETRIST

## 2022-09-08 PROCEDURE — 99999 PR PBB SHADOW E&M-EST. PATIENT-LVL IV: ICD-10-PCS | Mod: PBBFAC,,, | Performed by: DERMATOLOGY

## 2022-09-08 PROCEDURE — 99204 OFFICE O/P NEW MOD 45 MIN: CPT | Mod: S$GLB,,, | Performed by: DERMATOLOGY

## 2022-09-08 RX ORDER — KETOCONAZOLE 20 MG/G
CREAM TOPICAL
Qty: 60 G | Refills: 3 | Status: SHIPPED | OUTPATIENT
Start: 2022-09-08

## 2022-09-08 NOTE — PROGRESS NOTES
Subjective:       Patient ID:  Kiesha Rocha is a 56 y.o. female who presents for   Chief Complaint   Patient presents with    Hair Loss    Skin Discoloration     Face      Hair Loss - Initial  Affected locations: scalp  Duration: 10 years  Signs / symptoms: asymptomatic  Severity: moderate to severe  Timing: constant  Aggravated by: nothing  Treatments tried: vitamin E oils and hair skin, nail vitamins.    Skin Discoloration - Initial  Affected locations: face  Duration: 3 years  Severity: mild to moderate  Timing: constant  Exacerbated by: mask.  Treatments tried: cococa butter, dove soap.  Improvement on treatment: no relief    Review of Systems   Constitutional:  Positive for weight loss. Negative for weight gain, fatigue and malaise.   Genitourinary:  Negative for frequency.   Skin:  Positive for activity-related sunscreen use. Negative for itching, rash, dry skin and daily sunscreen use.   Psychiatric/Behavioral:  Positive for depressed mood, anxiety and high stress.       Objective:    Physical Exam   Constitutional: She appears well-developed and well-nourished. No distress.   Neurological: She is alert and oriented to person, place, and time. She is not disoriented.   Psychiatric: She has a normal mood and affect.   Skin:   Areas Examined (abnormalities noted in diagram):   Scalp / Hair Palpated and Inspected  Head / Face Inspection Performed            Diagram Legend     Erythematous scaling macule/papule c/w actinic keratosis       Vascular papule c/w angioma      Pigmented verrucoid papule/plaque c/w seborrheic keratosis      Yellow umbilicated papule c/w sebaceous hyperplasia      Irregularly shaped tan macule c/w lentigo     1-2 mm smooth white papules consistent with Milia      Movable subcutaneous cyst with punctum c/w epidermal inclusion cyst      Subcutaneous movable cyst c/w pilar cyst      Firm pink to brown papule c/w dermatofibroma      Pedunculated fleshy papule(s) c/w skin tag(s)       Evenly pigmented macule c/w junctional nevus     Mildly variegated pigmented, slightly irregular-bordered macule c/w mildly atypical nevus      Flesh colored to evenly pigmented papule c/w intradermal nevus       Pink pearly papule/plaque c/w basal cell carcinoma      Erythematous hyperkeratotic cursted plaque c/w SCC      Surgical scar with no sign of skin cancer recurrence      Open and closed comedones      Inflammatory papules and pustules      Verrucoid papule consistent consistent with wart     Erythematous eczematous patches and plaques     Dystrophic onycholytic nail with subungual debris c/w onychomycosis     Umbilicated papule    Erythematous-base heme-crusted tan verrucoid plaque consistent with inflamed seborrheic keratosis     Erythematous Silvery Scaling Plaque c/w Psoriasis     See annotation      Assessment / Plan:        Cicatricial alopecia  - counseled patient it is a chronic problem, too much scarring is present in the center there is minimal hope that more will grow will back   Hair loss  -     Ambulatory referral/consult to Dermatology    Seborrheic dermatitis  -     ketoconazole (NIZORAL) 2 % cream; AAA bid  Dispense: 60 g; Refill: 3  Recommend wash face daily with SebaMed or Vanicream Z-bar. You may find these in local drugstores or search online.    Recommend OTC medicated shampoo containing active ingredients of either selenium sulfide, zinc pyrithione, tar, salicylic acid, or ketoconazole. Patient should rotate the active ingredients to avoid building tolerance. It is recommended that patient wash hair at least 2 times per week and allow shampoo to sit on scalp at least 5 minutes prior to rinsing.      Post inflammatory hypopigmentation  Counseled patient that lesions will fade with time, okay to use cerave ointment or Vaseline to lesions, if in sun exposed area use SPF 30 or higher.     Eye dyschromia  - can use preparation H ointment to area at night   Prominent underlying vasculature  may also cause increased pigmentation. In some patients, the eyelids are relatively translucent leading to prominence of the subcutaneous and/or muscular vascular plexus, which causes a violaceous hue under the eyes. Thus preparation H ointment will shrink vasculature  - can also use SPF 50 or above to eyes  - soft tissue fillers can be considered (cosmetic)  - topical over the counter retinol for eyes/eye creams and over the counter vitamin C creams may be helpful (Cerave has these OTC)  - topical over the counter azelic acid twice daily can help such as the ordinary 10% ( can buy online)        F/u 4 mo     No follow-ups on file.

## 2022-09-08 NOTE — PATIENT INSTRUCTIONS
Seborrheic dermatitis  -     ketoconazole (NIZORAL) 2 % cream; AAA bid  Dispense: 60 g; Refill: 3  Recommend wash face daily with SebaMed or Vanicream Z-bar. You may find these in local drugstores or search online.    Recommend OTC medicated shampoo containing active ingredients of either selenium sulfide, zinc pyrithione, tar, salicylic acid, or ketoconazole. Patient should rotate the active ingredients to avoid building tolerance. It is recommended that patient wash hair at least 2 times per week and allow shampoo to sit on scalp at least 5 minutes prior to rinsing.      Post inflammatory hypopigmentation  Counseled patient that lesions will fade with time, okay to use cerave ointment or Vaseline to lesions, if in sun exposed area use SPF 30 or higher.     Eye dyschromia  - can use preparation H ointment to area at night   Prominent underlying vasculature may also cause increased pigmentation. In some patients, the eyelids are relatively translucent leading to prominence of the subcutaneous and/or muscular vascular plexus, which causes a violaceous hue under the eyes. Thus preparation H ointment will shrink vasculature  - can also use SPF 50 or above to eyes  - soft tissue fillers can be considered (cosmetic)  - topical over the counter retinol for eyes/eye creams and over the counter vitamin C creams may be helpful (Cerave has these OTC)  - topical over the counter azelic acid twice daily can help such as the ordinary 10% ( can buy online)

## 2022-09-08 NOTE — PROGRESS NOTES
HPI    55 Y/o female is here for diabetic eye exam with C/o pt states she see's a   change in both near and distance states she is interested in getting PAL   Pt denies pain and discomfort   No f/f    Eye med: Visine OU PRN   Last edited by Cameron Whyte MA on 9/8/2022  1:07 PM.            Assessment /Plan     For exam results, see Encounter Report.  NS (nuclear sclerosis), bilateral   Mild, monitor    Seasonal allergic rhinitis due to pollen   Pataday XS QAM/PRN    Presbyopia   Rx specs    Diabetes mellitus due to underlying condition with stage 5 chronic kidney disease  -     Ambulatory referral/consult to Optometry  -     Color Fundus Photography - OU - Both Eyes  NPDR (nonproliferative diabetic retinopathy)  OS>OD  Retinal pigment epithelium abnormality OS  Consult retina

## 2022-09-15 ENCOUNTER — HOSPITAL ENCOUNTER (OUTPATIENT)
Dept: RADIOLOGY | Facility: HOSPITAL | Age: 57
Discharge: HOME | End: 2022-09-15
Attending: NURSE PRACTITIONER
Payer: MEDICARE

## 2022-09-15 VITALS — HEIGHT: 65 IN | BODY MASS INDEX: 29.49 KG/M2 | WEIGHT: 177 LBS

## 2022-09-15 DIAGNOSIS — Z76.82 ORGAN TRANSPLANT CANDIDATE: ICD-10-CM

## 2022-09-15 PROCEDURE — 71250 CT THORAX DX C-: CPT | Mod: TC,TXP

## 2022-09-15 PROCEDURE — 77067 MAMMO DIGITAL SCREENING BILAT WITH TOMO: ICD-10-PCS | Mod: 26,TXP,, | Performed by: RADIOLOGY

## 2022-09-15 PROCEDURE — 71250 CT THORAX DX C-: CPT | Mod: 26,TXP,, | Performed by: RADIOLOGY

## 2022-09-15 PROCEDURE — 77067 SCR MAMMO BI INCL CAD: CPT | Mod: TC,TXP

## 2022-09-15 PROCEDURE — 71250 CT CHEST WITHOUT CONTRAST: ICD-10-PCS | Mod: 26,TXP,, | Performed by: RADIOLOGY

## 2022-09-15 PROCEDURE — 77063 MAMMO DIGITAL SCREENING BILAT WITH TOMO: ICD-10-PCS | Mod: 26,TXP,, | Performed by: RADIOLOGY

## 2022-09-15 PROCEDURE — 77063 BREAST TOMOSYNTHESIS BI: CPT | Mod: 26,TXP,, | Performed by: RADIOLOGY

## 2022-09-15 PROCEDURE — 77067 SCR MAMMO BI INCL CAD: CPT | Mod: 26,TXP,, | Performed by: RADIOLOGY

## 2022-09-28 ENCOUNTER — OFFICE VISIT (OUTPATIENT)
Dept: PULMONOLOGY | Facility: CLINIC | Age: 57
End: 2022-09-28
Payer: MEDICARE

## 2022-09-28 ENCOUNTER — HOSPITAL ENCOUNTER (OUTPATIENT)
Dept: PULMONOLOGY | Facility: CLINIC | Age: 57
Discharge: HOME | End: 2022-09-28
Payer: MEDICARE

## 2022-09-28 VITALS
BODY MASS INDEX: 29.38 KG/M2 | HEIGHT: 65 IN | SYSTOLIC BLOOD PRESSURE: 145 MMHG | DIASTOLIC BLOOD PRESSURE: 86 MMHG | HEART RATE: 85 BPM | OXYGEN SATURATION: 96 % | WEIGHT: 176.38 LBS

## 2022-09-28 VITALS — BODY MASS INDEX: 31.25 KG/M2 | WEIGHT: 176.38 LBS | HEIGHT: 63 IN

## 2022-09-28 DIAGNOSIS — R06.02 SHORTNESS OF BREATH: ICD-10-CM

## 2022-09-28 DIAGNOSIS — R91.8 MULTIPLE PULMONARY NODULES: ICD-10-CM

## 2022-09-28 DIAGNOSIS — R06.02 SOB (SHORTNESS OF BREATH): ICD-10-CM

## 2022-09-28 DIAGNOSIS — Z99.2 ESRD ON DIALYSIS: ICD-10-CM

## 2022-09-28 DIAGNOSIS — R91.1 PULMONARY NODULE 1 CM OR GREATER IN DIAMETER: ICD-10-CM

## 2022-09-28 DIAGNOSIS — I70.90 ATHEROSCLEROSIS: ICD-10-CM

## 2022-09-28 DIAGNOSIS — N18.6 ESRD ON DIALYSIS: ICD-10-CM

## 2022-09-28 DIAGNOSIS — Z76.82 ORGAN TRANSPLANT CANDIDATE: ICD-10-CM

## 2022-09-28 DIAGNOSIS — J30.1 SEASONAL ALLERGIC RHINITIS DUE TO POLLEN: ICD-10-CM

## 2022-09-28 DIAGNOSIS — Z76.82 ORGAN TRANSPLANT CANDIDATE: Primary | ICD-10-CM

## 2022-09-28 PROCEDURE — 3044F PR MOST RECENT HEMOGLOBIN A1C LEVEL <7.0%: ICD-10-PCS | Mod: CPTII,S$GLB,TXP, | Performed by: STUDENT IN AN ORGANIZED HEALTH CARE EDUCATION/TRAINING PROGRAM

## 2022-09-28 PROCEDURE — 1159F PR MEDICATION LIST DOCUMENTED IN MEDICAL RECORD: ICD-10-PCS | Mod: CPTII,S$GLB,TXP, | Performed by: STUDENT IN AN ORGANIZED HEALTH CARE EDUCATION/TRAINING PROGRAM

## 2022-09-28 PROCEDURE — 3079F DIAST BP 80-89 MM HG: CPT | Mod: CPTII,S$GLB,TXP, | Performed by: STUDENT IN AN ORGANIZED HEALTH CARE EDUCATION/TRAINING PROGRAM

## 2022-09-28 PROCEDURE — 94618 PULMONARY STRESS TESTING: ICD-10-PCS | Mod: S$GLB,TXP,, | Performed by: INTERNAL MEDICINE

## 2022-09-28 PROCEDURE — 94618 PULMONARY STRESS TESTING: CPT | Mod: S$GLB,TXP,, | Performed by: INTERNAL MEDICINE

## 2022-09-28 PROCEDURE — 94729 DIFFUSING CAPACITY: CPT | Mod: S$GLB,TXP,, | Performed by: INTERNAL MEDICINE

## 2022-09-28 PROCEDURE — 3044F HG A1C LEVEL LT 7.0%: CPT | Mod: CPTII,S$GLB,TXP, | Performed by: STUDENT IN AN ORGANIZED HEALTH CARE EDUCATION/TRAINING PROGRAM

## 2022-09-28 PROCEDURE — 94727 PR PULM FUNCTION TEST BY GAS: ICD-10-PCS | Mod: S$GLB,TXP,, | Performed by: INTERNAL MEDICINE

## 2022-09-28 PROCEDURE — 99214 OFFICE O/P EST MOD 30 MIN: CPT | Mod: S$GLB,TXP,, | Performed by: STUDENT IN AN ORGANIZED HEALTH CARE EDUCATION/TRAINING PROGRAM

## 2022-09-28 PROCEDURE — 99214 PR OFFICE/OUTPT VISIT, EST, LEVL IV, 30-39 MIN: ICD-10-PCS | Mod: S$GLB,TXP,, | Performed by: STUDENT IN AN ORGANIZED HEALTH CARE EDUCATION/TRAINING PROGRAM

## 2022-09-28 PROCEDURE — 99999 PR PBB SHADOW E&M-EST. PATIENT-LVL V: ICD-10-PCS | Mod: PBBFAC,TXP,, | Performed by: STUDENT IN AN ORGANIZED HEALTH CARE EDUCATION/TRAINING PROGRAM

## 2022-09-28 PROCEDURE — 94010 BREATHING CAPACITY TEST: ICD-10-PCS | Mod: S$GLB,TXP,, | Performed by: INTERNAL MEDICINE

## 2022-09-28 PROCEDURE — 3008F PR BODY MASS INDEX (BMI) DOCUMENTED: ICD-10-PCS | Mod: CPTII,S$GLB,TXP, | Performed by: STUDENT IN AN ORGANIZED HEALTH CARE EDUCATION/TRAINING PROGRAM

## 2022-09-28 PROCEDURE — 3077F SYST BP >= 140 MM HG: CPT | Mod: CPTII,S$GLB,TXP, | Performed by: STUDENT IN AN ORGANIZED HEALTH CARE EDUCATION/TRAINING PROGRAM

## 2022-09-28 PROCEDURE — 94727 GAS DIL/WSHOT DETER LNG VOL: CPT | Mod: S$GLB,TXP,, | Performed by: INTERNAL MEDICINE

## 2022-09-28 PROCEDURE — 1159F MED LIST DOCD IN RCRD: CPT | Mod: CPTII,S$GLB,TXP, | Performed by: STUDENT IN AN ORGANIZED HEALTH CARE EDUCATION/TRAINING PROGRAM

## 2022-09-28 PROCEDURE — 3008F BODY MASS INDEX DOCD: CPT | Mod: CPTII,S$GLB,TXP, | Performed by: STUDENT IN AN ORGANIZED HEALTH CARE EDUCATION/TRAINING PROGRAM

## 2022-09-28 PROCEDURE — 94010 BREATHING CAPACITY TEST: CPT | Mod: S$GLB,TXP,, | Performed by: INTERNAL MEDICINE

## 2022-09-28 PROCEDURE — 3079F PR MOST RECENT DIASTOLIC BLOOD PRESSURE 80-89 MM HG: ICD-10-PCS | Mod: CPTII,S$GLB,TXP, | Performed by: STUDENT IN AN ORGANIZED HEALTH CARE EDUCATION/TRAINING PROGRAM

## 2022-09-28 PROCEDURE — 99999 PR PBB SHADOW E&M-EST. PATIENT-LVL V: CPT | Mod: PBBFAC,TXP,, | Performed by: STUDENT IN AN ORGANIZED HEALTH CARE EDUCATION/TRAINING PROGRAM

## 2022-09-28 PROCEDURE — 3077F PR MOST RECENT SYSTOLIC BLOOD PRESSURE >= 140 MM HG: ICD-10-PCS | Mod: CPTII,S$GLB,TXP, | Performed by: STUDENT IN AN ORGANIZED HEALTH CARE EDUCATION/TRAINING PROGRAM

## 2022-09-28 PROCEDURE — 94729 PR C02/MEMBANE DIFFUSE CAPACITY: ICD-10-PCS | Mod: S$GLB,TXP,, | Performed by: INTERNAL MEDICINE

## 2022-09-28 NOTE — PROGRESS NOTES
"Subjective:     Reason for visit:  Transplant clearance    Patient ID:  Kiesha Rocha is a 56 y.o. female with allergic rhinitis, ESRD on HD, DM 2 followed in clinic for pulmonary nodules and shortness of breath    Patient is new to me.  Last saw Dr. Copeland in May 2022    Interval History:  Continues to have intermittent shortness of breath since her last visit.  Overall is not any worse.  Now here as part of kidney transplant workup.  No cough or sputum production.    Additional Pulmonary History:  Childhood Illnesses:  None  Occupational Exposures:  Worked in housekeeping  Environmental Exposures:  No pets  Tobacco/Smoking History:  Never smoker  Travel History:  No recent out of [place] travel    Review of Systems   Constitutional:  Negative for chills, fever, malaise/fatigue and weight loss.   Respiratory:  Positive for shortness of breath. Negative for cough, sputum production and wheezing.    Cardiovascular:  Negative for chest pain and leg swelling.   Gastrointestinal:  Negative for abdominal pain, nausea and vomiting.   Endo/Heme/Allergies:  Positive for environmental allergies.      Objective:     Vitals:    09/28/22 0847   BP: (!) 145/86   BP Location: Right arm   Patient Position: Sitting   Pulse: 85   SpO2: 96%   Weight: 80 kg (176 lb 5.9 oz)   Height: 5' 5" (1.651 m)         Physical Exam  Vitals and nursing note reviewed.   Constitutional:       General: She is not in acute distress.     Appearance: She is not ill-appearing, toxic-appearing or diaphoretic.   HENT:      Head: Normocephalic and atraumatic.   Eyes:      General: No scleral icterus.     Extraocular Movements: Extraocular movements intact.   Cardiovascular:      Rate and Rhythm: Normal rate and regular rhythm.   Pulmonary:      Effort: No tachypnea, accessory muscle usage, respiratory distress or retractions.      Breath sounds: No rales.   Abdominal:      General: There is no distension.   Skin:     General: Skin is warm and dry.      " Coloration: Skin is not jaundiced.      Findings: No rash.   Neurological:      General: No focal deficit present.      Mental Status: Mental status is at baseline.        Personal Diagnostic Review and Interpretation  09/15/2022 CT chest without contrast:  Grossly stable appearance of innumerable bilateral pulmonary nodules; continued demonstration of paratracheal lymph node; enlargement of LLL nodule, now measuring 3.7 x 2.1 x 2.7 cm (previously 2.4 cm)    08/17/2021 CT chest without contrast:  Scattered bilateral subcentimeter pulmonary nodules with some beating along the fissure; enlarged right paratracheal lymph node; 2.4 cm LLL soft tissue density    03/03/2020 CTA PE: Scattered micro nodules with interlobular septal thickening    Pertinent Studies Reviewed and Interpreted:     Pulmonary Function Tests:   09/28/2022:  FEV1 74%, FVC 71%.  TLC 62%, ERV 30%.  DLCO 40%    6 Minute Walk Tests:   Ordered     Echocardiograms:   04/14/2022: LVEF 65% with concentric LVH, LAE and G2 DD; PASP 26    Assessment:     1. Organ transplant candidate        2. Seasonal allergic rhinitis due to pollen        3. Multiple pulmonary nodules        4. Atherosclerosis        5. ESRD on dialysis        6. Shortness of breath        7. Pulmonary nodule 1 cm or greater in diameter  NM PET CT Routine FDG    CANCELED: NM PET CT Routine FDG          Current Outpatient Medications   Medication Instructions    albuterol (PROVENTIL/VENTOLIN HFA) 90 mcg/actuation inhaler 2 puffs, Inhalation, Every 6 hours PRN    amLODIPine (NORVASC) 10 mg, Oral, Daily    atorvastatin (LIPITOR) 20 mg, Oral, Daily    blood-glucose meter,continuous (DEXCOM G6 ) Misc 1 device to monitor blood glucose with dexcom    blood-glucose sensor (DEXCOM G6 SENSOR) Janet 3 each, Misc.(Non-Drug; Combo Route), Continuous PRN    blood-glucose transmitter (DEXCOM G6 TRANSMITTER) Janet 1 each, Misc.(Non-Drug; Combo Route), Continuous PRN    calcium acetate (PHOSLO) 667 mg  "capsule 2 capsules, Oral, 3 times daily with meals, Take 1 capsule with snacks    cetirizine (ZYRTEC) 10 mg, Oral, Daily PRN    cholecalciferol, vitamin D3, 1,250 mcg (50,000 unit) capsule cholecalciferol (vitamin D3) 1,250 mcg (50,000 unit) capsule   TAKE 1 CAPSULE BY MOUTH EVERY WEEK    cyclobenzaprine (FLEXERIL) 10 mg, Oral, 2 times daily PRN    cyproheptadine (PERIACTIN) 4 mg tablet cyproheptadine 4 mg tablet   TAKE 1 TABLET BY MOUTH THREE TIMES DAILY BEFORE MEALS    elderberry fruit and flower 460-115 mg Cap elderberry fruit 460 mg-elderberry flower 115 mg capsule   Take 1 capsule by oral route.    estradioL (ESTRACE) 1 MG tablet estradiol 1 mg tablet    fluorometholone 0.1% (FML) 0.1 % DrpS INSTILL ONE DROP IN EACH EYE FOUR TIMES DAILY    FLUoxetine 20 mg, Oral, Daily    fluticasone propionate (FLONASE) 50 mcg/actuation nasal spray 1 spray, Each Nostril, Daily PRN    furosemide (LASIX) 40 MG tablet TAKE 1 TABLET BY MOUTH DAILY    gabapentin (NEURONTIN) 600 mg, Oral, Nightly    garlic 1,000 mg    glucagon (GLUCAGON EMERGENCY KIT, HUMAN,) 1 mg SolR glucagon emergency kit 1 mg kit    hydrALAZINE (APRESOLINE) 50 mg, Oral, 3 times daily    HYDROcodone-acetaminophen (NORCO) 5-325 mg per tablet 1 tablet, Oral, Every 6 hours PRN    insulin lispro 100 unit/mL pen INJECT 4 UNITS INTO THE SKIN DAILY *    insulin syringe-needle U-100 1 mL 31 gauge x 5/16 Syrg UltiCare 1 mL 31 gauge x 5/16" syringe    INV INSULIN GLARGINE, LANTUS, 100U/ML SOLN FOR INJ SOLOSTAR lantus solostar 100 unit/ml sopn    ketoconazole (NIZORAL) 2 % cream AAA bid    labetaloL (NORMODYNE) 100 MG tablet 1 tablet, Oral, Daily    lactulose (CHRONULAC) 10 gram/15 mL solution lactulose 20 gram/30 mL oral solution    LANTUS SOLOSTAR U-100 INSULIN 35 Units, Subcutaneous    linaGLIPtin (TRADJENTA) 5 mg, Oral, Daily    lisinopriL (PRINIVIL,ZESTRIL) 20 mg, Oral, Daily    loratadine (CLARITIN) 10 mg tablet 1 tablet, Oral, Daily    methIMAzole (TAPAZOLE) 5 mg, " "Oral, Daily    methocarbamol (ROBAXIN) 500 MG Tab No dose, route, or frequency recorded.    metoprolol succinate (TOPROL-XL) 200 mg, Oral, Daily    metoprolol tartrate (LOPRESSOR) 50 mg, Oral, 2 times daily    mirtazapine (REMERON) 15 MG tablet mirtazapine 15 mg tablet    montelukast (SINGULAIR) 10 mg, Oral, Daily    multivitamin with minerals tablet 1 tablet, Oral, Daily    olopatadine (PATADAY) 0.2 % Drop place 1 DROP IN EACH EYE EVERY DAY    ondansetron (ZOFRAN) 4 mg, Oral, Every 8 hours PRN    pen needle, diabetic 31 gauge x 5/16" Ndle BD Ultra-Fine Short Pen Needle 31 gauge x 5/16"    pen needle, diabetic 31 gauge x 5/16" Ndle UltiCare Pen Needle 31 gauge x 5/16"    sevelamer carbonate (RENVELA) 800 mg Tab TAKE 2 TABLETS BY MOUTH THREE TIMES DAILY WITH MEALS    sodium bicarbonate 650 mg, Oral, 3 times daily    traZODone (DESYREL) 100 mg, Oral, Nightly    vitamin E 400 UNIT capsule vitamin E 400 unit capsule   Take 400 units by oral route.    zinc 50 mg Tab zinc 50 mg tablet   Take 1 tablet by oral route.      Kiesha Rocha had no medications administered during this visit.     Orders Placed This Encounter   Procedures    NM PET CT Routine FDG     Standing Status:   Future     Standing Expiration Date:   9/28/2023     Order Specific Question:   May the Radiologist modify the order per protocol to meet the clinical needs of the patient?     Answer:   Yes      Plan:       Problem List Items Addressed This Visit          ENT    Seasonal allergic rhinitis due to pollen    Overview     Continue Flonase 1 spray daily and daily cetirizine 10 mg            Pulmonary    Multiple pulmonary nodules    Overview     Innumerable scattered micro nodules dating as far back as 03/2020.  Most recent CT with overall stable appearance of micro nodules.  Possibly represents some remote insult.  Never smoker.  See the section on solitary pulmonary nodule for LLL opacity           Shortness of breath    Overview     Never smoker.  " Chronic allergic rhinitis and seasonal allergies.  CT chest with enlarging LLL mass and scattered micro nodules with some mild lymphadenopathy.  Sarcoidosis remains on the differential and although low probability, malignancy as well.  PFTs with moderate restriction and severely reduced DLCO.  6 MWT ordered as well as PET-CT.            Cardiac/Vascular    Atherosclerosis    Overview     Noted on serial CT.  Currently on atorvastatin 10 mg daily.            Renal/    ESRD on dialysis    Overview     4/14/2022 DR. Stevens.  [facility], [phone]. Hemodialysis: T, Th, Sat. 3.5 hours            Palliative Care    Organ transplant candidate - Primary    Overview     Moderate restriction with severely reduced DLCO on PFTs.  6 MWT ordered.  Enlarging left lower lobe nodule now > 3 cm at its widest dimension.  Patient will need to complete 6 MWT and PET-CT prior to clearance for transplant and subsequent immunosuppression          Other Visit Diagnoses       Pulmonary nodule 1 cm or greater in diameter        Relevant Orders    NM PET CT Routine FDG             30 minutes of total time spent on the encounter, which includes face-to-face time and non-face-to-face time preparing to see the patient (eg, review of tests), obtaining and/or reviewing separately obtained history, documenting clinical information in the electronic or other health record, independently interpreting results (not separately reported), and communicating results to the patient/family/caregiver, or care coordination (not separately reported).     Portions of the record may have been created with voice-recognition software. Occasional wrong-word or sound-a-like substitutions may have occurred due to the inherent limitations of voice-recognition software. Read the chart carefully and recognize, using context, where substitutions have occurred.

## 2022-09-29 NOTE — PROCEDURES
Kiesha Rocha is a 56 y.o.  female patient, who presents for a 6 minute walk test ordered by COLTEN Turner.  The diagnosis is Shortness of Breath; Pre-operative Evaluation.  The patient's BMI is 31.3 kg/m2.  Predicted distance (lower limit of normal) is 367.87 meters.      Test Results:    The test was completed without stopping.  The total time walked was 360 seconds.  During walking, the patient reported:  Dyspnea, Leg pain.  The patient used no assistive devices during testing.     09/28/2022---------Distance: 396.24 meters (1300 feet)     O2 Sat % Supplemental Oxygen Heart Rate Blood Pressure Ghislaine Scale   Pre-exercise  (Resting) 99 % Room Air 78 bpm 177/77 mmHg 0   During Exercise 97 % Room Air 118 bpm 186/81 mmHg 3   Post-exercise  (Recovery) 99 % Room Air  90 bpm       Recovery Time: 126 seconds    Performing nurse/tech: Sidney RUVALCABA      PREVIOUS STUDY:   The patient has not had a previous study.      CLINICAL INTERPRETATION:  Six minute walk distance is 396.24 meters (1300 feet) with moderate dyspnea.  During exercise, there was no significant desaturation while breathing room air.  Blood pressure remained stable and Heart rate increased significantly with walking.  Hypertension was present prior to exercise.  The patient reported non-pulmonary symptoms during exercise.  No previous study performed.  Based upon age and body mass index, exercise capacity is normal.

## 2022-10-05 ENCOUNTER — HOSPITAL ENCOUNTER (OUTPATIENT)
Dept: RADIOLOGY | Facility: HOSPITAL | Age: 57
Discharge: HOME | End: 2022-10-05
Attending: STUDENT IN AN ORGANIZED HEALTH CARE EDUCATION/TRAINING PROGRAM
Payer: MEDICARE

## 2022-10-05 DIAGNOSIS — R91.1 PULMONARY NODULE 1 CM OR GREATER IN DIAMETER: ICD-10-CM

## 2022-10-05 LAB — GLUCOSE SERPL-MCNC: 143 MG/DL (ref 70–110)

## 2022-10-05 PROCEDURE — 78815 PET IMAGE W/CT SKULL-THIGH: CPT | Mod: TC,PN,TXP

## 2022-10-05 PROCEDURE — 78815 PET IMAGE W/CT SKULL-THIGH: CPT | Mod: 26,TXP,, | Performed by: RADIOLOGY

## 2022-10-05 PROCEDURE — 78815 NM PET CT ROUTINE: ICD-10-PCS | Mod: 26,TXP,, | Performed by: RADIOLOGY

## 2022-10-05 NOTE — PROGRESS NOTES
PET Imaging Questionnaire    Are you a Diabetic? Recent Blood Sugar level? Yes    Are you anemic? Bone Marrow Stimulation Meds? No    Have you had a CT Scan, if so when & where was your last one? Yes -     Have you had a PET Scan, if so when & where was your last one? No    Chemotherapy or currently on Chemotherapy? No    Radiation therapy? No    Surgical History:   Past Surgical History:   Procedure Laterality Date    ARTHROSCOPY OF KNEE Right     AV FISTULA PLACEMENT Left 06/12/2019    Procedure: CREATION, AV FISTULA - Basilic;  Surgeon: Bautista Vasques MD;  Location: [facility];  Service: Vascular;  Laterality: Left;    BREAST BIOPSY Left     BREAST SURGERY Left     biopsy    HYSTERECTOMY  2017    TLH/BSO    KNEE SURGERY Right     URETERAL STENT PLACEMENT Bilateral     10/2018        Have you been fasting for at least 6 hours? Yes    Is there any chance you may be pregnant or breastfeeding? No    Assay: 10.84 MCi@:11:36   Injection Site:RT AC    Residual: .235 mCi@: 11:39   Technologist: Boubacar Crocker Injected:10.61mCi

## 2022-10-06 ENCOUNTER — OFFICE VISIT (OUTPATIENT)
Dept: OPHTHALMOLOGY | Facility: CLINIC | Age: 57
End: 2022-10-06
Payer: MEDICARE

## 2022-10-06 DIAGNOSIS — H25.13 AGE-RELATED NUCLEAR CATARACT OF BOTH EYES: ICD-10-CM

## 2022-10-06 DIAGNOSIS — E11.3393 MODERATE NONPROLIFERATIVE DIABETIC RETINOPATHY OF BOTH EYES WITHOUT MACULAR EDEMA ASSOCIATED WITH TYPE 2 DIABETES MELLITUS: Primary | ICD-10-CM

## 2022-10-06 DIAGNOSIS — H31.002 CHORIORETINAL SCAR, LEFT: ICD-10-CM

## 2022-10-06 PROCEDURE — 1159F PR MEDICATION LIST DOCUMENTED IN MEDICAL RECORD: ICD-10-PCS | Mod: CPTII,S$GLB,, | Performed by: OPHTHALMOLOGY

## 2022-10-06 PROCEDURE — 92134 CPTRZ OPH DX IMG PST SGM RTA: CPT | Mod: S$GLB,,, | Performed by: OPHTHALMOLOGY

## 2022-10-06 PROCEDURE — 1160F RVW MEDS BY RX/DR IN RCRD: CPT | Mod: CPTII,S$GLB,, | Performed by: OPHTHALMOLOGY

## 2022-10-06 PROCEDURE — 99204 OFFICE O/P NEW MOD 45 MIN: CPT | Mod: S$GLB,,, | Performed by: OPHTHALMOLOGY

## 2022-10-06 PROCEDURE — 99999 PR PBB SHADOW E&M-EST. PATIENT-LVL IV: CPT | Mod: PBBFAC,,, | Performed by: OPHTHALMOLOGY

## 2022-10-06 PROCEDURE — 1160F PR REVIEW ALL MEDS BY PRESCRIBER/CLIN PHARMACIST DOCUMENTED: ICD-10-PCS | Mod: CPTII,S$GLB,, | Performed by: OPHTHALMOLOGY

## 2022-10-06 PROCEDURE — 3044F PR MOST RECENT HEMOGLOBIN A1C LEVEL <7.0%: ICD-10-PCS | Mod: CPTII,S$GLB,, | Performed by: OPHTHALMOLOGY

## 2022-10-06 PROCEDURE — 3044F HG A1C LEVEL LT 7.0%: CPT | Mod: CPTII,S$GLB,, | Performed by: OPHTHALMOLOGY

## 2022-10-06 PROCEDURE — 99999 PR PBB SHADOW E&M-EST. PATIENT-LVL IV: ICD-10-PCS | Mod: PBBFAC,,, | Performed by: OPHTHALMOLOGY

## 2022-10-06 PROCEDURE — 99204 PR OFFICE/OUTPT VISIT, NEW, LEVL IV, 45-59 MIN: ICD-10-PCS | Mod: S$GLB,,, | Performed by: OPHTHALMOLOGY

## 2022-10-06 PROCEDURE — 92134 OCT, RETINA - OU - BOTH EYES: ICD-10-PCS | Mod: S$GLB,,, | Performed by: OPHTHALMOLOGY

## 2022-10-06 PROCEDURE — 1159F MED LIST DOCD IN RCRD: CPT | Mod: CPTII,S$GLB,, | Performed by: OPHTHALMOLOGY

## 2022-10-06 NOTE — PROGRESS NOTES
HPI    Dls: 9/8/22 Dr. Kenney     55 y/o female presents today for retinal eval for NPDR os>od per Dr. Kenney.  Pt c/o red ou. No pain no flashes no floaters + glare ou.      this am   Eye meds  Visine OU PRN       Last edited by Aida Ramirez MA on 10/6/2022 11:06 AM.            A/P    ICD-10-CM ICD-9-CM   1. Moderate nonproliferative diabetic retinopathy of both eyes without macular edema associated with type 2 diabetes mellitus  E11.3393 250.50     362.05   2. Chorioretinal scar, left  H31.002 363.30   3. Age-related nuclear cataract of both eyes  H25.13 366.16       1. Moderate nonproliferative diabetic retinopathy of both eyes without macular edema associated with type 2 diabetes mellitus  Referral from Dr. Kenney for DR isra  PCP Laverne Azevedo, COLTEN   05/05/2022  5.6  A1C     ESRD on HD    OD: no injections  [facility] 20/20, mild-mod DR, no DME  Plan: Observation    OS: no inejctions  VA 20/20, mod DR, no DME no NV  Plan: Observation      2. Chorioretinal scar, left  Peripheral CR scarring, no remote trauma  No cell/flare   Has micronodules on CT scan  Plan: Observation     3. Age-related nuclear cataract of both eyes  Mod NS, NVS  Plan: Observation  Update Mrx prn    RTC 1 year DFE/OCTm OU    I saw and examined the patient and reviewed in detail the findings documented. The final examination findings, image interpretations, and plan as documented in the record represent my personal judgment and conclusions.    Pino Stringer MD  Vitreoretinal Surgery   [facility]

## 2022-10-10 NOTE — PROGRESS NOTES
Attempted to call pt, no ans regarding pt outstanding for DEXA scan, thyroid scan & uptake. Endocrinology dept also attempted to call and noted pt did not answer. Unable to LM, mailbox full.

## 2022-11-03 NOTE — PROGRESS NOTES
Spoke with pt and informed her that she needs to reach out to Endocrinology dept to schedule testing needed for clearance. Pt verbalized understanding. Message sent to Shahrzad Jara MA in endocrinology.

## 2023-01-24 ENCOUNTER — TELEPHONE (OUTPATIENT)
Dept: ENDOCRINOLOGY | Facility: CLINIC | Age: 58
End: 2023-01-24
Payer: MEDICARE

## 2023-01-24 DIAGNOSIS — M81.8 OTHER OSTEOPOROSIS WITHOUT CURRENT PATHOLOGICAL FRACTURE: ICD-10-CM

## 2023-01-24 DIAGNOSIS — Z76.82 ORGAN TRANSPLANT CANDIDATE: Primary | ICD-10-CM

## 2023-01-24 DIAGNOSIS — E05.90 HYPERTHYROIDISM: Primary | ICD-10-CM

## 2023-01-24 NOTE — TELEPHONE ENCOUNTER
Received message from renal transplant nurse saying that she needs to have the DEXA scan and thyroid uptake and scan performed for her hyperthyroidism workup prior to being considered further for renal transplant.    Placing new orders for thyroid uptake and scan and DEXA scan

## 2023-02-15 ENCOUNTER — TELEPHONE (OUTPATIENT)
Dept: ENDOCRINOLOGY | Facility: CLINIC | Age: 58
End: 2023-02-15
Payer: MEDICARE

## 2023-03-20 DIAGNOSIS — Z76.82 ORGAN TRANSPLANT CANDIDATE: Primary | ICD-10-CM

## 2023-03-23 ENCOUNTER — TELEPHONE (OUTPATIENT)
Dept: TRANSPLANT | Facility: CLINIC | Age: 58
End: 2023-03-23
Payer: MEDICARE

## 2023-06-12 ENCOUNTER — TELEPHONE (OUTPATIENT)
Dept: TRANSPLANT | Facility: CLINIC | Age: 58
End: 2023-06-12
Payer: MEDICARE

## 2023-06-12 NOTE — TELEPHONE ENCOUNTER
----- Message from Isai Yanes MD sent at 6/12/2023  2:07 PM CDT -----  Regarding: RE: Clearance for kidney transplant  I haven't see her in almost a year.  Her exercise tolerance was age appropriate and she had no desaturation on 6 MWT.  Her PFTs have a severely reduced DLCO with restriction that is likely maureen the consequence of obesity and chronic diastolic heart failure more than the nodules themselves.  Not sure where the nodules come from, but they appear stable and are not FDG-avid.  Based solely on DLCO, she is above average risk.    Dr ARROYO  ----- Message -----  From: Tana Paul RN  Sent: 6/8/2023  10:19 AM CDT  To: Isai Yanes MD  Subject: Clearance for kidney transplant                  Dr. Yanes,    I recently acquired this patient as her Transplant Coordinator. Just following up on clearance for transplant. You saw her on 9/28/23 for Pulm Nodules and shortness of breath. She had PFT's and a 6 minute walk. Please advise on clearance with risk stratification. Thank you.    Tana Paul RN

## 2023-06-16 ENCOUNTER — HOSPITAL ENCOUNTER (OUTPATIENT)
Dept: RADIOLOGY | Facility: HOSPITAL | Age: 58
Discharge: HOME | End: 2023-06-16
Attending: NURSE PRACTITIONER
Payer: MEDICARE

## 2023-06-16 ENCOUNTER — OFFICE VISIT (OUTPATIENT)
Dept: TRANSPLANT | Facility: CLINIC | Age: 58
End: 2023-06-16
Payer: MEDICARE

## 2023-06-16 ENCOUNTER — HOSPITAL ENCOUNTER (OUTPATIENT)
Dept: CARDIOLOGY | Facility: HOSPITAL | Age: 58
Discharge: HOME | End: 2023-06-16
Attending: NURSE PRACTITIONER
Payer: MEDICARE

## 2023-06-16 VITALS
BODY MASS INDEX: 31.18 KG/M2 | HEIGHT: 63 IN | DIASTOLIC BLOOD PRESSURE: 85 MMHG | SYSTOLIC BLOOD PRESSURE: 140 MMHG | WEIGHT: 176 LBS | HEART RATE: 70 BPM

## 2023-06-16 VITALS
OXYGEN SATURATION: 98 % | WEIGHT: 170 LBS | DIASTOLIC BLOOD PRESSURE: 87 MMHG | BODY MASS INDEX: 30.12 KG/M2 | SYSTOLIC BLOOD PRESSURE: 197 MMHG | HEART RATE: 80 BPM | HEIGHT: 63 IN | TEMPERATURE: 98 F | RESPIRATION RATE: 16 BRPM

## 2023-06-16 DIAGNOSIS — N18.6 ESRD ON DIALYSIS: Primary | ICD-10-CM

## 2023-06-16 DIAGNOSIS — Z99.2 ESRD ON DIALYSIS: Primary | ICD-10-CM

## 2023-06-16 DIAGNOSIS — Z76.82 ORGAN TRANSPLANT CANDIDATE: ICD-10-CM

## 2023-06-16 DIAGNOSIS — N18.5 DIABETES MELLITUS DUE TO UNDERLYING CONDITION WITH STAGE 5 CHRONIC KIDNEY DISEASE: Chronic | ICD-10-CM

## 2023-06-16 DIAGNOSIS — E08.22 DIABETES MELLITUS DUE TO UNDERLYING CONDITION WITH STAGE 5 CHRONIC KIDNEY DISEASE: Chronic | ICD-10-CM

## 2023-06-16 DIAGNOSIS — Z76.82 PATIENT ON WAITING LIST FOR KIDNEY TRANSPLANT: ICD-10-CM

## 2023-06-16 LAB
ASCENDING AORTA: 2.79 CM
AV INDEX (PROSTH): 0.66
AV MEAN GRADIENT: 6 MMHG
AV PEAK GRADIENT: 12 MMHG
AV VALVE AREA: 1.41 CM2
AV VELOCITY RATIO: 0.59
BSA FOR ECHO PROCEDURE: 1.88 M2
CV ECHO LV RWT: 0.44 CM
DOP CALC AO PEAK VEL: 1.71 M/S
DOP CALC AO VTI: 36.96 CM
DOP CALC LVOT AREA: 2.1 CM2
DOP CALC LVOT DIAMETER: 1.65 CM
DOP CALC LVOT PEAK VEL: 1.01 M/S
DOP CALC LVOT STROKE VOLUME: 52.15 CM3
DOP CALCLVOT PEAK VEL VTI: 24.4 CM
E WAVE DECELERATION TIME: 197.55 MSEC
E/A RATIO: 1.04
E/E' RATIO: 22 M/S
ECHO LV POSTERIOR WALL: 1.12 CM (ref 0.6–1.1)
EJECTION FRACTION: 55 %
FRACTIONAL SHORTENING: 34 % (ref 28–44)
HR MV ECHO: 82 BPM
INTERVENTRICULAR SEPTUM: 0.89 CM (ref 0.6–1.1)
LA MAJOR: 5.34 CM
LA MINOR: 4.98 CM
LA WIDTH: 4.52 CM
LEFT ATRIUM SIZE: 4.55 CM
LEFT ATRIUM VOLUME INDEX MOD: 45.8 ML/M2
LEFT ATRIUM VOLUME INDEX: 49.2 ML/M2
LEFT ATRIUM VOLUME MOD: 83.87 CM3
LEFT ATRIUM VOLUME: 90.09 CM3
LEFT INTERNAL DIMENSION IN SYSTOLE: 3.38 CM (ref 2.1–4)
LEFT VENTRICLE DIASTOLIC VOLUME INDEX: 67.94 ML/M2
LEFT VENTRICLE DIASTOLIC VOLUME: 124.33 ML
LEFT VENTRICLE MASS INDEX: 104 G/M2
LEFT VENTRICLE SYSTOLIC VOLUME INDEX: 25.6 ML/M2
LEFT VENTRICLE SYSTOLIC VOLUME: 46.88 ML
LEFT VENTRICULAR INTERNAL DIMENSION IN DIASTOLE: 5.11 CM (ref 3.5–6)
LEFT VENTRICULAR MASS: 189.89 G
LV LATERAL E/E' RATIO: 20.43 M/S
LV SEPTAL E/E' RATIO: 23.83 M/S
MV MEAN GRADIENT: 5 MMHG
MV PEAK A VEL: 1.37 M/S
MV PEAK E VEL: 1.43 M/S
MV STENOSIS PRESSURE HALF TIME: 57.29 MS
MV VALVE AREA P 1/2 METHOD: 3.84 CM2
QEF: 53 %
RA MAJOR: 4.54 CM
RA PRESSURE: 3 MMHG
RA WIDTH: 3.91 CM
RIGHT VENTRICULAR END-DIASTOLIC DIMENSION: 3.56 CM
SINUS: 2.35 CM
STJ: 2.06 CM
TDI LATERAL: 0.07 M/S
TDI SEPTAL: 0.06 M/S
TDI: 0.07 M/S
TRICUSPID ANNULAR PLANE SYSTOLIC EXCURSION: 3.08 CM

## 2023-06-16 PROCEDURE — 3079F DIAST BP 80-89 MM HG: CPT | Mod: CPTII,S$GLB,TXP, | Performed by: NURSE PRACTITIONER

## 2023-06-16 PROCEDURE — 1159F PR MEDICATION LIST DOCUMENTED IN MEDICAL RECORD: ICD-10-PCS | Mod: CPTII,S$GLB,TXP, | Performed by: NURSE PRACTITIONER

## 2023-06-16 PROCEDURE — 99999 PR PBB SHADOW E&M-EST. PATIENT-LVL V: ICD-10-PCS | Mod: PBBFAC,TXP,, | Performed by: NURSE PRACTITIONER

## 2023-06-16 PROCEDURE — 3008F PR BODY MASS INDEX (BMI) DOCUMENTED: ICD-10-PCS | Mod: CPTII,S$GLB,TXP, | Performed by: NURSE PRACTITIONER

## 2023-06-16 PROCEDURE — 93306 TTE W/DOPPLER COMPLETE: CPT | Mod: TXP

## 2023-06-16 PROCEDURE — 1159F MED LIST DOCD IN RCRD: CPT | Mod: CPTII,S$GLB,TXP, | Performed by: NURSE PRACTITIONER

## 2023-06-16 PROCEDURE — 76770 US RETROPERITONEAL COMPLETE: ICD-10-PCS | Mod: 26,TXP,, | Performed by: STUDENT IN AN ORGANIZED HEALTH CARE EDUCATION/TRAINING PROGRAM

## 2023-06-16 PROCEDURE — 71046 XR CHEST PA AND LATERAL: ICD-10-PCS | Mod: 26,TXP,, | Performed by: RADIOLOGY

## 2023-06-16 PROCEDURE — 76770 US EXAM ABDO BACK WALL COMP: CPT | Mod: TC,TXP

## 2023-06-16 PROCEDURE — 99215 OFFICE O/P EST HI 40 MIN: CPT | Mod: S$GLB,TXP,, | Performed by: NURSE PRACTITIONER

## 2023-06-16 PROCEDURE — 3008F BODY MASS INDEX DOCD: CPT | Mod: CPTII,S$GLB,TXP, | Performed by: NURSE PRACTITIONER

## 2023-06-16 PROCEDURE — 99215 PR OFFICE/OUTPT VISIT, EST, LEVL V, 40-54 MIN: ICD-10-PCS | Mod: S$GLB,TXP,, | Performed by: NURSE PRACTITIONER

## 2023-06-16 PROCEDURE — 3077F PR MOST RECENT SYSTOLIC BLOOD PRESSURE >= 140 MM HG: ICD-10-PCS | Mod: CPTII,S$GLB,TXP, | Performed by: NURSE PRACTITIONER

## 2023-06-16 PROCEDURE — 3079F PR MOST RECENT DIASTOLIC BLOOD PRESSURE 80-89 MM HG: ICD-10-PCS | Mod: CPTII,S$GLB,TXP, | Performed by: NURSE PRACTITIONER

## 2023-06-16 PROCEDURE — 76770 US EXAM ABDO BACK WALL COMP: CPT | Mod: 26,TXP,, | Performed by: STUDENT IN AN ORGANIZED HEALTH CARE EDUCATION/TRAINING PROGRAM

## 2023-06-16 PROCEDURE — 93306 ECHO (CUPID ONLY): ICD-10-PCS | Mod: 26,TXP,, | Performed by: INTERNAL MEDICINE

## 2023-06-16 PROCEDURE — 93306 TTE W/DOPPLER COMPLETE: CPT | Mod: 26,TXP,, | Performed by: INTERNAL MEDICINE

## 2023-06-16 PROCEDURE — 71046 X-RAY EXAM CHEST 2 VIEWS: CPT | Mod: TC,TXP

## 2023-06-16 PROCEDURE — 3077F SYST BP >= 140 MM HG: CPT | Mod: CPTII,S$GLB,TXP, | Performed by: NURSE PRACTITIONER

## 2023-06-16 PROCEDURE — 71046 X-RAY EXAM CHEST 2 VIEWS: CPT | Mod: 26,TXP,, | Performed by: RADIOLOGY

## 2023-06-16 PROCEDURE — 99999 PR PBB SHADOW E&M-EST. PATIENT-LVL V: CPT | Mod: PBBFAC,TXP,, | Performed by: NURSE PRACTITIONER

## 2023-06-16 NOTE — LETTER
June 19, 2023        Lilia Malin  [address] [address] [address]  Phone: [phone]  Fax: [phone]             [facility]- Transplant 1st Fl  [address]  [facility] [address]  Phone: [phone]   Patient: Kiesha Rocha   MR Number: [id number]   YOB: 1965   Date of Visit: 6/16/2023       Dear Dr. Lilia Malin    Thank you for referring Kiesha Rocha to me for evaluation. Attached you will find relevant portions of my assessment and plan of care.    If you have questions, please do not hesitate to call me. I look forward to following Kiesha Rocha along with you.    Sincerely,    Bridget Betts, NP    Enclosure    If you would like to receive this communication electronically, please contact [email or web address] or [phone] to request [employer] Link access.    [employer] Link is a tool which provides read-only access to select patient information with whom you have a relationship. Its easy to use and provides real time access to review your patients record including encounter summaries, notes, results, and demographic information.    If you feel you have received this communication in error or would no longer like to receive these types of communications, please e-mail [email or web address]

## 2023-06-16 NOTE — PROGRESS NOTES
INITIAL PATIENT EDUCATION NOTE    Ms. Kiesha Rocha was seen in pre-kidney transplant clinic for evaluation for kidney, kidney/pancreas or pancreas only transplant.  The patient attended a an individual video education session that discussed/reviewed the following aspects of transplantation: evaluation including diagnostic and laboratory testing,( Chemistries, Hematology, Serologies including HIV and Hepatitis and HLA) required for transplantation and selection committee process, UNOS waitlist management/multiple listings, types of organs offered (KDPI < 85%, KDPI > 85%, PHS risk, DCD, HCV+, HIV+ for HIV+ recipients and enbloc/dual), financial aspects, surgical procedures, dietary instruction pre- and post-transplant, health maintenance pre- and post-transplant, post-transplant hospitalization and outpatient follow-up, potential to participate in a research protocol, and medication management and side effects.  A question and answer session was provided after the presentation.    The patient was seen by all members of the multi-disciplinary team to include: Nephrologist/ELBERT, Surgeon, , Transplant Coordinator, , Pharmacist and Dietician (if applicable).    The patient reviewed and signed all consents for evaluation which were witnessed and sent to scanning into the [facility] chart.    The patient was given an education book and plan for further evaluation based on her individual assessment.      Reviewed program requirement for complete COVID vaccination with documentation prior to listing.  COVID education information reviewed with patient. Patient encouraged to be up to date on all vaccinations.       The patient was informed that the transplant team would manage immediate post op pain. If the patient requires long term pain management, they will need to have that pain management addressed by their PCP or previous provider who wrote for long term pain medicines.    The patient was  encouraged to call with any questions or concerns.

## 2023-06-16 NOTE — PATIENT INSTRUCTIONS
Your are inactive on the transplant list.   You need to follow-up with the Endocrinologist and have the thyroid test thyroid uptake test)

## 2023-06-16 NOTE — PROGRESS NOTES
Kidney Transplant Recipient Reevalulation    Referring Physician: Lilia Malin  Current Nephrologist: Lilia Malin  Waitlist Status: inactive  Dialysis Start Date: 2/27/2019    Subjective:     CC:  Annual reassessment of kidney transplant candidacy.    HPI:  Ms. Rocha is a 57 y.o. year old Black or  female with ESRD secondary to diabetic nephropathy.  She has been on the wait list for a kidney transplant at [facility] since 2/27/2019. Patient is currently on hemodialysis started on 2/2019. Patient is dialyzing on MWF schedule.  Patient reports that she is tolerating dialysis well. She dialyzes for 3 1/2 hours per session. She has a LUE AV fistula. Patient denies any recent hospitalizations or ED visits.     Has been inactive since 9/3/20 because of Covid positive symptoms that require physical therapy and further follow-up such as CT chest and Pulmonology consult.   fx assessment    Reports her sister passed away last week and she is very tearful and is  still adjusting emotionally to this.   Otherwise  feels well. No health concerns today.   Denies chest pain, SOB, leg pain, abdominal pain or LUTs.  Does not appear frail.    11/10/2022 CT chest  Impression     1.  No evidence of pulmonary embolus.    2.  Numerous bilateral pulmonary nodules which have increased when compared to previous study. This may be secondary to an infectious/inflammatory process although pulmonary metastases may not be excluded. Abnormal left infrahilar lymphadenopathy    10/5/2022 PET CT  Impression:  1. FDG avid subcentimeter left adrenal nodule.  No discrete nodule is identified on the CT portion of the exam.  2. No other FDG avid disease.  3. Sub 3 cm left lower lobe nodule demonstrates no FDG uptake above background.  The other subcentimeter pulmonary nodules demonstrate no FDG uptake.  These nodules appear similar in size and extent.  4. No FDG avid thoracic lymphadenopathy.  Previously seen and described  lymph nodes are similar in size compared to 09/15/2022.      Hyperthyroid--Pt is following with Endocrinology ---- Pt no showed Endocrine appt 23 (Still needs thyroid uptake scan and DEXA) and cancelled gyn appt 23.  NM Thyroid Uptake and Scan --not done to date   Currently taking Methimazole   Lab Results   Component Value Date    TSH 1.218 2022     Thyroid US 2/3/2022  1.  Thyroid gland is normal in size with homogenous echotexture.  2.  Scattered subcentimeter simple cysts in both lobes.            Lab /diagnostic results reviewed with patient today.      CXR 2023 No acute pulmonary pathology  PXR:  2022-No acute process seen  Renal US: 2023 Mildly complex left superior pole renal cyst with thickened peripheral walls. Bilateral simple renal cysts. Punctate nonobstructing left renal stone.  Iliacs : 19 per surgery results favorable for transplant    Colonoscopy: 2017 normal  PAP: 2022 negative  MM/15/22 negative   GYN follow-up: < 6 months ago  Results for orders placed during the hospital encounter of 23    Echo    Interpretation Summary  · The left ventricle is normal in size with concentric hypertrophy and normal systolic function.  · The estimated ejection fraction is 55%.  · Grade II left ventricular diastolic dysfunction.  · Moderate left atrial enlargement.  · Normal right ventricular size with normal right ventricular systolic function.  · The quantitatively derived ejection fraction is 53%.  · Trivial pericardial effusion.  · Normal central venous pressure (3 mmHg).    Results for orders placed during the hospital encounter of 22    Lexiscan Card Interp    Interpretation Summary    Normal myocardial perfusion scan. There is no evidence of myocardial ischemia or infarction.    The visually estimated ejection fraction is normal at rest and normal during stress.    There is normal wall motion at rest and post stress.    LV cavity size is normal at  rest and normal at stress.    The EKG portion of this study is negative for ischemia.    The patient reported chest pain during the stress test.    There were no arrhythmias during stress.    When compared to the previous study from 9/11/2019, there are no significant changes.        Lab Results   Component Value Date    HGBA1C 5.9 (H) 04/18/2023       Past Medical History:   Diagnosis Date    Anemia     Anxiety     Diabetes mellitus     Disorder of kidney and ureter     Encounter for blood transfusion     GERD (gastroesophageal reflux disease)     Hydronephrosis     Hyperlipidemia     Hypertension     Hyperthyroidism     Obesity     Thyroid disease        Review of Systems   Constitutional:  Positive for fatigue and unexpected weight change. Negative for activity change, appetite change, chills and fever.   HENT:  Negative for congestion, facial swelling, postnasal drip, rhinorrhea, sinus pressure, sore throat and trouble swallowing.    Eyes:  Positive for visual disturbance. Negative for pain and redness.        Wears glasses    Respiratory:  Negative for cough, chest tightness, shortness of breath and wheezing.    Cardiovascular:  Negative for chest pain, palpitations and leg swelling.   Gastrointestinal:  Negative for abdominal pain, diarrhea, nausea and vomiting.   Genitourinary:  Positive for decreased urine volume. Negative for dysuria, flank pain and urgency.   Musculoskeletal:  Positive for arthralgias. Negative for gait problem, neck pain and neck stiffness.   Skin:  Negative for rash.   Allergic/Immunologic: Negative for environmental allergies, food allergies and immunocompromised state.   Neurological:  Negative for dizziness, weakness, light-headedness and headaches.   Psychiatric/Behavioral:  Positive for sleep disturbance. Negative for agitation and confusion. The patient is nervous/anxious.         Hx depression  tearful today      Objective:   body mass index is 29.82 kg/m².    BP (!) 197/87 (BP  "Location: Right arm, Patient Position: Sitting, BP Method: Medium (Automatic))   Pulse 80   Temp 97.7 °F (36.5 °C) (Temporal)   Resp 16   Ht 5' 3.31" (1.608 m)   Wt 77.1 kg (169 lb 15.6 oz)   SpO2 98%   BMI 29.82 kg/m²         Physical Exam  Vitals reviewed.   Constitutional:       Appearance: Normal appearance. She is well-developed.      Comments: tearful   HENT:      Head: Normocephalic.      Mouth/Throat:      Pharynx: No oropharyngeal exudate.   Eyes:      General: No scleral icterus.     Conjunctiva/sclera: Conjunctivae normal.      Pupils: Pupils are equal, round, and reactive to light.   Cardiovascular:      Rate and Rhythm: Normal rate and regular rhythm.      Heart sounds: Normal heart sounds.   Pulmonary:      Effort: Pulmonary effort is normal.      Breath sounds: Normal breath sounds.   Abdominal:      General: Bowel sounds are normal. There is no distension.      Palpations: Abdomen is soft. There is no hepatomegaly, splenomegaly or mass.      Tenderness: There is no abdominal tenderness. There is no guarding or rebound. Negative signs include Steele's sign and McBurney's sign.   Musculoskeletal:         General: Normal range of motion.        Arms:       Cervical back: Normal range of motion and neck supple.   Lymphadenopathy:      Cervical: No cervical adenopathy.   Skin:     General: Skin is warm and dry.   Neurological:      Mental Status: She is alert and oriented to person, place, and time.      Motor: No abnormal muscle tone.      Coordination: Coordination normal.   Psychiatric:         Behavior: Behavior normal.       Labs:  Lab Results   Component Value Date    WBC 10.08 04/29/2020    HGB 10.3 (L) 04/29/2020    HCT 33.7 (L) 04/29/2020     11/05/2020    K 3.6 11/05/2020    CL 98 11/05/2020    CO2 29 11/05/2020    BUN 35 (H) 11/05/2020    CREATININE 7.5 (H) 11/05/2020    EGFRNONAA 5.7 (A) 11/05/2020    CALCIUM 8.4 (L) 11/05/2020    PHOS 7.4 (H) 08/21/2019    ALBUMIN 3.6 11/05/2020 "    AST 10 11/05/2020    ALT 9 (L) 11/05/2020    .0 (H) 04/14/2022       No results found for: PREALBUMIN, BILIRUBINUA, GGT, AMYLASE, LIPASE, PROTEINUA, NITRITE, RBCUA, WBCUA    No results found for: HLAABCTYPE    Lab Results   Component Value Date    CPRA 28 04/10/2023    YA0VGIQ Negative 04/10/2023    CIABCLM WEAK CW2 10/10/2022    CIIAB DR15,DR16 04/10/2023    ABCMT WEAK DP6, DR51 10/10/2022       Labs were reviewed with the patient.    Pre-transplant Workup:   Reviewed with the patient.    Assessment:     1. ESRD on dialysis    2. Patient on waiting list for kidney transplant    3. Diabetes mellitus due to underlying condition with stage 5 chronic kidney disease        Plan:   Per pulmonary:  Not sure where the nodules come from, but they appear stable and are not FDG-avid.  Based solely on DLCO, she is above average risk.  Pt no showed Endocrine appt 2/16/23 (Still needs thyroid uptake scan and DEXA)    Once received endocrine testing and clearance, will need to be discussed prior to making active.      Transplant Candidacy:   Ms. Rocha is an unacceptable kidney transplant candidate, see above.  Meets center eligibility for accepting HCV+ donor offer - yes.  Patient educated on HCV+ donors. Kiesha is willing  to accept HCV+ donor offer -  yes   Patient is a candidate for KDPI > 85 kidney donor offer - no. Due to weight  She has experienced health changes that warrant further investigation.  Patient will be placed in an inactive status at present. Pending thyroid uptake and dexa . Patient will loose teeth and has not been seen by a dentist. Instructed to follow-up with dentist for evaluation.     Bridget Betts NP       Follow-up:   In addition to the tests noted in the plan, Ms. Rocha will continue to have reevaluation as per the standing pre-kidney transplant protocol:  Monthly blood for PRA  Annual return to clinic, except HIV positive, > 65 years of age, or pancreas transplant candidates who  will be scheduled to see transplant every 6 months while in pre-transplant phase  Annual re-testing: CXR, EKG, yearly mammograms for women over 40 and PSA for males over 40, cardiology follow-up as recommended by initial cardiology pre-transplant evaluation  Renal ultrasound every 2 years  Baseline colonoscopy after age 50 and repeated as recommended    [employer] Patient Status  Functional Status: 60% - Requires occasional assistance but is able to care for needs  Physical Capacity: No Limitations

## 2023-06-19 ENCOUNTER — TELEPHONE (OUTPATIENT)
Dept: TRANSPLANT | Facility: CLINIC | Age: 58
End: 2023-06-19
Payer: MEDICARE

## 2023-06-19 NOTE — TELEPHONE ENCOUNTER
Transplant Social Work Note:    SW called and spoke with pt 2x regarding mental health resources. First call pt was at dialysis and unable to talk, second time pt was in car and could not write down any names.     Pt agreed to call SW in morning as pt does not have dialysis on Tuesdays.     Jeanie Curtis LMSW

## 2023-06-19 NOTE — PROGRESS NOTES
"Transplant Psychosocial Evaluation Update:  Last psychosocial evaluation for transplant was completed on 2022 by SAMEERA Mane LMSW    Pt presents today in company of pt's daughter/primary caregiver, Juliette Rocha. Pt and caregiver present as alert, oriented to person, place, time, purpose of visit.     Pt presents as anxious and cried throughout majority of interview. Pt's daughter reports multiple losses in pt's family recently with pt's sister dying within the last month. Pt reports being unaware of inactive status on waitlist. SW confirmed with pt's nurse coordinator, Tana Paul RN while in room with pt that pt has been inactive for 3 years. Pt appeared overwhelmed and reports not knowing reason for being inactive and pt unsure of what steps to take to be active again. SW confirmed pt's nurse coordinator, Tana Paul RN will reach out to pt to discuss becoming active again, pt voiced understanding.     Mental Health: Pt has history of anxiety and depression. Pt and daughter report pt's anxiety was well managed until recently when pt's sister . Pt reports having "meltdowns" at home with crying and feeling overwhelmed with grief. Pt taking 40 mg Prozac daily, prescribed by PCP. Pt reports Prozac not as effective lately and pt reports having an "anxiety attack" earlier today. SW, pt, and pt's daughter discussed pt needing to manage mental health concerns and pt expressed interest in engaging in outpatient mental health services. Pt requested information on any spiritual therapists in her area, SW agreed to call pt by end of day 2023 to discuss therapy resources.     All concerns and education points as per transplant psychosocial evaluation process addressed (also refer to psychosocial dated 2022).     Pt actively participated in today's interview, with pt's daughter, Juliette participating as caregiver. Pt asking questions appropriately and denies changes to previous psychosocial evaluation for " transplantation which we reviewed line by line with pt and, per pt choice, with pts caregiver today.    Primary Caregivers and Transportation for Transplant:  1. Juliette Huggins, [place], [place]; 35 yo daughter, unemployed/housewife, [address], [phone]  2. Pantera Huggins, son-in-law/ of Juliette, FT , [address], [phone]  3. Darlene Rocha, 31 yo daughter, lives w/ pt has 2 young kids, [address], FT CNA at [employer], [phone]  4. Moo Rodrigez, 58 yo , retired, [address], [phone]    Both pt and caregiver/s plan to stay locally at  apartments or hotel of choice - information reviewed in depth. Caregivers plan to stay at hospital as appropriate for transplant and post-transplant process.    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 Pt receives $800/month in SSD and $600/month in SNAP.     DME: [facility], glucometer, cane- uses as needed     ADLS:  Pt reports independent with all ADLS, drives, and handles own medication management.      Household and Dependents: pt resides with 58 yo  Moo Rodrigez, 31 yo daughter Darlene Rocha, 5 yo granddaughter Ananth Richards and 2 yo granddaughter Rachelle. Children will be cared for by their mother, Darlene, during and after transplant.     Income: Pt states ability to afford all monthly expenses and costs including for medications now and if transplanted based on income and on savings and assets.    Dialysis Adherence: [facility] ([phone]) MWF. Dialysis adherence form faxed to NIURKA and RICHARD awaits reply. RICHARD spoke with dialysis social worker Atiya who reports aware of pt's recent losses and agreed to assist and encourage pt to engage in therapy.     Suitability for Transplant:   Pt is high risk for transplant at this time based on reported mental health concerns. Pt's mental health will need to be re-evaluated before being re-activated on kidney transplant waiting list and when  receiving any organ offers.     SW strongly recommends pt engage in outpatient mental health services.     Jeanie Curtis LMSW

## 2023-06-22 ENCOUNTER — TELEPHONE (OUTPATIENT)
Dept: TRANSPLANT | Facility: CLINIC | Age: 58
End: 2023-06-22
Payer: MEDICARE

## 2023-06-22 NOTE — TELEPHONE ENCOUNTER
"Dialysis Compliance Check:    SW received compliance check from pt's dialysis unit [facility] ([phone]). In the last 3 moths pt had:  AMAs: "none"  No-sows: 5/15/23 no show; 5/26/23 no show; 6/2/2023 cancelled by pt; 6/5/23 cancelled by pt; 6/16/23 no show  No concerns noted regarding caregivers, transportation, or mental health.     Pt's dialysis compliance is satisfactory.    Jeanie Curtis LMSW    "

## 2023-07-06 ENCOUNTER — TELEPHONE (OUTPATIENT)
Dept: TRANSPLANT | Facility: CLINIC | Age: 58
End: 2023-07-06
Payer: MEDICARE

## 2023-07-06 NOTE — TELEPHONE ENCOUNTER
Social Work Transplant Note:    SW returned call to pt to discuss mental health resources. Pt asked SW to mail resources, SW confirmed pt's mailing address and mailed resources. SW advised pt call pt's insurance directly if additional resources are needed, pt agreed. Pt agreed to update SW once appointment is made.     RICHARD called and spoke with Atiya pt's dialysis unit . SW informed DUSW pt will be receiving mental health resources in the mail. DUSW had questions on pt's outstanding medical testing needed to make pt active on kidney waiting list. SW advised pt and/or DUSW speak with pt's nurse coordinator to discuss medial testing.     RICHARD sent pt's nurse coordinator, Tana Paul RN, secure chat message asking for nurse coordinator to call pt to discuss outstanding testing.    Jeanie Curtis LMSW

## 2023-07-06 NOTE — TELEPHONE ENCOUNTER
Spoke to patient, informed her that she needs Endocrine appt (October 4th), gyn exam, and to be established with a counselor. After all if received, will discuss with doctors if she can be reactivated on waitlist.

## 2023-07-26 ENCOUNTER — DOCUMENTATION ONLY (OUTPATIENT)
Dept: TRANSPLANT | Facility: CLINIC | Age: 58
End: 2023-07-26
Payer: MEDICARE

## 2023-07-26 NOTE — PROGRESS NOTES
Medical record, including care everywhere, reviewed for lab results which meet criteria for an eGFR Waiting Time Modification based on OPTN Policy 8.3.A  Lab testing from 8/5/18 found to meet criteria. Reviewed with nephrology and Waiting Time Modification Form was submitted to [employer].

## 2023-08-01 ENCOUNTER — DOCUMENTATION ONLY (OUTPATIENT)
Dept: TRANSPLANT | Facility: CLINIC | Age: 58
End: 2023-08-01
Payer: MEDICARE

## 2023-08-01 NOTE — PROGRESS NOTES
The review of their medical record was completed and it was found that additional time may be due based on past lab results. Informed that the information was submitted to [employer] and approved.    Their waiting time for a kidney transplant previously began on 2/27/19. This has now been adjusted to 8/5/18 based on past lab values.

## 2023-08-29 ENCOUNTER — EPISODE CHANGES (OUTPATIENT)
Dept: TRANSPLANT | Facility: CLINIC | Age: 58
End: 2023-08-29

## 2023-10-02 NOTE — PROGRESS NOTES
Subjective:      Patient ID: Kiesha Rocha is a 57 y.o. female.    Chief Complaint: Hyperthyroidism     History of Present Illness  57 y.o.  female with history of hyperthyroidism, type 2 diabetes, ESRD on HD (being evaluated for renal transplant), hyperlipidemia, hypertension presents today for follow up regarding hyperthyroidism and type 2 diabetes.Previous patient of Dr Ivan. Last visit May 2022    With regards to hyperthyroidism,     - Duration:  Since at least late 2019   - Etiology determined to be: Graves negative antibodies     She presented with palpitations and weight loss.    Wt Readings from Last 3 Encounters:   10/04/23 1002 75.3 kg (165 lb 14.3 oz)   06/16/23 1057 79.8 kg (176 lb)   06/16/23 1306 77.1 kg (169 lb 15.6 oz)     current symptoms  [x] palpations  [x] weight loss and is on periactin   [x] frequent bm  [x] Hair loss  [x] Brittle nails  [x] skin changes  [x] tremor   [x] anxiety  [x] Heat intolerance    Current medication   Methimazole 5 daily for at least 6 months but reports she feels above symptoms and would like to go back up to 7.5 mg daily.  Denies missing doses    Denies Biotin usage      Any recent in the 6-12 months iodine or contrast- denies    Thyroid tenderness?-sometimes feels swollen     Graves Eye Clinical Activity: 3/7=Active  Eye pain?-yes at times  Eye pain with movement?-yes at times  Eyelid erythema?- yes at times   Erythema of conjunctiva?yes at times  Caruncle inflammation?yes at times  Eyelid swelling?yes at times    Smoke-denies    Lab Results   Component Value Date    TSH 0.96 08/15/2023    D0ZCCWW 57 (L) 09/11/2019    E0TDTBU 6.1 09/11/2019    FREET4 0.96 05/05/2022     - Thyroid ultrasound obtained 02/03/2022 unremarkable    10/5/2022 NM scan  COMPARISON:  None.     FINDINGS:  The 24 hour uptake is elevated at 34.9 % (normal 10-30%).     Normal in size and contour with normal distribution of radiotracer throughout both lobes.  No evidence of hyper or  hypofunctioning nodules.     Impression:     1. Elevated 24 hour radioiodine uptake by the thyroid, findings could represent Grave's disease. Correlate findings with clinical lab values.  2. Normal scintigraphic appearance of the thyroid.    - Last BMD: None on record  Hysterectomy around 2017    - Current symptoms: Fatigue, anxiety, hair loss, no exophthalmos or palpitations    Regarding Diabetes Mellitus:    - Initially diagnosed with Type 2 diabetes mellitus: over 25 years prior during pregnancy    With regards to the diabetes:    Diagnosed with Type 2 DM >25 years ago  Family History of Type 2 DM: mother and her maternal aunts  Known complications:  DKA/HHS: denies  RN: + 10/2022  PN: +  Nephropathy: + ESRD dialysis MWF  Gastroparesis: denies but always feels full  CAD: denies     Current regimen:  linagliptin 5 daily  Lantus 35 units at night   lispro 4 units three times daily as needed. States she has not taken in awhile as her blood sugars have been at goal    She is changing needle every time. She is rotating injection sites -gives in arm and abdomen.    Missed doses? -denies    Other medications tried:  Metformin    Has been on dexcom G6 in the past but did not like  4 # times a day testing va fingerstick  Log reviewed: none  Oral recall states up to 200s  States AM blood sugars in the 70s   Hypoglycemic event-denies and denies s/s of hypoglycemia   Knows how to correct with 15 grams of carbs- juice, coke, or a peppermint.     Dietary recall: She feels that she is not always following diabetic diet.   Eats 3 meals a day.     Exercise - She is doing exercise bike and/or treadmill 2 days a week    Education - last visit: 2/2022    Has a Medic alert tag?- does not have  Glucagon/Baqsimi-denies need    - Denies: History of frequent UTI/yeast infections, recent DKA, ketogenic diet, diuretics, history of pancreatitis, recurrent gallstones, daily alcohol use, MEN syndrome, pancreatic tumors, or  "gastroparesis    Diabetes Management Status  Statin: Taking  ACE/ARB: Not taking    Lab Results   Component Value Date    HGBA1C 5.1 08/15/2023    HGBA1C 5.9 (H) 04/18/2023    HGBA1C 5.8 (H) 11/15/2022     Screening or Prevention Patient's value Goal Complete/Controlled?   HgA1C Testing and Control   Lab Results   Component Value Date    HGBA1C 5.1 08/15/2023      Annually/Less than 8% Yes   Lipid profile : 08/15/2023 Annually No   LDL control Lab Results   Component Value Date    LDLCALC 56 08/15/2023    Annually/Less than 100 mg/dl  Yes   Nephropathy screening No results found for: "LABMICR"  No results found for: "PROTEINUA" Annually No   Blood pressure BP Readings from Last 1 Encounters:   10/04/23 136/76    Less than 140/90 No   Dilated retinal exam : 10/06/2022 Annually Yes   Foot exam   : 10/04/2023 Annually No     BP Readings from Last 3 Encounters:   10/04/23 136/76   06/16/23 (!) 140/85   06/16/23 (!) 197/87     Wt Readings from Last 10 Encounters:   10/04/23 75.3 kg (165 lb 14.3 oz)   06/16/23 79.8 kg (176 lb)   06/16/23 77.1 kg (169 lb 15.6 oz)   09/28/22 80 kg (176 lb 5.9 oz)   09/28/22 80 kg (176 lb 5.9 oz)   09/15/22 80.3 kg (177 lb)   05/05/22 80.6 kg (177 lb 11.1 oz)   05/05/22 79.5 kg (175 lb 2.5 oz)   04/14/22 81.9 kg (180 lb 8.9 oz)   04/14/22 81.2 kg (179 lb)     Review of Systems   Constitutional:  Positive for appetite change and unexpected weight change. Negative for fatigue.   Eyes:  Positive for visual disturbance.   Gastrointestinal:  Positive for diarrhea.   Endocrine: Negative for polydipsia, polyphagia and polyuria.   Neurological:  Negative for tremors.     Objective:   /76   Pulse 71   Ht 5' 3" (1.6 m)   Wt 75.3 kg (165 lb 14.3 oz)   SpO2 99%   BMI 29.39 kg/m²   Physical Exam  Constitutional:       Appearance: Normal appearance.   Neck:      Thyroid: No thyromegaly.   Pulmonary:      Effort: Pulmonary effort is normal.   Neurological:      Mental Status: She is alert. "   injection sites are without edema or erythema. No lipo hypertropthy or atrophy  Diabetes Foot Exam:   Denies sores or lesions to bilateral feet  Shoes appropriate  DM foot exam next time    BP Readings from Last 1 Encounters:   10/04/23 136/76      Wt Readings from Last 1 Encounters:   10/04/23 1002 75.3 kg (165 lb 14.3 oz)     Body mass index is 29.39 kg/m².    Lab Review:   Lab Results   Component Value Date    HGBA1C 5.1 08/15/2023     Lab Results   Component Value Date    CHOL 142 08/15/2023    HDL 57 08/15/2023    LDLCALC 56 08/15/2023    TRIG 144 08/15/2023    CHOLHDL 2.49 08/15/2023     Lab Results   Component Value Date     04/18/2023    K 4.0 04/18/2023    CL 98 11/05/2020    CO2 29 04/18/2023     (H) 11/05/2020    BUN 24.0 04/18/2023    CREATININE 6.82 (H) 04/18/2023    CALCIUM 8.8 04/18/2023    PROT 6.9 06/05/2021    ALBUMIN 3.1 (L) 06/05/2021    BILITOT 1.4 06/05/2021    ALKPHOS 46 06/05/2021    AST 22 06/05/2021    ALT 11 06/05/2021    ANIONGAP 15 11/10/2022    ESTGFRAFRICA 7 (L) 04/18/2023    EGFRNONAA 5.7 (A) 11/05/2020    TSH 0.96 08/15/2023     All pertinent labs reviewed    Assessment and Plan     Diabetes mellitus due to underlying condition with stage 5 chronic kidney disease  -- Reviewed goals of therapy are to get the best control we can without hypoglycemia    Medication Changes   Continue linagliptin 5 daily  Decrease Lantus 30 units at night   Restart Humalog/lispro 4 units three times daily 5 minutes before meals     -- Reviewed patient's current insulin regimen. Clarified proper insulin dose and timing in relation to meals, etc. Insulin injection sites and proper rotation instructed.    -- Advised frequent self blood glucose monitoring.  Patient encouraged to document glucose results and bring them to every clinic visit    -- Hypoglycemia precautions discussed. Instructed on precautions before driving.    -- Call for Bg repeatedly < 90 or > 180.   -- Close adherence to  lifestyle changes recommended.   -- Periodic follow ups for eye evaluations, foot care and dental care suggested.      Hyperthyroidism   Due to negative antibody Graves   Continue methimazole 5 mg daily   Check TSH fT4    Consider increase to 7.5 mg daily depending on her level as she is having symptoms versus addition of metoprolol    US unremarkable    NM scan in 2022 showed no nodules   Check bone density     Hypertriglyceridemia  - Continue atorvastatin 20 mg at night  - Encouraged to avoid fried fatty foods             - Encouraged exercise     ESRD on dialysis  - Caution with longer insulin half-life and greater potential for hypoglycemia  - Patient receives hemodialysis    BMI 29.0-29.9,adult  - Discussed appropriate diet and exercise    Hyperparathyroidism  Managed by nephrology    Follow up in about 4 months (around 2/4/2024).  Labs today

## 2023-10-03 NOTE — ASSESSMENT & PLAN NOTE
- Continue atorvastatin 20 mg at night  - Encouraged to avoid fried fatty foods             - Encouraged exercise

## 2023-10-03 NOTE — ASSESSMENT & PLAN NOTE
-- Reviewed goals of therapy are to get the best control we can without hypoglycemia    Medication Changes   Continue linagliptin 5 daily  Decrease Lantus 30 units at night   Restart Humalog/lispro 4 units three times daily 5 minutes before meals     -- Reviewed patient's current insulin regimen. Clarified proper insulin dose and timing in relation to meals, etc. Insulin injection sites and proper rotation instructed.    -- Advised frequent self blood glucose monitoring.  Patient encouraged to document glucose results and bring them to every clinic visit    -- Hypoglycemia precautions discussed. Instructed on precautions before driving.    -- Call for Bg repeatedly < 90 or > 180.   -- Close adherence to lifestyle changes recommended.   -- Periodic follow ups for eye evaluations, foot care and dental care suggested.

## 2023-10-04 ENCOUNTER — LAB VISIT (OUTPATIENT)
Dept: LAB | Facility: HOSPITAL | Age: 58
End: 2023-10-04
Payer: MEDICARE

## 2023-10-04 ENCOUNTER — TELEPHONE (OUTPATIENT)
Dept: ENDOCRINOLOGY | Facility: CLINIC | Age: 58
End: 2023-10-04

## 2023-10-04 ENCOUNTER — OFFICE VISIT (OUTPATIENT)
Dept: ENDOCRINOLOGY | Facility: CLINIC | Age: 58
End: 2023-10-04
Payer: MEDICARE

## 2023-10-04 VITALS
DIASTOLIC BLOOD PRESSURE: 76 MMHG | HEIGHT: 63 IN | OXYGEN SATURATION: 99 % | SYSTOLIC BLOOD PRESSURE: 136 MMHG | HEART RATE: 71 BPM | WEIGHT: 165.88 LBS | BODY MASS INDEX: 29.39 KG/M2

## 2023-10-04 DIAGNOSIS — N18.5 DIABETES MELLITUS DUE TO UNDERLYING CONDITION WITH STAGE 5 CHRONIC KIDNEY DISEASE: Chronic | ICD-10-CM

## 2023-10-04 DIAGNOSIS — N18.5 DIABETES MELLITUS DUE TO UNDERLYING CONDITION WITH STAGE 5 CHRONIC KIDNEY DISEASE: Primary | ICD-10-CM

## 2023-10-04 DIAGNOSIS — E05.90 HYPERTHYROIDISM: ICD-10-CM

## 2023-10-04 DIAGNOSIS — N18.5 DIABETES MELLITUS DUE TO UNDERLYING CONDITION WITH STAGE 5 CHRONIC KIDNEY DISEASE: Primary | Chronic | ICD-10-CM

## 2023-10-04 DIAGNOSIS — E21.3 HYPERPARATHYROIDISM: ICD-10-CM

## 2023-10-04 DIAGNOSIS — N18.6 ESRD ON DIALYSIS: ICD-10-CM

## 2023-10-04 DIAGNOSIS — E08.22 DIABETES MELLITUS DUE TO UNDERLYING CONDITION WITH STAGE 5 CHRONIC KIDNEY DISEASE: Primary | ICD-10-CM

## 2023-10-04 DIAGNOSIS — E78.1 HYPERTRIGLYCERIDEMIA: ICD-10-CM

## 2023-10-04 DIAGNOSIS — E08.22 DIABETES MELLITUS DUE TO UNDERLYING CONDITION WITH STAGE 5 CHRONIC KIDNEY DISEASE: Primary | Chronic | ICD-10-CM

## 2023-10-04 DIAGNOSIS — R00.2 PALPITATIONS: ICD-10-CM

## 2023-10-04 DIAGNOSIS — Z13.820 OSTEOPOROSIS SCREENING: ICD-10-CM

## 2023-10-04 DIAGNOSIS — Z78.0 POSTMENOPAUSAL: ICD-10-CM

## 2023-10-04 DIAGNOSIS — Z99.2 ESRD ON DIALYSIS: ICD-10-CM

## 2023-10-04 DIAGNOSIS — E08.22 DIABETES MELLITUS DUE TO UNDERLYING CONDITION WITH STAGE 5 CHRONIC KIDNEY DISEASE: Chronic | ICD-10-CM

## 2023-10-04 LAB
ALBUMIN SERPL BCP-MCNC: 3.8 G/DL (ref 3.5–5.2)
ALP SERPL-CCNC: 74 U/L (ref 55–135)
ALT SERPL W/O P-5'-P-CCNC: 14 U/L (ref 10–44)
ANION GAP SERPL CALC-SCNC: 15 MMOL/L (ref 8–16)
AST SERPL-CCNC: 17 U/L (ref 10–40)
BILIRUB SERPL-MCNC: 1.3 MG/DL (ref 0.1–1)
BUN SERPL-MCNC: 37 MG/DL (ref 6–20)
CALCIUM SERPL-MCNC: 8.8 MG/DL (ref 8.7–10.5)
CHLORIDE SERPL-SCNC: 99 MMOL/L (ref 95–110)
CO2 SERPL-SCNC: 21 MMOL/L (ref 23–29)
CREAT SERPL-MCNC: 9.1 MG/DL (ref 0.5–1.4)
EST. GFR  (NO RACE VARIABLE): 4.7 ML/MIN/1.73 M^2
ESTIMATED AVG GLUCOSE: 131 MG/DL (ref 68–131)
GLUCOSE SERPL-MCNC: 184 MG/DL (ref 70–110)
HBA1C MFR BLD: 6.2 % (ref 4–5.6)
POTASSIUM SERPL-SCNC: 4.7 MMOL/L (ref 3.5–5.1)
PROT SERPL-MCNC: 8.8 G/DL (ref 6–8.4)
SODIUM SERPL-SCNC: 135 MMOL/L (ref 136–145)
T4 FREE SERPL-MCNC: 0.94 NG/DL (ref 0.71–1.51)
TSH SERPL DL<=0.005 MIU/L-ACNC: 1.15 UIU/ML (ref 0.4–4)

## 2023-10-04 PROCEDURE — 84439 ASSAY OF FREE THYROXINE: CPT | Mod: TXP | Performed by: NURSE PRACTITIONER

## 2023-10-04 PROCEDURE — 3044F HG A1C LEVEL LT 7.0%: CPT | Mod: CPTII,NTX,S$GLB, | Performed by: NURSE PRACTITIONER

## 2023-10-04 PROCEDURE — 99999 PR PBB SHADOW E&M-EST. PATIENT-LVL V: CPT | Mod: PBBFAC,TXP,, | Performed by: NURSE PRACTITIONER

## 2023-10-04 PROCEDURE — 3078F PR MOST RECENT DIASTOLIC BLOOD PRESSURE < 80 MM HG: ICD-10-PCS | Mod: CPTII,NTX,S$GLB, | Performed by: NURSE PRACTITIONER

## 2023-10-04 PROCEDURE — 83036 HEMOGLOBIN GLYCOSYLATED A1C: CPT | Mod: TXP | Performed by: NURSE PRACTITIONER

## 2023-10-04 PROCEDURE — 3008F BODY MASS INDEX DOCD: CPT | Mod: CPTII,NTX,S$GLB, | Performed by: NURSE PRACTITIONER

## 2023-10-04 PROCEDURE — 3008F PR BODY MASS INDEX (BMI) DOCUMENTED: ICD-10-PCS | Mod: CPTII,NTX,S$GLB, | Performed by: NURSE PRACTITIONER

## 2023-10-04 PROCEDURE — 99214 PR OFFICE/OUTPT VISIT, EST, LEVL IV, 30-39 MIN: ICD-10-PCS | Mod: NTX,S$GLB,, | Performed by: NURSE PRACTITIONER

## 2023-10-04 PROCEDURE — 1159F MED LIST DOCD IN RCRD: CPT | Mod: CPTII,NTX,S$GLB, | Performed by: NURSE PRACTITIONER

## 2023-10-04 PROCEDURE — 36415 COLL VENOUS BLD VENIPUNCTURE: CPT | Mod: NTX | Performed by: NURSE PRACTITIONER

## 2023-10-04 PROCEDURE — 80053 COMPREHEN METABOLIC PANEL: CPT | Mod: TXP | Performed by: NURSE PRACTITIONER

## 2023-10-04 PROCEDURE — 3078F DIAST BP <80 MM HG: CPT | Mod: CPTII,NTX,S$GLB, | Performed by: NURSE PRACTITIONER

## 2023-10-04 PROCEDURE — 99214 OFFICE O/P EST MOD 30 MIN: CPT | Mod: NTX,S$GLB,, | Performed by: NURSE PRACTITIONER

## 2023-10-04 PROCEDURE — 1159F PR MEDICATION LIST DOCUMENTED IN MEDICAL RECORD: ICD-10-PCS | Mod: CPTII,NTX,S$GLB, | Performed by: NURSE PRACTITIONER

## 2023-10-04 PROCEDURE — 99999 PR PBB SHADOW E&M-EST. PATIENT-LVL V: ICD-10-PCS | Mod: PBBFAC,TXP,, | Performed by: NURSE PRACTITIONER

## 2023-10-04 PROCEDURE — 84443 ASSAY THYROID STIM HORMONE: CPT | Mod: NTX | Performed by: NURSE PRACTITIONER

## 2023-10-04 PROCEDURE — 1160F RVW MEDS BY RX/DR IN RCRD: CPT | Mod: CPTII,NTX,S$GLB, | Performed by: NURSE PRACTITIONER

## 2023-10-04 PROCEDURE — 3044F PR MOST RECENT HEMOGLOBIN A1C LEVEL <7.0%: ICD-10-PCS | Mod: CPTII,NTX,S$GLB, | Performed by: NURSE PRACTITIONER

## 2023-10-04 PROCEDURE — 1160F PR REVIEW ALL MEDS BY PRESCRIBER/CLIN PHARMACIST DOCUMENTED: ICD-10-PCS | Mod: CPTII,NTX,S$GLB, | Performed by: NURSE PRACTITIONER

## 2023-10-04 PROCEDURE — 3075F SYST BP GE 130 - 139MM HG: CPT | Mod: CPTII,NTX,S$GLB, | Performed by: NURSE PRACTITIONER

## 2023-10-04 PROCEDURE — 3075F PR MOST RECENT SYSTOLIC BLOOD PRESS GE 130-139MM HG: ICD-10-PCS | Mod: CPTII,NTX,S$GLB, | Performed by: NURSE PRACTITIONER

## 2023-10-04 RX ORDER — METHIMAZOLE 5 MG/1
5 TABLET ORAL DAILY
Qty: 90 TABLET | Refills: 3 | Status: SHIPPED | OUTPATIENT
Start: 2023-10-04 | End: 2024-10-03

## 2023-10-04 RX ORDER — INSULIN GLARGINE 100 [IU]/ML
30 INJECTION, SOLUTION SUBCUTANEOUS NIGHTLY
Qty: 9 ML | Refills: 11 | Status: SHIPPED | OUTPATIENT
Start: 2023-10-04 | End: 2024-10-03

## 2023-10-04 NOTE — PROGRESS NOTES
Please call patient and let her know I reviewed her lab results.  - Her sodium was slightly low =we will watch this   - Blood sugar was elevated at 184- she needs meal time insulin as discussed at her visit   - Her kidney function was low- expected as she is on dialysis  - Liver function was normal   - Thyroid level at goal on methimazole 5 mg daily -she does not need more methimazole. Please ask her if she ran out of propanolol or did her kidney doctor stop it. Let me know.

## 2023-10-04 NOTE — PATIENT INSTRUCTIONS
Hyperthyroidism    Due to negative antibody Graves   Continue methimazole 5 mg daily   Check TSH fT4    Consider increase to 7.5 mg daily depending on her level as she is having symptoms versus addition of metoprolol    US unremarkable    NM scan in 2022 showed no nodules   Check bone density     Diabetes   A1c falsely low due to CKD. Blood sugar is below goal in AM when she wakes up. Will decrease long acting insulin. Discussed she needs humalog to help blood sugars after meals and avoid blood sugars in the 200s  Discussed she should be checking blood sugar before meals  Discussed there is no way for you to know if you need humalog unless you are check blood sugar    Medication Changes   Continue linagliptin 5 daily  Decrease Lantus 30 units at night   Restart Humalog/lispro 4 units three times daily 5 minutes before meals     Goal blood sugar is  fasting   Goal blood sugar 1 hour after meal is less than 180  Goal blood sugar 2 hour after meal is less than 140    Hypoglycemia - Low Blood Sugar    What can you do?  Rule of 15:   Test your blood sugar  If glucose is between 50-70 mg/dL then ingest 15 grams of fast-acting carbs  If glucose is less than 50 mg/dL then ingest 30 grams of fast-acting carbs  Ingest 15 grams of fast-acting carbohydrate - such as:   3-4 glucose tablets  4 oz juice  ½ can regular soda pop  15 skittles or mini jelly beans   Re-check your blood sugar in 15 minutes. If its less than 70mg/dl, repeat steps 2 and 3.  If your next meal is more than 1 hour away, eat an additional 15 grams of carbohydrate and 1 ounce of protein (examples include crackers with cheese or one-half of a sandwich with peanut butter). It is important not to eat too much because this can raise your blood sugar above the target level.      After your blood sugar has normalized, think about why you went low. If you notice a pattern of low blood sugars, contact your Diabetes Team. We may need to adjust your  medication.

## 2023-10-04 NOTE — TELEPHONE ENCOUNTER
Reached out to the patient about her medication that the provider called in.    We will restart the metoprolol. I will call in now. Please let her know it should help with the palpitations. SHE DOES NOT NEED MORE METHIMAZOLE. Please continue methimazole 5 mg daily.           Ask her if she is taking labetolol

## 2023-10-04 NOTE — PROGRESS NOTES
MA called patient with results  Please call patient and let her know I reviewed her lab results.  - Her sodium was slightly low =we will watch this   - Blood sugar was elevated at 184- she needs meal time insulin as discussed at her visit   - Her kidney function was low- expected as she is on dialysis  - Liver function was normal   - Thyroid level at goal on methimazole 5 mg daily -she does not need more methimazole. Please ask her if she ran out of metoprolol or did her kidney doctor stop it. Let me know.    Patient stated shes out of it. She also said she doesn't know what its for.

## 2023-10-05 ENCOUNTER — TELEPHONE (OUTPATIENT)
Dept: ENDOCRINOLOGY | Facility: CLINIC | Age: 58
End: 2023-10-05
Payer: MEDICARE

## 2023-10-10 ENCOUNTER — HOSPITAL ENCOUNTER (OUTPATIENT)
Dept: RADIOLOGY | Facility: CLINIC | Age: 58
Discharge: HOME | End: 2023-10-10
Attending: NURSE PRACTITIONER
Payer: MEDICARE

## 2023-10-10 DIAGNOSIS — Z78.0 POSTMENOPAUSAL: ICD-10-CM

## 2023-10-10 PROCEDURE — 77080 DXA BONE DENSITY AXIAL: CPT | Mod: 26,TXP,, | Performed by: INTERNAL MEDICINE

## 2023-10-10 PROCEDURE — 77080 DXA BONE DENSITY AXIAL SKELETON 1 OR MORE SITES: ICD-10-PCS | Mod: 26,TXP,, | Performed by: INTERNAL MEDICINE

## 2023-10-10 PROCEDURE — 77080 DXA BONE DENSITY AXIAL: CPT | Mod: TC,TXP

## 2023-10-12 ENCOUNTER — TELEPHONE (OUTPATIENT)
Dept: ENDOCRINOLOGY | Facility: CLINIC | Age: 58
End: 2023-10-12
Payer: MEDICARE

## 2023-10-12 DIAGNOSIS — Z78.0 POSTMENOPAUSAL: Primary | ICD-10-CM

## 2023-10-12 DIAGNOSIS — Z13.820 OSTEOPOROSIS SCREENING: ICD-10-CM

## 2023-10-20 ENCOUNTER — HOSPITAL ENCOUNTER (OUTPATIENT)
Dept: RADIOLOGY | Facility: HOSPITAL | Age: 58
Discharge: HOME | End: 2023-10-20
Attending: NURSE PRACTITIONER
Payer: MEDICARE

## 2023-10-20 DIAGNOSIS — Z13.820 OSTEOPOROSIS SCREENING: ICD-10-CM

## 2023-10-20 PROCEDURE — 72100 X-RAY EXAM L-S SPINE 2/3 VWS: CPT | Mod: 26,TXP,, | Performed by: INTERNAL MEDICINE

## 2023-10-20 PROCEDURE — 72100 XR LUMBAR SPINE 2 OR 3 VIEWS: ICD-10-PCS | Mod: 26,TXP,, | Performed by: INTERNAL MEDICINE

## 2023-10-20 PROCEDURE — 72100 X-RAY EXAM L-S SPINE 2/3 VWS: CPT | Mod: TC,TXP

## 2023-10-26 ENCOUNTER — TELEPHONE (OUTPATIENT)
Dept: TRANSPLANT | Facility: CLINIC | Age: 58
End: 2023-10-26
Payer: MEDICARE

## 2023-10-26 NOTE — TELEPHONE ENCOUNTER
Patient states that when she was here in June she was grieving the loss of her sister. The SW recommended mental health services. Today, the patient states she is doing much better and does not feel like she needs mental health services. She has a very supportive daughter and is doing well. She has completed her Endocrinology evaluation and has a mammogram scheduled for December 19, 2023 at [facility] ([facility] Will send message to Transplant Nephrology to assess for reactivation.

## 2023-10-27 ENCOUNTER — COMMITTEE REVIEW (OUTPATIENT)
Dept: TRANSPLANT | Facility: CLINIC | Age: 58
End: 2023-10-27
Payer: MEDICARE

## 2023-10-27 DIAGNOSIS — Z76.82 ORGAN TRANSPLANT CANDIDATE: ICD-10-CM

## 2023-10-27 DIAGNOSIS — R91.1 SOLITARY PULMONARY NODULE: Primary | ICD-10-CM

## 2023-10-27 NOTE — COMMITTEE REVIEW
Native Organ Dx: Diabetes Mellitus - Type II    Discussion Only with Committee: 10/27/23    Unable to determine transplant candidacy at this time due to need for repeat chest CT to re-evaluate pulmonary nodules. Once CT reviewed by physician and deemed acceptable, patient can be reactivated on the kidney wait list.       Note written by Tana Paul RN    ===============================================    I was present at the meeting and attest to the decision of the committee.    Froylan Bowie  10/27/2023

## 2023-10-31 ENCOUNTER — TELEPHONE (OUTPATIENT)
Dept: TRANSPLANT | Facility: CLINIC | Age: 58
End: 2023-10-31
Payer: MEDICARE

## 2023-10-31 NOTE — TELEPHONE ENCOUNTER
I have attempted without success to contact this patient by phone to schedule appt for CT scan. I could not leave message due to mailbox being full.

## 2023-10-31 NOTE — TELEPHONE ENCOUNTER
----- Message from Divya Martinez RN sent at 10/30/2023  1:24 PM CDT -----  Regarding: FW: Patient advice  Contact: Pt [phone]    ----- Message -----  From: Judie Jensen  Sent: 10/30/2023  11:35 AM CDT  To: Kidney Waitlisted Coordinator  Subject: Patient advice                                               Name of Caller:  Kiesha     Contact Preference: [phone]    Nature of Call: Patient requesting a call back would like to get list status wants to make sure she competed all test needed

## 2023-11-13 ENCOUNTER — TELEPHONE (OUTPATIENT)
Dept: TRANSPLANT | Facility: CLINIC | Age: 58
End: 2023-11-13
Payer: MEDICARE

## 2023-12-12 ENCOUNTER — HOSPITAL ENCOUNTER (OUTPATIENT)
Dept: RADIOLOGY | Facility: HOSPITAL | Age: 58
Discharge: HOME | End: 2023-12-12
Attending: NURSE PRACTITIONER
Payer: MEDICARE

## 2023-12-12 DIAGNOSIS — R91.1 SOLITARY PULMONARY NODULE: ICD-10-CM

## 2023-12-12 DIAGNOSIS — Z76.82 ORGAN TRANSPLANT CANDIDATE: ICD-10-CM

## 2023-12-12 PROCEDURE — 71250 CT THORAX DX C-: CPT | Mod: TC,PO,TXP

## 2023-12-12 PROCEDURE — 71250 CT CHEST WITHOUT CONTRAST: ICD-10-PCS | Mod: 26,TXP,, | Performed by: RADIOLOGY

## 2023-12-12 PROCEDURE — 71250 CT THORAX DX C-: CPT | Mod: 26,TXP,, | Performed by: RADIOLOGY

## 2023-12-14 DIAGNOSIS — Z76.82 ORGAN TRANSPLANT CANDIDATE: Primary | ICD-10-CM

## 2023-12-15 ENCOUNTER — TELEPHONE (OUTPATIENT)
Dept: TRANSPLANT | Facility: CLINIC | Age: 58
End: 2023-12-15
Payer: MEDICARE

## 2023-12-15 DIAGNOSIS — Z76.82 ORGAN TRANSPLANT CANDIDATE: ICD-10-CM

## 2023-12-15 DIAGNOSIS — R91.8 PULMONARY MASS: Primary | ICD-10-CM

## 2023-12-15 NOTE — TELEPHONE ENCOUNTER
I have attempted without success to contact this patient by phone to schedule Pulmonary consult visit. I could not leave message due to voicemail being full.

## 2023-12-18 ENCOUNTER — TELEPHONE (OUTPATIENT)
Dept: TRANSPLANT | Facility: CLINIC | Age: 58
End: 2023-12-18
Payer: MEDICARE

## 2024-02-08 ENCOUNTER — OFFICE VISIT (OUTPATIENT)
Dept: PULMONOLOGY | Facility: CLINIC | Age: 59
End: 2024-02-08
Payer: MEDICARE

## 2024-02-08 ENCOUNTER — TELEPHONE (OUTPATIENT)
Dept: ENDOCRINOLOGY | Facility: CLINIC | Age: 59
End: 2024-02-08
Payer: MEDICARE

## 2024-02-08 VITALS
HEART RATE: 86 BPM | DIASTOLIC BLOOD PRESSURE: 62 MMHG | OXYGEN SATURATION: 96 % | BODY MASS INDEX: 29.38 KG/M2 | HEIGHT: 63 IN | SYSTOLIC BLOOD PRESSURE: 120 MMHG | WEIGHT: 165.81 LBS

## 2024-02-08 DIAGNOSIS — Z76.82 ORGAN TRANSPLANT CANDIDATE: ICD-10-CM

## 2024-02-08 DIAGNOSIS — R91.8 PULMONARY MASS: Primary | ICD-10-CM

## 2024-02-08 DIAGNOSIS — R91.1 SOLITARY PULMONARY NODULE: ICD-10-CM

## 2024-02-08 PROCEDURE — 99999 PR PBB SHADOW E&M-EST. PATIENT-LVL V: CPT | Mod: PBBFAC,TXP,, | Performed by: INTERNAL MEDICINE

## 2024-02-08 PROCEDURE — 99214 OFFICE O/P EST MOD 30 MIN: CPT | Mod: S$GLB,TXP,, | Performed by: INTERNAL MEDICINE

## 2024-02-08 PROCEDURE — 4010F ACE/ARB THERAPY RXD/TAKEN: CPT | Mod: CPTII,S$GLB,TXP, | Performed by: INTERNAL MEDICINE

## 2024-02-08 PROCEDURE — 1159F MED LIST DOCD IN RCRD: CPT | Mod: CPTII,S$GLB,TXP, | Performed by: INTERNAL MEDICINE

## 2024-02-08 PROCEDURE — 3074F SYST BP LT 130 MM HG: CPT | Mod: CPTII,S$GLB,TXP, | Performed by: INTERNAL MEDICINE

## 2024-02-08 PROCEDURE — 3078F DIAST BP <80 MM HG: CPT | Mod: CPTII,S$GLB,TXP, | Performed by: INTERNAL MEDICINE

## 2024-02-08 PROCEDURE — 3008F BODY MASS INDEX DOCD: CPT | Mod: CPTII,S$GLB,TXP, | Performed by: INTERNAL MEDICINE

## 2024-02-08 PROCEDURE — 1160F RVW MEDS BY RX/DR IN RCRD: CPT | Mod: CPTII,S$GLB,TXP, | Performed by: INTERNAL MEDICINE

## 2024-02-08 RX ORDER — METOPROLOL TARTRATE 50 MG/1
50 TABLET ORAL 2 TIMES DAILY
COMMUNITY

## 2024-02-08 RX ORDER — TOPIRAMATE 25 MG/1
25 TABLET ORAL 2 TIMES DAILY PRN
COMMUNITY

## 2024-02-08 RX ORDER — OXYCODONE AND ACETAMINOPHEN 5; 325 MG/1; MG/1
TABLET ORAL
COMMUNITY

## 2024-02-08 RX ORDER — PANTOPRAZOLE SODIUM 40 MG/1
40 TABLET, DELAYED RELEASE ORAL EVERY MORNING
COMMUNITY

## 2024-02-08 RX ORDER — DEXTROMETHORPHAN HYDROBROMIDE, GUAIFENESIN 5; 100 MG/5ML; MG/5ML
LIQUID ORAL
COMMUNITY

## 2024-02-08 RX ORDER — FLUOROMETHOLONE 1 MG/ML
SUSPENSION/ DROPS OPHTHALMIC
COMMUNITY

## 2024-02-08 RX ORDER — LISINOPRIL 20 MG/1
TABLET ORAL
COMMUNITY

## 2024-02-08 NOTE — PROGRESS NOTES
"Subjective:       Patient ID: Kiesha Rocha is a 58 y.o. female.    Chief Complaint:Left infrahilar mass.    Previously seen by Sandra Copeland and Farzana, she is new to me.    57 yo lifetime nonsmoker with ESRD, on Kidney tx list.  DM, HTN, Chronic FULLER with normal PFTs.  Patient has had a left infrahilar mass for years but meets size criteria for bx.  Not vascular as outside CTA confrims it is a mass.  Patient has HD MWF.  No history of sarcoid, but does have keloids.      No prior h/o malignancy Lives in [place].  Daughter provides transportaation.    Review of Systems    Objective:      Vitals:    02/08/24 1136   BP: 120/62   BP Location: Left arm   Patient Position: Sitting   BP Method: Medium (Manual)   Pulse: 86   SpO2: 96%   Weight: 75.2 kg (165 lb 12.6 oz)   Height: 5' 3" (1.6 m)      Physical Exam   Constitutional: She is oriented to person, place, and time. She appears well-developed and well-nourished.   HENT:   Head: Normocephalic.   Cardiovascular: Normal rate and regular rhythm.   Pulmonary/Chest: She has no wheezes. She has no rales.   Musculoskeletal:         General: No edema. Normal range of motion.      Cervical back: Normal range of motion and neck supple.   Neurological: She is alert and oriented to person, place, and time.   Psychiatric: She has a normal mood and affect.   Personal Diagnostic Review:    Normal walk:    09/28/2022---------Distance: 396.24 meters (1300 feet)       O2 Sat % Supplemental Oxygen Heart Rate Blood Pressure Ghislaine Scale   Pre-exercise  (Resting) 99 % Room Air 78 bpm 177/77 mmHg 0   During Exercise 97 % Room Air 118 bpm 186/81 mmHg 3   Post-exercise  (Recovery) 99 % Room Air  90 bpm            Review of images and reports.  She has had a CT[facility] 11/[facility] at [facility] which describes a LLL infrahilar mass.  Essentially seems unchanged.  PET has been negative.          Latest CT of chest 12/2023 with persistent infrahilar mass          2/8/2024    11:36 AM 10/4/2023    10:02 AM " "6/16/2023     1:06 PM 6/16/2023    10:57 AM 9/28/2022     9:59 AM 9/28/2022     8:47 AM 9/15/2022    10:35 AM   Pulmonary Function Tests   SpO2 96 % 99 % 98 %   96 %    Ordering Provider     COLTEN Turner     Performing nurse/tech/RT     Sidney RRT     Diagnosis     Shortness of Breath     Height 5' 3" (1.6 m) 5' 3" (1.6 m) 5' 3.31" (1.608 m) 5' 3" (1.6 m) 5' 3" (1.6 m) 5' 5" (1.651 m) 5' 5" (1.651 m)   Weight 75.2 kg (165 lb 12.6 oz) 75.3 kg (165 lb 14.3 oz) 77.1 kg (169 lb 15.6 oz) 79.8 kg (176 lb) 80 kg (176 lb 5.9 oz) 80 kg (176 lb 5.9 oz) 80.3 kg (177 lb)   BMI (Calculated) 29.4 29.4 29.8 31.2 31.3 29.3 29.5   Patient Race          6MWT Status     completed without stopping     Patient Reported     Dyspnea;Leg pain     Was O2 used?     No     6MW Distance walked (feet)     1300 feet     Distance walked (meters)     396.24 meters     Did patient stop?     No     Type of assistive device(s) used?     no assistive devices     Oxygen Saturation     99 %     Supplemental Oxygen     Room Air     Heart Rate     78 bpm     Blood Pressure     177/77     Ghislaine Dyspnea Rating      nothing at all     Oxygen Saturation     97 %     Supplemental Oxygen     Room Air     Heart Rate     118 bpm     Blood Pressure     186/81     Ghislaine Dyspnea Rating      moderate     Recovery Time (seconds)     126 seconds     Oxygen Saturation     99 %     Supplemental Oxygen     Room Air     Heart Rate     90 bpm     Is procedure ready for interpretation?     Yes     Oxygen Qualification?     No           Assessment:       1. Pulmonary mass    2. Organ transplant candidate        Outpatient Encounter Medications as of 2/8/2024   Medication Sig Dispense Refill    acetaminophen (TYLENOL ARTHRITIS PAIN) 650 MG TbSR Take 2 tablets every 8 hours by oral route.      albuterol (PROVENTIL/VENTOLIN HFA) 90 mcg/actuation inhaler Inhale 2 puffs into the lungs every 6 (six) hours as needed.      amLODIPine (NORVASC) 10 MG tablet Take 10 mg by " "mouth once daily.      atorvastatin (LIPITOR) 20 MG tablet Take 1 tablet (20 mg total) by mouth once daily. 90 tablet 3    calcium acetate (PHOSLO) 667 mg capsule Take 2 capsules by mouth 3 (three) times daily with meals. Take 1 capsule with snacks  3    cholecalciferol, vitamin D3, 1,250 mcg (50,000 unit) capsule cholecalciferol (vitamin D3) 1,250 mcg (50,000 unit) capsule   TAKE 1 CAPSULE BY MOUTH EVERY WEEK      cyclobenzaprine (FLEXERIL) 10 MG tablet Take 10 mg by mouth 2 (two) times daily as needed.      cyproheptadine (PERIACTIN) 4 mg tablet cyproheptadine 4 mg tablet   TAKE 1 TABLET BY MOUTH THREE TIMES DAILY BEFORE MEALS      elderberry fruit and flower 460-115 mg Cap elderberry fruit 460 mg-elderberry flower 115 mg capsule   Take 1 capsule by oral route.      fluorometholone 0.1% (FML) 0.1 % DrpS INSTILL ONE DROP IN EACH EYE FOUR TIMES DAILY      FLUoxetine 20 MG capsule Take 40 mg by mouth once daily.      fluticasone propionate (FLONASE) 50 mcg/actuation nasal spray 1 spray by Each Nostril route daily as needed.      furosemide (LASIX) 40 MG tablet TAKE 1 TABLET BY MOUTH DAILY      gabapentin (NEURONTIN) 600 MG tablet Take 600 mg by mouth every evening.       garlic 1,000 mg Cap 1,000 mg.      glucagon (GLUCAGON EMERGENCY KIT, HUMAN,) 1 mg SolR glucagon emergency kit 1 mg kit      hydrALAZINE (APRESOLINE) 50 MG tablet Take 50 mg by mouth 3 (three) times daily.      insulin (LANTUS SOLOSTAR U-100 INSULIN) glargine 100 units/mL SubQ pen Inject 30 Units into the skin every evening. 9 mL 11    insulin lispro 100 unit/mL pen INJECT 4 UNITS INTO THE SKIN DAILY *      insulin syringe-needle U-100 1 mL 31 gauge x 5/16 Syrg UltiCare 1 mL 31 gauge x 5/16" syringe      INV INSULIN GLARGINE, LANTUS, 100U/ML SOLN FOR INJ SOLOSTAR lantus solostar 100 unit/ml sopn      ketoconazole (NIZORAL) 2 % cream AAA bid 60 g 3    labetaloL (NORMODYNE) 100 MG tablet Take 1 tablet by mouth once daily.      lisinopriL " "(PRINIVIL,ZESTRIL) 20 MG tablet Take 1 tablet (20 mg total) by mouth daily      loratadine (CLARITIN) 10 mg tablet Take 1 tablet by mouth once daily.      methIMAzole (TAPAZOLE) 5 MG Tab Take 1 tablet (5 mg total) by mouth once daily. 90 tablet 3    metoprolol tartrate (LOPRESSOR) 50 MG tablet Take 50 mg by mouth 2 (two) times daily.      multivitamin with minerals tablet Take 1 tablet by mouth once daily.      olopatadine (PATADAY) 0.2 % Drop place 1 DROP IN EACH EYE EVERY DAY  5    ondansetron (ZOFRAN) 4 MG tablet Take 4 mg by mouth every 8 (eight) hours as needed.  2    oxyCODONE-acetaminophen (PERCOCET) 5-325 mg per tablet TAKE 1 TABLET BY MOUTH EVERY 4 HOURS AS NEEDED FOR PAIN for up to 12 doses      pantoprazole (PROTONIX) 40 MG tablet Take 40 mg by mouth every morning.      pen needle, diabetic 31 gauge x 5/16" Ndle BD Ultra-Fine Short Pen Needle 31 gauge x 5/16"      pen needle, diabetic 31 gauge x 5/16" Ndle UltiCare Pen Needle 31 gauge x 5/16"      topiramate (TOPAMAX) 25 MG tablet Take 25 mg by mouth 2 (two) times daily as needed.      linaGLIPtin (TRADJENTA) 5 mg Tab tablet Take 1 tablet (5 mg total) by mouth once daily. 90 tablet 3     Facility-Administered Encounter Medications as of 2/8/2024   Medication Dose Route Frequency Provider Last Rate Last Admin    0.9%  NaCl infusion  20 mL/hr Intravenous Continuous Shashi Krishnan  mL/hr at 06/12/19 1205 Rate Change at 06/12/19 1205    mupirocin 2 % ointment   Nasal On Call Procedure Bautista Vasques MD   Given at 06/12/19 0947     No orders of the defined types were placed in this encounter.    Plan:     Problem List Items Addressed This Visit       Organ transplant candidate    Overview     Moderate restriction with severely reduced DLCO on PFTs.  6 MWT ordered.  Enlarging left lower lobe nodule now > 3 cm at its widest dimension.  Patient will need to complete 6 MWT and PET-CT prior to clearance for transplant and subsequent " immunosuppression          Other Visit Diagnoses       Pulmonary mass            Will perform diagnostic robotic bronchoscopy with EBUS per Dr Harding.  Will need repeat CT of chest for navigation planning.    I have explained the risks, benefits and alternatives of the procedure in detail.  The patient voices understanding and all questions have been answered.  The patient agrees to proceed as planned.

## 2024-02-08 NOTE — TELEPHONE ENCOUNTER
Patient had an appointment on 2/8/2024 patient was an hour late was told she had to reschedule. I reached out to her she was unavailable.She need to get rescheduled..

## 2024-02-08 NOTE — H&P (VIEW-ONLY)
"Subjective:       Patient ID: Kiesha Rocha is a 58 y.o. female.    Chief Complaint:Left infrahilar mass.    Previously seen by Sandra Copeland and Farzana, she is new to me.    57 yo lifetime nonsmoker with ESRD, on Kidney tx list.  DM, HTN, Chronic FULLER with normal PFTs.  Patient has had a left infrahilar mass for years but meets size criteria for bx.  Not vascular as outside CTA confrims it is a mass.  Patient has HD MWF.  No history of sarcoid, but does have keloids.      No prior h/o malignancy Lives in [place].  Daughter provides transportaation.    Review of Systems    Objective:      Vitals:    02/08/24 1136   BP: 120/62   BP Location: Left arm   Patient Position: Sitting   BP Method: Medium (Manual)   Pulse: 86   SpO2: 96%   Weight: 75.2 kg (165 lb 12.6 oz)   Height: 5' 3" (1.6 m)      Physical Exam   Constitutional: She is oriented to person, place, and time. She appears well-developed and well-nourished.   HENT:   Head: Normocephalic.   Cardiovascular: Normal rate and regular rhythm.   Pulmonary/Chest: She has no wheezes. She has no rales.   Musculoskeletal:         General: No edema. Normal range of motion.      Cervical back: Normal range of motion and neck supple.   Neurological: She is alert and oriented to person, place, and time.   Psychiatric: She has a normal mood and affect.   Personal Diagnostic Review:    Normal walk:    09/28/2022---------Distance: 396.24 meters (1300 feet)       O2 Sat % Supplemental Oxygen Heart Rate Blood Pressure Ghislaine Scale   Pre-exercise  (Resting) 99 % Room Air 78 bpm 177/77 mmHg 0   During Exercise 97 % Room Air 118 bpm 186/81 mmHg 3   Post-exercise  (Recovery) 99 % Room Air  90 bpm            Review of images and reports.  She has had a CTA 11/2022 at [facility] which describes a LLL infrahilar mass.  Essentially seems unchanged.  PET has been negative.          Latest CT of chest 12/2023 with persistent infrahilar mass          2/8/2024    11:36 AM 10/4/2023    10:02 AM " "6/16/2023     1:06 PM 6/16/2023    10:57 AM 9/28/2022     9:59 AM 9/28/2022     8:47 AM 9/15/2022    10:35 AM   Pulmonary Function Tests   SpO2 96 % 99 % 98 %   96 %    Ordering Provider     COLTEN Turner     Performing nurse/tech/RT     Sidney RRT     Diagnosis     Shortness of Breath     Height 5' 3" (1.6 m) 5' 3" (1.6 m) 5' 3.31" (1.608 m) 5' 3" (1.6 m) 5' 3" (1.6 m) 5' 5" (1.651 m) 5' 5" (1.651 m)   Weight 75.2 kg (165 lb 12.6 oz) 75.3 kg (165 lb 14.3 oz) 77.1 kg (169 lb 15.6 oz) 79.8 kg (176 lb) 80 kg (176 lb 5.9 oz) 80 kg (176 lb 5.9 oz) 80.3 kg (177 lb)   BMI (Calculated) 29.4 29.4 29.8 31.2 31.3 29.3 29.5   Patient Race          6MWT Status     completed without stopping     Patient Reported     Dyspnea;Leg pain     Was O2 used?     No     6MW Distance walked (feet)     1300 feet     Distance walked (meters)     396.24 meters     Did patient stop?     No     Type of assistive device(s) used?     no assistive devices     Oxygen Saturation     99 %     Supplemental Oxygen     Room Air     Heart Rate     78 bpm     Blood Pressure     177/77     Ghislaine Dyspnea Rating      nothing at all     Oxygen Saturation     97 %     Supplemental Oxygen     Room Air     Heart Rate     118 bpm     Blood Pressure     186/81     Ghislaine Dyspnea Rating      moderate     Recovery Time (seconds)     126 seconds     Oxygen Saturation     99 %     Supplemental Oxygen     Room Air     Heart Rate     90 bpm     Is procedure ready for interpretation?     Yes     Oxygen Qualification?     No           Assessment:       1. Pulmonary mass    2. Organ transplant candidate        Outpatient Encounter Medications as of 2/8/2024   Medication Sig Dispense Refill    acetaminophen (TYLENOL ARTHRITIS PAIN) 650 MG TbSR Take 2 tablets every 8 hours by oral route.      albuterol (PROVENTIL/VENTOLIN HFA) 90 mcg/actuation inhaler Inhale 2 puffs into the lungs every 6 (six) hours as needed.      amLODIPine (NORVASC) 10 MG tablet Take 10 mg by " "mouth once daily.      atorvastatin (LIPITOR) 20 MG tablet Take 1 tablet (20 mg total) by mouth once daily. 90 tablet 3    calcium acetate (PHOSLO) 667 mg capsule Take 2 capsules by mouth 3 (three) times daily with meals. Take 1 capsule with snacks  3    cholecalciferol, vitamin D3, 1,250 mcg (50,000 unit) capsule cholecalciferol (vitamin D3) 1,250 mcg (50,000 unit) capsule   TAKE 1 CAPSULE BY MOUTH EVERY WEEK      cyclobenzaprine (FLEXERIL) 10 MG tablet Take 10 mg by mouth 2 (two) times daily as needed.      cyproheptadine (PERIACTIN) 4 mg tablet cyproheptadine 4 mg tablet   TAKE 1 TABLET BY MOUTH THREE TIMES DAILY BEFORE MEALS      elderberry fruit and flower 460-115 mg Cap elderberry fruit 460 mg-elderberry flower 115 mg capsule   Take 1 capsule by oral route.      fluorometholone 0.1% (FML) 0.1 % DrpS INSTILL ONE DROP IN EACH EYE FOUR TIMES DAILY      FLUoxetine 20 MG capsule Take 40 mg by mouth once daily.      fluticasone propionate (FLONASE) 50 mcg/actuation nasal spray 1 spray by Each Nostril route daily as needed.      furosemide (LASIX) 40 MG tablet TAKE 1 TABLET BY MOUTH DAILY      gabapentin (NEURONTIN) 600 MG tablet Take 600 mg by mouth every evening.       garlic 1,000 mg Cap 1,000 mg.      glucagon (GLUCAGON EMERGENCY KIT, HUMAN,) 1 mg SolR glucagon emergency kit 1 mg kit      hydrALAZINE (APRESOLINE) 50 MG tablet Take 50 mg by mouth 3 (three) times daily.      insulin (LANTUS SOLOSTAR U-100 INSULIN) glargine 100 units/mL SubQ pen Inject 30 Units into the skin every evening. 9 mL 11    insulin lispro 100 unit/mL pen INJECT 4 UNITS INTO THE SKIN DAILY *      insulin syringe-needle U-100 1 mL 31 gauge x 5/16 Syrg UltiCare 1 mL 31 gauge x 5/16" syringe      INV INSULIN GLARGINE, LANTUS, 100U/ML SOLN FOR INJ SOLOSTAR lantus solostar 100 unit/ml sopn      ketoconazole (NIZORAL) 2 % cream AAA bid 60 g 3    labetaloL (NORMODYNE) 100 MG tablet Take 1 tablet by mouth once daily.      lisinopriL " "(PRINIVIL,ZESTRIL) 20 MG tablet Take 1 tablet (20 mg total) by mouth daily      loratadine (CLARITIN) 10 mg tablet Take 1 tablet by mouth once daily.      methIMAzole (TAPAZOLE) 5 MG Tab Take 1 tablet (5 mg total) by mouth once daily. 90 tablet 3    metoprolol tartrate (LOPRESSOR) 50 MG tablet Take 50 mg by mouth 2 (two) times daily.      multivitamin with minerals tablet Take 1 tablet by mouth once daily.      olopatadine (PATADAY) 0.2 % Drop place 1 DROP IN EACH EYE EVERY DAY  5    ondansetron (ZOFRAN) 4 MG tablet Take 4 mg by mouth every 8 (eight) hours as needed.  2    oxyCODONE-acetaminophen (PERCOCET) 5-325 mg per tablet TAKE 1 TABLET BY MOUTH EVERY 4 HOURS AS NEEDED FOR PAIN for up to 12 doses      pantoprazole (PROTONIX) 40 MG tablet Take 40 mg by mouth every morning.      pen needle, diabetic 31 gauge x 5/16" Ndle BD Ultra-Fine Short Pen Needle 31 gauge x 5/16"      pen needle, diabetic 31 gauge x 5/16" Ndle UltiCare Pen Needle 31 gauge x 5/16"      topiramate (TOPAMAX) 25 MG tablet Take 25 mg by mouth 2 (two) times daily as needed.      linaGLIPtin (TRADJENTA) 5 mg Tab tablet Take 1 tablet (5 mg total) by mouth once daily. 90 tablet 3     Facility-Administered Encounter Medications as of 2/8/2024   Medication Dose Route Frequency Provider Last Rate Last Admin    0.9%  NaCl infusion  20 mL/hr Intravenous Continuous Shashi Krishnan  mL/hr at 06/12/19 1205 Rate Change at 06/12/19 1205    mupirocin 2 % ointment   Nasal On Call Procedure Bautista Vasques MD   Given at 06/12/19 0947     No orders of the defined types were placed in this encounter.    Plan:     Problem List Items Addressed This Visit       Organ transplant candidate    Overview     Moderate restriction with severely reduced DLCO on PFTs.  6 MWT ordered.  Enlarging left lower lobe nodule now > 3 cm at its widest dimension.  Patient will need to complete 6 MWT and PET-CT prior to clearance for transplant and subsequent " immunosuppression          Other Visit Diagnoses       Pulmonary mass            Will perform diagnostic robotic bronchoscopy with EBUS per Dr Harding.  Will need repeat CT of chest for navigation planning.    I have explained the risks, benefits and alternatives of the procedure in detail.  The patient voices understanding and all questions have been answered.  The patient agrees to proceed as planned.

## 2024-02-09 ENCOUNTER — TELEPHONE (OUTPATIENT)
Dept: PULMONOLOGY | Facility: CLINIC | Age: 59
End: 2024-02-09
Payer: MEDICARE

## 2024-02-09 ENCOUNTER — TELEPHONE (OUTPATIENT)
Dept: TRANSPLANT | Facility: CLINIC | Age: 59
End: 2024-02-09
Payer: MEDICARE

## 2024-02-09 NOTE — TELEPHONE ENCOUNTER
Phone call to patient to confirm appointment and instructions given in clinic for EBUS/Robotic procedure scheduled for 02/27/24.   Patient instructed to arrive to the 2nd floor [facility] check in desk at 0700  on 02/27/24 with designated .  NPO after midnight the night prior to scheduled procedure  .Patient states not currently taking any GLP1 or antiplatelet/anticoags.  Patient verbalizes understanding of all above instructions given.

## 2024-02-09 NOTE — TELEPHONE ENCOUNTER
----- Message from Mayuri Valdez MD sent at 2/9/2024 10:54 AM CST -----      Pulmonary evaluation is below. Please inactivate her till we get all these tests and clearance from pulmonary     Moderate restriction with severely reduced DLCO on PFTs.  6 MWT ordered.  Enlarging left lower lobe nodule now > 3 cm at its widest dimension.  Patient will need to complete 6 MWT and PET-CT prior to clearance for transplant and subsequent immunosuppression      ----- Message -----  From: Michelle Hatfield MD  Sent: 2/8/2024   3:52 PM CST  To: Mayuri Valdez MD

## 2024-02-14 ENCOUNTER — TELEPHONE (OUTPATIENT)
Dept: TRANSPLANT | Facility: CLINIC | Age: 59
End: 2024-02-14
Payer: MEDICARE

## 2024-02-20 ENCOUNTER — HOSPITAL ENCOUNTER (OUTPATIENT)
Dept: PULMONOLOGY | Facility: CLINIC | Age: 59
Discharge: HOME | End: 2024-02-20
Payer: MEDICARE

## 2024-02-20 ENCOUNTER — HOSPITAL ENCOUNTER (OUTPATIENT)
Dept: RADIOLOGY | Facility: HOSPITAL | Age: 59
Discharge: HOME | End: 2024-02-20
Attending: INTERNAL MEDICINE
Payer: MEDICARE

## 2024-02-20 VITALS — WEIGHT: 165.81 LBS | BODY MASS INDEX: 28.31 KG/M2 | HEIGHT: 64 IN

## 2024-02-20 DIAGNOSIS — R91.8 PULMONARY MASS: ICD-10-CM

## 2024-02-20 DIAGNOSIS — R91.1 SOLITARY PULMONARY NODULE: ICD-10-CM

## 2024-02-20 LAB
DLCO SINGLE BREATH LLN: 16.98
DLCO SINGLE BREATH PRE REF: 54.3 %
DLCO SINGLE BREATH REF: 22.71
DLCOC SBVA LLN: 3.2
DLCOC SBVA REF: 4.76
DLCOC SINGLE BREATH LLN: 16.98
DLCOC SINGLE BREATH REF: 22.71
DLCOCSBVAULN: 6.32
DLCOCSINGLEBREATHULN: 28.44
DLCOSINGLEBREATHULN: 28.44
DLCOVA LLN: 3.2
DLCOVA PRE REF: 81.2 %
DLCOVA PRE: 3.86 ML/(MIN*MMHG*L) (ref 3.2–6.32)
DLCOVA REF: 4.76
DLCOVAULN: 6.32
ERV LLN: -16449.15
ERV PRE REF: 81.2 %
ERV REF: 0.85
ERVULN: ABNORMAL
FEF 25 75 LLN: 0.96
FEF 25 75 PRE REF: 121.7 %
FEF 25 75 REF: 2.11
FET100 CHG: -8.7 %
FEV05 LLN: 0.99
FEV05 REF: 1.85
FEV1 CHG: 1 %
FEV1 FVC LLN: 69
FEV1 FVC PRE REF: 106.8 %
FEV1 FVC REF: 80
FEV1 LLN: 1.61
FEV1 PRE REF: 86.9 %
FEV1 REF: 2.17
FEV1 VOL CHG: 0.02
FRCPLETH LLN: 1.82
FRCPLETH PREREF: 92.2 %
FRCPLETH REF: 2.64
FRCPLETHULN: 3.46
FVC CHG: -0 %
FVC LLN: 2.03
FVC PRE REF: 81.1 %
FVC REF: 2.71
FVC VOL CHG: -0
IVC PRE: 2.13 L (ref 2.03–3.42)
IVC SINGLE BREATH LLN: 2.03
IVC SINGLE BREATH PRE REF: 78.7 %
IVC SINGLE BREATH REF: 2.71
IVCSINGLEBREATHULN: 3.42
LLN IC: -16448.02
PEF LLN: 3.75
PEF PRE REF: 102.2 %
PEF REF: 5.7
PHYSICIAN COMMENT: ABNORMAL
POST FEF 25 75: 2.86 L/S (ref 0.96–3.71)
POST FET 100: 6.22 SEC
POST FEV1 FVC: 86.71 % (ref 69.04–90.01)
POST FEV1: 1.91 L (ref 1.61–2.71)
POST FEV5: 1.66 L (ref 0.99–2.71)
POST FVC: 2.2 L (ref 2.03–3.42)
POST PEF: 5.49 L/S (ref 3.75–7.64)
PRE DLCO: 12.33 ML/(MIN*MMHG) (ref 16.98–28.44)
PRE ERV: 0.69 L (ref -16449.15–16450.85)
PRE FEF 25 75: 2.57 L/S (ref 0.96–3.71)
PRE FET 100: 6.81 SEC
PRE FEV05 REF: 87.9 %
PRE FEV1 FVC: 85.82 % (ref 69.04–90.01)
PRE FEV1: 1.89 L (ref 1.61–2.71)
PRE FEV5: 1.63 L (ref 0.99–2.71)
PRE FRC PL: 2.44 L (ref 1.82–3.46)
PRE FVC: 2.2 L (ref 2.03–3.42)
PRE IC: 1.53 L (ref -16448.02–16451.98)
PRE PEF: 5.82 L/S (ref 3.75–7.64)
PRE REF IC: 77 %
PRE RV: 1.75 L (ref 1.22–2.37)
PRE TLC: 3.96 L (ref 3.78–5.76)
RAW PRE REF: 77.8 %
RAW PRE: 2.38 CMH2O*S/L (ref 3.06–3.06)
RAW REF: 3.06
REF IC: 1.98
RV LLN: 1.22
RV PRE REF: 97.4 %
RV REF: 1.79
RVTLC LLN: 28
RVTLC PRE REF: 116 %
RVTLC PRE: 44.06 % (ref 28.41–47.59)
RVTLC REF: 38
RVTLCULN: 48
RVULN: 2.37
SGAW PRE REF: 151.4 %
SGAW PRE: 0.15 1/(CMH2O*S) (ref 0.1–0.1)
SGAW REF: 0.1
TLC LLN: 3.78
TLC PRE REF: 83.1 %
TLC REF: 4.77
TLC ULN: 5.76
ULN IC: ABNORMAL
VA PRE: 3.19 L (ref 4.62–4.62)
VA SINGLE BREATH LLN: 4.62
VA SINGLE BREATH PRE REF: 69.1 %
VA SINGLE BREATH REF: 4.62
VASINGLEBREATHULN: 4.62
VC LLN: 2.03
VC PRE REF: 81.8 %
VC PRE: 2.22 L (ref 2.03–3.42)
VC REF: 2.71
VC ULN: 3.42

## 2024-02-20 PROCEDURE — 94726 PLETHYSMOGRAPHY LUNG VOLUMES: CPT | Mod: S$GLB,TXP,, | Performed by: INTERNAL MEDICINE

## 2024-02-20 PROCEDURE — 94060 EVALUATION OF WHEEZING: CPT | Mod: S$GLB,TXP,, | Performed by: INTERNAL MEDICINE

## 2024-02-20 PROCEDURE — 94729 DIFFUSING CAPACITY: CPT | Mod: S$GLB,TXP,, | Performed by: INTERNAL MEDICINE

## 2024-02-20 PROCEDURE — 71250 CT THORAX DX C-: CPT | Mod: TC,NTX

## 2024-02-20 PROCEDURE — 71250 CT THORAX DX C-: CPT | Mod: 26,TXP,, | Performed by: STUDENT IN AN ORGANIZED HEALTH CARE EDUCATION/TRAINING PROGRAM

## 2024-02-20 PROCEDURE — 94618 PULMONARY STRESS TESTING: CPT | Mod: S$GLB,TXP,, | Performed by: INTERNAL MEDICINE

## 2024-02-21 ENCOUNTER — TELEPHONE (OUTPATIENT)
Dept: ENDOCRINOLOGY | Facility: CLINIC | Age: 59
End: 2024-02-21
Payer: MEDICARE

## 2024-02-21 NOTE — PROCEDURES
Kiesha Rocha is a 58 y.o.  female patient, who presents for a 6 minute walk test ordered by MD Alysia.  The diagnosis is Shortness of Breath; Pre-operative Evaluation.  The patient's BMI is 28.4 kg/m2.  Predicted distance (lower limit of normal) is 374.3 meters.      Test Results:    The test was completed without stopping. The total time walked was 360 seconds. During walking, the patient reported:  No complaints. The patient used no assistive devices during testing.     02/20/2024---------Distance: 365.76 meters (1200 feet)     O2 Sat % Supplemental Oxygen Heart Rate Blood Pressure Ghislaine Scale   Pre-exercise  (Resting) 98 % Room Air 72 bpm 124/63 mmHg 0   During Exercise 98 % Room Air 102 bpm 127/62 mmHg 2   Post-exercise  (Recovery) 98 % Room Air  84 bpm       Recovery Time: 78 seconds    Performing nurse/tech: Estopinal RRT      PREVIOUS STUDY:   09/28/2022---------Distance: 396.24 meters (1300 feet)       O2 Sat % Supplemental Oxygen Heart Rate Blood Pressure Ghislaine Scale   Pre-exercise  (Resting) 99 % Room Air 78 bpm 177/77 mmHg 0   During Exercise 97 % Room Air 118 bpm 186/81 mmHg 3   Post-exercise  (Recovery) 99 % Room Air  90 bpm           CLINICAL INTERPRETATION:  Six minute walk distance is 365.76 meters (1200 feet) with light dyspnea.  During exercise, there was no desaturation while breathing room air.  Blood pressure remained stable and Heart rate increased significantly with walking.  The patient did not report non-pulmonary symptoms during exercise.  Since the previous study in September 2022, exercise capacity is unchanged.  Based upon age and body mass index, exercise capacity is less than predicted.

## 2024-02-23 ENCOUNTER — TELEPHONE (OUTPATIENT)
Dept: PULMONOLOGY | Facility: CLINIC | Age: 59
End: 2024-02-23
Payer: MEDICARE

## 2024-02-23 NOTE — TELEPHONE ENCOUNTER
Spoke with patient regarding changed arrival time at 0530 on 02/27/24 at [facility]. NPO past midnight the night before. Patient stated daughter will be her designated . Patient confirmed understanding.

## 2024-02-26 ENCOUNTER — ANESTHESIA EVENT (OUTPATIENT)
Dept: SURGERY | Facility: HOSPITAL | Age: 59
End: 2024-02-26
Payer: MEDICARE

## 2024-02-26 NOTE — ANESTHESIA PREPROCEDURE EVALUATION
[facility]  Anesthesia Pre-Operative Evaluation         Patient Name: Kiesha Rocha  YOB: 1965  MRN: [id number]    SUBJECTIVE:     Pre-operative evaluation for Procedure(s) (LRB):  ROBOTIC BRONCHOSCOPY (N/A)     02/26/2024    Kiesha Rocha is a 58 y.o. female w/ a significant PMHx of ESRD, on Kidney tx list. DM, HTN, Chronic FULLER .    Patient now presents for the above procedure(s).    Echo 6/2023  · The left ventricle is normal in size with concentric hypertrophy and normal systolic function.  · The estimated ejection fraction is 55%.  · Grade II left ventricular diastolic dysfunction.  · Moderate left atrial enlargement.  · Normal right ventricular size with normal right ventricular systolic function.  · The quantitatively derived ejection fraction is 53%.  · Trivial pericardial effusion.  · Normal central venous pressure (3 mmHg).    LDA:       Tunneled Central Line Insertion/Assessment - Double Lumen  right subclavian (Active)   Number of days:      Prev airway: None documented.      Patient Active Problem List   Diagnosis    ESRD on dialysis    Hydronephrosis    Diabetes mellitus due to underlying condition with stage 5 chronic kidney disease    BMI 29.0-29.9,adult    Hyperthyroidism    Hypertriglyceridemia    Organ transplant candidate    Multiple pulmonary nodules    Shortness of breath    Seasonal allergic rhinitis due to pollen    Atherosclerosis    Moderate nonproliferative diabetic retinopathy of both eyes without macular edema associated with type 2 diabetes mellitus    Chorioretinal scar, left    Age-related nuclear cataract of both eyes    Patient on waiting list for kidney transplant    Hyperparathyroidism       Review of patient's allergies indicates:   Allergen Reactions    Latex Itching    Penicillins Hives and Rash       Current Inpatient Medications:      Current Facility-Administered Medications on File Prior to Encounter   Medication Dose Route Frequency  Provider Last Rate Last Admin    0.9%  NaCl infusion  20 mL/hr Intravenous Continuous Shashi Krishnan  mL/hr at 06/12/19 1205 Rate Change at 06/12/19 1205    mupirocin 2 % ointment   Nasal On Call Procedure Bautista Vasques MD   Given at 06/12/19 0998     Current Outpatient Medications on File Prior to Encounter   Medication Sig Dispense Refill    acetaminophen (TYLENOL ARTHRITIS PAIN) 650 MG TbSR Take 2 tablets every 8 hours by oral route.      albuterol (PROVENTIL/VENTOLIN HFA) 90 mcg/actuation inhaler Inhale 2 puffs into the lungs every 6 (six) hours as needed.      amLODIPine (NORVASC) 10 MG tablet Take 10 mg by mouth once daily.      atorvastatin (LIPITOR) 20 MG tablet Take 1 tablet (20 mg total) by mouth once daily. 90 tablet 3    calcium acetate (PHOSLO) 667 mg capsule Take 2 capsules by mouth 3 (three) times daily with meals. Take 1 capsule with snacks  3    cholecalciferol, vitamin D3, 1,250 mcg (50,000 unit) capsule cholecalciferol (vitamin D3) 1,250 mcg (50,000 unit) capsule   TAKE 1 CAPSULE BY MOUTH EVERY WEEK      cyclobenzaprine (FLEXERIL) 10 MG tablet Take 10 mg by mouth 2 (two) times daily as needed.      cyproheptadine (PERIACTIN) 4 mg tablet cyproheptadine 4 mg tablet   TAKE 1 TABLET BY MOUTH THREE TIMES DAILY BEFORE MEALS      elderberry fruit and flower 460-115 mg Cap elderberry fruit 460 mg-elderberry flower 115 mg capsule   Take 1 capsule by oral route.      fluorometholone 0.1% (FML) 0.1 % DrpS INSTILL ONE DROP IN EACH EYE FOUR TIMES DAILY      FLUoxetine 20 MG capsule Take 40 mg by mouth once daily.      fluticasone propionate (FLONASE) 50 mcg/actuation nasal spray 1 spray by Each Nostril route daily as needed.      furosemide (LASIX) 40 MG tablet TAKE 1 TABLET BY MOUTH DAILY      gabapentin (NEURONTIN) 600 MG tablet Take 600 mg by mouth every evening.       garlic 1,000 mg Cap 1,000 mg.      glucagon (GLUCAGON EMERGENCY KIT, HUMAN,) 1 mg SolR glucagon emergency kit 1 mg kit       "hydrALAZINE (APRESOLINE) 50 MG tablet Take 50 mg by mouth 3 (three) times daily.      insulin (LANTUS SOLOSTAR U-100 INSULIN) glargine 100 units/mL SubQ pen Inject 30 Units into the skin every evening. 9 mL 11    insulin lispro 100 unit/mL pen INJECT 4 UNITS INTO THE SKIN DAILY *      insulin syringe-needle U-100 1 mL 31 gauge x 5/16 Syrg UltiCare 1 mL 31 gauge x 5/16" syringe      INV INSULIN GLARGINE, LANTUS, 100U/ML SOLN FOR INJ SOLOSTAR lantus solostar 100 unit/ml sopn      ketoconazole (NIZORAL) 2 % cream AAA bid 60 g 3    labetaloL (NORMODYNE) 100 MG tablet Take 1 tablet by mouth once daily.      linaGLIPtin (TRADJENTA) 5 mg Tab tablet Take 1 tablet (5 mg total) by mouth once daily. 90 tablet 3    lisinopriL (PRINIVIL,ZESTRIL) 20 MG tablet Take 1 tablet (20 mg total) by mouth daily      loratadine (CLARITIN) 10 mg tablet Take 1 tablet by mouth once daily.      methIMAzole (TAPAZOLE) 5 MG Tab Take 1 tablet (5 mg total) by mouth once daily. 90 tablet 3    metoprolol tartrate (LOPRESSOR) 50 MG tablet Take 50 mg by mouth 2 (two) times daily.      multivitamin with minerals tablet Take 1 tablet by mouth once daily.      olopatadine (PATADAY) 0.2 % Drop place 1 DROP IN EACH EYE EVERY DAY  5    ondansetron (ZOFRAN) 4 MG tablet Take 4 mg by mouth every 8 (eight) hours as needed.  2    oxyCODONE-acetaminophen (PERCOCET) 5-325 mg per tablet TAKE 1 TABLET BY MOUTH EVERY 4 HOURS AS NEEDED FOR PAIN for up to 12 doses      pantoprazole (PROTONIX) 40 MG tablet Take 40 mg by mouth every morning.      pen needle, diabetic 31 gauge x 5/16" Ndle BD Ultra-Fine Short Pen Needle 31 gauge x 5/16"      pen needle, diabetic 31 gauge x 5/16" Ndle UltiCare Pen Needle 31 gauge x 5/16"      topiramate (TOPAMAX) 25 MG tablet Take 25 mg by mouth 2 (two) times daily as needed.         Past Surgical History:   Procedure Laterality Date    ARTHROSCOPY OF KNEE Right     AV FISTULA PLACEMENT Left 06/12/2019    Procedure: CREATION, AV FISTULA - " Basilic;  Surgeon: Bautista Vasques MD;  Location: [facility];  Service: Vascular;  Laterality: Left;    BREAST BIOPSY Left     BREAST SURGERY Left     biopsy    HYSTERECTOMY  2017    TLH/BSO    KNEE SURGERY Right     URETERAL STENT PLACEMENT Bilateral     10/2018       Social History     Socioeconomic History    Marital status: Single   Tobacco Use    Smoking status: Never    Smokeless tobacco: Never   Substance and Sexual Activity    Alcohol use: Never    Drug use: Never       OBJECTIVE:     Vital Signs Range (Last 24H):         Significant Labs:  Lab Results   Component Value Date    WBC 8.1 08/15/2023    HGB 9.7 (L) 08/15/2023    HCT 30.6 (L) 08/15/2023     2020    CHOL 142 08/15/2023    TRIG 144 08/15/2023    HDL 57 08/15/2023    ALT 15 2023    AST 19 2023     2023    K 3.4 (L) 2023    CL 99 10/04/2023    CREATININE 4.00 (H) 2023    BUN 11 2023    CO2 27 2023    TSH 1.145 10/04/2023    INR 0.9 2019    HGBA1C 6.2 (H) 10/04/2023       Diagnostic Studies: No relevant studies.    EKG:   Results for orders placed or performed during the hospital encounter of 19   EKG 12-lead    Collection Time: 19  9:12 AM    Narrative                             [facility]                                                                                  Test Date:    2019  Pat Name:     JOSSUE JOSE         Department:     Patient ID:   [id number]                 Room:         [facility]  [facility]  Gender:       Female                   Technician:   ELSA  :          1965               Requested By:   Order Number:                          Reading MD:   Elan Vargas                                   Measurements  Intervals                              Axis            Rate:         57                       P:            32  NE:           182                      QRS:          1  QRSD:         84                        T:            138  QT:           502                                      QTc:          488                                                                 Interpretive Statements  Sinus bradycardia  Anteroseptal infarct, age undetermined  ST & T wave abnormality, consider lateral ischemia  Abnormal ECG  No previous ECG available for comparison    Electronically Signed On 6- 9:44:59 CDT by Elan Vargas         2D ECHO:  TTE:  Results for orders placed or performed during the hospital encounter of 06/16/23   Echo   Result Value Ref Range    Ascending aorta 2.79 cm    STJ 2.06 cm    AV mean gradient 6 mmHg    Ao peak lv 1.71 m/s    Ao VTI 36.96 cm    IVS 0.89 0.6 - 1.1 cm    LA size 4.55 cm    Left Atrium Major Axis 5.34 cm    Left Atrium Minor Axis 4.98 cm    LVIDd 5.11 3.5 - 6.0 cm    LVIDs 3.38 2.1 - 4.0 cm    LVOT diameter 1.65 cm    LVOT peak VTI 24.40 cm    Posterior Wall 1.12 (A) 0.6 - 1.1 cm    MV Peak A Lv 1.37 m/s    E wave deceleration time 197.55 msec    MV Peak E Lv 1.43 m/s    RA Major Axis 4.54 cm    RA Width 3.91 cm    RVDD 3.56 cm    Sinus 2.35 cm    TAPSE 3.08 cm    LA WIDTH 4.52 cm    MV stenosis pressure 1/2 time 57.29 ms    LV Diastolic Volume 124.33 mL    LV Systolic Volume 46.88 mL    LVOT peak lv 1.01 m/s    TDI LATERAL 0.07 m/s    TDI SEPTAL 0.06 m/s    LA volume (mod) 83.87 cm3    LV LATERAL E/E' RATIO 20.43 m/s    LV SEPTAL E/E' RATIO 23.83 m/s    FS 34 %    LA volume 90.09 cm3    LV mass 189.89 g    Left Ventricle Relative Wall Thickness 0.44 cm    AV valve area 1.41 cm2    AV Velocity Ratio 0.59     AV index (prosthetic) 0.66     MV valve area p 1/2 method 3.84 cm2    E/A ratio 1.04     Mean e' 0.07 m/s    LVOT area 2.1 cm2    LVOT stroke volume 52.15 cm3    AV peak gradient 12 mmHg    E/E' ratio 22.00 m/s    BSA 1.88 m2    LV Systolic Volume Index 25.6 mL/m2    LV Diastolic Volume Index 67.94 mL/m2    LA Volume Index 49.2 mL/m2    LV Mass Index 104 g/m2    LA Volume  Index (Mod) 45.8 mL/m2    Right Atrial Pressure (from IVC) 3 mmHg    QEF 53 %    EF 55 %    MV mean gradient 5 mmHg    Mitral Valve Heart Rate 82 bpm    Narrative    · The left ventricle is normal in size with concentric hypertrophy and   normal systolic function.  · The estimated ejection fraction is 55%.  · Grade II left ventricular diastolic dysfunction.  · Moderate left atrial enlargement.  · Normal right ventricular size with normal right ventricular systolic   function.  · The quantitatively derived ejection fraction is 53%.  · Trivial pericardial effusion.  · Normal central venous pressure (3 mmHg).          TIMOTHY:  No results found for this or any previous visit.    ASSESSMENT/PLAN:           Pre-op Assessment    I have reviewed the Patient Summary Reports.     I have reviewed the Nursing Notes.    I have reviewed the Medications.     Review of Systems  Anesthesia Hx:  No problems with previous Anesthesia             Denies Family Hx of Anesthesia complications.    Denies Personal Hx of Anesthesia complications.                    Social:  Non-Smoker, No Alcohol Use       Hematology/Oncology:       -- Denies Anemia:                  Denies Current/Recent Cancer                Cardiovascular:     Hypertension    Denies CAD.     Denies Dysrhythmias.    Denies CHF.    no hyperlipidemia                             Pulmonary:    Denies COPD.  Denies Asthma.  Shortness of breath   Denies Sleep Apnea.                Renal/:  Chronic Renal Disease, ESRD, Dialysis                Hepatic/GI:      Denies GERD. Denies Liver Disease.            Musculoskeletal:  Denies Arthritis.               Neurological:    Denies CVA. Denies Neuromuscular Disease.   Denies Seizures.          Denies Chronic Pain Syndrome                         Endocrine:  Diabetes  Hyperthyroidism       Denies Obesity / BMI > 30  Psych:   denies anxiety denies depression                Physical Exam  General: Well nourished, Cooperative, Alert and  Oriented    Airway:  Mallampati: II   Mouth Opening: Normal  TM Distance: Normal  Tongue: Normal  Neck ROM: Normal ROM    Dental:  Partial Dentures        Anesthesia Plan  Type of Anesthesia, risks & benefits discussed:    Anesthesia Type: Gen ETT  Intra-op Monitoring Plan: Standard ASA Monitors  Post Op Pain Control Plan: multimodal analgesia and IV/PO Opioids PRN  Induction:  IV  Airway Plan: Direct and Video, Post-Induction  Informed Consent: Informed consent signed with the Patient and all parties understand the risks and agree with anesthesia plan.  All questions answered.   ASA Score: 3  Day of Surgery Review of History & Physical: H&P Update referred to the surgeon/provider.    Ready For Surgery From Anesthesia Perspective.     .

## 2024-02-27 ENCOUNTER — HOSPITAL ENCOUNTER (OUTPATIENT)
Facility: HOSPITAL | Age: 59
Discharge: HOME | End: 2024-02-27
Attending: EMERGENCY MEDICINE | Admitting: EMERGENCY MEDICINE
Payer: MEDICARE

## 2024-02-27 ENCOUNTER — ANESTHESIA (OUTPATIENT)
Dept: SURGERY | Facility: HOSPITAL | Age: 59
End: 2024-02-27
Payer: MEDICARE

## 2024-02-27 ENCOUNTER — TELEPHONE (OUTPATIENT)
Dept: PULMONOLOGY | Facility: CLINIC | Age: 59
End: 2024-02-27
Payer: MEDICARE

## 2024-02-27 VITALS
OXYGEN SATURATION: 93 % | HEART RATE: 81 BPM | BODY MASS INDEX: 28.17 KG/M2 | TEMPERATURE: 98 F | HEIGHT: 64 IN | DIASTOLIC BLOOD PRESSURE: 74 MMHG | SYSTOLIC BLOOD PRESSURE: 172 MMHG | WEIGHT: 165 LBS | RESPIRATION RATE: 19 BRPM

## 2024-02-27 DIAGNOSIS — J98.4 BRONCHOGENIC CYST: Primary | ICD-10-CM

## 2024-02-27 DIAGNOSIS — R91.8 PULMONARY MASS: ICD-10-CM

## 2024-02-27 DIAGNOSIS — Z76.82 ORGAN TRANSPLANT CANDIDATE: ICD-10-CM

## 2024-02-27 LAB
ANION GAP SERPL CALC-SCNC: 19 MMOL/L (ref 8–16)
BUN SERPL-MCNC: 22 MG/DL (ref 6–20)
CALCIUM SERPL-MCNC: 8.4 MG/DL (ref 8.7–10.5)
CHLORIDE SERPL-SCNC: 96 MMOL/L (ref 95–110)
CO2 SERPL-SCNC: 28 MMOL/L (ref 23–29)
CREAT SERPL-MCNC: 6.7 MG/DL (ref 0.5–1.4)
EST. GFR  (NO RACE VARIABLE): 6.7 ML/MIN/1.73 M^2
GLUCOSE SERPL-MCNC: 170 MG/DL (ref 70–110)
GRAM STN SPEC: NORMAL
GRAM STN SPEC: NORMAL
POCT GLUCOSE: 189 MG/DL (ref 70–110)
POCT GLUCOSE: 206 MG/DL (ref 70–110)
POTASSIUM SERPL-SCNC: 4.7 MMOL/L (ref 3.5–5.1)
SODIUM SERPL-SCNC: 143 MMOL/L (ref 136–145)

## 2024-02-27 PROCEDURE — 37000009 HC ANESTHESIA EA ADD 15 MINS: Mod: TXP | Performed by: EMERGENCY MEDICINE

## 2024-02-27 PROCEDURE — C1726 CATH, BAL DIL, NON-VASCULAR: HCPCS | Mod: TXP | Performed by: EMERGENCY MEDICINE

## 2024-02-27 PROCEDURE — 87205 SMEAR GRAM STAIN: CPT | Mod: NTX | Performed by: EMERGENCY MEDICINE

## 2024-02-27 PROCEDURE — 36000704 HC OR TIME LEV I 1ST 15 MIN: Mod: TXP | Performed by: EMERGENCY MEDICINE

## 2024-02-27 PROCEDURE — 25000003 PHARM REV CODE 250: Mod: TXP | Performed by: EMERGENCY MEDICINE

## 2024-02-27 PROCEDURE — 87070 CULTURE OTHR SPECIMN AEROBIC: CPT | Mod: TXP | Performed by: EMERGENCY MEDICINE

## 2024-02-27 PROCEDURE — 25000003 PHARM REV CODE 250: Mod: NTX

## 2024-02-27 PROCEDURE — 87075 CULTR BACTERIA EXCEPT BLOOD: CPT | Mod: NTX | Performed by: EMERGENCY MEDICINE

## 2024-02-27 PROCEDURE — 87102 FUNGUS ISOLATION CULTURE: CPT | Mod: TXP | Performed by: EMERGENCY MEDICINE

## 2024-02-27 PROCEDURE — 87206 SMEAR FLUORESCENT/ACID STAI: CPT | Mod: NTX | Performed by: EMERGENCY MEDICINE

## 2024-02-27 PROCEDURE — 31629 BRONCHOSCOPY/NEEDLE BX EACH: CPT | Mod: LT,TXP,, | Performed by: EMERGENCY MEDICINE

## 2024-02-27 PROCEDURE — 88173 CYTOPATH EVAL FNA REPORT: CPT | Mod: TXP | Performed by: PATHOLOGY

## 2024-02-27 PROCEDURE — 88305 TISSUE EXAM BY PATHOLOGIST: CPT | Mod: TXP | Performed by: PATHOLOGY

## 2024-02-27 PROCEDURE — D9220A PRA ANESTHESIA: Mod: NTX,,, | Performed by: ANESTHESIOLOGY

## 2024-02-27 PROCEDURE — 87116 MYCOBACTERIA CULTURE: CPT | Mod: NTX | Performed by: EMERGENCY MEDICINE

## 2024-02-27 PROCEDURE — 71000015 HC POSTOP RECOV 1ST HR: Mod: NTX | Performed by: EMERGENCY MEDICINE

## 2024-02-27 PROCEDURE — 80048 BASIC METABOLIC PNL TOTAL CA: CPT | Mod: NTX

## 2024-02-27 PROCEDURE — 88173 CYTOPATH EVAL FNA REPORT: CPT | Mod: 26,TXP,, | Performed by: PATHOLOGY

## 2024-02-27 PROCEDURE — 88305 TISSUE EXAM BY PATHOLOGIST: CPT | Mod: 26,TXP,, | Performed by: PATHOLOGY

## 2024-02-27 PROCEDURE — 36000705 HC OR TIME LEV I EA ADD 15 MIN: Mod: NTX | Performed by: EMERGENCY MEDICINE

## 2024-02-27 PROCEDURE — 37000008 HC ANESTHESIA 1ST 15 MINUTES: Mod: NTX | Performed by: EMERGENCY MEDICINE

## 2024-02-27 PROCEDURE — 71000044 HC DOSC ROUTINE RECOVERY FIRST HOUR: Mod: NTX | Performed by: EMERGENCY MEDICINE

## 2024-02-27 PROCEDURE — 63600175 PHARM REV CODE 636 W HCPCS: Mod: NTX

## 2024-02-27 PROCEDURE — 27201423 OPTIME MED/SURG SUP & DEVICES STERILE SUPPLY: Mod: TXP | Performed by: EMERGENCY MEDICINE

## 2024-02-27 PROCEDURE — 63600175 PHARM REV CODE 636 W HCPCS: Mod: TXP

## 2024-02-27 PROCEDURE — 31654 BRONCH EBUS IVNTJ PERPH LES: CPT | Mod: TXP,,, | Performed by: EMERGENCY MEDICINE

## 2024-02-27 RX ORDER — HALOPERIDOL 5 MG/ML
0.5 INJECTION INTRAMUSCULAR ONCE
Status: COMPLETED | OUTPATIENT
Start: 2024-02-27 | End: 2024-02-27

## 2024-02-27 RX ORDER — SODIUM CHLORIDE 0.9 % (FLUSH) 0.9 %
10 SYRINGE (ML) INJECTION
Status: DISCONTINUED | OUTPATIENT
Start: 2024-02-27 | End: 2024-02-27 | Stop reason: HOSPADM

## 2024-02-27 RX ORDER — MIDAZOLAM HYDROCHLORIDE 1 MG/ML
INJECTION, SOLUTION INTRAMUSCULAR; INTRAVENOUS
Status: DISCONTINUED | OUTPATIENT
Start: 2024-02-27 | End: 2024-02-27

## 2024-02-27 RX ORDER — ROCURONIUM BROMIDE 10 MG/ML
INJECTION, SOLUTION INTRAVENOUS
Status: DISCONTINUED | OUTPATIENT
Start: 2024-02-27 | End: 2024-02-27

## 2024-02-27 RX ORDER — PROPOFOL 10 MG/ML
VIAL (ML) INTRAVENOUS
Status: DISCONTINUED | OUTPATIENT
Start: 2024-02-27 | End: 2024-02-27

## 2024-02-27 RX ORDER — DEXAMETHASONE SODIUM PHOSPHATE 4 MG/ML
INJECTION, SOLUTION INTRA-ARTICULAR; INTRALESIONAL; INTRAMUSCULAR; INTRAVENOUS; SOFT TISSUE
Status: DISCONTINUED | OUTPATIENT
Start: 2024-02-27 | End: 2024-02-27

## 2024-02-27 RX ORDER — FENTANYL CITRATE 50 UG/ML
INJECTION, SOLUTION INTRAMUSCULAR; INTRAVENOUS
Status: DISCONTINUED | OUTPATIENT
Start: 2024-02-27 | End: 2024-02-27

## 2024-02-27 RX ORDER — ONDANSETRON HYDROCHLORIDE 2 MG/ML
INJECTION, SOLUTION INTRAVENOUS
Status: DISCONTINUED | OUTPATIENT
Start: 2024-02-27 | End: 2024-02-27

## 2024-02-27 RX ORDER — ACETAMINOPHEN 500 MG
1000 TABLET ORAL
Status: COMPLETED | OUTPATIENT
Start: 2024-02-27 | End: 2024-02-27

## 2024-02-27 RX ORDER — LIDOCAINE HYDROCHLORIDE 20 MG/ML
INJECTION INTRAVENOUS
Status: DISCONTINUED | OUTPATIENT
Start: 2024-02-27 | End: 2024-02-27

## 2024-02-27 RX ORDER — LIDOCAINE HYDROCHLORIDE 10 MG/ML
INJECTION, SOLUTION EPIDURAL; INFILTRATION; INTRACAUDAL; PERINEURAL
Status: DISCONTINUED | OUTPATIENT
Start: 2024-02-27 | End: 2024-02-27 | Stop reason: HOSPADM

## 2024-02-27 RX ADMIN — PROPOFOL 150 MG: 10 INJECTION, EMULSION INTRAVENOUS at 07:02

## 2024-02-27 RX ADMIN — DEXAMETHASONE SODIUM PHOSPHATE 4 MG: 4 INJECTION, SOLUTION INTRAMUSCULAR; INTRAVENOUS at 07:02

## 2024-02-27 RX ADMIN — SODIUM CHLORIDE: 0.9 INJECTION, SOLUTION INTRAVENOUS at 06:02

## 2024-02-27 RX ADMIN — FENTANYL CITRATE 100 MCG: 50 INJECTION, SOLUTION INTRAMUSCULAR; INTRAVENOUS at 07:02

## 2024-02-27 RX ADMIN — ACETAMINOPHEN 1000 MG: 500 TABLET ORAL at 06:02

## 2024-02-27 RX ADMIN — ONDANSETRON 4 MG: 2 INJECTION INTRAMUSCULAR; INTRAVENOUS at 07:02

## 2024-02-27 RX ADMIN — HALOPERIDOL LACTATE 0.5 MG: 5 INJECTION, SOLUTION INTRAMUSCULAR at 08:02

## 2024-02-27 RX ADMIN — PROPOFOL 50 MCG/KG/MIN: 10 INJECTION, EMULSION INTRAVENOUS at 07:02

## 2024-02-27 RX ADMIN — SUGAMMADEX 200 MG: 100 INJECTION, SOLUTION INTRAVENOUS at 07:02

## 2024-02-27 RX ADMIN — MIDAZOLAM HYDROCHLORIDE 2 MG: 1 INJECTION, SOLUTION INTRAMUSCULAR; INTRAVENOUS at 06:02

## 2024-02-27 RX ADMIN — LIDOCAINE HYDROCHLORIDE 100 MG: 20 INJECTION INTRAVENOUS at 07:02

## 2024-02-27 RX ADMIN — ROCURONIUM BROMIDE 50 MG: 10 INJECTION, SOLUTION INTRAVENOUS at 07:02

## 2024-02-27 NOTE — INTERVAL H&P NOTE
The patient has been examined and the H&P has been reviewed:    I concur with the findings and no changes have occurred since H&P was written.    Procedure risks, benefits and alternative options discussed and understood by patient/family.      Dick Harding MD

## 2024-02-27 NOTE — ANESTHESIA PROCEDURE NOTES
Intubation    Date/Time: 2/27/2024 7:08 AM    Performed by: Nahid Magana MD  Authorized by: Prasanna Mcdaniel MD    Intubation:     Induction:  Intravenous    Intubated:  Postinduction    Mask Ventilation:  Easy with oral airway    Attempts:  1    Attempted By:  Resident anesthesiologist    Method of Intubation:  Video laryngoscopy    Blade:  Herrera 3    Laryngeal View Grade: Grade I - full view of cords      Difficult Airway Encountered?: No      Complications:  None    Airway Device:  Oral endotracheal tube    Airway Device Size:  9.0    Style/Cuff Inflation:  Cuffed (inflated to minimal occlusive pressure)    Tube secured:  21    Secured at:  The gums    Placement Verified By:  Capnometry    Complicating Factors:  None    Findings Post-Intubation:  BS equal bilateral and atraumatic/condition of teeth unchanged

## 2024-02-27 NOTE — TRANSFER OF CARE
"Anesthesia Transfer of Care Note    Patient: Kiesha Rocha    Procedure(s) Performed: Procedure(s) (LRB):  ENDOBRONCHIAL ULTRASOUND (EBUS) (N/A)    Patient location: [facility]    Anesthesia Type: general    Transport from OR: Transported from OR on 6-10 L/min O2 by face mask with adequate spontaneous ventilation    Post pain: adequate analgesia    Post assessment: no apparent anesthetic complications    Post vital signs: stable    Level of consciousness: awake and alert    Nausea/Vomiting: no nausea/vomiting    Complications: none    Transfer of care protocol was followed      Last vitals: Visit Vitals  BP (!) 142/68 (BP Location: Right arm, Patient Position: Lying)   Pulse 78   Temp 36.7 °C (98.1 °F) (Temporal)   Resp 20   Ht 5' 4" (1.626 m)   Wt 74.8 kg (165 lb)   SpO2 99%   Breastfeeding No   BMI 28.32 kg/m²     "

## 2024-02-27 NOTE — PROGRESS NOTES
Pt OC=548. Consulted MD Jonas on findings. MD ok to proceed with D/C since Pt took BG control medication this AM. Patient is ready for discharge. Patient stable alert and oriented. IVs removed. No complaints of pain. Discussed discharge plan. Reviewed medications and side effects, appointments, and answered questions with patient and family.

## 2024-02-27 NOTE — DISCHARGE SUMMARY
[facility] (2nd Fl)  Discharge Note  Short Stay    Procedure(s) (LRB):  ENDOBRONCHIAL ULTRASOUND (EBUS) (N/A)      OUTCOME: Patient tolerated treatment/procedure well without complication and is now ready for discharge.    DISPOSITION: Home or Self Care    FINAL DIAGNOSIS:  <principal problem not specified>    FOLLOWUP:  will call with results    DISCHARGE INSTRUCTIONS:  No discharge procedures on file.     TIME SPENT ON DISCHARGE: 10 minutes    Gretel Mcclelland MD  [employer]   02/27/2024 7:41 AM

## 2024-02-27 NOTE — TELEPHONE ENCOUNTER
Bronchoscopy performed today c/w bronchogenic cyst per Dr Harding.  Will discuss at [facility] and consider thoracic surgery consult

## 2024-02-28 ENCOUNTER — TELEPHONE (OUTPATIENT)
Dept: TRANSPLANT | Facility: CLINIC | Age: 59
End: 2024-02-28
Payer: MEDICARE

## 2024-02-28 NOTE — ANESTHESIA POSTPROCEDURE EVALUATION
Anesthesia Post Evaluation    Patient: Kiesha Rocha    Procedure(s) Performed: Procedure(s) (LRB):  ENDOBRONCHIAL ULTRASOUND (EBUS) (N/A)    Final Anesthesia Type: general      Patient location during evaluation: PACU  Patient participation: Yes- Able to Participate  Level of consciousness: awake and alert  Post-procedure vital signs: reviewed and stable  Pain management: adequate  Airway patency: patent    PONV status at discharge: No PONV  Anesthetic complications: no      Cardiovascular status: blood pressure returned to baseline  Respiratory status: unassisted  Hydration status: euvolemic  Follow-up not needed.              Vitals Value Taken Time   /73 02/27/24 0906   Temp 36.6 °C (97.9 °F) 02/27/24 0758   Pulse 81 02/27/24 0915   Resp 19 02/27/24 0915   SpO2 93 % 02/27/24 0915   Vitals shown include unvalidated device data.      No case tracking events are documented in the log.      Pain/Cira Score: Pain Rating Prior to Med Admin: 0 (2/27/2024  6:57 AM)  Cira Score: 9 (2/27/2024  8:45 AM)

## 2024-02-28 NOTE — TELEPHONE ENCOUNTER
----- Message from Lorena Rojas sent at 2/28/2024 10:46 AM CST -----  Regarding: Consult/Advisory  Contact: [phone]  CONSULT/ADVISORY    Name of Caller: Marcelina Andre with [facility] Social Worker    Contact Preference:  [phone]    Nature of Call:  Requesting a call back to discuss pt.

## 2024-02-28 NOTE — TELEPHONE ENCOUNTER
Spoke to SW at dialysis, encouraged her to tell patient to call Dr. Hatfield to discuss her status as far as Pulmonary workup to clear her for transplant.

## 2024-03-01 LAB — BACTERIA SPEC AEROBE CULT: NO GROWTH

## 2024-03-05 LAB — BACTERIA SPEC ANAEROBE CULT: NORMAL

## 2024-03-06 ENCOUNTER — TELEPHONE (OUTPATIENT)
Dept: PULMONOLOGY | Facility: HOSPITAL | Age: 59
End: 2024-03-06
Payer: MEDICARE

## 2024-03-06 NOTE — TELEPHONE ENCOUNTER
Bronchoscopy and evaluation consistent with a bronchogenic cyst and is benign.  No surgical intervention is necessary. Follow up cultures.  Should not prevent her from being a kidney tx candidate.  Left a message and will message Kidney tx team.   
Yes

## 2024-03-07 ENCOUNTER — TELEPHONE (OUTPATIENT)
Dept: TRANSPLANT | Facility: CLINIC | Age: 59
End: 2024-03-07
Payer: MEDICARE

## 2024-04-01 LAB — FUNGUS SPEC CULT: NORMAL

## 2024-04-03 ENCOUNTER — TELEPHONE (OUTPATIENT)
Dept: TRANSPLANT | Facility: CLINIC | Age: 59
End: 2024-04-03
Payer: MEDICARE

## 2024-04-03 NOTE — TELEPHONE ENCOUNTER
----- Message from Judie Jensen sent at 4/3/2024 11:42 AM CDT -----  Regarding: Patient advice  Contact: pt  [phone]            Name of Caller:  Kiesha Mak Preference: [phone]    Nature of Call:  Called to touch bases would like to make sure all appt have been completed and discuss list status

## 2024-04-16 LAB
ACID FAST MOD KINY STN SPEC: NORMAL
MYCOBACTERIUM SPEC QL CULT: NORMAL

## 2024-06-17 ENCOUNTER — TELEPHONE (OUTPATIENT)
Dept: TRANSPLANT | Facility: CLINIC | Age: 59
End: 2024-06-17
Payer: MEDICARE

## 2024-06-17 NOTE — TELEPHONE ENCOUNTER
ON-CALL NOTE     UNOS# [id number]     Notified by Dante Scott, , that Kiesha Frye eligible for kidney offer.  Spoke with patient and identified no acute medical issues with telephone assessment. Protocol script read to patient regarding HCV+ donor offer. Patient verbalized understanding, all questions answered, patient accepts organ offer. Notified by Dante Scott that virtual crossmatch is negative.  Current sample of blood is not available for crossmatch. Patient will need fresh sample collected on admit if becomes primary and is admitted. Patient reports no sensitizing event since last blood sample for PRA received.   Patient was asked if they have had a positive COVID-19 test or if they have any signs or symptoms. Informed patient that they will be tested for COVID-19 upon arrival to the hospital, unless have a previous positive result. If tested and result is positive, the transplant will not be able to occur, they will be inactivated on the wait list for 21 days per protocol and required to quarantine.      Patient notified of plan to remain at home on standby and await updated. Donor OR is set for 1900 this evening. Patient encouraged to call with any questions  and states understanding.  I will update patient on status as I am updated.    06/18/2024 07:30 Patient released from case due to poor organ quality. Verbalized understanding

## 2024-07-13 DIAGNOSIS — E05.90 HYPERTHYROIDISM: ICD-10-CM

## 2024-07-15 RX ORDER — METHIMAZOLE 5 MG/1
5 TABLET ORAL DAILY
Qty: 90 TABLET | Refills: 3 | Status: SHIPPED | OUTPATIENT
Start: 2024-07-15 | End: 2025-07-15

## 2024-07-18 DIAGNOSIS — Z76.82 ORGAN TRANSPLANT CANDIDATE: Primary | ICD-10-CM

## 2024-07-19 ENCOUNTER — TELEPHONE (OUTPATIENT)
Dept: TRANSPLANT | Facility: CLINIC | Age: 59
End: 2024-07-19
Payer: MEDICARE

## 2024-07-19 NOTE — LETTER
2024    Kiesha Rocha  [address]  [place] [facility] [place] [address]        Dear Kiesha Rocha:  MRN: [id number]  : 1965    You are scheduled on 10/17/2024 for follow up in the transplant clinic to update your records and evaluate your health status as you wait for a transplant.  It is very important that we ensure you are ready for your transplant.      Please make arrangements to be in our transplant clinic from 12:30 pm- 4 pm.  During this time you will watch an educational video and then be seen by our transplant , financial counselor, and advance practice provider.     If you have had a change in your medical history since your last visit, please call your transplant coordinator to ensure we have the medical records to review prior to your appointment.     Please bring the following with you to this appointment:  A Caregiver is REQUIRED  Bring ALL insurance information including medication card  Current list of medications  Copies of any outside medical records from the past year, such as: mammogram, pap smear, ultrasound, CAT scan, colonoscopy, cardiac angiogram or cardiac stress test    To reschedule your appointments please call [phone] or [phone] ([phone]. [phone]). Please ask for the .      Failure to cancel in advance or arrive on time could result in a $50 cancellation fee.  Your transplant candidacy could be affected by no showing these appointments.  We look forward to seeing you.    Sincerely,    [facility] [place]  [facility] [place] [address]  [phone]

## 2024-07-24 ENCOUNTER — TELEPHONE (OUTPATIENT)
Dept: TRANSPLANT | Facility: CLINIC | Age: 59
End: 2024-07-24
Payer: MEDICARE

## 2024-07-24 NOTE — TELEPHONE ENCOUNTER
----- Message from Laverne Casillas sent at 7/24/2024  1:14 PM CDT -----  Regarding: status check  Contact: Jos  Call back #  [phone]  Jos calling from [employer] to get status check on patient for transplant    Call back #  [phone]

## 2024-08-27 ENCOUNTER — TELEPHONE (OUTPATIENT)
Dept: TRANSPLANT | Facility: CLINIC | Age: 59
End: 2024-08-27
Payer: MEDICARE

## 2024-08-27 NOTE — TELEPHONE ENCOUNTER
Late entry from 8/26/24 at 1500:    ON-CALL NOTE    UNOS# [id number]    Notified by Bentley Subramanian, , that Kiesha Rocha is eligible for a back-up kidney offer.  Spoke with patient and identified recent diarrhea as potential issue when proceeding to transplant.  Patient states has had diarrhea for past 5 days.  Patient was seen by her primary care on 8/23/24 and he prescribed dicyclomine.  Patient states diarrhea is getting better since prescribed dicyclomine.  Denies excessive foul ammonia-like odor to stools.  Denies darkened or blood tinged stools.  Patient states she believes diarrhea is the result of her 'binder's.  Case discussed with Dr. Powers.  Patient to be withdrawn from consideration for this donor over concern for infectious diarrhea.  Patient is scheduled to follow up with her PCP first week in September.  Patient advised of her being removed from consideration for this case and reasons why.  Verbalized understanding.  Further advised this would not affect her place on the waiting list.  Patient's listed coordinator advised of patient's recent diarrhea and need for follow up with PCP.

## 2024-08-28 ENCOUNTER — TELEPHONE (OUTPATIENT)
Dept: TRANSPLANT | Facility: CLINIC | Age: 59
End: 2024-08-28
Payer: MEDICARE

## 2024-08-28 NOTE — TELEPHONE ENCOUNTER
----- Message from Rosa Conroy MD sent at 8/27/2024  4:47 PM CDT -----  Regarding: RE: question about H-Pylori  Please inactive for 2 reasons:   -it appears that this is empiric treatment, and w need to make sure diarrhea is resolving  -some of the meds used to treat H pylori can interfere with immunosuppression & result in tacrolimus toxicity  ----- Message -----  From: Tana Paul RN  Sent: 8/27/2024   2:54 PM CDT  To: Rosa Conroy MD  Subject: FW: question about H-Pylori                      Do you want me to place her inactive? Please see below. Thanks.  ----- Message -----  From: Froylan Bowie MD  Sent: 8/27/2024   2:26 PM CDT  To: Tana Paul RN; #  Subject: RE: question about H-Pylori                      I will defer to Dr Powers that reviewed the case.  ----- Message -----  From: Tana Paul RN  Sent: 8/27/2024   1:48 PM CDT  To: [facility]  Subject: question about H-Pylori                          Patient came up for an organ offer on Monday. Justin said the patient reported diarrhea. He ultimately spoke to Dr. Powers who said to pass on the offer. Today, patient saw her GI doctor and is being treated for 14 days for H-Pylori. Does she need to be inactive?    Tana

## 2024-08-28 NOTE — LETTER
August 28, 2024    Kiesha Rocha  [address]  [place] [address]    Dear Kiesha Rocha:  MRN: [id number]    The [facility] team reviewed your transplant candidacy.  It is our programs opinion that you are temporarily not a transplant candidate at our facility as of 8/28/24 because of diarrhea and treatment for H-Pylori.  You will remain listed on the wait list, but will not be able to receive a transplant until this issue is resolved.  You will need Gastroenterology clearance.     Attached is a letter from the [employer] Organ [employer]aring (UNOS).  It describes the services and information offered to patients by UNOS and the Organ Procurement and Transplant Network.    The [facility] team remains available to answer any questions.  Should you have any questions regarding this decision, please do not hesitate to contact our pre-transplant office.      Sincerely,      Rosa Domingo MD  Medical Director, Kidney & Kidney/Pancreas Transplantation  lh/enclosure    Cc: Dr. Lilia Malin        [facility]               The Organ Procurement and Transplantation Network   Toll-free patient services line: [phone]  Your resource for organ transplant information      Staffed 8:30 am - 5:00 pm ET Monday - Friday   Leave a message 24/7 to receive a call back    The Organ Procurement and Transplantation Network (OPTN) is the national transplant system. It makes the policies that decide how donated organs are matched to patients waiting for a transplant. The OPTN:    Makes sure donated organs get matched to people on the transplant waiting list  Tells people about the donation and transplant processes  Makes sure that the public knows about the need for more organ and tissue donations    The OPTN has a free patient services line that you can call to:  Get more information about:   o Organ donation and organ transplants   o Donation and transplant policies  Get an information  kit with:   o A list of transplant hospitals   o Waiting list information  Talk about any questions you may have about your transplant hospital or organ procurement organization. The staff will do their best to help you or point you to others who may help.  Find out how you can volunteer with the OPTN and help shape transplant policy    The patient services line number is: 5-127-967-7471    Patient services line staff CANNOT answer questions about your own medical care, including:  Waiting list status  Test results  Medical records  You will need to call your transplant hospital for this information.    The following websites have more information about transplantation and donation:  OPTN: https://optn.transplant.hrsa.gov/  For potential living donors and transplant recipients:   o Living with transplant: https://www.transplantliving.org/   o Living donation process: https://optn.transplant.hrsa.gov/living-donation/     o Financial assistance: https://www.livingdonorassistance.org/  Transplantation data: https://www.srtr.org/  Organ donation: https://www.organdonor.gov/    Volunteer with the OPTN: https://optn.transplant.hrsa.gov/get-involved

## 2024-08-28 NOTE — TELEPHONE ENCOUNTER
----- Message from Pawel Bello Jr., MD sent at 8/27/2024  3:21 PM CDT -----  Regarding: RE: CT review  The 2020 one looks ok but is out of date.  The 2023 one is abdomen only so she'll new a new CT  ----- Message -----  From: Tana Paul RN  Sent: 8/27/2024  11:14 AM CDT  To: Pawel Bello Jr., MD  Subject: CT review                                        Dr. Bello,    Patient has 2 CT scans downloaded on 8/7. The first one is from 2020 but the second one is from 11/13/23. Please review and advise on kidney candidacy. Thanks.     Tana

## 2024-09-24 ENCOUNTER — TELEPHONE (OUTPATIENT)
Dept: TRANSPLANT | Facility: CLINIC | Age: 59
End: 2024-09-24
Payer: MEDICARE

## 2024-10-10 ENCOUNTER — TELEPHONE (OUTPATIENT)
Dept: TRANSPLANT | Facility: CLINIC | Age: 59
End: 2024-10-10
Payer: MEDICARE

## 2024-10-15 ENCOUNTER — TELEPHONE (OUTPATIENT)
Dept: CARDIOLOGY | Facility: HOSPITAL | Age: 59
End: 2024-10-15
Payer: MEDICARE

## 2024-10-17 ENCOUNTER — HOSPITAL ENCOUNTER (OUTPATIENT)
Dept: CARDIOLOGY | Facility: HOSPITAL | Age: 59
Discharge: HOME | End: 2024-10-17
Attending: NURSE PRACTITIONER
Payer: MEDICARE

## 2024-10-17 ENCOUNTER — OFFICE VISIT (OUTPATIENT)
Dept: TRANSPLANT | Facility: CLINIC | Age: 59
End: 2024-10-17
Payer: MEDICARE

## 2024-10-17 ENCOUNTER — HOSPITAL ENCOUNTER (OUTPATIENT)
Dept: RADIOLOGY | Facility: HOSPITAL | Age: 59
Discharge: HOME | End: 2024-10-17
Attending: NURSE PRACTITIONER
Payer: MEDICARE

## 2024-10-17 VITALS
OXYGEN SATURATION: 99 % | HEIGHT: 64 IN | SYSTOLIC BLOOD PRESSURE: 193 MMHG | WEIGHT: 169.06 LBS | BODY MASS INDEX: 28.86 KG/M2 | HEART RATE: 89 BPM | DIASTOLIC BLOOD PRESSURE: 86 MMHG | TEMPERATURE: 97 F

## 2024-10-17 VITALS
HEART RATE: 81 BPM | HEIGHT: 64 IN | HEIGHT: 64 IN | BODY MASS INDEX: 28.17 KG/M2 | WEIGHT: 165 LBS | DIASTOLIC BLOOD PRESSURE: 82 MMHG | BODY MASS INDEX: 28.17 KG/M2 | SYSTOLIC BLOOD PRESSURE: 147 MMHG | WEIGHT: 165 LBS

## 2024-10-17 DIAGNOSIS — Z79.4 TYPE 2 DIABETES MELLITUS WITH CHRONIC KIDNEY DISEASE ON CHRONIC DIALYSIS, WITH LONG-TERM CURRENT USE OF INSULIN: ICD-10-CM

## 2024-10-17 DIAGNOSIS — Z76.82 PATIENT ON WAITING LIST FOR KIDNEY TRANSPLANT: ICD-10-CM

## 2024-10-17 DIAGNOSIS — Z99.2 ESRD ON DIALYSIS: ICD-10-CM

## 2024-10-17 DIAGNOSIS — Z76.82 ORGAN TRANSPLANT CANDIDATE: ICD-10-CM

## 2024-10-17 DIAGNOSIS — N18.6 TYPE 2 DIABETES MELLITUS WITH CHRONIC KIDNEY DISEASE ON CHRONIC DIALYSIS, WITH LONG-TERM CURRENT USE OF INSULIN: ICD-10-CM

## 2024-10-17 DIAGNOSIS — G45.9 TIA (TRANSIENT ISCHEMIC ATTACK): Primary | ICD-10-CM

## 2024-10-17 DIAGNOSIS — E11.22 TYPE 2 DIABETES MELLITUS WITH CHRONIC KIDNEY DISEASE ON CHRONIC DIALYSIS, WITH LONG-TERM CURRENT USE OF INSULIN: ICD-10-CM

## 2024-10-17 DIAGNOSIS — R91.8 MULTIPLE PULMONARY NODULES: ICD-10-CM

## 2024-10-17 DIAGNOSIS — E05.90 HYPERTHYROIDISM: ICD-10-CM

## 2024-10-17 DIAGNOSIS — I12.0 BENIGN HYPERTENSION WITH ESRD (END-STAGE RENAL DISEASE): ICD-10-CM

## 2024-10-17 DIAGNOSIS — N18.6 BENIGN HYPERTENSION WITH ESRD (END-STAGE RENAL DISEASE): ICD-10-CM

## 2024-10-17 DIAGNOSIS — N18.6 ESRD ON DIALYSIS: ICD-10-CM

## 2024-10-17 DIAGNOSIS — Z99.2 TYPE 2 DIABETES MELLITUS WITH CHRONIC KIDNEY DISEASE ON CHRONIC DIALYSIS, WITH LONG-TERM CURRENT USE OF INSULIN: ICD-10-CM

## 2024-10-17 PROBLEM — N25.81 HYPERPARATHYROIDISM DUE TO ESRD ON DIALYSIS: Status: ACTIVE | Noted: 2023-10-04

## 2024-10-17 LAB
ASCENDING AORTA: 3.17 CM
AV AREA BY CONTINUOUS VTI: 2.5 CM2
AV INDEX (PROSTH): 0.81
AV LVOT MEAN GRADIENT: 3 MMHG
AV LVOT PEAK GRADIENT: 5 MMHG
AV MEAN GRADIENT: 5.5 MMHG
AV PEAK GRADIENT: 11.6 MMHG
AV VALVE AREA BY VELOCITY RATIO: 2 CM²
AV VALVE AREA: 2.6 CM2
AV VELOCITY RATIO: 0.65
BSA FOR ECHO PROCEDURE: 1.84 M2
CV ECHO LV RWT: 0.46 CM
CV PHARM DOSE: 0.4 MG
CV STRESS BASE HR: 81 BPM
DIASTOLIC BLOOD PRESSURE: 82 MMHG
DOP CALC AO PEAK VEL: 1.7 M/S
DOP CALC AO VTI: 39.2 CM
DOP CALC LVOT AREA: 3.1 CM2
DOP CALC LVOT DIAMETER: 2 CM
DOP CALC LVOT PEAK VEL: 1.1 M/S
DOP CALC LVOT STROKE VOLUME: 100.2 CM3
DOP CALCLVOT PEAK VEL VTI: 31.9 CM
E WAVE DECELERATION TIME: 252.12 MS
E/A RATIO: 1.04
E/E' RATIO: 26.17 M/S
ECHO EF ESTIMATED: 52 %
ECHO LV POSTERIOR WALL: 1.1 CM (ref 0.6–1.1)
EJECTION FRACTION- HIGH: 65 %
END DIASTOLIC INDEX-HIGH: 153 ML/M2
END DIASTOLIC INDEX-LOW: 93 ML/M2
END SYSTOLIC INDEX-HIGH: 71 ML/M2
END SYSTOLIC INDEX-LOW: 31 ML/M2
FRACTIONAL SHORTENING: 27.1 % (ref 28–44)
INTERVENTRICULAR SEPTUM: 1.1 CM (ref 0.6–1.1)
IVC DIAMETER: 1.33 CM
LA MAJOR: 6.37 CM
LA MINOR: 6.34 CM
LA WIDTH: 4.53 CM
LEFT ATRIUM SIZE: 4.23 CM
LEFT ATRIUM VOLUME INDEX MOD: 50.8 ML/M2
LEFT ATRIUM VOLUME INDEX: 57.5 ML/M2
LEFT ATRIUM VOLUME MOD: 91.46 ML
LEFT ATRIUM VOLUME: 103.51 CM3
LEFT INTERNAL DIMENSION IN SYSTOLE: 3.5 CM (ref 2.1–4)
LEFT VENTRICLE DIASTOLIC VOLUME INDEX: 58.94 ML/M2
LEFT VENTRICLE DIASTOLIC VOLUME: 106.09 ML
LEFT VENTRICLE MASS INDEX: 107.8 G/M2
LEFT VENTRICLE SYSTOLIC VOLUME INDEX: 28.1 ML/M2
LEFT VENTRICLE SYSTOLIC VOLUME: 50.58 ML
LEFT VENTRICULAR INTERNAL DIMENSION IN DIASTOLE: 4.8 CM (ref 3.5–6)
LEFT VENTRICULAR MASS: 194 G
LV LATERAL E/E' RATIO: 19.63
LV SEPTAL E/E' RATIO: 39.25
MV PEAK A VEL: 1.51 M/S
MV PEAK E VEL: 1.57 M/S
NUC REST DIASTOLIC VOLUME INDEX: 125
NUC REST EJECTION FRACTION: 53
NUC REST SYSTOLIC VOLUME INDEX: 56
NUC STRESS DIASTOLIC VOLUME INDEX: 133
NUC STRESS EJECTION FRACTION: 55 %
NUC STRESS SYSTOLIC VOLUME INDEX: 61
OHS CV CPX 1 MINUTE RECOVERY HEART RATE: 99 BPM
OHS CV CPX 85 PERCENT MAX PREDICTED HEART RATE MALE: 139
OHS CV CPX MAX PREDICTED HEART RATE: 164
OHS CV CPX PATIENT IS FEMALE: 1
OHS CV CPX PATIENT IS MALE: 0
OHS CV CPX PEAK DIASTOLIC BLOOD PRESSURE: 81 MMHG
OHS CV CPX PEAK HEAR RATE: 91 BPM
OHS CV CPX PEAK RATE PRESSURE PRODUCT: NORMAL
OHS CV CPX PEAK SYSTOLIC BLOOD PRESSURE: 186 MMHG
OHS CV CPX PERCENT MAX PREDICTED HEART RATE ACHIEVED: 58
OHS CV CPX RATE PRESSURE PRODUCT PRESENTING: NORMAL
OHS CV INITIAL DOSE: 7.2 MCG/KG/MIN
OHS CV PEAK DOSE: 20.2 MCG/KG/MIN
OHS CV RV/LV RATIO: 0.92 CM
PISA TR MAX VEL: 2.96 M/S
RA MAJOR: 5.2 CM
RA PRESSURE ESTIMATED: 3 MMHG
RA WIDTH: 3.28 CM
RETIRED EF AND QEF - SEE NOTES: 53 %
RIGHT ATRIAL AREA: 12.1 CM2
RIGHT VENTRICLE DIASTOLIC BASEL DIMENSION: 4.4 CM
RV TB RVSP: 6 MMHG
RV TISSUE DOPPLER FREE WALL SYSTOLIC VELOCITY 1 (APICAL 4 CHAMBER VIEW): 18.67 CM/S
SINUS: 2.73 CM
STJ: 2.05 CM
SYSTOLIC BLOOD PRESSURE: 147 MMHG
TDI LATERAL: 0.08 M/S
TDI SEPTAL: 0.04 M/S
TDI: 0.06 M/S
TR MAX PG: 35 MMHG
TRICUSPID ANNULAR PLANE SYSTOLIC EXCURSION: 2.33 CM
TV PEAK GRADIENT: 35 MMHG
TV REST PULMONARY ARTERY PRESSURE: 38 MMHG
Z-SCORE OF LEFT VENTRICULAR DIMENSION IN END DIASTOLE: -0.36
Z-SCORE OF LEFT VENTRICULAR DIMENSION IN END SYSTOLE: 1.02

## 2024-10-17 PROCEDURE — 93306 TTE W/DOPPLER COMPLETE: CPT | Mod: TXP

## 2024-10-17 PROCEDURE — 93306 TTE W/DOPPLER COMPLETE: CPT | Mod: 26,TXP,, | Performed by: INTERNAL MEDICINE

## 2024-10-17 PROCEDURE — 76770 US EXAM ABDO BACK WALL COMP: CPT | Mod: TC,TXP

## 2024-10-17 PROCEDURE — A9502 TC99M TETROFOSMIN: HCPCS | Mod: TXP | Performed by: NURSE PRACTITIONER

## 2024-10-17 PROCEDURE — 3077F SYST BP >= 140 MM HG: CPT | Mod: CPTII,S$GLB,TXP, | Performed by: NURSE PRACTITIONER

## 2024-10-17 PROCEDURE — 93016 CV STRESS TEST SUPVJ ONLY: CPT | Mod: TXP,,, | Performed by: INTERNAL MEDICINE

## 2024-10-17 PROCEDURE — 1159F MED LIST DOCD IN RCRD: CPT | Mod: CPTII,S$GLB,TXP, | Performed by: NURSE PRACTITIONER

## 2024-10-17 PROCEDURE — 93018 CV STRESS TEST I&R ONLY: CPT | Mod: TXP,,, | Performed by: INTERNAL MEDICINE

## 2024-10-17 PROCEDURE — 99999 PR PBB SHADOW E&M-EST. PATIENT-LVL V: CPT | Mod: PBBFAC,TXP,, | Performed by: NURSE PRACTITIONER

## 2024-10-17 PROCEDURE — 78452 HT MUSCLE IMAGE SPECT MULT: CPT | Mod: TXP

## 2024-10-17 PROCEDURE — 63600175 PHARM REV CODE 636 W HCPCS: Mod: TXP | Performed by: NURSE PRACTITIONER

## 2024-10-17 PROCEDURE — 3008F BODY MASS INDEX DOCD: CPT | Mod: CPTII,S$GLB,TXP, | Performed by: NURSE PRACTITIONER

## 2024-10-17 PROCEDURE — 78452 HT MUSCLE IMAGE SPECT MULT: CPT | Mod: 26,TXP,, | Performed by: INTERNAL MEDICINE

## 2024-10-17 PROCEDURE — 4010F ACE/ARB THERAPY RXD/TAKEN: CPT | Mod: CPTII,S$GLB,TXP, | Performed by: NURSE PRACTITIONER

## 2024-10-17 PROCEDURE — 3044F HG A1C LEVEL LT 7.0%: CPT | Mod: CPTII,S$GLB,TXP, | Performed by: NURSE PRACTITIONER

## 2024-10-17 PROCEDURE — 3079F DIAST BP 80-89 MM HG: CPT | Mod: CPTII,S$GLB,TXP, | Performed by: NURSE PRACTITIONER

## 2024-10-17 PROCEDURE — 93017 CV STRESS TEST TRACING ONLY: CPT | Mod: TXP

## 2024-10-17 PROCEDURE — 99215 OFFICE O/P EST HI 40 MIN: CPT | Mod: S$GLB,TXP,, | Performed by: NURSE PRACTITIONER

## 2024-10-17 PROCEDURE — 76770 US EXAM ABDO BACK WALL COMP: CPT | Mod: 26,TXP,, | Performed by: INTERNAL MEDICINE

## 2024-10-17 RX ORDER — REGADENOSON 0.08 MG/ML
0.4 INJECTION, SOLUTION INTRAVENOUS
Status: COMPLETED | OUTPATIENT
Start: 2024-10-17 | End: 2024-10-17

## 2024-10-17 RX ORDER — AMINOPHYLLINE 25 MG/ML
75 INJECTION, SOLUTION INTRAVENOUS ONCE
Status: COMPLETED | OUTPATIENT
Start: 2024-10-17 | End: 2024-10-17

## 2024-10-17 RX ADMIN — TETROFOSMIN 20.2 MILLICURIE: 1.38 INJECTION, POWDER, LYOPHILIZED, FOR SOLUTION INTRAVENOUS at 11:10

## 2024-10-17 RX ADMIN — REGADENOSON 0.4 MG: 0.08 INJECTION, SOLUTION INTRAVENOUS at 11:10

## 2024-10-17 RX ADMIN — AMINOPHYLLINE 75 MG: 25 INJECTION, SOLUTION INTRAVENOUS at 11:10

## 2024-10-17 RX ADMIN — TETROFOSMIN 7.2 MILLICURIE: 1.38 INJECTION, POWDER, LYOPHILIZED, FOR SOLUTION INTRAVENOUS at 09:10

## 2024-10-17 NOTE — PROGRESS NOTES
"     Kidney Transplant Recipient Reevalulation    Referring Physician: Lilia Malin  Current Nephrologist: Lilia Malin  Waitlist Status: inactive  Dialysis Start Date: 2/27/2019    Subjective:     CC:  Annual reassessment of kidney transplant candidacy.    HPI:  Ms. Rocha is a 58 y.o. year old Black or  female with ESRD secondary to diabetic nephropathy.  She has been on the wait list for a kidney transplant at [email or web address] since 8/5/2018. Patient is currently on hemodialysis started on 2/2019. Patient is dialyzing on MWF schedule.  Patient reports that she is tolerating dialysis well. She dialyzes for 3 1/2 hours per session. She has a LUE AV fistula. Patient denies any recent hospitalizations or ED visits.      inactive since 9/3/20  d/t Covid positive symptoms that require physical therapy and further follow-up such as CT chest and Pulmonology consult.     Interval HX:     8/27/24 - treatment for H-Pylori for 14 days, repeat lab is negative     9/2/24 hospitalization: Acute TIA with aphasia/dysarthria   . Stat CT head without contrast obtained in the ED showed no acute intracranial normality. There is mild leukoaraiosis of chronic microvascular ischemia. CTA head and neck showed no evidence of large vessel occlusion. MRI of the brain was benign. Neurology has been consulted. Hospital medicine consulted for further management and admission. Patient started on aspirin 81 mg p.o. daily. Also on Lipitor 40 mg p.o. daily for TIA.     Pt reports she is now on a low dose ASA and is f/u with her cardiologist in [place] to determine the cause of the TIA. She reports palpitations and her cardiologist wants to implant a loop recorder   And she reports may needing a "stronger"  Anticoagulation     9/8/24 Hospitalized Acute respiratory failure--reported from missing HD     Pulmonology    3/6/24 Dr Hatfield: hilar abnormality ais a bronchogenic cyst.  Benign.   Cleared for txp     fx assessment   Unchanged and " Does not appear frail.        NM Thyroid Uptake and Scan    Currently taking Methimazole   Lab Results   Component Value Date    TSH 1.145 10/04/2023     Thyroid US 2/3/2022  1.  Thyroid gland is normal in size with homogenous echotexture.  2.  Scattered subcentimeter simple cysts in both lobes.             Lab /diagnostic results /TXP [facility] reviewed      Renal US: ???  CXR  24: unremarkable   MRA brain WO 24: No acute intracranial abnormality. Mild leukoaraiosis of chronic microvascular ischemia.      PXR:  2022-No acute process seen   Iliacs : 19 per surgery results favorable for transplant    Colonoscopy: 2017 normal  PAP: 2022 negative  MM23 negative   GYN follow-up: < 6 months ago  Results for orders placed during the hospital encounter of 23    Hyperthyroid-- Last seen by endocrinology on 10/4/23--Etiology determined to be: Graves negative antibodies , Continue methimazole 5 mg daily , NM scan in  showed no nodules         Echo 10/17/24  Lexiscan 10/17/24       3/6/24 Dr Hatfield: hilar abnormality ais a bronchogenic cyst.  Benign.   Cleared for txp      24 Chest CT WO:    1. No significant interval change in left infrahilar oval-shaped masslike density.  2. Overall stable innumerable subcentimeter pulmonary nodes  3. No new incidental findings.    11/10/2022 CT chest   1.  No evidence of pulmonary embolus.    2.  Numerous bilateral pulmonary nodules which have increased when compared to previous study. This may be secondary to an infectious/inflammatory process although pulmonary metastases may not be excluded. Abnormal left infrahilar lymphadenopathy    10/5/2022 PET CT  Impression:  1. FDG avid subcentimeter left adrenal nodule.  No discrete nodule is identified on the CT portion of the exam.  2. No other FDG avid disease.  3. Sub 3 cm left lower lobe nodule demonstrates no FDG uptake above background.  The other subcentimeter pulmonary nodules demonstrate no FDG  uptake.  These nodules appear similar in size and extent.  4. No FDG avid thoracic lymphadenopathy.  Previously seen and described lymph nodes are similar in size compared to 09/15/2022.      Lab Results   Component Value Date    HGBA1C 4.8 09/03/2024       Past Medical History:   Diagnosis Date    Anemia     Anxiety     Diabetes mellitus     Disorder of kidney and ureter     Encounter for blood transfusion     GERD (gastroesophageal reflux disease)     Hydronephrosis     Hyperlipidemia     Hypertension     Hyperthyroidism     Obesity     Thyroid disease        Review of Systems   Constitutional:  Positive for fatigue and unexpected weight change. Negative for activity change, appetite change, chills and fever.   HENT:  Negative for congestion, facial swelling, postnasal drip, rhinorrhea, sinus pressure, sore throat and trouble swallowing.    Eyes:  Positive for visual disturbance. Negative for pain and redness.        Wears glasses    Respiratory:  Negative for cough, chest tightness, shortness of breath and wheezing.    Cardiovascular:  Negative for chest pain, palpitations and leg swelling.   Gastrointestinal:  Negative for abdominal pain, diarrhea, nausea and vomiting.   Genitourinary:  Positive for decreased urine volume. Negative for dysuria, flank pain and urgency.   Musculoskeletal:  Positive for arthralgias. Negative for gait problem, neck pain and neck stiffness.   Skin:  Negative for rash.   Allergic/Immunologic: Negative for environmental allergies, food allergies and immunocompromised state.   Neurological:  Negative for dizziness, weakness, light-headedness and headaches.   Psychiatric/Behavioral:  Positive for sleep disturbance. Negative for agitation and confusion. The patient is nervous/anxious.         Hx depression  tearful today        Objective:   body mass index is 29.02 kg/m².    BP (!) 193/86 (BP Location: Right arm, Patient Position: Sitting)   Pulse 89   Temp 97.3 °F (36.3 °C) (Temporal)   " Ht 5' 4" (1.626 m)   Wt 76.7 kg (169 lb 1.5 oz)   SpO2 99%   BMI 29.02 kg/m²         Physical Exam  Vitals reviewed.   Constitutional:       Appearance: Normal appearance. She is well-developed.      Comments: tearful   HENT:      Head: Normocephalic.      Mouth/Throat:      Pharynx: No oropharyngeal exudate.   Eyes:      General: No scleral icterus.     Conjunctiva/sclera: Conjunctivae normal.      Pupils: Pupils are equal, round, and reactive to light.   Cardiovascular:      Rate and Rhythm: Normal rate and regular rhythm.      Heart sounds: Normal heart sounds.   Pulmonary:      Effort: Pulmonary effort is normal.      Breath sounds: Normal breath sounds.   Abdominal:      General: Bowel sounds are normal. There is no distension.      Palpations: Abdomen is soft. There is no hepatomegaly, splenomegaly or mass.      Tenderness: There is no abdominal tenderness. There is no guarding or rebound. Negative signs include Steele's sign and McBurney's sign.   Musculoskeletal:         General: Normal range of motion.        Arms:       Cervical back: Normal range of motion and neck supple.   Lymphadenopathy:      Cervical: No cervical adenopathy.   Skin:     General: Skin is warm and dry.   Neurological:      Mental Status: She is alert and oriented to person, place, and time.      Motor: No abnormal muscle tone.      Coordination: Coordination normal.   Psychiatric:         Behavior: Behavior normal.         Labs:  Lab Results   Component Value Date    WBC 8.1 08/15/2023    HGB 9.7 (L) 08/15/2023    HCT 30.6 (L) 08/15/2023     09/10/2024    K 4.3 09/10/2024    CL 96 02/27/2024    CO2 26 09/10/2024    BUN 19 09/10/2024    CREATININE 5.38 (H) 09/10/2024    EGFRNONAA 5.7 (A) 11/05/2020    GLUCOSE 157 (H) 09/10/2024    CALCIUM 8.9 09/10/2024    PHOS 7.4 (H) 08/21/2019    ALBUMIN 3.7 09/10/2024    AST 19 09/10/2024    ALT 18 09/10/2024    .7 (H) 06/16/2023       Lab Results   Component Value Date    " "BILIRUBINUA Negative 05/07/2021       No results found for: "HLAABCTYPE"    Lab Results   Component Value Date    CPRA 29 06/26/2024    HR8DZEN Cw18 06/26/2024    CIABCLM WEAK Cw2 06/26/2024    CIIAB DR15,DR16 06/26/2024    ABCMT WEAK DR51 03/11/2024       Labs were reviewed with the patient.    Pre-transplant Workup:   Reviewed with the patient.    Assessment:     1. TIA (transient ischemic attack)    2. Patient on waiting list for kidney transplant    3. ESRD on dialysis    4. Type 2 diabetes mellitus with chronic kidney disease on chronic dialysis, with long-term current use of insulin    5. Benign hypertension with ESRD (end-stage renal disease)    6. Multiple pulmonary nodules    7. Hyperthyroidism          Plan:      9/2/24 hospitalization:  dx Acute TIA with aphasia/dysarthria    Has f/u with local cardiology since hospitalization  Pt reports she is now on a low dose ASA and is f/u with her cardiologist in [place] to determine the cause of the TIA. She reports palpitations and her cardiologist wants to implant a loop recorder   And she reports may needing a "stronger"  Anticoagulation . She follows up the first week of November.   -will need UTD cardiology clearance and recs     Fx assessment--acceptable     Transplant Candidacy:   Ms. Rocha is an unacceptable kidney transplant candidate, see above.  Meets center eligibility for accepting HCV+ donor offer - yes.  Patient educated on HCV+ donors. Kiesha is willing  to accept HCV+ donor offer -  yes   Patient is a candidate for KDPI > 85 kidney donor offer - no. Due to weight  She has experienced health changes that warrant further investigation.  Patient will be placed in an inactive status at present. Pending thyroid uptake and dexa . Patient will loose teeth and has not been seen by a dentist. Instructed to follow-up with dentist for evaluation.     Caitlin Stevens NP       Follow-up:   In addition to the tests noted in the plan, Ms. Rocha will " continue to have reevaluation as per the standing pre-kidney transplant protocol:  Monthly blood for PRA  Annual return to clinic, except HIV positive, > 65 years of age, or pancreas transplant candidates who will be scheduled to see transplant every 6 months while in pre-transplant phase  Annual re-testing: CXR, EKG, yearly mammograms for women over 40 and PSA for males over 40, cardiology follow-up as recommended by initial cardiology pre-transplant evaluation  Renal ultrasound every 2 years  Baseline colonoscopy after age 50 and repeated as recommended    UNOS Patient Status  Functional Status: 60% - Requires occasional assistance but is able to care for needs  Physical Capacity: No Limitations

## 2024-10-17 NOTE — PROGRESS NOTES
AA YEARLY PATIENT EDUCATION NOTE    Ms. Kiesha Rocha was seen in pre-kidney transplant clinic for yearly (or six months) evaluation for kidney, kidney/pancreas or pancreas only transplant.  The patient attended a web based education session that discussed/reviewed the following aspects of transplantation: UNOS waitlist management/multiple listings, types of organs offered (KDPI < 85%, KDPI > 85%, PHS increased risk, DCD, HCV+), financial aspects, surgical procedures, dietary instruction pre- and post-transplant, health maintenance pre- and post-transplant, post-transplant hospitalization and outpatient follow-up, potential to participate in a research protocol, and medication management and side effects. A question and answer session was provided after the presentation.    The patient was seen by all members of the multi-disciplinary team to include: Nephrologist/PA, , , Pharmacist and Dietician (if applicable).    The Patient was educated on OPTN policy change regarding race based eGFR. For Black or  Americans, this eGFR could have shown that their kidneys were working better than they were.    Because of this change, we are looking at everyones record and assessing waiting time for people who are eligible. We will be reviewing your medical records and will notify you if you are eligible. We also encouraged patient to provide span 20 labs that are not in our electronic medical records.    The patient reviewed and signed all consents for evaluation which were witnessed and sent to scanning into the [facility] chart.    The patient was given an education book and plan for further evaluation based on her individual assessment.      The patient was encouraged to call with any questions or concerns.

## 2024-10-17 NOTE — LETTER
October 21, 2024        Lilia Malin  [address] [address] [address]  Phone: [phone]  Fax: [phone]             [facility]- Transplant 1st Fl  [address]  [facility] [address]  Phone: [phone]   Patient: Kiesha Rocha   MR Number: [id number]   YOB: 1965   Date of Visit: 10/17/2024       Dear Dr. Lilia Malin    Thank you for referring Kiesha Rocha to me for evaluation. Attached you will find relevant portions of my assessment and plan of care.    If you have questions, please do not hesitate to call me. I look forward to following Kiesha Rocha along with you.    Sincerely,    Caitlin Stevens, NP    Enclosure    If you would like to receive this communication electronically, please contact [email or web address] or [phone] to request [employer] access.    [employer] is a tool which provides read-only access to select patient information with whom you have a relationship. Its easy to use and provides real time access to review your patients record including encounter summaries, notes, results, and demographic information.    If you feel you have received this communication in error or would no longer like to receive these types of communications, please e-mail [email or web address]

## 2024-10-22 NOTE — PROGRESS NOTES
"Transplant Psychosocial Evaluation Update:  Last psychosocial evaluation for transplant was completed on 2023 by ELIE Ware    Pt presents alone today. Pt is typically is typically accompanied by pt's daughter/primary caregiver, Tere Rocha. Pt presents as alert, oriented to person, place, time, purpose of visit.     PREVIOUSLY REPORTED:   Pt presents as anxious and cried throughout majority of interview. Pt's daughter reports multiple losses in pt's family recently with pt's sister dying within the last month. Pt reports being unaware of inactive status on waitlist. SW confirmed with pt's nurse coordinator, Tana Paul RN while in room with pt that pt has been inactive for 3 years. Pt appeared overwhelmed and reports not knowing reason for being inactive and pt unsure of what steps to take to be active again. SW confirmed pt's nurse coordinator, Tana Paul RN will reach out to pt to discuss becoming active again, pt voiced understanding.   Mental Health: Pt has history of anxiety and depression. Pt and daughter report pt's anxiety was well managed until recently when pt's sister . Pt reports having "meltdowns" at home with crying and feeling overwhelmed with grief. Pt taking 40 mg Prozac daily, prescribed by PCP. Pt reports Prozac not as effective lately and pt reports having an "anxiety attack" earlier today. SW, pt, and pt's daughter discussed pt needing to manage mental health concerns and pt expressed interest in engaging in outpatient mental health services. Pt requested information on any spiritual therapists in her area, SW agreed to call pt by end of day 2023 to discuss therapy resources.     Today, pt reports she is now managing well grief well and is highly supported. Pt denies frequent crying spells and overwhelming anxiety experiences. Pt did express some concerns with inactive status and stressing to submit required documentation.      Primary Caregivers and Transportation for " Transplant:  1. Juliette Huggins, [place], [place]; 33 yo daughter, unemployed/housewife, [address], [phone]  2. Pantera Huggins, son-in-law/ of Juliette, FARSHAD , [address], [phone]  3. Darlene Rocha, 30 yo daughter, lives w/ pt has 2 young kids, [address], FT CNA at [employer], [phone]  4. Moo Rodrigez, 59 yo , retired, [address], [phone]    Both pt and caregiver/s plan to stay locally at  apartments or hotel of choice - information reviewed in depth. Caregivers plan to stay at hospital as appropriate for transplant and post-transplant process.    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 Pt receives $800/month in SSD and $600/month in SNAP.     DME: [facility], glucometer, cane- uses as needed     ADLS:  Pt reports independent with all ADLS, drives, and handles own medication management.      Household and Dependents: pt resides with 59 yo  Moo Rodrigez, 30 yo daughter Darlene Rocha, 6 yo granddaughter Ananth Richards and 3 yo granddaughter Rachelle. Children will be cared for by their mother, Darlene, during and after transplant.     Income: Pt states ability to afford all monthly expenses and costs including for medications now and if transplanted based on income and on savings and assets.    Dialysis Adherence: [facility] ([phone]) MWF. Dialysis adherence form faxed to NIURKA and RICHARD awaits reply. RICHARD spoke with dialysis social worker Atiya who reports aware of pt's recent losses and agreed to assist and encourage pt to engage in therapy.     Suitability for Transplant:   Pt is low risk for transplant at this time based on reported mental health concerns. Pt's mental health will need to be re-evaluated before being re-activated on kidney transplant waiting list and when receiving any organ offers.     Elias Gleason, CALIN, LMSW

## 2024-11-12 ENCOUNTER — TELEPHONE (OUTPATIENT)
Dept: TRANSPLANT | Facility: CLINIC | Age: 59
End: 2024-11-12
Payer: MEDICARE

## 2024-11-12 NOTE — TELEPHONE ENCOUNTER
"----- Message from Divya Martinez sent at 11/11/2024  1:38 PM CST -----  Regarding: FW: call back    ----- Message -----  From: Juliocesar Wade  Sent: 11/11/2024  12:19 PM CST  To: Kidney Waitlisted Coordinator  Subject: call back                                        Consult/Advisory:        Name Of Caller: Self     Contact Preference?:[phone]     What is the nature of the call?: Calling to speak w/ someone in regards to transplant status         Additional Notes:  "Thank you for all that you do for our patients"  "

## 2024-11-18 ENCOUNTER — TELEPHONE (OUTPATIENT)
Dept: TRANSPLANT | Facility: CLINIC | Age: 59
End: 2024-11-18
Payer: MEDICARE

## 2024-11-18 NOTE — TELEPHONE ENCOUNTER
Attempted to contact patient, mailbox full. She remains inactive for abnormal stress test. Received records from Dr. Gore. Noted she had a loop recorder inserted on 10/22/24, no mention of abnormal stress test (records were sent to Dr. Gore and they faxed the reports back to me). No clearance received.

## 2024-11-19 ENCOUNTER — TELEPHONE (OUTPATIENT)
Dept: TRANSPLANT | Facility: CLINIC | Age: 59
End: 2024-11-19
Payer: MEDICARE

## 2024-11-19 NOTE — TELEPHONE ENCOUNTER
Left voicemail for patient to inquire about what her Cardiologist has told her regarding abnormal stress test.

## 2024-11-25 ENCOUNTER — TELEPHONE (OUTPATIENT)
Dept: TRANSPLANT | Facility: CLINIC | Age: 59
End: 2024-11-25
Payer: MEDICARE

## 2024-11-25 NOTE — TELEPHONE ENCOUNTER
Spoke to patient, she states Dr. Gore did an EKG today and she will have another stress test on 12/3/24. She states the Loop Recorder reported normal heart activity. Will await new stress test and cardiac clearance to reactivate.

## 2024-11-25 NOTE — TELEPHONE ENCOUNTER
"Called patient to inquire about what Cardiologist's plan is, unable to leave voicemail due to "mailbox full".   "

## 2024-11-25 NOTE — TELEPHONE ENCOUNTER
"----- Message from Bautista sent at 11/25/2024 10:23 AM CST -----  Consult/Advisory    Name Of Caller: Self    Contact Preference?: [phone]    What is the nature of the call?: Returning call to Tana    Additional Notes:  "Thank you for all that you do for our patients"  "

## 2024-12-12 ENCOUNTER — TELEPHONE (OUTPATIENT)
Dept: TRANSPLANT | Facility: CLINIC | Age: 59
End: 2024-12-12
Payer: MEDICARE

## 2024-12-12 NOTE — TELEPHONE ENCOUNTER
----- Message from Judie sent at 12/12/2024  1:26 PM CST -----  Regarding: Patient advice  Contact: Yari  [phone]        Name of Caller:  Yari with [facility]      Contact Preference: [phone]     Nature of Call: Requesting a call to get pt list status

## 2024-12-19 DIAGNOSIS — Z76.82 PRE-KIDNEY TRANSPLANT, LISTED: Primary | ICD-10-CM

## 2024-12-27 ENCOUNTER — TELEPHONE (OUTPATIENT)
Dept: TRANSPLANT | Facility: CLINIC | Age: 59
End: 2024-12-27
Payer: MEDICARE

## 2024-12-27 NOTE — TELEPHONE ENCOUNTER
I attempted to return patient's call. The number patient gave to return call  has a full mailbox and I was unable to leave a [facility].    ----- Message from Divya Martinez sent at 12/27/2024 12:07 PM CST -----  Regarding: FW: Consult/Advisory  Contact: Kiesha    ----- Message -----  From: Jenna Rodriguez  Sent: 12/27/2024  10:27 AM CST  To: Kidney Waitlisted Coordinator  Subject: Consult/Advisory                                 Consult/Advisory     Name Of Caller: Kiesha Rocha          Contact Preference: [phone] (Mobile) , requesting a call back.        Nature of call: pt is requesting to speak with staff in regards to a letter she received in regards to a gyn and endo appt. Please advise.

## 2025-01-28 ENCOUNTER — TELEPHONE (OUTPATIENT)
Dept: TRANSPLANT | Facility: CLINIC | Age: 60
End: 2025-01-28
Payer: MEDICARE

## 2025-01-28 NOTE — TELEPHONE ENCOUNTER
Multiple attempts made to contact patient, goes to voicemail and the mailbox is full. Sent letter on 12/19/24 for gyn and endocrine consult to be scheduled locally. Pt remains inactive.

## 2025-02-03 ENCOUNTER — TELEPHONE (OUTPATIENT)
Dept: ENDOCRINOLOGY | Facility: CLINIC | Age: 60
End: 2025-02-03
Payer: MEDICARE

## 2025-02-11 ENCOUNTER — TELEPHONE (OUTPATIENT)
Dept: TRANSPLANT | Facility: CLINIC | Age: 60
End: 2025-02-11
Payer: MEDICARE

## 2025-02-11 NOTE — TELEPHONE ENCOUNTER
Case reviewed with Eleazar Browne to reactivate patient on the kidney wait list. Encourage patient to continue to follow up with Endocrine as recommended for thyroid.

## 2025-04-11 ENCOUNTER — TELEPHONE (OUTPATIENT)
Dept: TRANSPLANT | Facility: CLINIC | Age: 60
End: 2025-04-11
Payer: MEDICARE

## 2025-04-11 NOTE — TELEPHONE ENCOUNTER
Returned call, spoke to patient, informed her that she is active on the list and eligible for organ offers at any time.

## 2025-04-11 NOTE — TELEPHONE ENCOUNTER
"----- Message from Bautista sent at 4/11/2025 11:42 AM CDT -----  Consult/AdvisoryName Of Caller: SelfContact Preference?: [phone]: Yodit is the nature of the call?: Inquiring about current status. Also inquiring about the next stepAdditional Notes:"Thank you for all that you do for our patients"  "

## 2025-04-21 ENCOUNTER — TELEPHONE (OUTPATIENT)
Dept: TRANSPLANT | Facility: CLINIC | Age: 60
End: 2025-04-21
Payer: MEDICARE

## 2025-04-22 NOTE — TELEPHONE ENCOUNTER
ON-CALL NOTE    UNOS# [id number]    Notified by Bentley Subramanian, , that Kiesha Rocha is eligible for kidney offer.  Spoke with patient and identified no acute medical issues with telephone assessment. Protocol script read to patient regarding HCV+ donor offer. Patient verbalized understanding, all questions answered, patient accepts organ offer. Notified by Bentley Subramanian that virtual crossmatch is negative.  Current sample of blood is available from date 4/2/25 for crossmatch.  Patient reports no sensitizing event since last blood sample for PRA received. Needs retro crossmatch if admitted.     Patient notified of plan to remain on standby as a backup and states understanding.  Donor OR TBD.    4/22/25 - 0800 - report given to Divya Marquez RN

## 2025-04-24 ENCOUNTER — ANESTHESIA EVENT (OUTPATIENT)
Dept: SURGERY | Facility: HOSPITAL | Age: 60
DRG: 652 | End: 2025-04-24
Payer: MEDICARE

## 2025-04-24 ENCOUNTER — ANESTHESIA (OUTPATIENT)
Dept: SURGERY | Facility: HOSPITAL | Age: 60
DRG: 652 | End: 2025-04-24
Payer: MEDICARE

## 2025-04-24 ENCOUNTER — TELEPHONE (OUTPATIENT)
Dept: TRANSPLANT | Facility: CLINIC | Age: 60
End: 2025-04-24
Payer: MEDICARE

## 2025-04-24 ENCOUNTER — HOSPITAL ENCOUNTER (INPATIENT)
Facility: HOSPITAL | Age: 60
LOS: 3 days | Discharge: HOME | DRG: 652 | End: 2025-04-27
Attending: TRANSPLANT SURGERY | Admitting: TRANSPLANT SURGERY
Payer: MEDICARE

## 2025-04-24 DIAGNOSIS — D62 ACUTE BLOOD LOSS ANEMIA: ICD-10-CM

## 2025-04-24 DIAGNOSIS — Z99.2 ESRD ON DIALYSIS: ICD-10-CM

## 2025-04-24 DIAGNOSIS — I10 HYPERTENSION, UNSPECIFIED TYPE: ICD-10-CM

## 2025-04-24 DIAGNOSIS — Z76.82 AWAITING ORGAN TRANSPLANT STATUS: Primary | ICD-10-CM

## 2025-04-24 DIAGNOSIS — Z94.0 S/P KIDNEY TRANSPLANT: Primary | ICD-10-CM

## 2025-04-24 DIAGNOSIS — Z01.811 PRE-OP CHEST EXAM: ICD-10-CM

## 2025-04-24 DIAGNOSIS — D63.8 ANEMIA OF CHRONIC DISEASE: ICD-10-CM

## 2025-04-24 DIAGNOSIS — N18.6 TYPE 2 DIABETES MELLITUS WITH CHRONIC KIDNEY DISEASE ON CHRONIC DIALYSIS, WITH LONG-TERM CURRENT USE OF INSULIN: ICD-10-CM

## 2025-04-24 DIAGNOSIS — Z91.89 AT RISK FOR OPPORTUNISTIC INFECTIONS: ICD-10-CM

## 2025-04-24 DIAGNOSIS — N18.6 ESRD (END STAGE RENAL DISEASE): Primary | ICD-10-CM

## 2025-04-24 DIAGNOSIS — N18.6 END STAGE KIDNEY DISEASE: ICD-10-CM

## 2025-04-24 DIAGNOSIS — E11.22 TYPE 2 DIABETES MELLITUS WITH CHRONIC KIDNEY DISEASE ON CHRONIC DIALYSIS, WITH LONG-TERM CURRENT USE OF INSULIN: ICD-10-CM

## 2025-04-24 DIAGNOSIS — Z79.60 LONG-TERM USE OF IMMUNOSUPPRESSANT MEDICATION: ICD-10-CM

## 2025-04-24 DIAGNOSIS — Z79.4 TYPE 2 DIABETES MELLITUS WITH CHRONIC KIDNEY DISEASE ON CHRONIC DIALYSIS, WITH LONG-TERM CURRENT USE OF INSULIN: ICD-10-CM

## 2025-04-24 DIAGNOSIS — N18.6 ESRD ON DIALYSIS: ICD-10-CM

## 2025-04-24 DIAGNOSIS — Z99.2 TYPE 2 DIABETES MELLITUS WITH CHRONIC KIDNEY DISEASE ON CHRONIC DIALYSIS, WITH LONG-TERM CURRENT USE OF INSULIN: ICD-10-CM

## 2025-04-24 DIAGNOSIS — Z29.89 PROPHYLACTIC IMMUNOTHERAPY: ICD-10-CM

## 2025-04-24 LAB
ABSOLUTE EOSINOPHIL (OHS): 0.01 K/UL
ABSOLUTE EOSINOPHIL (OHS): 1.32 K/UL
ABSOLUTE MONOCYTE (OHS): 0.26 K/UL (ref 0.3–1)
ABSOLUTE MONOCYTE (OHS): 0.74 K/UL (ref 0.3–1)
ABSOLUTE NEUTROPHIL COUNT (OHS): 12.89 K/UL (ref 1.8–7.7)
ABSOLUTE NEUTROPHIL COUNT (OHS): 6.77 K/UL (ref 1.8–7.7)
ALBUMIN SERPL BCP-MCNC: 2.9 G/DL (ref 3.5–5.2)
ALBUMIN SERPL BCP-MCNC: 3.1 G/DL (ref 3.5–5.2)
ALBUMIN SERPL BCP-MCNC: 3.2 G/DL (ref 3.5–5.2)
ALP SERPL-CCNC: 77 UNIT/L (ref 40–150)
ALT SERPL W/O P-5'-P-CCNC: 6 UNIT/L (ref 10–44)
ANION GAP (OHS): 11 MMOL/L (ref 8–16)
ANION GAP (OHS): 13 MMOL/L (ref 8–16)
ANION GAP (OHS): 14 MMOL/L (ref 8–16)
APTT PPP: 23.4 SECONDS (ref 21–32)
AST SERPL-CCNC: 13 UNIT/L (ref 11–45)
BASOPHILS # BLD AUTO: 0.02 K/UL
BASOPHILS # BLD AUTO: 0.12 K/UL
BASOPHILS NFR BLD AUTO: 0.1 %
BASOPHILS NFR BLD AUTO: 1.1 %
BILIRUB SERPL-MCNC: 0.9 MG/DL (ref 0.1–1)
BUN SERPL-MCNC: 22 MG/DL (ref 6–20)
BUN SERPL-MCNC: 22 MG/DL (ref 6–20)
BUN SERPL-MCNC: 27 MG/DL (ref 6–20)
CALCIUM SERPL-MCNC: 8 MG/DL (ref 8.7–10.5)
CALCIUM SERPL-MCNC: 8.3 MG/DL (ref 8.7–10.5)
CALCIUM SERPL-MCNC: 9.2 MG/DL (ref 8.7–10.5)
CHLORIDE SERPL-SCNC: 102 MMOL/L (ref 95–110)
CHLORIDE SERPL-SCNC: 104 MMOL/L (ref 95–110)
CHLORIDE SERPL-SCNC: 104 MMOL/L (ref 95–110)
CO2 SERPL-SCNC: 19 MMOL/L (ref 23–29)
CO2 SERPL-SCNC: 22 MMOL/L (ref 23–29)
CO2 SERPL-SCNC: 24 MMOL/L (ref 23–29)
CREAT SERPL-MCNC: 5.6 MG/DL (ref 0.5–1.4)
CREAT SERPL-MCNC: 5.7 MG/DL (ref 0.5–1.4)
CREAT SERPL-MCNC: 6.1 MG/DL (ref 0.5–1.4)
EAG (OHS): 108 MG/DL (ref 68–131)
ERYTHROCYTE [DISTWIDTH] IN BLOOD BY AUTOMATED COUNT: 17.8 % (ref 11.5–14.5)
ERYTHROCYTE [DISTWIDTH] IN BLOOD BY AUTOMATED COUNT: 18.2 % (ref 11.5–14.5)
GFR SERPLBLD CREATININE-BSD FMLA CKD-EPI: 8 ML/MIN/1.73/M2
GLUCOSE SERPL-MCNC: 141 MG/DL (ref 70–110)
GLUCOSE SERPL-MCNC: 214 MG/DL (ref 70–110)
GLUCOSE SERPL-MCNC: 301 MG/DL (ref 70–110)
HBA1C MFR BLD: 5.4 % (ref 4–5.6)
HBV CORE AB SERPL QL IA: NORMAL
HBV CORE IGM SERPL QL IA: NORMAL
HBV SURFACE AG SERPL QL IA: NORMAL
HCT VFR BLD AUTO: 32.4 % (ref 37–48.5)
HCT VFR BLD AUTO: 32.7 % (ref 37–48.5)
HCT VFR BLD AUTO: 34.4 % (ref 37–48.5)
HCV AB SERPL QL IA: NORMAL
HGB BLD-MCNC: 10 GM/DL (ref 12–16)
HGB BLD-MCNC: 9.9 GM/DL (ref 12–16)
HIV 1+2 AB+HIV1 P24 AG SERPL QL IA: NORMAL
IMM GRANULOCYTES # BLD AUTO: 0.06 K/UL (ref 0–0.04)
IMM GRANULOCYTES # BLD AUTO: 0.08 K/UL (ref 0–0.04)
IMM GRANULOCYTES NFR BLD AUTO: 0.5 % (ref 0–0.5)
IMM GRANULOCYTES NFR BLD AUTO: 0.6 % (ref 0–0.5)
INDIRECT COOMBS: NORMAL
INR PPP: 1 (ref 0.8–1.2)
LYMPHOCYTES # BLD AUTO: 0.09 K/UL (ref 1–4.8)
LYMPHOCYTES # BLD AUTO: 2.25 K/UL (ref 1–4.8)
MCH RBC QN AUTO: 27.2 PG (ref 27–31)
MCH RBC QN AUTO: 27.5 PG (ref 27–31)
MCHC RBC AUTO-ENTMCNC: 30.3 G/DL (ref 32–36)
MCHC RBC AUTO-ENTMCNC: 30.9 G/DL (ref 32–36)
MCV RBC AUTO: 88 FL (ref 82–98)
MCV RBC AUTO: 91 FL (ref 82–98)
NUCLEATED RBC (/100WBC) (OHS): 0 /100 WBC
NUCLEATED RBC (/100WBC) (OHS): 0 /100 WBC
OHS QRS DURATION: 88 MS
OHS QTC CALCULATION: 481 MS
PHOSPHATE SERPL-MCNC: 3.8 MG/DL (ref 2.7–4.5)
PHOSPHATE SERPL-MCNC: 4 MG/DL (ref 2.7–4.5)
PHOSPHATE SERPL-MCNC: 4.7 MG/DL (ref 2.7–4.5)
PLATELET # BLD AUTO: 185 K/UL (ref 150–450)
PLATELET # BLD AUTO: 202 K/UL (ref 150–450)
PMV BLD AUTO: 12.5 FL (ref 9.2–12.9)
PMV BLD AUTO: 12.7 FL (ref 9.2–12.9)
POCT GLUCOSE: 138 MG/DL (ref 70–110)
POCT GLUCOSE: 241 MG/DL (ref 70–110)
POCT GLUCOSE: 252 MG/DL (ref 70–110)
POCT GLUCOSE: 387 MG/DL (ref 70–110)
POTASSIUM SERPL-SCNC: 3.8 MMOL/L (ref 3.5–5.1)
POTASSIUM SERPL-SCNC: 4.2 MMOL/L (ref 3.5–5.1)
POTASSIUM SERPL-SCNC: 4.3 MMOL/L (ref 3.5–5.1)
PROT SERPL-MCNC: 8.1 GM/DL (ref 6–8.4)
PROTHROMBIN TIME: 11.2 SECONDS (ref 9–12.5)
RBC # BLD AUTO: 3.6 M/UL (ref 4–5.4)
RBC # BLD AUTO: 3.67 M/UL (ref 4–5.4)
RELATIVE EOSINOPHIL (OHS): 0.1 %
RELATIVE EOSINOPHIL (OHS): 11.7 %
RELATIVE LYMPHOCYTE (OHS): 0.7 % (ref 18–48)
RELATIVE LYMPHOCYTE (OHS): 20 % (ref 18–48)
RELATIVE MONOCYTE (OHS): 1.9 % (ref 4–15)
RELATIVE MONOCYTE (OHS): 6.6 % (ref 4–15)
RELATIVE NEUTROPHIL (OHS): 60.1 % (ref 38–73)
RELATIVE NEUTROPHIL (OHS): 96.6 % (ref 38–73)
RH BLD: NORMAL
SODIUM SERPL-SCNC: 135 MMOL/L (ref 136–145)
SODIUM SERPL-SCNC: 137 MMOL/L (ref 136–145)
SODIUM SERPL-SCNC: 141 MMOL/L (ref 136–145)
SPECIMEN OUTDATE: NORMAL
WBC # BLD AUTO: 11.26 K/UL (ref 3.9–12.7)
WBC # BLD AUTO: 13.35 K/UL (ref 3.9–12.7)

## 2025-04-24 PROCEDURE — 63600175 PHARM REV CODE 636 W HCPCS: Mod: NTX | Performed by: PHYSICIAN ASSISTANT

## 2025-04-24 PROCEDURE — 0TY00Z0 TRANSPLANTATION OF RIGHT KIDNEY, ALLOGENEIC, OPEN APPROACH: ICD-10-PCS | Performed by: TRANSPLANT SURGERY

## 2025-04-24 PROCEDURE — 71000015 HC POSTOP RECOV 1ST HR: Performed by: TRANSPLANT SURGERY

## 2025-04-24 PROCEDURE — 36000931 HC OR TIME LEV VII EA ADD 15 MIN: Performed by: TRANSPLANT SURGERY

## 2025-04-24 PROCEDURE — 25000003 PHARM REV CODE 250

## 2025-04-24 PROCEDURE — C2617 STENT, NON-COR, TEM W/O DEL: HCPCS | Performed by: TRANSPLANT SURGERY

## 2025-04-24 PROCEDURE — 83036 HEMOGLOBIN GLYCOSYLATED A1C: CPT

## 2025-04-24 PROCEDURE — 93005 ELECTROCARDIOGRAM TRACING: CPT | Mod: NTX

## 2025-04-24 PROCEDURE — 87522 HEPATITIS C REVRS TRNSCRPJ: CPT | Mod: NTX | Performed by: PHYSICIAN ASSISTANT

## 2025-04-24 PROCEDURE — 20600001 HC STEP DOWN PRIVATE ROOM

## 2025-04-24 PROCEDURE — 86803 HEPATITIS C AB TEST: CPT | Mod: NTX | Performed by: PHYSICIAN ASSISTANT

## 2025-04-24 PROCEDURE — 86704 HEP B CORE ANTIBODY TOTAL: CPT | Mod: NTX | Performed by: PHYSICIAN ASSISTANT

## 2025-04-24 PROCEDURE — 85610 PROTHROMBIN TIME: CPT | Mod: NTX | Performed by: PHYSICIAN ASSISTANT

## 2025-04-24 PROCEDURE — 63600175 PHARM REV CODE 636 W HCPCS: Performed by: ANESTHESIOLOGY

## 2025-04-24 PROCEDURE — 36415 COLL VENOUS BLD VENIPUNCTURE: CPT | Mod: NTX | Performed by: PHYSICIAN ASSISTANT

## 2025-04-24 PROCEDURE — 63600175 PHARM REV CODE 636 W HCPCS

## 2025-04-24 PROCEDURE — 37000008 HC ANESTHESIA 1ST 15 MINUTES: Performed by: TRANSPLANT SURGERY

## 2025-04-24 PROCEDURE — 71000033 HC RECOVERY, INTIAL HOUR: Performed by: TRANSPLANT SURGERY

## 2025-04-24 PROCEDURE — 84100 ASSAY OF PHOSPHORUS: CPT | Performed by: PHYSICIAN ASSISTANT

## 2025-04-24 PROCEDURE — 87389 HIV-1 AG W/HIV-1&-2 AB AG IA: CPT | Mod: NTX | Performed by: PHYSICIAN ASSISTANT

## 2025-04-24 PROCEDURE — 27201423 OPTIME MED/SURG SUP & DEVICES STERILE SUPPLY: Performed by: TRANSPLANT SURGERY

## 2025-04-24 PROCEDURE — 80053 COMPREHEN METABOLIC PANEL: CPT | Mod: NTX | Performed by: PHYSICIAN ASSISTANT

## 2025-04-24 PROCEDURE — S5010 5% DEXTROSE AND 0.45% SALINE: HCPCS

## 2025-04-24 PROCEDURE — 86705 HEP B CORE ANTIBODY IGM: CPT | Mod: NTX | Performed by: PHYSICIAN ASSISTANT

## 2025-04-24 PROCEDURE — 99223 1ST HOSP IP/OBS HIGH 75: CPT | Mod: 57,AI,NTX, | Performed by: PHYSICIAN ASSISTANT

## 2025-04-24 PROCEDURE — 50605 INSERT URETERAL SUPPORT: CPT | Mod: 51,LT,, | Performed by: TRANSPLANT SURGERY

## 2025-04-24 PROCEDURE — 85730 THROMBOPLASTIN TIME PARTIAL: CPT | Mod: NTX | Performed by: PHYSICIAN ASSISTANT

## 2025-04-24 PROCEDURE — 85025 COMPLETE CBC W/AUTO DIFF WBC: CPT | Mod: NTX | Performed by: PHYSICIAN ASSISTANT

## 2025-04-24 PROCEDURE — 82040 ASSAY OF SERUM ALBUMIN: CPT

## 2025-04-24 PROCEDURE — 37000009 HC ANESTHESIA EA ADD 15 MINS: Performed by: TRANSPLANT SURGERY

## 2025-04-24 PROCEDURE — 84100 ASSAY OF PHOSPHORUS: CPT | Mod: NTX | Performed by: PHYSICIAN ASSISTANT

## 2025-04-24 PROCEDURE — 27000207 HC ISOLATION

## 2025-04-24 PROCEDURE — 93010 ELECTROCARDIOGRAM REPORT: CPT | Mod: ,,, | Performed by: INTERNAL MEDICINE

## 2025-04-24 PROCEDURE — 86901 BLOOD TYPING SEROLOGIC RH(D): CPT | Performed by: PHYSICIAN ASSISTANT

## 2025-04-24 PROCEDURE — 63600175 PHARM REV CODE 636 W HCPCS: Mod: NTX | Performed by: TRANSPLANT SURGERY

## 2025-04-24 PROCEDURE — 85014 HEMATOCRIT: CPT

## 2025-04-24 PROCEDURE — 25000003 PHARM REV CODE 250: Mod: NTX | Performed by: PHYSICIAN ASSISTANT

## 2025-04-24 PROCEDURE — 71000039 HC RECOVERY, EACH ADD'L HOUR: Performed by: TRANSPLANT SURGERY

## 2025-04-24 PROCEDURE — 87340 HEPATITIS B SURFACE AG IA: CPT | Mod: NTX | Performed by: PHYSICIAN ASSISTANT

## 2025-04-24 PROCEDURE — 81200001 HC KIDNEY ACQUISITION - CADAVER

## 2025-04-24 PROCEDURE — 86706 HEP B SURFACE ANTIBODY: CPT | Mod: NTX | Performed by: PHYSICIAN ASSISTANT

## 2025-04-24 PROCEDURE — 50360 RNL ALTRNSPLJ W/O RCP NFRCT: CPT | Mod: LT,,, | Performed by: TRANSPLANT SURGERY

## 2025-04-24 PROCEDURE — 87522 HEPATITIS C REVRS TRNSCRPJ: CPT | Performed by: TRANSPLANT SURGERY

## 2025-04-24 PROCEDURE — 36000930 HC OR TIME LEV VII 1ST 15 MIN: Performed by: TRANSPLANT SURGERY

## 2025-04-24 PROCEDURE — 81300002 HC KIDNEY TRANSPORT, GROUND 4-5 HOURS

## 2025-04-24 PROCEDURE — 71000016 HC POSTOP RECOV ADDL HR: Performed by: TRANSPLANT SURGERY

## 2025-04-24 DEVICE — 6.0FR(2MM)X 12 CM SURGITEK DOUBLE-J CLOSED TIP URETERAL STENT
Type: IMPLANTABLE DEVICE | Site: KIDNEY | Status: FUNCTIONAL
Brand: DOUBLE-J

## 2025-04-24 RX ORDER — OXYCODONE HYDROCHLORIDE 5 MG/1
5 TABLET ORAL EVERY 6 HOURS PRN
Status: DISCONTINUED | OUTPATIENT
Start: 2025-04-24 | End: 2025-04-25

## 2025-04-24 RX ORDER — GLUCAGON 1 MG
1 KIT INJECTION CONTINUOUS PRN
Status: DISCONTINUED | OUTPATIENT
Start: 2025-04-24 | End: 2025-04-27 | Stop reason: HOSPADM

## 2025-04-24 RX ORDER — HEPARIN SOD,PORCINE/0.9 % NACL 1000/500ML
INTRAVENOUS SOLUTION INTRAVENOUS
Status: DISCONTINUED | OUTPATIENT
Start: 2025-04-24 | End: 2025-04-24 | Stop reason: HOSPADM

## 2025-04-24 RX ORDER — GLUCAGON 1 MG
1 KIT INJECTION
Status: DISCONTINUED | OUTPATIENT
Start: 2025-04-24 | End: 2025-04-27 | Stop reason: HOSPADM

## 2025-04-24 RX ORDER — ROCURONIUM BROMIDE 10 MG/ML
INJECTION, SOLUTION INTRAVENOUS
Status: DISCONTINUED | OUTPATIENT
Start: 2025-04-24 | End: 2025-04-24

## 2025-04-24 RX ORDER — BUPIVACAINE HYDROCHLORIDE 2.5 MG/ML
INJECTION, SOLUTION EPIDURAL; INFILTRATION; INTRACAUDAL; PERINEURAL
Status: DISCONTINUED | OUTPATIENT
Start: 2025-04-24 | End: 2025-04-24 | Stop reason: HOSPADM

## 2025-04-24 RX ORDER — INSULIN GLARGINE 100 [IU]/ML
11 INJECTION, SOLUTION SUBCUTANEOUS NIGHTLY
Status: DISCONTINUED | OUTPATIENT
Start: 2025-04-24 | End: 2025-04-25

## 2025-04-24 RX ORDER — VANCOMYCIN HYDROCHLORIDE 1 G/20ML
INJECTION, POWDER, LYOPHILIZED, FOR SOLUTION INTRAVENOUS
Status: DISCONTINUED | OUTPATIENT
Start: 2025-04-24 | End: 2025-04-24

## 2025-04-24 RX ORDER — IBUPROFEN 200 MG
16 TABLET ORAL
Status: DISCONTINUED | OUTPATIENT
Start: 2025-04-24 | End: 2025-04-27 | Stop reason: HOSPADM

## 2025-04-24 RX ORDER — LIDOCAINE HYDROCHLORIDE 20 MG/ML
INJECTION, SOLUTION EPIDURAL; INFILTRATION; INTRACAUDAL; PERINEURAL
Status: DISCONTINUED | OUTPATIENT
Start: 2025-04-24 | End: 2025-04-24

## 2025-04-24 RX ORDER — PREGABALIN 75 MG/1
75 CAPSULE ORAL
Status: COMPLETED | OUTPATIENT
Start: 2025-04-24 | End: 2025-04-24

## 2025-04-24 RX ORDER — FAMOTIDINE 20 MG/1
20 TABLET, FILM COATED ORAL DAILY
Qty: 30 TABLET | Refills: 0 | Status: SHIPPED | OUTPATIENT
Start: 2025-04-24

## 2025-04-24 RX ORDER — TRAMADOL HYDROCHLORIDE 50 MG/1
50 TABLET ORAL EVERY 6 HOURS PRN
Status: DISCONTINUED | OUTPATIENT
Start: 2025-04-24 | End: 2025-04-25

## 2025-04-24 RX ORDER — TACROLIMUS 1 MG/1
6 CAPSULE ORAL EVERY 12 HOURS
Qty: 360 CAPSULE | Refills: 11 | Status: SHIPPED | OUTPATIENT
Start: 2025-04-24 | End: 2025-04-28 | Stop reason: DRUGHIGH

## 2025-04-24 RX ORDER — PROPOFOL 10 MG/ML
VIAL (ML) INTRAVENOUS
Status: DISCONTINUED | OUTPATIENT
Start: 2025-04-24 | End: 2025-04-24

## 2025-04-24 RX ORDER — HEPARIN SODIUM 5000 [USP'U]/ML
5000 INJECTION, SOLUTION INTRAVENOUS; SUBCUTANEOUS ONCE
Status: COMPLETED | OUTPATIENT
Start: 2025-04-24 | End: 2025-04-24

## 2025-04-24 RX ORDER — INSULIN ASPART 100 [IU]/ML
0-5 INJECTION, SOLUTION INTRAVENOUS; SUBCUTANEOUS
Status: DISCONTINUED | OUTPATIENT
Start: 2025-04-24 | End: 2025-04-24

## 2025-04-24 RX ORDER — ACETAMINOPHEN 650 MG/20.3ML
650 LIQUID ORAL ONCE
Status: COMPLETED | OUTPATIENT
Start: 2025-04-24 | End: 2025-04-24

## 2025-04-24 RX ORDER — FUROSEMIDE 10 MG/ML
INJECTION INTRAMUSCULAR; INTRAVENOUS
Status: DISCONTINUED | OUTPATIENT
Start: 2025-04-24 | End: 2025-04-24

## 2025-04-24 RX ORDER — IBUPROFEN 200 MG
24 TABLET ORAL
Status: DISCONTINUED | OUTPATIENT
Start: 2025-04-24 | End: 2025-04-27 | Stop reason: HOSPADM

## 2025-04-24 RX ORDER — VALGANCICLOVIR 450 MG/1
450 TABLET, FILM COATED ORAL EVERY MORNING
Status: DISCONTINUED | OUTPATIENT
Start: 2025-05-04 | End: 2025-04-27 | Stop reason: HOSPADM

## 2025-04-24 RX ORDER — CIPROFLOXACIN 2 MG/ML
400 INJECTION, SOLUTION INTRAVENOUS
Status: COMPLETED | OUTPATIENT
Start: 2025-04-24 | End: 2025-04-24

## 2025-04-24 RX ORDER — DIPHENHYDRAMINE HCL 25 MG
25 CAPSULE ORAL
Status: COMPLETED | OUTPATIENT
Start: 2025-04-25 | End: 2025-04-25

## 2025-04-24 RX ORDER — METHYLPREDNISOLONE SODIUM SUCCINATE 1 G/16ML
INJECTION INTRAMUSCULAR; INTRAVENOUS
Status: DISCONTINUED | OUTPATIENT
Start: 2025-04-24 | End: 2025-04-24

## 2025-04-24 RX ORDER — METHYLPREDNISOLONE SOD SUCC 125 MG
250 VIAL (EA) INJECTION ONCE
Status: COMPLETED | OUTPATIENT
Start: 2025-04-25 | End: 2025-04-25

## 2025-04-24 RX ORDER — DIPHENHYDRAMINE HYDROCHLORIDE 50 MG/ML
50 INJECTION, SOLUTION INTRAMUSCULAR; INTRAVENOUS ONCE
Status: COMPLETED | OUTPATIENT
Start: 2025-04-24 | End: 2025-04-24

## 2025-04-24 RX ORDER — ONDANSETRON HYDROCHLORIDE 2 MG/ML
4 INJECTION, SOLUTION INTRAVENOUS EVERY 6 HOURS PRN
Status: DISCONTINUED | OUTPATIENT
Start: 2025-04-24 | End: 2025-04-27 | Stop reason: HOSPADM

## 2025-04-24 RX ORDER — LABETALOL HCL 20 MG/4 ML
10 SYRINGE (ML) INTRAVENOUS EVERY 4 HOURS PRN
Status: DISCONTINUED | OUTPATIENT
Start: 2025-04-24 | End: 2025-04-25

## 2025-04-24 RX ORDER — HYDROMORPHONE HYDROCHLORIDE 1 MG/ML
0.2 INJECTION, SOLUTION INTRAMUSCULAR; INTRAVENOUS; SUBCUTANEOUS EVERY 5 MIN PRN
Status: DISCONTINUED | OUTPATIENT
Start: 2025-04-24 | End: 2025-04-24 | Stop reason: HOSPADM

## 2025-04-24 RX ORDER — DIPHENHYDRAMINE HCL 25 MG
50 CAPSULE ORAL ONCE AS NEEDED
Status: DISCONTINUED | OUTPATIENT
Start: 2025-04-24 | End: 2025-04-27 | Stop reason: HOSPADM

## 2025-04-24 RX ORDER — INSULIN ASPART 100 [IU]/ML
4 INJECTION, SOLUTION INTRAVENOUS; SUBCUTANEOUS
Status: DISCONTINUED | OUTPATIENT
Start: 2025-04-25 | End: 2025-04-25

## 2025-04-24 RX ORDER — MYCOPHENOLATE MOFETIL 250 MG/1
1000 CAPSULE ORAL 2 TIMES DAILY
Qty: 240 CAPSULE | Refills: 2 | Status: SHIPPED | OUTPATIENT
Start: 2025-04-24

## 2025-04-24 RX ORDER — SODIUM CHLORIDE 0.9 % (FLUSH) 0.9 %
10 SYRINGE (ML) INJECTION
Status: DISCONTINUED | OUTPATIENT
Start: 2025-04-24 | End: 2025-04-27 | Stop reason: HOSPADM

## 2025-04-24 RX ORDER — ACETAMINOPHEN 325 MG/1
650 TABLET ORAL EVERY 8 HOURS
Status: COMPLETED | OUTPATIENT
Start: 2025-04-24 | End: 2025-04-26

## 2025-04-24 RX ORDER — MYCOPHENOLATE MOFETIL 250 MG/1
1000 CAPSULE ORAL 2 TIMES DAILY
Status: DISCONTINUED | OUTPATIENT
Start: 2025-04-24 | End: 2025-04-27 | Stop reason: HOSPADM

## 2025-04-24 RX ORDER — PREDNISONE 5 MG/1
TABLET ORAL
Qty: 70 TABLET | Refills: 11 | Status: SHIPPED | OUTPATIENT
Start: 2025-04-24 | End: 2025-04-24

## 2025-04-24 RX ORDER — FLUOXETINE HYDROCHLORIDE 20 MG/1
40 CAPSULE ORAL DAILY
Status: DISCONTINUED | OUTPATIENT
Start: 2025-04-25 | End: 2025-04-27 | Stop reason: HOSPADM

## 2025-04-24 RX ORDER — MUPIROCIN 20 MG/G
OINTMENT TOPICAL
Status: DISCONTINUED | OUTPATIENT
Start: 2025-04-24 | End: 2025-04-24

## 2025-04-24 RX ORDER — DEXTROSE MONOHYDRATE AND SODIUM CHLORIDE 5; .45 G/100ML; G/100ML
INJECTION, SOLUTION INTRAVENOUS CONTINUOUS
Status: DISCONTINUED | OUTPATIENT
Start: 2025-04-24 | End: 2025-04-25

## 2025-04-24 RX ORDER — MUPIROCIN 20 MG/G
1 OINTMENT TOPICAL 2 TIMES DAILY
Status: DISCONTINUED | OUTPATIENT
Start: 2025-04-24 | End: 2025-04-27 | Stop reason: HOSPADM

## 2025-04-24 RX ORDER — FENTANYL CITRATE 50 UG/ML
INJECTION, SOLUTION INTRAMUSCULAR; INTRAVENOUS
Status: DISCONTINUED | OUTPATIENT
Start: 2025-04-24 | End: 2025-04-24

## 2025-04-24 RX ORDER — PREDNISONE 20 MG/1
20 TABLET ORAL DAILY
Status: DISCONTINUED | OUTPATIENT
Start: 2025-04-26 | End: 2025-04-27 | Stop reason: HOSPADM

## 2025-04-24 RX ORDER — BISACODYL 5 MG
10 TABLET, DELAYED RELEASE (ENTERIC COATED) ORAL NIGHTLY
Status: DISCONTINUED | OUTPATIENT
Start: 2025-04-24 | End: 2025-04-27 | Stop reason: HOSPADM

## 2025-04-24 RX ORDER — TACROLIMUS 1 MG/1
3 CAPSULE ORAL 2 TIMES DAILY
Status: DISCONTINUED | OUTPATIENT
Start: 2025-04-24 | End: 2025-04-25

## 2025-04-24 RX ORDER — EPINEPHRINE 1 MG/ML
1 INJECTION, SOLUTION, CONCENTRATE INTRAVENOUS ONCE AS NEEDED
Status: DISCONTINUED | OUTPATIENT
Start: 2025-04-24 | End: 2025-04-27 | Stop reason: HOSPADM

## 2025-04-24 RX ORDER — METHIMAZOLE 5 MG/1
5 TABLET ORAL DAILY
Status: DISCONTINUED | OUTPATIENT
Start: 2025-04-25 | End: 2025-04-27 | Stop reason: HOSPADM

## 2025-04-24 RX ORDER — ACETAMINOPHEN 325 MG/1
650 TABLET ORAL
Status: COMPLETED | OUTPATIENT
Start: 2025-04-25 | End: 2025-04-25

## 2025-04-24 RX ORDER — FAMOTIDINE 20 MG/1
20 TABLET, FILM COATED ORAL NIGHTLY
Status: DISCONTINUED | OUTPATIENT
Start: 2025-04-24 | End: 2025-04-27 | Stop reason: HOSPADM

## 2025-04-24 RX ORDER — MANNITOL 250 MG/ML
INJECTION, SOLUTION INTRAVENOUS
Status: DISCONTINUED | OUTPATIENT
Start: 2025-04-24 | End: 2025-04-24

## 2025-04-24 RX ORDER — SULFAMETHOXAZOLE AND TRIMETHOPRIM 400; 80 MG/1; MG/1
1 TABLET ORAL EVERY MORNING
Status: DISCONTINUED | OUTPATIENT
Start: 2025-05-04 | End: 2025-04-27 | Stop reason: HOSPADM

## 2025-04-24 RX ORDER — INSULIN ASPART 100 [IU]/ML
0-5 INJECTION, SOLUTION INTRAVENOUS; SUBCUTANEOUS
Status: DISCONTINUED | OUTPATIENT
Start: 2025-04-24 | End: 2025-04-27 | Stop reason: HOSPADM

## 2025-04-24 RX ORDER — HEPARIN SODIUM 5000 [USP'U]/ML
5000 INJECTION, SOLUTION INTRAVENOUS; SUBCUTANEOUS EVERY 8 HOURS
Status: DISCONTINUED | OUTPATIENT
Start: 2025-04-24 | End: 2025-04-27 | Stop reason: HOSPADM

## 2025-04-24 RX ORDER — PREDNISONE 5 MG/1
TABLET ORAL
Qty: 70 TABLET | Refills: 11 | Status: SHIPPED | OUTPATIENT
Start: 2025-04-24

## 2025-04-24 RX ORDER — SULFAMETHOXAZOLE AND TRIMETHOPRIM 400; 80 MG/1; MG/1
1 TABLET ORAL DAILY
Qty: 30 TABLET | Refills: 5 | Status: SHIPPED | OUTPATIENT
Start: 2025-04-24 | End: 2025-04-26

## 2025-04-24 RX ORDER — SODIUM CHLORIDE 9 MG/ML
INJECTION, SOLUTION INTRAVENOUS CONTINUOUS
Status: DISCONTINUED | OUTPATIENT
Start: 2025-04-24 | End: 2025-04-25

## 2025-04-24 RX ORDER — VALGANCICLOVIR 450 MG/1
900 TABLET, FILM COATED ORAL DAILY
Qty: 60 TABLET | Refills: 2 | Status: SHIPPED | OUTPATIENT
Start: 2025-04-24 | End: 2025-04-26

## 2025-04-24 RX ORDER — DOCUSATE SODIUM 100 MG/1
100 CAPSULE, LIQUID FILLED ORAL 3 TIMES DAILY
Status: DISCONTINUED | OUTPATIENT
Start: 2025-04-24 | End: 2025-04-27 | Stop reason: HOSPADM

## 2025-04-24 RX ORDER — ONDANSETRON HYDROCHLORIDE 2 MG/ML
4 INJECTION, SOLUTION INTRAVENOUS ONCE AS NEEDED
Status: DISCONTINUED | OUTPATIENT
Start: 2025-04-24 | End: 2025-04-24 | Stop reason: HOSPADM

## 2025-04-24 RX ORDER — ONDANSETRON HYDROCHLORIDE 2 MG/ML
4 INJECTION, SOLUTION INTRAVENOUS DAILY PRN
Status: DISCONTINUED | OUTPATIENT
Start: 2025-04-24 | End: 2025-04-24 | Stop reason: HOSPADM

## 2025-04-24 RX ADMIN — ACETAMINOPHEN 650 MG: 650 SOLUTION ORAL at 10:04

## 2025-04-24 RX ADMIN — FENTANYL CITRATE 100 MCG: 50 INJECTION, SOLUTION INTRAMUSCULAR; INTRAVENOUS at 10:04

## 2025-04-24 RX ADMIN — HEPARIN SODIUM 5000 UNITS: 5000 INJECTION INTRAVENOUS; SUBCUTANEOUS at 09:04

## 2025-04-24 RX ADMIN — SUGAMMADEX 200 MG: 100 INJECTION, SOLUTION INTRAVENOUS at 01:04

## 2025-04-24 RX ADMIN — BISACODYL 10 MG: 5 TABLET, COATED ORAL at 09:04

## 2025-04-24 RX ADMIN — HYDROMORPHONE HYDROCHLORIDE 0.2 MG: 1 INJECTION, SOLUTION INTRAMUSCULAR; INTRAVENOUS; SUBCUTANEOUS at 02:04

## 2025-04-24 RX ADMIN — VANCOMYCIN HYDROCHLORIDE 1 G: 1 INJECTION, POWDER, LYOPHILIZED, FOR SOLUTION INTRAVENOUS at 10:04

## 2025-04-24 RX ADMIN — OXYCODONE 5 MG: 5 TABLET ORAL at 02:04

## 2025-04-24 RX ADMIN — MANNITOL 25 G: 12.5 INJECTION, SOLUTION INTRAVENOUS at 12:04

## 2025-04-24 RX ADMIN — ROCURONIUM BROMIDE 20 MG: 10 INJECTION, SOLUTION INTRAVENOUS at 12:04

## 2025-04-24 RX ADMIN — PREGABALIN 75 MG: 75 CAPSULE ORAL at 09:04

## 2025-04-24 RX ADMIN — FUROSEMIDE 100 MG: 10 INJECTION, SOLUTION INTRAMUSCULAR; INTRAVENOUS at 12:04

## 2025-04-24 RX ADMIN — MYCOPHENOLATE MOFETIL 1000 MG: 250 CAPSULE ORAL at 09:04

## 2025-04-24 RX ADMIN — ACETAMINOPHEN 650 MG: 325 TABLET ORAL at 09:04

## 2025-04-24 RX ADMIN — HEPARIN SODIUM 5000 UNITS: 5000 INJECTION INTRAVENOUS; SUBCUTANEOUS at 02:04

## 2025-04-24 RX ADMIN — ACETAMINOPHEN 650 MG: 325 TABLET ORAL at 05:04

## 2025-04-24 RX ADMIN — OXYCODONE 5 MG: 5 TABLET ORAL at 08:04

## 2025-04-24 RX ADMIN — INSULIN ASPART 3 UNITS: 100 INJECTION, SOLUTION INTRAVENOUS; SUBCUTANEOUS at 10:04

## 2025-04-24 RX ADMIN — SODIUM CHLORIDE: 9 INJECTION, SOLUTION INTRAVENOUS at 03:04

## 2025-04-24 RX ADMIN — INSULIN GLARGINE 11 UNITS: 100 INJECTION, SOLUTION SUBCUTANEOUS at 10:04

## 2025-04-24 RX ADMIN — SODIUM CHLORIDE 125 MG: 9 INJECTION, SOLUTION INTRAVENOUS at 11:04

## 2025-04-24 RX ADMIN — DEXTROSE AND SODIUM CHLORIDE: 5; 450 INJECTION, SOLUTION INTRAVENOUS at 02:04

## 2025-04-24 RX ADMIN — PROPOFOL 140 MG: 10 INJECTION, EMULSION INTRAVENOUS at 10:04

## 2025-04-24 RX ADMIN — DOCUSATE SODIUM 100 MG: 100 CAPSULE, LIQUID FILLED ORAL at 09:04

## 2025-04-24 RX ADMIN — INSULIN ASPART 2 UNITS: 100 INJECTION, SOLUTION INTRAVENOUS; SUBCUTANEOUS at 02:04

## 2025-04-24 RX ADMIN — FAMOTIDINE 20 MG: 20 TABLET, FILM COATED ORAL at 09:04

## 2025-04-24 RX ADMIN — CIPROFLOXACIN 400 MG: 2 INJECTION, SOLUTION INTRAVENOUS at 10:04

## 2025-04-24 RX ADMIN — DIPHENHYDRAMINE HYDROCHLORIDE 50 MG: 50 INJECTION INTRAMUSCULAR; INTRAVENOUS at 10:04

## 2025-04-24 RX ADMIN — LIDOCAINE HYDROCHLORIDE 100 MG: 20 INJECTION, SOLUTION EPIDURAL; INFILTRATION; INTRACAUDAL; PERINEURAL at 10:04

## 2025-04-24 RX ADMIN — METHYLPREDNISOLONE SODIUM SUCCINATE 500 MG: 1 INJECTION, POWDER, FOR SOLUTION INTRAMUSCULAR; INTRAVENOUS at 10:04

## 2025-04-24 RX ADMIN — SODIUM CHLORIDE, SODIUM GLUCONATE, SODIUM ACETATE, POTASSIUM CHLORIDE, MAGNESIUM CHLORIDE, SODIUM PHOSPHATE, DIBASIC, AND POTASSIUM PHOSPHATE: .53; .5; .37; .037; .03; .012; .00082 INJECTION, SOLUTION INTRAVENOUS at 10:04

## 2025-04-24 RX ADMIN — SODIUM CHLORIDE: 9 INJECTION, SOLUTION INTRAVENOUS at 10:04

## 2025-04-24 RX ADMIN — TACROLIMUS 3 MG: 1 CAPSULE ORAL at 06:04

## 2025-04-24 RX ADMIN — Medication 1 TABLET: at 02:04

## 2025-04-24 RX ADMIN — ROCURONIUM BROMIDE 50 MG: 10 INJECTION, SOLUTION INTRAVENOUS at 10:04

## 2025-04-24 RX ADMIN — MUPIROCIN 1 G: 20 OINTMENT TOPICAL at 09:04

## 2025-04-24 RX ADMIN — DOCUSATE SODIUM 100 MG: 100 CAPSULE, LIQUID FILLED ORAL at 02:04

## 2025-04-24 RX ADMIN — INSULIN ASPART 3 UNITS: 100 INJECTION, SOLUTION INTRAVENOUS; SUBCUTANEOUS at 05:04

## 2025-04-24 RX ADMIN — PHENYLEPHRINE HYDROCHLORIDE 0.2 MCG/KG/MIN: 10 INJECTION INTRAVENOUS at 10:04

## 2025-04-24 RX ADMIN — VANCOMYCIN HYDROCHLORIDE 1000 MG: 1 INJECTION, POWDER, LYOPHILIZED, FOR SOLUTION INTRAVENOUS at 10:04

## 2025-04-24 NOTE — ANESTHESIA PROCEDURE NOTES
Intubation    Date/Time: 4/24/2025 10:41 AM    Performed by: Eunice Quiles CRNA  Authorized by: Angelito Blanton MD    Intubation:     Induction:  Intravenous    Intubated:  Postinduction    Mask Ventilation:  Easy with oral airway    Attempts:  1    Attempted By:  JO    Blade:  Herrera 3    Laryngeal View Grade: Grade I - full view of cords      Difficult Airway Encountered?: No      Complications:  None    Airway Device:  Oral endotracheal tube    Airway Device Size:  7.0    Style/Cuff Inflation:  Cuffed (inflated to minimal occlusive pressure)    Inflation Amount (mL):  4    Tube secured:  21    Secured at:  The lips    Placement Verified By:  Capnometry    Complicating Factors:  None    Findings Post-Intubation:  BS equal bilateral and atraumatic/condition of teeth unchanged

## 2025-04-24 NOTE — NURSING TRANSFER
Nursing Transfer Note      4/24/2025   3:54 PM    Nurse giving handoff:YULIET Burr  Nurse receiving handoff:YULIET Haynes    Reason patient is being transferred: Post op care    Transfer To: [id number]    Transfer via bed    Transfer with IV pole    Transported by PCT    Medicines sent: N/A    Any special needs or follow-up needed: Routine care    Patient belongings transferred with patient: No    Chart send with patient: Yes    Notified: daughter    Patient reassessed at: 1615 4/24/25

## 2025-04-24 NOTE — H&P
[facility] - Transplant Stepdown  Kidney Transplant  H&P      Subjective:     Chief Complaint/Reason for Admission: kidney transplant    History of Present Illness:  Ms. Rocha is a 59 y.o. year old female with ESRD secondary to diabetic nephropathy and TIA on daily aspirin (last taken on Monday 4/21) who presents for kidney transplant.  She has been on the wait list for a kidney transplant at [facility] since 8/5/2018. Patient is currently on hemodialysis started on 2/2019. Patient is dialyzing on MWF schedule.  Patient reports that she is tolerating dialysis well. She dialyzes for 3 1/2 hours per session. She has a LUE AV fistula. Patient denies any recent hospitalizations or ED visits. Dry weight 70.5 kg.        Dialysis History: Ms. Pop with ESRD, requiring chronic dialysis who is on hemodialysis started on 2019. Patient is dialyzing on MWF schedule.  Patient reports that she is tolerating dialysis well.  Date of Last Dialysis: 4/23/25    Native urine output per day: Minimal    Previous Transplant: no    No medications prior to admission.       Review of patient's allergies indicates:   Allergen Reactions    Latex Itching    Penicillins Hives and Rash       Past Medical History:   Diagnosis Date    Anemia     Anxiety     Diabetes mellitus     Disorder of kidney and ureter     Encounter for blood transfusion     GERD (gastroesophageal reflux disease)     Hydronephrosis     Hyperlipidemia     Hypertension     Hyperthyroidism     Obesity     Thyroid disease      Past Surgical History:   Procedure Laterality Date    ARTHROSCOPY OF KNEE Right     AV FISTULA PLACEMENT Left 06/12/2019    Procedure: CREATION, AV FISTULA - Basilic;  Surgeon: Bautista Vasques MD;  Location: [facility];  Service: Vascular;  Laterality: Left;    BREAST BIOPSY Left     BREAST SURGERY Left     biopsy    ENDOBRONCHIAL ULTRASOUND N/A 2/27/2024    Procedure: ENDOBRONCHIAL ULTRASOUND (EBUS);  Surgeon: Dick Harding MD;  Location: [address]  [facility];  Service: Pulmonary;  Laterality: N/A;    HYSTERECTOMY  2017    TLH/BSO    KNEE SURGERY Right     URETERAL STENT PLACEMENT Bilateral     10/2018     Family History       Problem Relation (Age of Onset)    Breast cancer Paternal Aunt    Cataracts Mother    Diabetes Father, Brother, Maternal Aunt    Heart attack Cousin    Heart disease Father    Heart failure Cousin    Hypertension Father    No Known Problems Brother, Maternal Uncle, Paternal Uncle, Maternal Grandmother, Maternal Grandfather, Paternal Grandmother, Paternal Grandfather, Other    Ovarian cancer Sister          Tobacco Use    Smoking status: Never    Smokeless tobacco: Never   Substance and Sexual Activity    Alcohol use: Never    Drug use: Never    Sexual activity: Not on file        Review of Systems   Constitutional:  Negative for activity change, appetite change, chills, diaphoresis and fever.   HENT:  Negative for congestion, sinus pressure, sinus pain, sore throat and trouble swallowing.    Eyes:  Negative for visual disturbance.   Respiratory:  Negative for chest tightness, shortness of breath and stridor.    Cardiovascular:  Negative for chest pain, palpitations and leg swelling.   Gastrointestinal:  Negative for abdominal distention, abdominal pain, constipation, diarrhea, nausea and vomiting.   Genitourinary:  Positive for decreased urine volume. Negative for difficulty urinating, dysuria and flank pain.   Musculoskeletal:  Negative for neck pain and neck stiffness.   Skin:  Negative for color change and rash.   Allergic/Immunologic: Positive for immunocompromised state.   Neurological:  Negative for dizziness, tremors, syncope, light-headedness and headaches.   Psychiatric/Behavioral:  Negative for agitation, confusion, decreased concentration and hallucinations. The patient is not nervous/anxious.      Objective:     Vital Signs (Most Recent):           There is no height or weight on file to calculate BMI.      Physical Exam  Vitals and  nursing note reviewed.   Constitutional:       General: She is not in acute distress.     Appearance: She is not diaphoretic.   HENT:      Head: Normocephalic and atraumatic.   Eyes:      General: No scleral icterus.        Right eye: No discharge.         Left eye: No discharge.   Cardiovascular:      Rate and Rhythm: Normal rate and regular rhythm.      Heart sounds: Normal heart sounds.   Pulmonary:      Effort: Pulmonary effort is normal.      Breath sounds: Normal breath sounds. No wheezing or rales.   Abdominal:      General: Bowel sounds are normal. There is no distension.      Palpations: Abdomen is soft.      Tenderness: There is no abdominal tenderness. There is no guarding.   Musculoskeletal:         General: No swelling.      Cervical back: Normal range of motion and neck supple.      Comments: LUE AVF +/+   Skin:     General: Skin is warm and dry.      Capillary Refill: Capillary refill takes less than 2 seconds.   Neurological:      Mental Status: She is alert and oriented to person, place, and time.      Cranial Nerves: No cranial nerve deficit.   Psychiatric:         Thought Content: Thought content normal.         Judgment: Judgment normal.          Laboratory pending    Diagnostic Results:pending  Assessment/Plan:     Renal/  * ESRD on dialysis  - admit for kidney transplant  - NPO for OR 10am  - surgeon Dr Garcia  - pre op labs and diagnostic testing pending, to be reviewed prior to surgery  - Thymo induction        The patient presents for kidney transplant.  There are no apparent contraindications to proceeding with the planned transplant.  The patient understands that the transplant could potentially be cancelled pending detailed assessment of the donor organ.  She will receive Thymoglobulin induction.  A complete discussion of the transplant procedure, including risks, complications, and alternatives, as well as any donor-specific risk factors requiring specific disclosure, will be carried  out by the responsible staff surgeon prior to the procedure.       Irma Cunningham PA-C  Kidney Transplant  [facility] - Transplant Stepdown

## 2025-04-24 NOTE — PLAN OF CARE
Pre-operative Discussion Note  Kidney Transplant Surgery    Kiesha Rocha is a 59 y.o. female with ESRD, requiring chronic dialysis admitted for kidney transplant.  I discussed the planned procedure in detail, including expected hospital course and outcomes, benefits, risks, and potential complications.  Complications discussed included death, graft failure, bleeding, infection, vascular thrombosis, and rejection.  I discussed the risks of anesthesia, as well as the potential need for re-operation.  The possibility of other complications not specifically mentioned was also discussed.  Also, I discussed the need for lifelong immunosuppression and the possibility of serious complications from immunosuppressive drugs.    The discussion included the risks that the patient will incur if she elects to not have the proposed procedure.    Relevant donor-specific risk factors were disclosed and discussed with the patient, including:   HCV: I discussed the use of HCV-positive organs in recipients who already have HCV, including the outcomes that are not demonstrably different from HCV-positive recipients receiving HCV-negative organs.  The potential advantage to the recipient is possibly receiving a transplant sooner by accepting such an organ.  The patient is willing to consider such grafts.     Specific PHS donor risk criteria for the organ donor include:  None    HCV: Non-viremic recipient: I discussed the use of HCV-positive organs in naive recipients, including the risk of viral transmission to the patients or others, potential insurance barriers for antiviral medication coverage, risk for fibrosing cholestatic hepatitis, death or graft loss. The potential advantage to the recipient is the possibility of receiving a transplant sooner with decreased mortality risk by accepting such an organ. The patient is willing to consider such grafts.    Blood Type: This is a blood type compatible. transplant.    The patient was  SARS-CoV-2 /COVID-19 tested with negative results.    COVID-19: I discussed the possibility of COVID-19 transmission with the patient. Although LORNE testing is available for the virus using a technique which in theory should be very accurate, there is no data yet regarding the likelihood of a  false-negative test leading to virus transmission. Based on accuracy of testing for other viruses, it is expected that this risk is extremely small.     I also discussed that transplant immunosuppression will increase susceptibility to COVID-19 and other viruses, and that although we use stringent precautions to protect patients from infection, it is possible for a transplant recipient to contract this infection. If COVID-19 infection should occur, it would be a serious matter with a significant risk of death.    All questions were answered.  The patient and available family members voice understanding and agree to proceed with the transplant.    UNOS Patient Status  Note on scores:  ICU = 10 = total assistance  TSU = 20-30 = partial assistance  Outpatient admitted for transplant requiring medical care in last year = 40-50 = partial assistance  Scores 60 or higher indicate no assistance, meaning no need for medical care in last year. This would be very unusual for a transplant candidate.    UNOS Patient Status  Functional Status: 50% - Requires considerable assistance and frequent medical care  Physical Capacity: No Limitations

## 2025-04-24 NOTE — TELEPHONE ENCOUNTER
ON CALL NOTE    UNOS # [id number]    Received call from Dante Scott,  that pt has  become primary and to have her admitted.  OR time is set for 10:00 with Dr. Garcia.  Patient contacted and instructed to proceed to the hospital for admission.  NPO at this time.  Last HD was on 4/23/25.  ETA 0800.  CSN [id number].  The following have been contacted.     Admit:  Abi  TSZACH Charge:  Dace  ELBERT:  Cat Walter Sup: J Luis  HLA:  Judie (retro cxm)

## 2025-04-24 NOTE — TRANSFER OF CARE
"Anesthesia Transfer of Care Note    Patient: Kiesha Rocha    Procedure(s) Performed: Procedure(s) (LRB):  TRANSPLANT, KIDNEY (N/A)    Patient location: PACU    Anesthesia Type: general    Transport from OR: Transported from OR on 6-10 L/min O2 by face mask with adequate spontaneous ventilation    Post pain: adequate analgesia    Post assessment: no apparent anesthetic complications and tolerated procedure well    Post vital signs: stable    Level of consciousness: awake and oriented    Nausea/Vomiting: no nausea/vomiting    Complications: none    Transfer of care protocol was followed      Last vitals: Visit Vitals  /66   Pulse 78   Temp 37.2 °C (99 °F) (Temporal)   Resp 20   Ht 5' 4" (1.626 m)   Wt 70.5 kg (155 lb 8.6 oz)   SpO2 100%   Breastfeeding No   BMI 26.70 kg/m²     "

## 2025-04-24 NOTE — OP NOTE
Operative Report    Date of Procedure: 4/24/2025  Date of Transplant (UNOS): 4/24/25    Surgeons:  Surgeons and Role:     * Shandra Garcia MD - Primary     * Stanley Lora MD - Fellow    First Assistant Attestation:  The indicated resident served as first assistant for this procedure.    Pre-operative Diagnosis: ESRD, requiring chronic dialysis secondary to Diabetes Mellitus - Type II  Post-operative Diagnosis: Same    Procedure(s) Performed:   Back Table Preparation of Left Kidney     Donation after Brain Death Kidney transplant    Anesthesia: General endotracheal    Preamble  Indications and Patient Counseling: The patient is a 59 y.o. year-old female with end-stage kidney disease secondary to Diabetes Mellitus - Type II who has been evaluated for a kidney transplant.  The procedure was thoroughly discussed with the patient, including potential risks, complications, and alternatives.  Specific complications mentioned included death, graft non-function, bleeding, infection, and rejection, as well as the possibility of other complications not specifically mentioned.    Donor Risk Factors: Prior to the operation, the patient was advised of any donor-specific risk factors requiring specific disclosure.  Factors in this case included donor HCV+.      Specific PHS donor risk criteria for the organ donor include:  None    All questions were answered, the patient voiced appropriate understanding, and she agreed to proceed with the planned procedure.    ABO Confirmation: Immediately following arrival of the donor organ and prior to implantation, a formal ABO confirmation was done according to hospital and UNOS policies.  I confirmed the UNOS ID number ([id number]) of the donor organ and the donor and recipient ABO types, directly verifying these data by comparison with the UNOS Match Run report ([id number]).  This confirmation was personally done by an attending surgeon and circulating nurse, and is officially  documented elsewhere.    Time-Out: A complete time out was carried out prior to incision, with confirmation of patient identity, correct procedure, correct operative site, appropriate antibiotic prophylaxis, review of any known allergies, and presence of all needed equipment.    Procedure in Detail  Prior to starting the operation, the left kidney  was prepared on the back table. Arterial anatomy was single. Venous anatomy was single. Ureteral anatomy was single. Back table vascular reconstruction was not required  .  Unneeded fat was removed from the kidney, the vessels were cleaned of adherent tissue and tested for leaks, and the kidney was maintained at ice temperature in organ preservation solution until it was brought to the operative field.     The patient was brought into the operating room and placed in a supine position on the OR table.  After the induction of general endotracheal anesthesia, lines were placed by the anesthesiologist.  The urinary bladder was catheterized and irrigated with antibiotic solution.  There was no tension on the axillae and all pressure points were padded.  Sequential compression boots were used as were Alex Huggers.  The abdomen was prepped and draped in the usual sterile fashion.  Skin was incised over the right with a knife and deepened with electrocautery.  The peritoneum and its contents were swept medially, exposing the right external iliac artery and the right external iliac vein.  The Bookwalter retractor was used to provide exposure.  Overlying lymphatics were ligated or cauterized and the vessels were dissected free for a length compatible with anastomosis.  The kidney was brought to the OR table at 4/24/2025 11:45 AM.  Venous control was obtained with a vascular clamp.  A venotomy was made, the vein irrigated, and an end renal to right external iliac vein anastomosis was created with 5-0 polypropylene.  Arterial control was obtained with a vascular clamp.  Arteriotomy  was made, the artery irrigated, and an aortic patch to right external iliac artery anastomosis was created with 5-0 polypropylene.  The kidney was unclamped and reperfused at 4/24/2025 12:24 PM.  Reperfusion quality was good. Intraoperative urine production was not observed.  After hemostasis was obtained, a Lich uretero-neocystostomy was created.  The bladder was filled and identified, opened, and the anastomosis created using 6-0 PDS.  The bladder muscle was closed over the distal ureter to create an antireflux tunnel.  A ureteral stent was used.  With the kidney well perfused and sitting appropriately without tension on the anastomoses, viscera were replaced in their usual position.  The peritoneum was very thin and friable. A large opening was created by the retractors. At the completion of the case, the peritoneum was opened widely to create a peritoneal window.  The wound was closed after a final check for hemostasis.  Overall, the graft quality was assessed to be good. At the end of the case the needle, sponge and instrument counts were all correct.  Sterile dressings were applied and the patient was brought to the recovery room/ICU in good condition.    Estimated Blood Loss: 100 mL  Fluids Administered:   Crystalloid (mL) 1,300      Drains: none   Specimens: none    Findings:    Organ Transplanted: Left Kidney    Arterial Anatomy: single  Number of Arteries: 1  Configuration of Multiple Arteries: not applicable  Venous Anatomy: single  Number of Veins: 1  Ureteral Anatomy: single  Number of Ureters: 1  Reperfusion Quality: good  Overall Graft Quality: good  Intraoperative Urine Production: no  Garzon: not to be removed before 2 days.  Ureteral Stent: Yes    Ischemic Times:   Anastomosis (warm ischemia) time: 39 minutes   Cold ischemia time: 866 minutes  Total ischemia time: 905 minutes    Donor Data:  UNOS ID: [id number]    UNOS Match Run: [id number]    Donor Type: Donation after Brain Death    Donor CMV Status:  Positive    Donor HBcAB: Negative    Donor HCV Status: Positive

## 2025-04-24 NOTE — SUBJECTIVE & OBJECTIVE
Subjective:     Chief Complaint/Reason for Admission: kidney transplant    History of Present Illness:  Ms. Rocha is a 59 y.o. year old female with ESRD secondary to diabetic nephropathy.  She has been on the wait list for a kidney transplant at [facility] since 8/5/2018. Patient is currently on hemodialysis started on 2/2019. Patient is dialyzing on MWF schedule.  Patient reports that she is tolerating dialysis well. She dialyzes for 3 1/2 hours per session. She has a LUE AV fistula. Patient denies any recent hospitalizations or ED visits. Dry weight 70.5 kg.        Dialysis History: Ms. Pop with ESRD, requiring chronic dialysis who is on hemodialysis started on 2019. Patient is dialyzing on MWF schedule.  Patient reports that she is tolerating dialysis well.  Date of Last Dialysis: 4/23/25    Native urine output per day: Minimal    Previous Transplant: no    No medications prior to admission.       Review of patient's allergies indicates:   Allergen Reactions    Latex Itching    Penicillins Hives and Rash       Past Medical History:   Diagnosis Date    Anemia     Anxiety     Diabetes mellitus     Disorder of kidney and ureter     Encounter for blood transfusion     GERD (gastroesophageal reflux disease)     Hydronephrosis     Hyperlipidemia     Hypertension     Hyperthyroidism     Obesity     Thyroid disease      Past Surgical History:   Procedure Laterality Date    ARTHROSCOPY OF KNEE Right     AV FISTULA PLACEMENT Left 06/12/2019    Procedure: CREATION, AV FISTULA - Basilic;  Surgeon: Bautista Vasques MD;  Location[facility];  Service: Vascular;  Laterality: Left;    BREAST BIOPSY Left     BREAST SURGERY Left     biopsy    ENDOBRONCHIAL ULTRASOUND N/A 2/27/2024    Procedure: ENDOBRONCHIAL ULTRASOUND (EBUS);  Surgeon: Dick Harding MD;  Location: [facility] OR [address] [place] [address];  Service: Pulmonary;  Laterality: N/A;    HYSTERECTOMY  2017    TLH/BSO    KNEE SURGERY Right     URETERAL STENT PLACEMENT Bilateral     10/2018      Family History       Problem Relation (Age of Onset)    Breast cancer Paternal Aunt    Cataracts Mother    Diabetes Father, Brother, Maternal Aunt    Heart attack Cousin    Heart disease Father    Heart failure Cousin    Hypertension Father    No Known Problems Brother, Maternal Uncle, Paternal Uncle, Maternal Grandmother, Maternal Grandfather, Paternal Grandmother, Paternal Grandfather, Other    Ovarian cancer Sister          Tobacco Use    Smoking status: Never    Smokeless tobacco: Never   Substance and Sexual Activity    Alcohol use: Never    Drug use: Never    Sexual activity: Not on file        Review of Systems   Constitutional:  Negative for activity change, appetite change, chills, diaphoresis and fever.   HENT:  Negative for congestion, sinus pressure, sinus pain, sore throat and trouble swallowing.    Eyes:  Negative for visual disturbance.   Respiratory:  Negative for chest tightness, shortness of breath and stridor.    Cardiovascular:  Negative for chest pain, palpitations and leg swelling.   Gastrointestinal:  Negative for abdominal distention, abdominal pain, constipation, diarrhea, nausea and vomiting.   Genitourinary:  Positive for decreased urine volume. Negative for difficulty urinating, dysuria and flank pain.   Musculoskeletal:  Negative for neck pain and neck stiffness.   Skin:  Negative for color change and rash.   Allergic/Immunologic: Positive for immunocompromised state.   Neurological:  Negative for dizziness, tremors, syncope, light-headedness and headaches.   Psychiatric/Behavioral:  Negative for agitation, confusion, decreased concentration and hallucinations. The patient is not nervous/anxious.      Objective:     Vital Signs (Most Recent):           There is no height or weight on file to calculate BMI.      Physical Exam  Vitals and nursing note reviewed.   Constitutional:       General: She is not in acute distress.     Appearance: She is not diaphoretic.   HENT:      Head:  Normocephalic and atraumatic.   Eyes:      General: No scleral icterus.        Right eye: No discharge.         Left eye: No discharge.   Cardiovascular:      Rate and Rhythm: Normal rate and regular rhythm.      Heart sounds: Normal heart sounds.   Pulmonary:      Effort: Pulmonary effort is normal.      Breath sounds: Normal breath sounds. No wheezing or rales.   Abdominal:      General: Bowel sounds are normal. There is no distension.      Palpations: Abdomen is soft.      Tenderness: There is no abdominal tenderness. There is no guarding.   Musculoskeletal:         General: No swelling.      Cervical back: Normal range of motion and neck supple.      Comments: LUE AVF +/+   Skin:     General: Skin is warm and dry.      Capillary Refill: Capillary refill takes less than 2 seconds.   Neurological:      Mental Status: She is alert and oriented to person, place, and time.      Cranial Nerves: No cranial nerve deficit.   Psychiatric:         Thought Content: Thought content normal.         Judgment: Judgment normal.          Laboratory pending    Diagnostic Results:pending

## 2025-04-24 NOTE — HPI
Ms. Rocha is a 59 y.o. year old female with ESRD secondary to diabetic nephropathy and history of TIA on aspirin who presents today for kidney transplant.  She has been on the wait list for a kidney transplant at [facility] since 8/5/2018. Patient is currently on hemodialysis started on 2/2019. Patient is dialyzing on MWF schedule.  Patient reports that she is tolerating dialysis well. She dialyzes for 3 1/2 hours per session. She has a LUE AV fistula. Patient denies any recent hospitalizations or ED visits. Dry weight 70.5 kg. Minimal UOP.

## 2025-04-24 NOTE — PROGRESS NOTES
TRANSPLANT NOTE:      ORGAN: LEFT KIDNEY      Disease Etiology: Diabetes Mellitus - Type II  Donor Type: Donation after Brain Death      CDC High Risk: No      Donor CMV Status:   Positive   Donor HBcAB: Negative      Donor HCV Status: Positive      Peak cPRA % (within 1 year of transplant): 29      Kiesha Rocha is a 59 y.o. female s/p  Donation after Brain Death     kidney transplant on 4/24/2025 (Kidney) for Diabetes Mellitus - Type II.   This patient will receive anti-thymocyte globulin 4.5 mg/kg for induction.  This patients maintenance immunosuppression will include a steroid taper per protocol to 5mg daily, tacrolimus, and mycophenolate maintenance.  Opportunistic infection prophylaxis will include valganciclovir for 3 months (CMV D + , R + ) and sulfamethoxazole-trimethoprim for 6 months.  Patient is to begin self medications upon transfer to the [facility], and I plan to meet with this patient and his/her support person prior to discharge to review the medication section of the Kidney Transplant Education Manual.  I have reviewed the pre-op medications and have restarted those, as appropriate.

## 2025-04-24 NOTE — ANESTHESIA PREPROCEDURE EVALUATION
04/24/2025  Kiesha Rocha is a 59 y.o., female.      Pre-op Assessment          Review of Systems  Anesthesia Hx:  No problems with previous Anesthesia                Cardiovascular:     Hypertension    Denies CAD.                                          Pulmonary:    Asthma     Denies Sleep Apnea.                Renal/:  Chronic Renal Disease, ESRD, Dialysis                Hepatic/GI:   Denies PUD.   Denies GERD. Denies Liver Disease.               Neurological:   CVA, no residual symptoms    Denies Seizures.                                Endocrine:  Diabetes Denies Hypothyroidism. Hyperthyroidism             Physical Exam  General: Alert    Airway:  Mallampati: I   Mouth Opening: Normal  TM Distance: Normal  Tongue: Normal  Neck ROM: Normal ROM    Dental:  Dentures        Anesthesia Plan  Type of Anesthesia, risks & benefits discussed:    Anesthesia Type: Gen ETT  Intra-op Monitoring Plan: Standard ASA Monitors  Post Op Pain Control Plan: multimodal analgesia and IV/PO Opioids PRN  Induction:  IV  Airway Plan: Direct  Informed Consent: Informed consent signed with the Patient and all parties understand the risks and agree with anesthesia plan.  All questions answered.   ASA Score: 4    Ready For Surgery From Anesthesia Perspective.     .

## 2025-04-24 NOTE — PROGRESS NOTES
Transport arrived to pick patient up for surgery, [facility] aware that EKG, I stat and anesthesia consent not completed. [facility] requested to send the patient without, and they will complete when the patient gets there.

## 2025-04-24 NOTE — PLAN OF CARE
Prepped pt for surgery. Procedure reviewed and confirmed with pt. Family present at bedside. All questions answered, no concerns. Bed in lowest position. Call light within reach. Safety maintained.    Pulled abx from [email or web address] and handed to OR nurse for administration in OR.

## 2025-04-24 NOTE — ASSESSMENT & PLAN NOTE
- admit for kidney transplant  - NPO for OR 10am  - surgeon Dr Garcia  - pre op labs and diagnostic testing pending, to be reviewed prior to surgery  - Thymo induction

## 2025-04-24 NOTE — PROGRESS NOTES
Patient arrived to [facility] for transplant, Irma JOHNSON notified of arrival as well as EKG, anesthesia, chest xray, and lab.

## 2025-04-24 NOTE — PROGRESS NOTES
Pt. Arrived to room via stretcher; tolerated transfer well; pt. post/op kidney transplant has a RLQ incision CDI dressing noted to site; pt. has a cash catheter with clear yellow urine draining to gu bag; pt. c/o pain to abdomen has prn pain medication ordered; family at the bedside; bed in the lowest position with call light in reach; care continued.

## 2025-04-25 DIAGNOSIS — Z94.0 KIDNEY REPLACED BY TRANSPLANT: Primary | ICD-10-CM

## 2025-04-25 PROBLEM — Z29.89 PROPHYLACTIC IMMUNOTHERAPY: Status: ACTIVE | Noted: 2025-04-25

## 2025-04-25 PROBLEM — D62 ACUTE BLOOD LOSS ANEMIA: Status: ACTIVE | Noted: 2025-04-25

## 2025-04-25 PROBLEM — D63.8 ANEMIA OF CHRONIC DISEASE: Status: ACTIVE | Noted: 2025-04-25

## 2025-04-25 PROBLEM — I10 HYPERTENSION: Status: ACTIVE | Noted: 2024-10-17

## 2025-04-25 PROBLEM — T38.0X5A ADRENAL CORTICAL STEROIDS CAUSING ADVERSE EFFECT IN THERAPEUTIC USE: Status: ACTIVE | Noted: 2025-04-25

## 2025-04-25 PROBLEM — Z91.89 AT RISK FOR OPPORTUNISTIC INFECTIONS: Status: ACTIVE | Noted: 2025-04-25

## 2025-04-25 PROBLEM — Z79.60 LONG-TERM USE OF IMMUNOSUPPRESSANT MEDICATION: Status: ACTIVE | Noted: 2025-04-25

## 2025-04-25 LAB
ABSOLUTE EOSINOPHIL (OHS): 0 K/UL
ABSOLUTE EOSINOPHIL (OHS): 0.02 K/UL
ABSOLUTE MONOCYTE (OHS): 0.24 K/UL (ref 0.3–1)
ABSOLUTE MONOCYTE (OHS): 0.81 K/UL (ref 0.3–1)
ABSOLUTE NEUTROPHIL COUNT (OHS): 14.25 K/UL (ref 1.8–7.7)
ABSOLUTE NEUTROPHIL COUNT (OHS): 14.58 K/UL (ref 1.8–7.7)
ALBUMIN SERPL BCP-MCNC: 3 G/DL (ref 3.5–5.2)
ALBUMIN SERPL BCP-MCNC: 3.1 G/DL (ref 3.5–5.2)
ANION GAP (OHS): 14 MMOL/L (ref 8–16)
ANION GAP (OHS): 14 MMOL/L (ref 8–16)
BASOPHILS # BLD AUTO: 0.02 K/UL
BASOPHILS # BLD AUTO: 0.08 K/UL
BASOPHILS NFR BLD AUTO: 0.1 %
BASOPHILS NFR BLD AUTO: 0.5 %
BUN SERPL-MCNC: 30 MG/DL (ref 6–20)
BUN SERPL-MCNC: 33 MG/DL (ref 6–20)
CALCIUM SERPL-MCNC: 8.2 MG/DL (ref 8.7–10.5)
CALCIUM SERPL-MCNC: 8.3 MG/DL (ref 8.7–10.5)
CHLORIDE SERPL-SCNC: 102 MMOL/L (ref 95–110)
CHLORIDE SERPL-SCNC: 104 MMOL/L (ref 95–110)
CO2 SERPL-SCNC: 17 MMOL/L (ref 23–29)
CO2 SERPL-SCNC: 19 MMOL/L (ref 23–29)
CREAT SERPL-MCNC: 4.9 MG/DL (ref 0.5–1.4)
CREAT SERPL-MCNC: 5.5 MG/DL (ref 0.5–1.4)
ERYTHROCYTE [DISTWIDTH] IN BLOOD BY AUTOMATED COUNT: 18.6 % (ref 11.5–14.5)
ERYTHROCYTE [DISTWIDTH] IN BLOOD BY AUTOMATED COUNT: 18.6 % (ref 11.5–14.5)
GFR SERPLBLD CREATININE-BSD FMLA CKD-EPI: 10 ML/MIN/1.73/M2
GFR SERPLBLD CREATININE-BSD FMLA CKD-EPI: 9 ML/MIN/1.73/M2
GLUCOSE SERPL-MCNC: 225 MG/DL (ref 70–110)
GLUCOSE SERPL-MCNC: 225 MG/DL (ref 70–110)
HCT VFR BLD AUTO: 33 % (ref 37–48.5)
HCT VFR BLD AUTO: 33.2 % (ref 37–48.5)
HCV RNA SERPL NAA+PROBE-LOG IU: NOT DETECTED {LOG_IU}/ML
HGB BLD-MCNC: 10 GM/DL (ref 12–16)
HGB BLD-MCNC: 10.3 GM/DL (ref 12–16)
IMM GRANULOCYTES # BLD AUTO: 0.1 K/UL (ref 0–0.04)
IMM GRANULOCYTES # BLD AUTO: 0.2 K/UL (ref 0–0.04)
IMM GRANULOCYTES NFR BLD AUTO: 0.7 % (ref 0–0.5)
IMM GRANULOCYTES NFR BLD AUTO: 1.3 % (ref 0–0.5)
LYMPHOCYTES # BLD AUTO: 0.13 K/UL (ref 1–4.8)
LYMPHOCYTES # BLD AUTO: 0.2 K/UL (ref 1–4.8)
MAGNESIUM SERPL-MCNC: 2 MG/DL (ref 1.6–2.6)
MCH RBC QN AUTO: 27.4 PG (ref 27–31)
MCH RBC QN AUTO: 27.8 PG (ref 27–31)
MCHC RBC AUTO-ENTMCNC: 30.1 G/DL (ref 32–36)
MCHC RBC AUTO-ENTMCNC: 31.2 G/DL (ref 32–36)
MCV RBC AUTO: 89 FL (ref 82–98)
MCV RBC AUTO: 91 FL (ref 82–98)
NUCLEATED RBC (/100WBC) (OHS): 0 /100 WBC
NUCLEATED RBC (/100WBC) (OHS): 0 /100 WBC
PHOSPHATE SERPL-MCNC: 4.4 MG/DL (ref 2.7–4.5)
PHOSPHATE SERPL-MCNC: 4.4 MG/DL (ref 2.7–4.5)
PLATELET # BLD AUTO: 208 K/UL (ref 150–450)
PLATELET # BLD AUTO: 253 K/UL (ref 150–450)
PLATELET BLD QL SMEAR: ABNORMAL
PMV BLD AUTO: 12.9 FL (ref 9.2–12.9)
PMV BLD AUTO: ABNORMAL FL
POCT GLUCOSE: 164 MG/DL (ref 70–110)
POCT GLUCOSE: 259 MG/DL (ref 70–110)
POCT GLUCOSE: 273 MG/DL (ref 70–110)
POCT GLUCOSE: 294 MG/DL (ref 70–110)
POCT GLUCOSE: 305 MG/DL (ref 70–110)
POCT GLUCOSE: 313 MG/DL (ref 70–110)
POTASSIUM SERPL-SCNC: 4.1 MMOL/L (ref 3.5–5.1)
POTASSIUM SERPL-SCNC: 4.5 MMOL/L (ref 3.5–5.1)
RBC # BLD AUTO: 3.65 M/UL (ref 4–5.4)
RBC # BLD AUTO: 3.7 M/UL (ref 4–5.4)
RELATIVE EOSINOPHIL (OHS): 0 %
RELATIVE EOSINOPHIL (OHS): 0.1 %
RELATIVE LYMPHOCYTE (OHS): 0.9 % (ref 18–48)
RELATIVE LYMPHOCYTE (OHS): 1.3 % (ref 18–48)
RELATIVE MONOCYTE (OHS): 1.6 % (ref 4–15)
RELATIVE MONOCYTE (OHS): 5.1 % (ref 4–15)
RELATIVE NEUTROPHIL (OHS): 91.7 % (ref 38–73)
RELATIVE NEUTROPHIL (OHS): 96.7 % (ref 38–73)
SODIUM SERPL-SCNC: 135 MMOL/L (ref 136–145)
SODIUM SERPL-SCNC: 135 MMOL/L (ref 136–145)
TACROLIMUS BLD-MCNC: <2 NG/ML (ref 5–15)
WBC # BLD AUTO: 14.74 K/UL (ref 3.9–12.7)
WBC # BLD AUTO: 15.89 K/UL (ref 3.9–12.7)

## 2025-04-25 PROCEDURE — 25000003 PHARM REV CODE 250

## 2025-04-25 PROCEDURE — 25000003 PHARM REV CODE 250: Performed by: PHYSICIAN ASSISTANT

## 2025-04-25 PROCEDURE — 82310 ASSAY OF CALCIUM: CPT | Performed by: PHYSICIAN ASSISTANT

## 2025-04-25 PROCEDURE — 80069 RENAL FUNCTION PANEL: CPT

## 2025-04-25 PROCEDURE — 63600175 PHARM REV CODE 636 W HCPCS

## 2025-04-25 PROCEDURE — 63600175 PHARM REV CODE 636 W HCPCS: Performed by: PHYSICIAN ASSISTANT

## 2025-04-25 PROCEDURE — 83735 ASSAY OF MAGNESIUM: CPT

## 2025-04-25 PROCEDURE — 27000207 HC ISOLATION

## 2025-04-25 PROCEDURE — 36415 COLL VENOUS BLD VENIPUNCTURE: CPT

## 2025-04-25 PROCEDURE — 99233 SBSQ HOSP IP/OBS HIGH 50: CPT | Mod: 24,,, | Performed by: PHYSICIAN ASSISTANT

## 2025-04-25 PROCEDURE — 20600001 HC STEP DOWN PRIVATE ROOM

## 2025-04-25 PROCEDURE — 36415 COLL VENOUS BLD VENIPUNCTURE: CPT | Performed by: PHYSICIAN ASSISTANT

## 2025-04-25 PROCEDURE — 85025 COMPLETE CBC W/AUTO DIFF WBC: CPT | Performed by: TRANSPLANT SURGERY

## 2025-04-25 PROCEDURE — 25000003 PHARM REV CODE 250: Performed by: NURSE PRACTITIONER

## 2025-04-25 PROCEDURE — 80197 ASSAY OF TACROLIMUS: CPT

## 2025-04-25 PROCEDURE — 85025 COMPLETE CBC W/AUTO DIFF WBC: CPT

## 2025-04-25 PROCEDURE — 99232 SBSQ HOSP IP/OBS MODERATE 35: CPT | Mod: ,,,

## 2025-04-25 RX ORDER — SODIUM BICARBONATE 650 MG/1
650 TABLET ORAL 2 TIMES DAILY
Status: DISCONTINUED | OUTPATIENT
Start: 2025-04-25 | End: 2025-04-27

## 2025-04-25 RX ORDER — CARVEDILOL 12.5 MG/1
12.5 TABLET ORAL 2 TIMES DAILY
Qty: 60 TABLET | Refills: 11 | Status: SHIPPED | OUTPATIENT
Start: 2025-04-25 | End: 2025-04-26 | Stop reason: HOSPADM

## 2025-04-25 RX ORDER — OXYCODONE HYDROCHLORIDE 5 MG/1
5 TABLET ORAL EVERY 6 HOURS PRN
Refills: 0 | Status: DISCONTINUED | OUTPATIENT
Start: 2025-04-25 | End: 2025-04-27 | Stop reason: HOSPADM

## 2025-04-25 RX ORDER — CARVEDILOL 12.5 MG/1
12.5 TABLET ORAL ONCE
Status: COMPLETED | OUTPATIENT
Start: 2025-04-25 | End: 2025-04-25

## 2025-04-25 RX ORDER — SODIUM CHLORIDE 9 MG/ML
INJECTION, SOLUTION INTRAVENOUS CONTINUOUS
Status: ACTIVE | OUTPATIENT
Start: 2025-04-25 | End: 2025-04-26

## 2025-04-25 RX ORDER — INSULIN GLARGINE 100 [IU]/ML
16 INJECTION, SOLUTION SUBCUTANEOUS NIGHTLY
Status: DISCONTINUED | OUTPATIENT
Start: 2025-04-25 | End: 2025-04-27

## 2025-04-25 RX ORDER — OXYBUTYNIN CHLORIDE 5 MG/1
5 TABLET ORAL 3 TIMES DAILY
Status: DISCONTINUED | OUTPATIENT
Start: 2025-04-25 | End: 2025-04-27 | Stop reason: HOSPADM

## 2025-04-25 RX ORDER — GABAPENTIN 300 MG/1
600 CAPSULE ORAL NIGHTLY
Status: DISCONTINUED | OUTPATIENT
Start: 2025-04-25 | End: 2025-04-27 | Stop reason: HOSPADM

## 2025-04-25 RX ORDER — HYDROXYZINE PAMOATE 25 MG/1
25 CAPSULE ORAL EVERY 8 HOURS PRN
Status: DISCONTINUED | OUTPATIENT
Start: 2025-04-25 | End: 2025-04-27 | Stop reason: HOSPADM

## 2025-04-25 RX ORDER — CARVEDILOL 12.5 MG/1
25 TABLET ORAL 2 TIMES DAILY
Status: DISCONTINUED | OUTPATIENT
Start: 2025-04-26 | End: 2025-04-27 | Stop reason: HOSPADM

## 2025-04-25 RX ORDER — PEN NEEDLE, DIABETIC 30 GX3/16"
1 NEEDLE, DISPOSABLE MISCELLANEOUS 4 TIMES DAILY
Qty: 100 EACH | Refills: 11 | Status: SHIPPED | OUTPATIENT
Start: 2025-04-25

## 2025-04-25 RX ORDER — OXYCODONE HYDROCHLORIDE 5 MG/1
5 TABLET ORAL EVERY 6 HOURS PRN
Qty: 28 TABLET | Refills: 0 | Status: SHIPPED | OUTPATIENT
Start: 2025-04-25

## 2025-04-25 RX ORDER — TACROLIMUS 1 MG/1
2 CAPSULE ORAL ONCE
Status: COMPLETED | OUTPATIENT
Start: 2025-04-25 | End: 2025-04-25

## 2025-04-25 RX ORDER — HYDRALAZINE HYDROCHLORIDE 20 MG/ML
10 INJECTION INTRAMUSCULAR; INTRAVENOUS EVERY 6 HOURS PRN
Status: DISCONTINUED | OUTPATIENT
Start: 2025-04-25 | End: 2025-04-27 | Stop reason: HOSPADM

## 2025-04-25 RX ORDER — INSULIN ASPART 100 [IU]/ML
6 INJECTION, SOLUTION INTRAVENOUS; SUBCUTANEOUS
Status: DISCONTINUED | OUTPATIENT
Start: 2025-04-26 | End: 2025-04-27 | Stop reason: HOSPADM

## 2025-04-25 RX ORDER — DEXTROSE 4 G
TABLET,CHEWABLE ORAL
Qty: 1 EACH | Refills: 0 | Status: SHIPPED | OUTPATIENT
Start: 2025-04-25

## 2025-04-25 RX ORDER — CARVEDILOL 12.5 MG/1
12.5 TABLET ORAL 2 TIMES DAILY
Status: DISCONTINUED | OUTPATIENT
Start: 2025-04-25 | End: 2025-04-25

## 2025-04-25 RX ORDER — NIFEDIPINE 30 MG/1
30 TABLET, EXTENDED RELEASE ORAL DAILY
Status: DISCONTINUED | OUTPATIENT
Start: 2025-04-25 | End: 2025-04-27 | Stop reason: HOSPADM

## 2025-04-25 RX ORDER — OXYCODONE HYDROCHLORIDE 5 MG/1
5 TABLET ORAL ONCE
Refills: 0 | Status: COMPLETED | OUTPATIENT
Start: 2025-04-25 | End: 2025-04-25

## 2025-04-25 RX ORDER — LANCETS 33 GAUGE
EACH MISCELLANEOUS
Qty: 100 EACH | Refills: 11 | Status: SHIPPED | OUTPATIENT
Start: 2025-04-25

## 2025-04-25 RX ORDER — TACROLIMUS 1 MG/1
5 CAPSULE ORAL 2 TIMES DAILY
Status: DISCONTINUED | OUTPATIENT
Start: 2025-04-25 | End: 2025-04-26

## 2025-04-25 RX ORDER — OXYCODONE HYDROCHLORIDE 10 MG/1
10 TABLET ORAL EVERY 6 HOURS PRN
Refills: 0 | Status: DISCONTINUED | OUTPATIENT
Start: 2025-04-25 | End: 2025-04-27 | Stop reason: HOSPADM

## 2025-04-25 RX ADMIN — INSULIN ASPART 4 UNITS: 100 INJECTION, SOLUTION INTRAVENOUS; SUBCUTANEOUS at 12:04

## 2025-04-25 RX ADMIN — NIFEDIPINE 30 MG: 30 TABLET, FILM COATED, EXTENDED RELEASE ORAL at 09:04

## 2025-04-25 RX ADMIN — INSULIN ASPART 4 UNITS: 100 INJECTION, SOLUTION INTRAVENOUS; SUBCUTANEOUS at 08:04

## 2025-04-25 RX ADMIN — SODIUM CHLORIDE: 9 INJECTION, SOLUTION INTRAVENOUS at 08:04

## 2025-04-25 RX ADMIN — HEPARIN SODIUM 5000 UNITS: 5000 INJECTION INTRAVENOUS; SUBCUTANEOUS at 05:04

## 2025-04-25 RX ADMIN — SODIUM CHLORIDE 125 MG: 9 INJECTION, SOLUTION INTRAVENOUS at 10:04

## 2025-04-25 RX ADMIN — ACETAMINOPHEN 650 MG: 325 TABLET ORAL at 05:04

## 2025-04-25 RX ADMIN — MUPIROCIN 1 G: 20 OINTMENT TOPICAL at 08:04

## 2025-04-25 RX ADMIN — OXYCODONE HYDROCHLORIDE 10 MG: 10 TABLET ORAL at 06:04

## 2025-04-25 RX ADMIN — Medication 1 TABLET: at 08:04

## 2025-04-25 RX ADMIN — HEPARIN SODIUM 5000 UNITS: 5000 INJECTION INTRAVENOUS; SUBCUTANEOUS at 02:04

## 2025-04-25 RX ADMIN — INSULIN ASPART 4 UNITS: 100 INJECTION, SOLUTION INTRAVENOUS; SUBCUTANEOUS at 05:04

## 2025-04-25 RX ADMIN — MYCOPHENOLATE MOFETIL 1000 MG: 250 CAPSULE ORAL at 08:04

## 2025-04-25 RX ADMIN — ACETAMINOPHEN 650 MG: 325 TABLET ORAL at 10:04

## 2025-04-25 RX ADMIN — OXYBUTYNIN CHLORIDE 5 MG: 5 TABLET ORAL at 08:04

## 2025-04-25 RX ADMIN — FAMOTIDINE 20 MG: 20 TABLET, FILM COATED ORAL at 08:04

## 2025-04-25 RX ADMIN — INSULIN ASPART 3 UNITS: 100 INJECTION, SOLUTION INTRAVENOUS; SUBCUTANEOUS at 08:04

## 2025-04-25 RX ADMIN — DOCUSATE SODIUM 100 MG: 100 CAPSULE, LIQUID FILLED ORAL at 08:04

## 2025-04-25 RX ADMIN — OXYCODONE 5 MG: 5 TABLET ORAL at 06:04

## 2025-04-25 RX ADMIN — HYDROXYZINE PAMOATE 25 MG: 25 CAPSULE ORAL at 09:04

## 2025-04-25 RX ADMIN — CARVEDILOL 12.5 MG: 12.5 TABLET, FILM COATED ORAL at 08:04

## 2025-04-25 RX ADMIN — FLUOXETINE HYDROCHLORIDE 40 MG: 20 CAPSULE ORAL at 08:04

## 2025-04-25 RX ADMIN — GABAPENTIN 600 MG: 300 CAPSULE ORAL at 08:04

## 2025-04-25 RX ADMIN — ACETAMINOPHEN 650 MG: 325 TABLET ORAL at 02:04

## 2025-04-25 RX ADMIN — TACROLIMUS 3 MG: 1 CAPSULE ORAL at 08:04

## 2025-04-25 RX ADMIN — METHYLPREDNISOLONE SODIUM SUCCINATE 250 MG: 125 INJECTION, POWDER, FOR SOLUTION INTRAMUSCULAR; INTRAVENOUS at 09:04

## 2025-04-25 RX ADMIN — FUROSEMIDE 160 MG: 10 INJECTION, SOLUTION INTRAMUSCULAR; INTRAVENOUS at 11:04

## 2025-04-25 RX ADMIN — INSULIN GLARGINE 16 UNITS: 100 INJECTION, SOLUTION SUBCUTANEOUS at 08:04

## 2025-04-25 RX ADMIN — INSULIN ASPART 3 UNITS: 100 INJECTION, SOLUTION INTRAVENOUS; SUBCUTANEOUS at 05:04

## 2025-04-25 RX ADMIN — DIPHENHYDRAMINE HYDROCHLORIDE 25 MG: 25 CAPSULE ORAL at 10:04

## 2025-04-25 RX ADMIN — INSULIN ASPART 2 UNITS: 100 INJECTION, SOLUTION INTRAVENOUS; SUBCUTANEOUS at 02:04

## 2025-04-25 RX ADMIN — TACROLIMUS 5 MG: 1 CAPSULE ORAL at 05:04

## 2025-04-25 RX ADMIN — TRAMADOL HYDROCHLORIDE 50 MG: 50 TABLET, COATED ORAL at 04:04

## 2025-04-25 RX ADMIN — CARVEDILOL 12.5 MG: 12.5 TABLET, FILM COATED ORAL at 09:04

## 2025-04-25 RX ADMIN — OXYCODONE 5 MG: 5 TABLET ORAL at 08:04

## 2025-04-25 RX ADMIN — ACETAMINOPHEN 650 MG: 325 TABLET ORAL at 08:04

## 2025-04-25 RX ADMIN — METHIMAZOLE 5 MG: 5 TABLET ORAL at 08:04

## 2025-04-25 RX ADMIN — TACROLIMUS 2 MG: 1 CAPSULE ORAL at 10:04

## 2025-04-25 RX ADMIN — SODIUM CHLORIDE: 9 INJECTION, SOLUTION INTRAVENOUS at 02:04

## 2025-04-25 RX ADMIN — SODIUM BICARBONATE 650 MG TABLET 650 MG: at 05:04

## 2025-04-25 RX ADMIN — DOCUSATE SODIUM 100 MG: 100 CAPSULE, LIQUID FILLED ORAL at 02:04

## 2025-04-25 RX ADMIN — HEPARIN SODIUM 5000 UNITS: 5000 INJECTION INTRAVENOUS; SUBCUTANEOUS at 08:04

## 2025-04-25 RX ADMIN — INSULIN ASPART 1 UNITS: 100 INJECTION, SOLUTION INTRAVENOUS; SUBCUTANEOUS at 09:04

## 2025-04-25 NOTE — PROGRESS NOTES
Patient admitted for Kidney transplant.  Transplant coordinator met with the patient on rounds to introduce self and explain the coordinator role.     The post-transplant teaching book was given.   Transplant Coordinator explained that she will follow the patient while in the hospital and assist with discharge.     ESRD 2/2 DMII  SCD, KDPI 92%  Thymo induction  CMV ++  Donor HCVAb+/LORNE-

## 2025-04-25 NOTE — CARE UPDATE
Consult acknowledged. Formal consult note to follow in the AM.       59 y.o. year old female with ESRD secondary to diabetic nephropathy and TIA on daily aspirin (last taken on Monday 4/21) who presents for kidney transplant. Now s/p kidney tx 4/24/25.    -Glucose Goal 140-180    -A1C:   Hemoglobin A1C   Date Value Ref Range Status   09/03/2024 4.8 4.6 - 6.2 % Final   10/04/2023 6.2 (H) 4.0 - 5.6 % Final     Comment:     ADA Screening Guidelines:  5.7-6.4%  Consistent with prediabetes  >or=6.5%  Consistent with diabetes    High levels of fetal hemoglobin interfere with the HbA1C  assay. Heterozygous hemoglobin variants (HbS, HgC, etc)do  not significantly interfere with this assay.   However, presence of multiple variants may affect accuracy.           -HOME REGIMEN: Per chart review Lantus 30 units, Tradjenta 5 mg     -GLUCOSE TREND FOR THE PAST 24HRS:   Recent Labs   Lab 04/24/25  0916 04/24/25  1441 04/24/25  1710   POCTGLUCOSE 138* 241* 252*         -NO HYPOGYCEMIAS NOTED     - Diet  Diet Renal Non-Dialysis    -Steroids - Methylprednisolone 500 mg         Plan:   Start 0.3 u/kg/day WBD regimen given renal function. Consider re-WBD at 0.5 u/kg/day if BG remains above goal.     - Lantus 11 units HS  - Novolog 4 units TIDWM   - LDC SSI PRN (150/50)   - POCT Glucose before meals, at bedtime and at 2 am  - Hypoglycemia protocol in place      ** Please notify Endocrine for any change and/or advance in diet**  ** Please call Endocrine for any BG related issues **     Discharge Planning:   TBD. Please notify endocrinology prior to discharge.

## 2025-04-25 NOTE — PLAN OF CARE
Problem: Infection  Goal: Absence of Infection Signs and Symptoms  Outcome: Progressing     Problem: Neutropenia  Goal: Absence of Infection  Outcome: Progressing     Problem: Kidney Transplant  Goal: Effective Bowel Elimination  Outcome: Progressing     Problem: Kidney Transplant  Goal: Effective Renal Function  Outcome: Progressing   Progressing towards goals of care. RLQ inc gilmar with staples. Incision is CDI. Garzon draining clear yellow urine. See flowsheet for accurate output. Pt oob and in recliner and ambulatory with standby assist. Large bowel movement this shift. Appetite is good. Pain well managed. Blue card and self meds introduced and reviewed with pt and daughter. Tolerating thymo infusion without issue. Plan of care reviewed with pt.

## 2025-04-25 NOTE — PROGRESS NOTES
Admit and Discharge Note     Met with patient and daughter Juliette Huggins to assess needs. Patient is a 59 y.o.  female, admitted for for kidney transplant.      Patient admitted from home on 4/24/2025 .  At this time, patient presents as alert and oriented x 4, pleasant, good eye contact, well groomed, recall good, concentration/judgement good, average intelligence, calm, communicative, cooperative, and asking and answering questions appropriately.  At this time, patients caregiver presents as alert and oriented x 4, pleasant, good eye contact, well groomed, recall good, concentration/judgement good, average intelligence, calm, communicative, cooperative, and asking and answering questions appropriately.    Household/Family Systems     Patient resides with patient's Moo Rodrigez,  , Kelin Rocha, mother, Darlene Rocha, daughter and 2 grandchildren in [place], [place] Support system includes , mother, children and a host of other relatives.  Patient does not have dependents that are need of being cared for.     Patients primary caregiver is Juliette Huggins, patients daughter, phone number [phone].  Confirmed patients contact information is [phone].    During admission, patient's caregiver plans to stay in patient's room.  Confirmed patient and patients caregivers do have access to reliable transportation.    Cognitive Status/Learning     Patient reports reading ability as 12th grade and states patient does not have difficulty with reading, writing, seeing, hearing, comprehension, learning, and memory.  Patient reports patient learns best by multisensory instructions.     Needed: No.   Highest education level: High School (9-12) or GED    Vocation/Disability   .  Working for Income: No  If no, reason not working: Disability Patient receives SSD and SNAP benefits.     Adherence     Patient reports a high level of adherence to patients health care regimen.   Adherence counseling and education provided. Patient verbalizes understanding.    Substance Use    Patient reports the following substance usage.    Tobacco: none, patient denies any use.  Alcohol: none, patient denies any use.  Illicit Drugs/Non-prescribed Medications: none, patient denies any use.  Patient states clear understanding of the potential impact of substance use.  Substance abstinence/cessation counseling, education and resources provided and reviewed.     Services Utilizing/ADLS    Infusion Service: Prior to admission, patient utilizing? no  Home Health: Prior to admission, patient utilizing? no  DME: Prior to admission, yes [facility], glucometer, cane   Pulmonary/Cardiac Rehab: Prior to admission, no  Dialysis:  Prior to admission, yes [facility] ([phone]) MWF 9:15AM  Transplant Specialty Pharmacy:  Prior to admission, no.    Prior to admission, patient reports patient was independent with ADLS and was not driving.  Patient reports patient is able to care for self at this time..  Patient indicates a willingness to care for self once medically cleared to do so.    Insurance/Medications    Insured by   Payer/Plan Subscr  Sex Relation Sub. Ins. ID Effective Group Num   1. HU[employer] MANAGE* JOSSUE JOSE 1965 Female Self [id number]                                   [address] [address]   2. [place]* JOSSUE JOSE 1965 Female Self [id number]* [id number]                                   [address] [address]      Primary Insurance (for UNOS reporting): Public Insurance - Medicare & Choice  Secondary Insurance (for UNOS reporting): Public Insurance - Medicaid    Patient reports patient is able to obtain and afford medications at this time and at time of discharge.    Living Will/Healthcare Power of     Patient states patient does not have a LW and/or HCPA.   provided education regarding LW and HCPA and the completion of forms.    Coping/Mental  Health    Patient is coping adequately with the aid of  family members. Patient denies mental health difficulties at this time. Patient reported she suffers from anxiety but she hasn't had any difficulty since transplant. Patient reported she's in good spirits.     Discharge Planning    At time of discharge, patient plans to return to patient's home under the care of Juliette Huggins, daughter, Moo Rodrigez,  and Kelin Rocha, mother.  Patients daughter will transport patient.  Per rounds today, expected discharge date  is over the weekend . Per rounds this morning patient may need to resume hemodialysis. The final decision as not been determine at this time. RICHARD spoke with Stephanie, manager for  [facility] ([phone]). Stephanie notified patient received kidney transplant yesterday. In the event patient still needs dialysis RICHARD requested chair time is moved to the afternoon. Stephanie confirmed she can move HD chair time to 3:45PM on MWF for the time being. Stephanie asked RICHARD to follow-up on patients transportation since chair time was changing. RICHARD confirmed with patient,  transport her to and from dialysis. Transportation is not an issue. Patient notified of chair time change in the event she needs dialysis. Patient denied needing or wanting mental health resources or referrals at this time. Patient denied additional needs or concerns from this . Patient and daughter verbalize understanding and involved in treatment planning and discharge process. Patient agrees to contact transplant team with needs, questions or concerns as they arise. Patient aware of, involved in, and coping well with this discharge plan. Patient  and daughter did not have any concerns with the discharge plan at this time. RICHARD remains available at [phone].  Additional Concerns    Patient's caretaker denies additional needs and/or concerns at this time. Patient is being followed for needs, education,  resources, information, emotional support, supportive counseling, and for supportive and skilled discharge plan of care.  providing ongoing psychosocial support, education, resources and d/c planning as needed.  SW remains available.  remains available. Patient's caregiver verbalizes understanding and agreement with information reviewed,  availability and how to access available resources as needed. Patient denies additional needs and/or concerns at this time. Patient verbalizes understanding and agreement with information reviewed, social work availability, and how to access available resources as needed.

## 2025-04-25 NOTE — PROGRESS NOTES
Transplant Teaching Book given to patient, Kiesha Rocha, on 04/25/2025.  During the course of the hospital stay the patient received information regarding kidney transplant. Teaching and instruction were completed.  Areas that were discussed included: how to contact the Transplant Team, the importance of measuring intake of fluids and urine output, and monitoring vital signs such as blood pressure, temperature, and daily weights.  Parameters for which to report abnormal findings were given.  Appointment were provided along with the rational for the importance of lab work and clinic visits.  A written medication list was provided.  The importance of immunosuppressive medications, their common side effects, and treatment to prevent or minimize side effects has been reviewed.  Signs and symptoms of rejection and infection along with various treatments were reviewed.  The need to avoid infection was discussed.  Wound care and special consideration regarding activities of daily living were explained.  Written and verbal teaching of the above information was given.     Discussed with the patient and caregiver the importance of maintaining COVID-19 precautions; wearing a mask, good handwashing, and social distancing.  Also, to report any signs or symptoms (fever, difficulty breathing, loss of taste/smell, etc.), suspected exposure, or COVID testing, immediately to the transplant program.     HCV Education provided to the patient and her daughter

## 2025-04-25 NOTE — ASSESSMENT & PLAN NOTE
BG Goal 140-180      Re-WBD at 0.5 u/kg/day     - Lantus 17 units HS  - Novolog 5 units TIDWM   - LDC SSI PRN (150/50)   - POCT Glucose before meals, at bedtime and at 2 am  - Hypoglycemia protocol in place      ** Please notify Endocrine for any change and/or advance in diet**  ** Please call Endocrine for any BG related issues **     Discharge Planning:   TBD. Please notify endocrinology prior to discharge.

## 2025-04-25 NOTE — ASSESSMENT & PLAN NOTE
- Home regimen includes: Hydralazine 100 mg TID, labetolol 200 mg daily, Amlodipine 10 mg daily  - start carvedilol this morning and continue to monitor

## 2025-04-25 NOTE — HPI
"Reason for Consult: Management of T2DM, Hyperglycemia     Surgical Procedure and Date: kidney tx 4/24/25    Diabetes diagnosis year: > 10 years ago    Home Diabetes Medications:  Per chart review Lantus 30 units, Tradjenta 5 mg     How often checking glucose at home? {Blank single:48405::"One","1-3 x day",">4 x day"}   BG readings on regimen: ***  Hypoglycemia on the regimen?  {YES NO:55441}  Missed doses on regimen?  {YES NO:14111}    Diabetes Complications include:     Hyperglycemia    Complicating diabetes co morbidities:   ESRD and Glucocorticoid use       HPI:   Patient is a 59 y.o. female with ESRD secondary to diabetic nephropathy and TIA on daily aspirin (last taken on Monday 4/21) who presents for kidney transplant. Now s/p kidney tx 4/24/25. Endocrine consulted for BG management.       "

## 2025-04-25 NOTE — PLAN OF CARE
S/p kidney transplant POD 1.  She is AAOx4. Q2 hr VSS.  Afebrile.  On 1L NC, continuous pulse ox in place.  8pm labs showed K 4.3, Cr 5.6 to 5.7.  cash in place w clear yellow UOP, IVF adjusted per MAR q1hr.  RLQ incision w island dressing CDI.  No drains.  Prn oxy and tramadol given for pain w full relief obtained.  Good appetite, accuchecks achs + 0200.  Daughter at bedside.  Self meds ready.  SCDs on o/n.  Fall pxns maintained.

## 2025-04-25 NOTE — HOSPITAL COURSE
She is now s/p DBD Kidney transplant 4/24. CIT ~ 15 hours; 2 day cash; no drains. Post op kidney US satisfactory. UOP ~30-50 cc/hr. Post op labs stable. Tolerating diet, drinking water, +flatus. Increase pain regimen today. Advance pathway this afternoon. Plan for catheter removal tomorrow. Cont to monitor.     Interval history : NAEON, AAO, VSS, Good UO, Cr trending down, Tolerating deitand passing gas no BM yet. US Kidney reviewed. Cash removed , Voiding fine. Started on Ditropan for bladder spasms. Wound CDI. Aim Discharge tomorrow

## 2025-04-25 NOTE — DISCHARGE SUMMARY
[facility] - [facility]plant Step[facility]  Kidney Transplant  Discharge Summary    Patient Name: Kiesha Rocha  MRN: [id number]  Admission Date: 4/24/2025  Hospital Length of Stay: 1 days  Discharge Date and Time: 04/27/2025 1:36 PM  Attending Physician: Shandra Garcia MD   Discharging Provider: Irma Cunningham PA-C  Primary Care Provider: Laverne Azevedo NP    HPI:   Ms. Rocha is a 59 y.o. year old female with ESRD secondary to diabetic nephropathy and history of TIA on aspirin who presents today for kidney transplant.  She has been on the wait list for a kidney transplant at [facility] since 8/5/2018. Patient is currently on hemodialysis started on 2/2019. Patient is dialyzing on MWF schedule.  Patient reports that she is tolerating dialysis well. She dialyzes for 3 1/2 hours per session. She has a LUE AV fistula. Patient denies any recent hospitalizations or ED visits. Dry weight 70.5 kg. Minimal UOP.     Procedure(s) (LRB):  TRANSPLANT, KIDNEY (N/A)     Hospital Course:  She is now s/p DBD Kidney transplant 4/24/25. Surgery uncomplicated. CIT ~ 15 hours; 2 day cash; no drains. Thymo induction. Kidney US post op satisfactory. Pt progressed well post operatively. Cr slowly trending down daily with adequate UOP. Catheter removed in am 4/26 and voiding without issues. Diet advanced and patient tolerating. Passing gas and had BM. She is ambulating and pain is controlled. Incision c/d/I in RLQ. She is now stable for discharge. She has met with pharm D and transplant coordinator for education. She will follow up with labs and in clinic on Monday 4/28/25. Pt expressed understanding of discharge instructions and importance of follow up.     Length of Stay was longer than expected due to: Discharged as expected    Goals of Care Treatment Preferences:  Code Status: Full Code      Final Active Diagnoses:    Diagnosis Date Noted POA    PRINCIPAL PROBLEM:  ESRD on dialysis [N18.6, Z99.2] 06/12/2019 Not Applicable      Problems  Resolved During this Admission:     Treatments: surgery: see hospital course    Consults (From admission, onward)          Status Ordering Provider     Inpatient consult to Endocrinology  Once        Provider:  (Not yet assigned)    Acknowledged JAY SANCHEZ     Inpatient consult to Registered Dietitian/Nutritionist  Once        Provider:  (Not yet assigned)    Acknowledged ROXANE BARON     Inpatient consult to Transplant Nephrology (KTM)  Once        Provider:  (Not yet assigned)    Acknowledged ROXANE BARON            Pending Diagnostic Studies:       Procedure Component Value Units Date/Time    CBC auto differential [[id number]] Collected: 04/25/25 0642    Order Status: Sent Lab Status: No result     Specimen: Blood     Narrative:      The following orders were created for panel order CBC auto differential.  Procedure                               Abnormality         Status                     ---------                               -----------         ------                     CBC with Differential[[id number]]                                                        Please view results for these tests on the individual orders.    CBC with Differential [[id number]] Collected: 04/25/25 0642    Order Status: Sent Lab Status: No result     Specimen: Blood     HLA Flow Crossmatch - Allo [[id number]] Collected: 04/24/25 0855    Order Status: Sent Lab Status: In process Updated: 04/24/25 0855    Specimen: Blood     Hepatitis B Surface Antibody, Qual/Quant [[id number]] Collected: 04/24/25 0915    Order Status: Sent Lab Status: In process Updated: 04/24/25 0927    Specimen: Blood     Hepatitis C RNA, Quantitative, PCR [[id number]] Collected: 04/24/25 0915    Order Status: Sent Lab Status: In process Updated: 04/24/25 1914    Specimen: Blood     Magnesium [[id number]] Collected: 04/25/25 0642    Order Status: Sent Lab Status: No result     Specimen: Blood     Protein / creatinine ratio, urine [[id number]]      Order Status: Sent Lab Status: No result     Specimen: Urine, Clean Catch     Renal function panel [[id number]] Collected: 04/25/25 0642    Order Status: Sent Lab Status: No result     Specimen: Blood     Tacrolimus level [[id number]] Collected: 04/25/25 0642    Order Status: Sent Lab Status: No result     Specimen: Blood           Significant Diagnostic Studies: Labs: CMP   Recent Labs   Lab 04/25/25  1504 04/26/25  0611 04/27/25  0550   * 140 141   K 4.1 3.8 3.0*    105 108   CO2 17* 23 22*   BUN 33* 41* 42*   CREATININE 4.9* 4.2* 2.8*   CALCIUM 8.2* 8.8 8.4*   ALBUMIN 3.1* 3.3* 2.9*   ANIONGAP 14 12 11     CBC   Recent Labs   Lab 04/25/25  1753 04/26/25  0611 04/27/25  0550   WBC 14.74* 12.96* 8.18   HGB 10.0* 9.6* 8.7*   HCT 33.2* 32.1* 28.0*    238 203     INR   Lab Results   Component Value Date    INR 1.0 04/24/2025    INR 1.0 04/12/2024    INR 0.9 08/21/2019     and All labs within the past 24 hours have been reviewed    Discharged Condition: fair    Disposition: to local address accompanied by family    Follow Up: see hospital course    Patient Instructions:   No discharge procedures on file.    Medications:  Reconciled Home Medications:      Medication List        START taking these medications      blood sugar diagnostic Strp  To check BG 3 times daily, to use with insurance preferred meter     blood-glucose meter Misc  To check BG 3 times daily, to use with insurance preferred meter     carvediloL 25 MG tablet  Commonly known as: COREG  Take 1 tablet (25 mg total) by mouth 2 (two) times daily.     famotidine 20 MG tablet  Commonly known as: PEPCID  Take 1 tablet (20 mg total) by mouth once daily.     hydrOXYzine pamoate 25 MG Cap  Commonly known as: VISTARIL  Take 1 capsule (25 mg total) by mouth every 8 (eight) hours as needed (insomnia).     insulin aspart U-100 100 unit/mL (3 mL) Inpn pen  Commonly known as: NovoLOG  Inject 5 Units into the skin 3 (three) times daily. Plus low  "sliding scale. Max units per day: 30     lancets 33 gauge Misc  To check BG 3 times daily, to use with insurance preferred meter     mycophenolate 250 mg Cap  Commonly known as: CELLCEPT  Take 4 capsules (1,000 mg total) by mouth 2 (two) times daily.     NIFEdipine 30 MG (OSM) 24 hr tablet  Commonly known as: PROCARDIA-XL  Take 1 tablet (30 mg total) by mouth 2 (two) times a day.     oxybutynin 5 MG Tab  Commonly known as: DITROPAN  Take 1 tablet (5 mg total) by mouth 3 (three) times daily.     oxyCODONE 5 MG immediate release tablet  Commonly known as: ROXICODONE  Take 1 tablet (5 mg total) by mouth every 6 (six) hours as needed for Pain.     pen needle, diabetic 32 gauge x 5/32" Ndle  Inject 1 Needle into the skin 4 (four) times daily.  Replaces: pen needle, diabetic 31 gauge x 5/16" Ndle     PHOSPHA 250 NEUTRAL 250 mg Tab  Generic drug: k phos di & mono-sod phos mono  Take 2 tablets by mouth 2 (two) times a day.     predniSONE 5 MG tablet  Commonly known as: DELTASONE  Take by mouth daily: 4/26 to 5/2 20 mg, 5/3 to 5/9 15 mg, 5/10 to 5/16 10 mg, 5/17 to FOREVER 5 mg, DO NOT DISCONTINUE     sodium bicarbonate 650 MG tablet  Take 2 tablets (1,300 mg total) by mouth 2 (two) times daily.     sulfamethoxazole-trimethoprim 400-80mg 400-80 mg per tablet  Commonly known as: BACTRIM,SEPTRA  Take 1 tablet by mouth once daily. Stop 10/24/25     tacrolimus 1 MG Cap  Commonly known as: PROGRAF  Take 6 capsules (6 mg total) by mouth every 12 (twelve) hours.     valGANciclovir 450 mg Tab  Commonly known as: VALCYTE  Take 1 tablet (450 mg total) by mouth once daily. Stop 7/24/25            CHANGE how you take these medications      insulin glargine U-100 (Lantus) 100 unit/mL (3 mL) Inpn pen  Inject 13 Units into the skin every evening.  What changed: how much to take            CONTINUE taking these medications      albuterol 90 mcg/actuation inhaler  Commonly known as: PROVENTIL/VENTOLIN HFA  Inhale 2 puffs into the lungs every " 6 (six) hours as needed.     ASPIR-81 ORAL     atorvastatin 20 MG tablet  Commonly known as: LIPITOR  Take 1 tablet (20 mg total) by mouth once daily.     FLUoxetine 20 MG capsule  Take 40 mg by mouth once daily.     linaGLIPtin 5 mg Tab tablet  Commonly known as: TRADJENTA  Take 1 tablet (5 mg total) by mouth once daily.     methIMAzole 5 MG Tab  Commonly known as: TAPAZOLE  Take 1 tablet (5 mg total) by mouth once daily.     multivitamin Tab  Take 1 tablet by mouth once daily.            STOP taking these medications      amLODIPine 10 MG tablet  Commonly known as: NORVASC     calcium acetate(phosphat bind) 667 mg capsule  Commonly known as: PHOSLO     cholecalciferol (vitamin D3) 1,250 mcg (50,000 unit) capsule     cyclobenzaprine 10 MG tablet  Commonly known as: FLEXERIL     cyproheptadine 4 mg tablet  Commonly known as: PERIACTIN     elderberry fruit and flower 460-115 mg Cap     fluorometholone 0.1% 0.1 % Drps  Commonly known as: FML     fluticasone propionate 50 mcg/actuation nasal spray  Commonly known as: FLONASE     furosemide 40 MG tablet  Commonly known as: LASIX     gabapentin 600 MG tablet  Commonly known as: NEURONTIN     garlic 1,000 mg Cap     GLUCAGON EMERGENCY KIT (HUMAN) 1 mg injection  Generic drug: glucagon     hydrALAZINE 50 MG tablet  Commonly known as: APRESOLINE     insulin lispro 100 unit/mL pen     insulin syringe-needle U-100 1 mL 31 gauge x 5/16 Syrg     INV INSULIN GLARGINE (LANTUS) 100U/ML SOLN FOR INJ SOLOSTAR     ketoconazole 2 % cream  Commonly known as: NIZORAL     labetaloL 100 MG tablet  Commonly known as: NORMODYNE     lisinopriL 20 MG tablet  Commonly known as: PRINIVIL,ZESTRIL     loratadine 10 mg tablet  Commonly known as: CLARITIN     metoprolol tartrate 50 MG tablet  Commonly known as: LOPRESSOR     olopatadine 0.2 % Drop  Commonly known as: PATADAY     ondansetron 4 MG tablet  Commonly known as: ZOFRAN     oxyCODONE-acetaminophen 5-325 mg per tablet  Commonly known as:  "PERCOCET     pantoprazole 40 MG tablet  Commonly known as: PROTONIX     pen needle, diabetic 31 gauge x 5/16" Ndle  Replaced by: pen needle, diabetic 32 gauge x 5/32" Ndle     topiramate 25 MG tablet  Commonly known as: TOPAMAX     TYLENOL ARTHRITIS PAIN 650 MG Tbsr  Generic drug: acetaminophen            Time spent caring for patient (Greater than 1/2 spent in direct face-to-face contact): > 30 minutes    Gabbie Boston NP-C  Kidney Transplant  [facility] - Transplant Stepdown  "

## 2025-04-25 NOTE — CONSULTS
[facility] - Transplant [facility]down  Endocrinology  Diabetes Consult Note    Consult Requested by: Shandra Garcia MD   Reason for admit: S/P kidney transplant    HISTORY OF PRESENT ILLNESS:  Reason for Consult: Management of T2DM, Hyperglycemia     Surgical Procedure and Date: kidney tx 25    Diabetes diagnosis year: > 10 years ago    Home Diabetes Medications: Tradjenta 5 mg     How often checking glucose at home? 2x/day   BG readings on regimen: 100s  Hypoglycemia on the regimen? No   Missed doses on regimen?  No     Diabetes Complications include:     Hyperglycemia    Complicating diabetes co morbidities:   ESRD and Glucocorticoid use       HPI:   Patient is a 59 y.o. female with ESRD secondary to diabetic nephropathy and TIA on daily aspirin (last taken on ) who presents for kidney transplant. Now s/p kidney tx 25. Endocrine consulted for BG management.         Interval HPI:   No acute events overnight. Patient in room 75908/74128 A. Blood glucose stable. BG above goal on current insulin regimen (SSI, prandial, and basal insulin ). Steroid use- Methylprednisolone  250 mg.   1 Day Post-Op  Renal function-   Lab Results   Component Value Date    CREATININE 5.5 (H) 2025        Vasopressors-  None     Diet Renal Non-Dialysis     Eatin%  Nausea: No  Hypoglycemia and intervention: No  Fever: No  TPN and/or TF: No    PMH, PSH, FH, SH updated and reviewed     ROS:  Review of Systems   Gastrointestinal:  Negative for diarrhea, nausea and vomiting.   Endocrine: Negative for polydipsia.       Current Medications and/or Treatments Impacting Glycemic Control  Immunotherapy:    Immunosuppressants           Stop Route Frequency     tacrolimus capsule 5 mg         -- Oral 2 times daily     tacrolimus capsule 2 mg         -- Oral Once     antithymocyte globulin (rabbit) 125 mg, hydrocortisone sodium succinate (SOLU-CORTEF) 20 mg in 0.9% NaCl 500 mL (FOR PERIPHERAL LINE ADMINISTRATION ONLY)          04/26/25 0859 IV Daily     mycophenolate capsule 1,000 mg         -- Oral 2 times daily          Steroids:   Hormones (From admission, onward)      Start     Stop Route Frequency Ordered    04/26/25 0900  predniSONE tablet 20 mg  (IP TXP KIDNEY POST-OP THYMO WITH PERIPHERAL PRECHECKED IBW)         -- Oral Daily 04/24/25 1359    04/25/25 0900  antithymocyte globulin (rabbit) 125 mg, hydrocortisone sodium succinate (SOLU-CORTEF) 20 mg in 0.9% NaCl 500 mL (FOR PERIPHERAL LINE ADMINISTRATION ONLY)  (IP TXP KIDNEY POST-OP THYMO WITH PERIPHERAL PRECHECKED IBW)         04/26/25 0859 IV Daily 04/24/25 1359    04/24/25 1440  hydrocortisone sodium succinate injection 100 mg  (IP TXP KIDNEY POST-OP THYMO WITH PERIPHERAL PRECHECKED IBW)         09/20/36 0540 IV Once as needed 04/24/25 1359          Pressors:    Autonomic Drugs (From admission, onward)      Start     Stop Route Frequency Ordered    04/24/25 1440  EPINEPHrine (PF) injection 1 mg  (IP TXP KIDNEY POST-OP THYMO WITH PERIPHERAL PRECHECKED IBW)         09/20/36 0540 SubQ Once as needed 04/24/25 1359          Hyperglycemia/Diabetes Medications:   Antihyperglycemics (From admission, onward)      Start     Stop Route Frequency Ordered    04/25/25 2100  insulin glargine U-100 (Lantus) pen 16 Units         -- SubQ Nightly 04/25/25 0847    04/25/25 0715  insulin aspart U-100 pen 4 Units         -- SubQ 3 times daily with meals 04/24/25 1917 04/24/25 2017  insulin aspart U-100 pen 0-5 Units         -- SubQ Before meals, nightly and at 0200 PRN 04/24/25 1917             PHYSICAL EXAMINATION:  Vitals:    04/25/25 0826   BP:    Pulse:    Resp: 17   Temp:      Body mass index is 26.77 kg/m².     Physical Exam  Constitutional:       General: She is not in acute distress.     Appearance: Normal appearance. She is not ill-appearing.   HENT:      Head: Normocephalic and atraumatic.      Right Ear: External ear normal.      Left Ear: External ear normal.      Nose: Nose normal.  "  Pulmonary:      Effort: No respiratory distress.   Skin:     Coloration: Skin is not jaundiced.   Neurological:      Mental Status: She is alert. Mental status is at baseline.   Psychiatric:         Mood and Affect: Mood normal.         Behavior: Behavior normal.         Thought Content: Thought content normal.         Judgment: Judgment normal.            Labs Reviewed and Include   Recent Labs   Lab 04/25/25  0642   CALCIUM 8.3*   ALBUMIN 3.0*   *   K 4.5   CO2 19*      BUN 30*   CREATININE 5.5*     Lab Results   Component Value Date    WBC 15.89 (H) 04/25/2025    HGB 10.3 (L) 04/25/2025    HCT 33.0 (L) 04/25/2025    MCV 89 04/25/2025     04/24/2025     No results for input(s): "TSH", "FREET4" in the last 168 hours.  Lab Results   Component Value Date    HGBA1C 5.4 04/24/2025       Nutritional status:   Body mass index is 26.77 kg/m².  Lab Results   Component Value Date    ALBUMIN 3.0 (L) 04/25/2025    ALBUMIN 2.9 (L) 04/24/2025    ALBUMIN 3.1 (L) 04/24/2025     No results found for: "PREALBUMIN"    Estimated Creatinine Clearance: 10.9 mL/min (A) (based on SCr of 5.5 mg/dL (H)).    Accu-Checks  Recent Labs     04/24/25  0916 04/24/25  1441 04/24/25  1710 04/24/25  2227 04/25/25  0218 04/25/25  0835   POCTGLUCOSE 138* 241* 252* 387* 313* 259*        ASSESSMENT and PLAN    Renal/  * S/P kidney transplant  Managed per primary team        Endocrine  Type 2 diabetes mellitus with chronic kidney disease on chronic dialysis, with long-term current use of insulin  BG Goal 140-180      Re-WBD at 0.5 u/kg/day     - Lantus 16 units HS  - Novolog 4 units TIDWM   - LDC SSI PRN (150/50)   - POCT Glucose before meals, at bedtime and at 2 am  - Hypoglycemia protocol in place      ** Please notify Endocrine for any change and/or advance in diet**  ** Please call Endocrine for any BG related issues **     Discharge Planning:   TBD. Please notify endocrinology prior to discharge.               Other  Adrenal " cortical steroids causing adverse effect in therapeutic use  Glucocorticoids markedly increase glucose levels. Expect the steroid taper will help glucose control.             Plan discussed with patient, family, and RN at bedside.     Tiffany Gonsalez PA-C  Endocrinology  [facility] - Transplant Stepdown

## 2025-04-25 NOTE — SUBJECTIVE & OBJECTIVE
Subjective:   History of Present Illness:  Ms. Rocha is a 59 y.o. year old female with ESRD secondary to diabetic nephropathy and history of TIA on aspirin who presents today for kidney transplant.  She has been on the wait list for a kidney transplant at [facility] since 8/5/2018. Patient is currently on hemodialysis started on 2/2019. Patient is dialyzing on MWF schedule.  Patient reports that she is tolerating dialysis well. She dialyzes for 3 1/2 hours per session. She has a LUE AV fistula. Patient denies any recent hospitalizations or ED visits. Dry weight 70.5 kg. Minimal UOP.       Hospital Course:  She is now s/p DBD Kidney transplant 4/24. CIT ~ 15 hours; 2 day cash; no drains. Post op kidney US satisfactory. UOP ~30-50 cc/hr. Post op labs stable. Tolerating diet, drinking water, +flatus. Increase pain regimen today. Advance pathway this afternoon. Plan for catheter removal tomorrow. Cont to monitor.       Past Medical, Surgical, Family, and Social History:   Unchanged from H&P.    Scheduled Meds:   acetaminophen  650 mg Oral Q8H    acetaminophen  650 mg Oral Q24H    antithymocyte globulin (rabbit) 125 mg, hydrocortisone sodium succinate (SOLU-CORTEF) 20 mg in 0.9% NaCl 500 mL (FOR PERIPHERAL LINE ADMINISTRATION ONLY)  2.25 mg/kg (Ideal) Intravenous Daily    bisacodyL  10 mg Oral QHS    diphenhydrAMINE  25 mg Oral Q24H    docusate sodium  100 mg Oral TID    famotidine  20 mg Oral QHS    FLUoxetine  40 mg Oral Daily    heparin (porcine)  5,000 Units Subcutaneous Q8H    insulin aspart U-100  4 Units Subcutaneous TIDWM    insulin glargine U-100  11 Units Subcutaneous QHS    methIMAzole  5 mg Oral Daily    methylPREDNISolone injection (PEDS and ADULTS)  250 mg Intravenous Once    multivitamin  1 tablet Oral Daily    mupirocin  1 g Nasal BID    mycophenolate  1,000 mg Oral BID    [START ON 4/26/2025] predniSONE  20 mg Oral Daily    [START ON 5/4/2025] sulfamethoxazole-trimethoprim 400-80mg  1 tablet Oral Daily AM     tacrolimus  3 mg Oral BID    [START ON 5/4/2025] valGANciclovir  450 mg Oral Daily AM     Continuous Infusions:   0.9% NaCl   Intravenous Continuous   Stopped at 04/24/25 2323    D5 and 0.45% NaCl   Intravenous Continuous 50 mL/hr at 04/24/25 1415 New Bag at 04/24/25 1415    glucagon (human recombinant)  1 mg Intramuscular Continuous PRN         PRN Meds:  Current Facility-Administered Medications:     dextrose 50%, 12.5 g, Intravenous, PRN    dextrose 50%, 12.5 g, Intravenous, PRN    dextrose 50%, 25 g, Intravenous, PRN    diphenhydrAMINE, 50 mg, Oral, Once PRN    EPINEPHrine (PF), 1 mg, Subcutaneous, Once PRN    glucagon (human recombinant), 1 mg, Intramuscular, Continuous PRN    glucagon (human recombinant), 1 mg, Intramuscular, PRN    glucose, 16 g, Oral, PRN    glucose, 16 g, Oral, PRN    glucose, 16 g, Oral, PRN    glucose, 24 g, Oral, PRN    glucose, 24 g, Oral, PRN    hydrocortisone sodium succinate, 100 mg, Intravenous, Once PRN    insulin aspart U-100, 0-5 Units, Subcutaneous, AC + HS + 0200 PRN    ondansetron, 4 mg, Intravenous, Q6H PRN    oxyCODONE, 5 mg, Oral, Q6H PRN    oxyCODONE, 10 mg, Oral, Q6H PRN    sodium chloride 0.9%, 10 mL, Intravenous, PRN    Intake/Output - Last 3 Shifts         04/23 0700  04/24 0659 04/24 0700 04/25 0659 04/25 0700 04/26 0659    P.O.  450     IV Piggyback  2000     Total Intake(mL/kg)  2450 (34.7)     Urine (mL/kg/hr)  1150 50 (0.5)    Other  1000     Blood  100     Total Output  2250 50    Net  +200 -50                    Review of Systems   Constitutional:  Negative for activity change, appetite change, chills, diaphoresis and fever.   Eyes:  Negative for visual disturbance.   Respiratory:  Negative for chest tightness, shortness of breath and stridor.    Cardiovascular:  Negative for chest pain, palpitations and leg swelling.   Gastrointestinal:  Positive for abdominal pain. Negative for abdominal distention, constipation, diarrhea, nausea and vomiting.  "  Genitourinary:  Negative for decreased urine volume, difficulty urinating, dysuria and flank pain.   Musculoskeletal:  Negative for neck pain and neck stiffness.   Skin:  Negative for color change and rash.   Allergic/Immunologic: Positive for immunocompromised state.   Neurological:  Negative for dizziness, tremors, syncope, light-headedness and headaches.   Psychiatric/Behavioral:  Negative for agitation, confusion and decreased concentration.       Objective:     Vital Signs (Most Recent):  Temp: 98.5 °F (36.9 °C) (04/25/25 0803)  Pulse: 93 (04/25/25 0803)  Resp: 17 (04/25/25 0826)  BP: (!) 178/84 (04/25/25 0817)  SpO2: 95 % (04/25/25 0803) Vital Signs (24h Range):  Temp:  [97.9 °F (36.6 °C)-99 °F (37.2 °C)] 98.5 °F (36.9 °C)  Pulse:  [78-93] 93  Resp:  [15-22] 17  SpO2:  [90 %-100 %] 95 %  BP: (133-194)/(66-87) 178/84     Weight: 70.8 kg (155 lb 15.6 oz)  Height: 5' 4" (162.6 cm)  Body mass index is 26.77 kg/m².     Physical Exam  Vitals and nursing note reviewed.   Constitutional:       General: She is not in acute distress.     Appearance: She is not diaphoretic.   HENT:      Head: Normocephalic and atraumatic.   Eyes:      General: No scleral icterus.        Right eye: No discharge.         Left eye: No discharge.   Cardiovascular:      Rate and Rhythm: Normal rate and regular rhythm.      Heart sounds: Normal heart sounds.   Pulmonary:      Effort: Pulmonary effort is normal.      Breath sounds: Normal breath sounds. No wheezing or rales.   Abdominal:      General: Bowel sounds are normal. There is no distension.      Palpations: Abdomen is soft.      Tenderness: There is no abdominal tenderness. There is no guarding.       Genitourinary:     Comments: Garzon to gravity with yellow output  Musculoskeletal:         General: No swelling.      Cervical back: Normal range of motion and neck supple.      Comments: LUE AVF +/+   Skin:     General: Skin is warm and dry.      Capillary Refill: Capillary refill " takes less than 2 seconds.   Neurological:      Mental Status: She is alert and oriented to person, place, and time.      Cranial Nerves: No cranial nerve deficit.   Psychiatric:         Thought Content: Thought content normal.         Judgment: Judgment normal.          Laboratory:  CBC:   Recent Labs   Lab 04/24/25  0915 04/24/25  1431 04/24/25  1959   WBC 11.26  --  13.35*   RBC 3.67*  --  3.60*   HGB 10.0*  --  9.9*   HCT 32.4* 34.4* 32.7*     --  185   MCV 88  --  91   MCH 27.2  --  27.5   MCHC 30.9*  --  30.3*     CMP:   Recent Labs   Lab 04/24/25  0915 04/24/25  1431 04/24/25  1959   CALCIUM 9.2 8.3* 8.0*   ALBUMIN 3.2* 3.1* 2.9*    137 135*   K 3.8 4.2 4.3   CO2 24 22* 19*    104 102   BUN 22* 22* 27*   CREATININE 6.1* 5.6* 5.7*   ALKPHOS 77  --   --    ALT 6*  --   --    AST 13  --   --      Labs within the past 24 hours have been reviewed.    Diagnostic Results:  Reviewed.

## 2025-04-25 NOTE — CONSULTS
[facility] - Transplant Stepdown  Adult Nutrition  Consult Note    SUMMARY     Recommendations    Recommendation/Intervention:   1. Current diet insufficient for nutritional needs     - Recommend liberalizing diet to cardiac diet with phos binder to optimize nutrition status    2. RD to monitor weight, labs, intake, tolerance    Goals:   1. % nutritional needs met with diet during admission     2. Maintain weight during admission  Nutrition Goal Status: new  Communication of RD Recs: other (comment) (POC)    Nutrition Discharge Planning     Nutrition Discharge Planning: Other (comments), Therapeutic diet (comments), Oral supplement regimen (comments)  Therapeutic diet (comments): low Na diet  Oral supplement regimen (comments): supplement of choice  Other (comments): Post transplant nutrition education provided on 4/25. Food safety/drug interactions emphasized. General healthy/low salt diet recommended. Education material left at bedside. No other needs identified.    Assessment and Plan    Nutrition Problem  Increased nutrient needs (protein/calorie)     Related to (etiology):  Increased metabolic stress associated with transplant     Signs and Symptoms (as evidenced by):  S/p kidney transplantation and presence of surgical wound     Interventions (treatment strategy):  Collaboration of nutrition care w/ other providers     Nutrition Diagnosis Status:  New    Reason for Assessment    Reason For Assessment: consult (assess nutritional needs post kidney transplant)  Diagnosis: transplant/postoperative complications (S/P kidney transplant)  General Information Comments: Pt admitted with S/P kidney transplant. PMHx: thyroid disease, HTN, DM, disorder and ureter, HLD, hydronephrosis, hyperthyroidism, GERD. Recent surgical hx: kidney transplant (4/24). No edema noted. Wound: incision on abdomen. No GI s/s - BM: 4/24. Pt reported having a good appetite. Having a good intake - PO: %. No wt hx. Pt visually  "looked well nourished for age and condition, NFPE not warranted.    Nutrition/Diet History    Nutrition Intake History: 3 meals and snacks  Spiritual, Cultural Beliefs, Spiritism Practices, Values that Affect Care: no  Food Allergies: NKFA  Factors Affecting Nutritional Intake: None identified at this time    Nutrition Related Social Determinants of Health: SDOH: Adequate food in home environment    Anthropometrics    Height: 5' 4" (162.6 cm)  Height (inches): 64 in  Height Method: Measured  Weight: 70.8 kg (155 lb 15.6 oz)  Weight (lb): 155.98 lb  Weight Method: Standard Scale  Ideal Body Weight (IBW), Female: 120 lb  % Ideal Body Weight, Female (lb): 129.62 %  BMI (Calculated): 26.8  BMI Grade: 25 - 29.9 - overweight  Usual Body Weight (UBW), k.5 kg  % Usual Body Weight: 100.56    Lab/Procedures/Meds    Pertinent Labs Reviewed: reviewed  Pertinent Labs Comments: H/H 10.3/33.0 low, Na 135 low, BUN 30 high, creatinine 5.5 high, GFR 9 low, glucose 225 high, Ca 8.3 low, albumin 3.0 low, ALT 6 low  Pertinent Medications Reviewed: reviewed  Pertinent Medications Comments: bisacodyl carvedilol, famotidine, furosemide, gabapentin, heparin, insulin aspart, insulin lantus, methimazole, MVI, prednisone, sulfamethoxazole, tacrolimus, shona ganciclovir    Estimated/Assessed Needs    Weight Used For Calorie Calculations: 70.8 kg (156 lb 1.4 oz)  Energy Calorie Requirements (kcal): 1457-1157 kcal  Energy Need Method: Kcal/kg (25-35 kcal/kg)  Protein Requirements:  g/pro (0.8-1.5 g/kg)  Weight Used For Protein Calculations: 70.8 kg (156 lb 1.4 oz)        RDA Method (mL): 1770  CHO Requirement: 221-310 g    Nutrition Prescription Ordered    Current Diet Order: renal non-dialysis    Evaluation of Received Nutrient/Fluid Intake    Energy Calories Required: meeting needs  Protein Required: meeting needs  Fluid Required: other (see comments) (per MD)  % Intake of Estimated Energy Needs: 75 - 100 %  % Meal Intake: 75 - 100 " %    Nutrition Risk    Level of Risk/Frequency of Follow-up: low     Monitor and Evaluation    Monitor and Evaluation: Energy intake, Food and beverage intake, Protein intake, Carbohydrate intake, Diet order, Food and nutrition knowledge, Weight, Beliefs and attitudes, Electrolyte and renal panel, Gastrointestinal profile, Inflammatory profile, Lipid profile, Glucose/endocrine profile, Nutrition focused physical findings     Nutrition Follow-Up    RD Follow-up?: Yes

## 2025-04-25 NOTE — ASSESSMENT & PLAN NOTE
- s/p DBD Kidney transplant 4/24/25 (2 day cash; no drains; CIT 15 hours; thymo induction)  - UOP 30-40 cc/hr  - Post op kidney US satisfactory  - 2 day cash to be removed 4/26 at 6:00am  - Tolerating diet, +flatus, ambulating  - Increase pain regimen today

## 2025-04-25 NOTE — PROGRESS NOTES
[facility] - [facility]plant Stepdown  Kidney Transplant  Progress Note      Reason for Follow-up: Reassessment of Kidney Transplant - 4/24/2025  (#1) recipient and management of immunosuppression.    ORGAN: LEFT KIDNEY      Donor Type: Donation after Brain Death      Donor CMV Status: Positive  Donor HBcAB:Negative  Donor HCV Status:Positive  Donor HBV LORNE:   Donor HCV LORNE: Negative      Subjective:   History of Present Illness:  Ms. Rocha is a 59 y.o. year old female with ESRD secondary to diabetic nephropathy and history of TIA on aspirin who presents today for kidney transplant.  She has been on the wait list for a kidney transplant at [facility] since 8/5/2018. Patient is currently on hemodialysis started on 2/2019. Patient is dialyzing on MWF schedule.  Patient reports that she is tolerating dialysis well. She dialyzes for 3 1/2 hours per session. She has a LUE AV fistula. Patient denies any recent hospitalizations or ED visits. Dry weight 70.5 kg. Minimal UOP.       Hospital Course:  She is now s/p DBD Kidney transplant 4/24. CIT ~ 15 hours; 2 day cash; no drains. Post op kidney US satisfactory. UOP ~30-50 cc/hr. Post op labs stable. Tolerating diet, drinking water, +flatus. Increase pain regimen today. Advance pathway this afternoon. Plan for catheter removal tomorrow. Cont to monitor.       Past Medical, Surgical, Family, and Social History:   Unchanged from H&P.    Scheduled Meds:   acetaminophen  650 mg Oral Q8H    acetaminophen  650 mg Oral Q24H    antithymocyte globulin (rabbit) 125 mg, hydrocortisone sodium succinate (SOLU-CORTEF) 20 mg in 0.9% NaCl 500 mL (FOR PERIPHERAL LINE ADMINISTRATION ONLY)  2.25 mg/kg (Ideal) Intravenous Daily    bisacodyL  10 mg Oral QHS    diphenhydrAMINE  25 mg Oral Q24H    docusate sodium  100 mg Oral TID    famotidine  20 mg Oral QHS    FLUoxetine  40 mg Oral Daily    heparin (porcine)  5,000 Units Subcutaneous Q8H    insulin aspart U-100  4 Units Subcutaneous TIDWM    insulin  glargine U-100  11 Units Subcutaneous QHS    methIMAzole  5 mg Oral Daily    methylPREDNISolone injection (PEDS and ADULTS)  250 mg Intravenous Once    multivitamin  1 tablet Oral Daily    mupirocin  1 g Nasal BID    mycophenolate  1,000 mg Oral BID    [START ON 4/26/2025] predniSONE  20 mg Oral Daily    [START ON 5/4/2025] sulfamethoxazole-trimethoprim 400-80mg  1 tablet Oral Daily AM    tacrolimus  3 mg Oral BID    [START ON 5/4/2025] valGANciclovir  450 mg Oral Daily AM     Continuous Infusions:   0.9% NaCl   Intravenous Continuous   Stopped at 04/24/25 2323    D5 and 0.45% NaCl   Intravenous Continuous 50 mL/hr at 04/24/25 1415 New Bag at 04/24/25 1415    glucagon (human recombinant)  1 mg Intramuscular Continuous PRN         PRN Meds:  Current Facility-Administered Medications:     dextrose 50%, 12.5 g, Intravenous, PRN    dextrose 50%, 12.5 g, Intravenous, PRN    dextrose 50%, 25 g, Intravenous, PRN    diphenhydrAMINE, 50 mg, Oral, Once PRN    EPINEPHrine (PF), 1 mg, Subcutaneous, Once PRN    glucagon (human recombinant), 1 mg, Intramuscular, Continuous PRN    glucagon (human recombinant), 1 mg, Intramuscular, PRN    glucose, 16 g, Oral, PRN    glucose, 16 g, Oral, PRN    glucose, 16 g, Oral, PRN    glucose, 24 g, Oral, PRN    glucose, 24 g, Oral, PRN    hydrocortisone sodium succinate, 100 mg, Intravenous, Once PRN    insulin aspart U-100, 0-5 Units, Subcutaneous, AC + HS + 0200 PRN    ondansetron, 4 mg, Intravenous, Q6H PRN    oxyCODONE, 5 mg, Oral, Q6H PRN    oxyCODONE, 10 mg, Oral, Q6H PRN    sodium chloride 0.9%, 10 mL, Intravenous, PRN    Intake/Output - Last 3 Shifts         04/23 0700 04/24 0659 04/24 0700 04/25 0659 04/25 0700 04/26 0659    P.O.  450     IV Piggyback  2000     Total Intake(mL/kg)  2450 (34.7)     Urine (mL/kg/hr)  1150 50 (0.5)    Other  1000     Blood  100     Total Output  2250 50    Net  +200 -50                    Review of Systems   Constitutional:  Negative for activity  "change, appetite change, chills, diaphoresis and fever.   Eyes:  Negative for visual disturbance.   Respiratory:  Negative for chest tightness, shortness of breath and stridor.    Cardiovascular:  Negative for chest pain, palpitations and leg swelling.   Gastrointestinal:  Positive for abdominal pain. Negative for abdominal distention, constipation, diarrhea, nausea and vomiting.   Genitourinary:  Negative for decreased urine volume, difficulty urinating, dysuria and flank pain.   Musculoskeletal:  Negative for neck pain and neck stiffness.   Skin:  Negative for color change and rash.   Allergic/Immunologic: Positive for immunocompromised state.   Neurological:  Negative for dizziness, tremors, syncope, light-headedness and headaches.   Psychiatric/Behavioral:  Negative for agitation, confusion and decreased concentration.       Objective:     Vital Signs (Most Recent):  Temp: 98.5 °F (36.9 °C) (04/25/25 0803)  Pulse: 93 (04/25/25 0803)  Resp: 17 (04/25/25 0826)  BP: (!) 178/84 (04/25/25 0817)  SpO2: 95 % (04/25/25 0803) Vital Signs (24h Range):  Temp:  [97.9 °F (36.6 °C)-99 °F (37.2 °C)] 98.5 °F (36.9 °C)  Pulse:  [78-93] 93  Resp:  [15-22] 17  SpO2:  [90 %-100 %] 95 %  BP: (133-194)/(66-87) 178/84     Weight: 70.8 kg (155 lb 15.6 oz)  Height: 5' 4" (162.6 cm)  Body mass index is 26.77 kg/m².     Physical Exam  Vitals and nursing note reviewed.   Constitutional:       General: She is not in acute distress.     Appearance: She is not diaphoretic.   HENT:      Head: Normocephalic and atraumatic.   Eyes:      General: No scleral icterus.        Right eye: No discharge.         Left eye: No discharge.   Cardiovascular:      Rate and Rhythm: Normal rate and regular rhythm.      Heart sounds: Normal heart sounds.   Pulmonary:      Effort: Pulmonary effort is normal.      Breath sounds: Normal breath sounds. No wheezing or rales.   Abdominal:      General: Bowel sounds are normal. There is no distension.      Palpations: " Abdomen is soft.      Tenderness: There is no abdominal tenderness. There is no guarding.       Genitourinary:     Comments: Cash to gravity with yellow output  Musculoskeletal:         General: No swelling.      Cervical back: Normal range of motion and neck supple.      Comments: LUE AVF +/+   Skin:     General: Skin is warm and dry.      Capillary Refill: Capillary refill takes less than 2 seconds.   Neurological:      Mental Status: She is alert and oriented to person, place, and time.      Cranial Nerves: No cranial nerve deficit.   Psychiatric:         Thought Content: Thought content normal.         Judgment: Judgment normal.          Laboratory:  CBC:   Recent Labs   Lab 04/24/25  0915 04/24/25  1431 04/24/25 1959   WBC 11.26  --  13.35*   RBC 3.67*  --  3.60*   HGB 10.0*  --  9.9*   HCT 32.4* 34.4* 32.7*     --  185   MCV 88  --  91   MCH 27.2  --  27.5   MCHC 30.9*  --  30.3*     CMP:   Recent Labs   Lab 04/24/25  0915 04/24/25  1431 04/24/25 1959   CALCIUM 9.2 8.3* 8.0*   ALBUMIN 3.2* 3.1* 2.9*    137 135*   K 3.8 4.2 4.3   CO2 24 22* 19*    104 102   BUN 22* 22* 27*   CREATININE 6.1* 5.6* 5.7*   ALKPHOS 77  --   --    ALT 6*  --   --    AST 13  --   --      Labs within the past 24 hours have been reviewed.    Diagnostic Results:  Reviewed.  Assessment/Plan:     * S/P kidney transplant  - s/p DBD Kidney transplant 4/24/25 (2 day cash; no drains; CIT 15 hours; thymo induction)  - UOP 30-40 cc/hr  - Post op kidney US satisfactory  - 2 day cash to be removed 4/26 at 6:00am  - Tolerating diet, +flatus, ambulating  - Increase pain regimen today      Anemia of chronic disease  - 2/2 h/o renal disease  - daily CBC      Acute blood loss anemia  - expected post op  - acute on chronic  - daily CBC      At risk for opportunistic infections  - bactrim and valcyte to start on discharge per protocol      Long-term use of immunosuppressant medication  - see prophylactic  immunotherapy      Prophylactic immunotherapy  - continue prograf, cellcept, and steroid taper per protocol  - will check daily prograf levels, monitor for toxic side effects, and adjust for therapeutic dose        Hypertension  - Home regimen includes: Hydralazine 100 mg TID, labetolol 200 mg daily, Amlodipine 10 mg daily  - start carvedilol this morning and continue to monitor      Type 2 diabetes mellitus with chronic kidney disease on chronic dialysis, with long-term current use of insulin  Endocrine on board for management      ESRD on dialysis  - admit for kidney transplant  - NPO for OR 10am  - surgeon Dr Garcia  - pre op labs and diagnostic testing pending, to be reviewed prior to surgery  - Thymo induction        Discharge Planning: Not a candidate for discharge at this time.     Medical decision making for this encounter includes review of pertinent labs and diagnostic studies, assessment and planning, discussions with consulting providers, discussion with patient/family, and participation in multidisciplinary rounds. Time spent caring for patient: 60 minutes    Irma Cunningham PA-C  Kidney Transplant  [facility] - Transplant Stepdown

## 2025-04-25 NOTE — SUBJECTIVE & OBJECTIVE
Interval HPI:   No acute events overnight. Patient in room 33444/09866 A. Blood glucose stable. BG above goal on current insulin regimen (SSI, prandial, and basal insulin ). Steroid use- Methylprednisolone  250 mg.   1 Day Post-Op  Renal function-   Lab Results   Component Value Date    CREATININE 5.5 (H) 2025        Vasopressors-  None     Diet Renal Non-Dialysis     Eatin%  Nausea: No  Hypoglycemia and intervention: No  Fever: No  TPN and/or TF: No    PMH, PSH, FH, SH updated and reviewed     ROS:  Review of Systems   Gastrointestinal:  Negative for diarrhea, nausea and vomiting.   Endocrine: Negative for polydipsia.       Current Medications and/or Treatments Impacting Glycemic Control  Immunotherapy:    Immunosuppressants           Stop Route Frequency     tacrolimus capsule 5 mg         -- Oral 2 times daily     tacrolimus capsule 2 mg         -- Oral Once     antithymocyte globulin (rabbit) 125 mg, hydrocortisone sodium succinate (SOLU-CORTEF) 20 mg in 0.9% NaCl 500 mL (FOR PERIPHERAL LINE ADMINISTRATION ONLY)         25 0859 IV Daily     mycophenolate capsule 1,000 mg         -- Oral 2 times daily          Steroids:   Hormones (From admission, onward)      Start     Stop Route Frequency Ordered    25 0900  predniSONE tablet 20 mg  (IP TXP KIDNEY POST-OP THYMO WITH PERIPHERAL PRECHECKED IBW)         -- Oral Daily 25 1359    25 0900  antithymocyte globulin (rabbit) 125 mg, hydrocortisone sodium succinate (SOLU-CORTEF) 20 mg in 0.9% NaCl 500 mL (FOR PERIPHERAL LINE ADMINISTRATION ONLY)  (IP TXP KIDNEY POST-OP THYMO WITH PERIPHERAL PRECHECKED IBW)         25 0859 IV Daily 25 1359    25 1440  hydrocortisone sodium succinate injection 100 mg  (IP TXP KIDNEY POST-OP THYMO WITH PERIPHERAL PRECHECKED IBW)         36 0540 IV Once as needed 25 1359          Pressors:    Autonomic Drugs (From admission, onward)      Start     Stop Route Frequency Ordered     04/24/25 1440  EPINEPHrine (PF) injection 1 mg  (IP TXP KIDNEY POST-OP THYMO WITH PERIPHERAL PRECHECKED IBW)         09/20/36 0540 SubQ Once as needed 04/24/25 1359          Hyperglycemia/Diabetes Medications:   Antihyperglycemics (From admission, onward)      Start     Stop Route Frequency Ordered    04/25/25 2100  insulin glargine U-100 (Lantus) pen 16 Units         -- SubQ Nightly 04/25/25 0847    04/25/25 0715  insulin aspart U-100 pen 4 Units         -- SubQ 3 times daily with meals 04/24/25 1917 04/24/25 2017  insulin aspart U-100 pen 0-5 Units         -- SubQ Before meals, nightly and at 0200 PRN 04/24/25 1917             PHYSICAL EXAMINATION:  Vitals:    04/25/25 0826   BP:    Pulse:    Resp: 17   Temp:      Body mass index is 26.77 kg/m².     Physical Exam  Constitutional:       General: She is not in acute distress.     Appearance: Normal appearance. She is not ill-appearing.   HENT:      Head: Normocephalic and atraumatic.      Right Ear: External ear normal.      Left Ear: External ear normal.      Nose: Nose normal.   Pulmonary:      Effort: No respiratory distress.   Skin:     Coloration: Skin is not jaundiced.   Neurological:      Mental Status: She is alert. Mental status is at baseline.   Psychiatric:         Mood and Affect: Mood normal.         Behavior: Behavior normal.         Thought Content: Thought content normal.         Judgment: Judgment normal.

## 2025-04-25 NOTE — PLAN OF CARE
Recommendations     Recommendation/Intervention:   1. Current diet insufficient for nutritional needs     - Recommend liberalizing diet to cardiac diet with phos binder to optimize nutrition status    2. RD to monitor weight, labs, intake, tolerance     Goals:   1. % nutritional needs met with diet during admission     2. Maintain weight during admission  Nutrition Goal Status: new  Communication of RD Recs: other (comment) (POC)     Nutrition Discharge Planning      Nutrition Discharge Planning: Other (comments), Therapeutic diet (comments), Oral supplement regimen (comments)  Therapeutic diet (comments): low Na diet  Oral supplement regimen (comments): supplement of choice  Other (comments): Post transplant nutrition education provided on 4/25. Food safety/drug interactions emphasized. General healthy/low salt diet recommended. Education material left at bedside. No other needs identified.

## 2025-04-25 NOTE — PROGRESS NOTES
EDUCATION NOTE:    Met with Kiesha Rocha and her caregivers to provide teaching re: immunosuppressant medications.  Reviewed medication section of the Kidney Transplant Education book that was provided.  Emphasized the importance of compliance, role of the blue medication card, concerns for drug interactions, and process of obtaining refills.  Counseled regarding Prograf, Cellcept , prednisone, including directions for use, monitoring, how to handle missed doses, and side effects.  Patient and Family verbalized understanding and had the opportunity to ask questions.

## 2025-04-26 LAB
ABSOLUTE EOSINOPHIL (OHS): 0.03 K/UL
ABSOLUTE MONOCYTE (OHS): 0.43 K/UL (ref 0.3–1)
ABSOLUTE NEUTROPHIL COUNT (OHS): 12.18 K/UL (ref 1.8–7.7)
ALBUMIN SERPL BCP-MCNC: 3.3 G/DL (ref 3.5–5.2)
ANION GAP (OHS): 12 MMOL/L (ref 8–16)
BASOPHILS # BLD AUTO: 0.03 K/UL
BASOPHILS NFR BLD AUTO: 0.2 %
BUN SERPL-MCNC: 41 MG/DL (ref 6–20)
CALCIUM SERPL-MCNC: 8.8 MG/DL (ref 8.7–10.5)
CHLORIDE SERPL-SCNC: 105 MMOL/L (ref 95–110)
CO2 SERPL-SCNC: 23 MMOL/L (ref 23–29)
CREAT SERPL-MCNC: 4.2 MG/DL (ref 0.5–1.4)
ERYTHROCYTE [DISTWIDTH] IN BLOOD BY AUTOMATED COUNT: 18.6 % (ref 11.5–14.5)
GFR SERPLBLD CREATININE-BSD FMLA CKD-EPI: 12 ML/MIN/1.73/M2
GLUCOSE SERPL-MCNC: 148 MG/DL (ref 70–110)
HCT VFR BLD AUTO: 32.1 % (ref 37–48.5)
HGB BLD-MCNC: 9.6 GM/DL (ref 12–16)
IMM GRANULOCYTES # BLD AUTO: 0.07 K/UL (ref 0–0.04)
IMM GRANULOCYTES NFR BLD AUTO: 0.5 % (ref 0–0.5)
LYMPHOCYTES # BLD AUTO: 0.22 K/UL (ref 1–4.8)
MAGNESIUM SERPL-MCNC: 2.1 MG/DL (ref 1.6–2.6)
MCH RBC QN AUTO: 27.2 PG (ref 27–31)
MCHC RBC AUTO-ENTMCNC: 29.9 G/DL (ref 32–36)
MCV RBC AUTO: 91 FL (ref 82–98)
NUCLEATED RBC (/100WBC) (OHS): 0 /100 WBC
PHOSPHATE SERPL-MCNC: 3.9 MG/DL (ref 2.7–4.5)
PLATELET # BLD AUTO: 238 K/UL (ref 150–450)
PMV BLD AUTO: 12.8 FL (ref 9.2–12.9)
POCT GLUCOSE: 129 MG/DL (ref 70–110)
POCT GLUCOSE: 155 MG/DL (ref 70–110)
POCT GLUCOSE: 191 MG/DL (ref 70–110)
POCT GLUCOSE: 194 MG/DL (ref 70–110)
POCT GLUCOSE: 271 MG/DL (ref 70–110)
POTASSIUM SERPL-SCNC: 3.8 MMOL/L (ref 3.5–5.1)
RBC # BLD AUTO: 3.53 M/UL (ref 4–5.4)
RELATIVE EOSINOPHIL (OHS): 0.2 %
RELATIVE LYMPHOCYTE (OHS): 1.7 % (ref 18–48)
RELATIVE MONOCYTE (OHS): 3.3 % (ref 4–15)
RELATIVE NEUTROPHIL (OHS): 94.1 % (ref 38–73)
SODIUM SERPL-SCNC: 140 MMOL/L (ref 136–145)
TACROLIMUS BLD-MCNC: 5.5 NG/ML (ref 5–15)
W HEPATITIS B SURFACE ANTIBODY, QUALITATIVE: POSITIVE
W HEPATITIS B SURFACE ANTIBODY, QUANTITATIVE: NORMAL MIU/ML
WBC # BLD AUTO: 12.96 K/UL (ref 3.9–12.7)

## 2025-04-26 PROCEDURE — 63600175 PHARM REV CODE 636 W HCPCS

## 2025-04-26 PROCEDURE — 83735 ASSAY OF MAGNESIUM: CPT

## 2025-04-26 PROCEDURE — 80197 ASSAY OF TACROLIMUS: CPT

## 2025-04-26 PROCEDURE — 63600175 PHARM REV CODE 636 W HCPCS: Performed by: PHYSICIAN ASSISTANT

## 2025-04-26 PROCEDURE — 25000003 PHARM REV CODE 250

## 2025-04-26 PROCEDURE — 80069 RENAL FUNCTION PANEL: CPT

## 2025-04-26 PROCEDURE — 25000003 PHARM REV CODE 250: Performed by: NURSE PRACTITIONER

## 2025-04-26 PROCEDURE — 85025 COMPLETE CBC W/AUTO DIFF WBC: CPT

## 2025-04-26 PROCEDURE — 25000003 PHARM REV CODE 250: Performed by: PHYSICIAN ASSISTANT

## 2025-04-26 PROCEDURE — 36415 COLL VENOUS BLD VENIPUNCTURE: CPT

## 2025-04-26 PROCEDURE — 27000207 HC ISOLATION

## 2025-04-26 PROCEDURE — 20600001 HC STEP DOWN PRIVATE ROOM

## 2025-04-26 PROCEDURE — 63600175 PHARM REV CODE 636 W HCPCS: Performed by: NURSE PRACTITIONER

## 2025-04-26 RX ORDER — CARVEDILOL 25 MG/1
25 TABLET ORAL 2 TIMES DAILY
Qty: 180 TABLET | Refills: 3 | Status: SHIPPED | OUTPATIENT
Start: 2025-04-26 | End: 2026-04-26

## 2025-04-26 RX ORDER — TACROLIMUS 1 MG/1
6 CAPSULE ORAL 2 TIMES DAILY
Status: DISCONTINUED | OUTPATIENT
Start: 2025-04-26 | End: 2025-04-27 | Stop reason: HOSPADM

## 2025-04-26 RX ORDER — VALGANCICLOVIR 450 MG/1
450 TABLET, FILM COATED ORAL
Qty: 12 TABLET | Refills: 2 | Status: SHIPPED | OUTPATIENT
Start: 2025-04-28 | End: 2025-04-27

## 2025-04-26 RX ORDER — TACROLIMUS 1 MG/1
1 CAPSULE ORAL ONCE
Status: COMPLETED | OUTPATIENT
Start: 2025-04-26 | End: 2025-04-26

## 2025-04-26 RX ORDER — SULFAMETHOXAZOLE AND TRIMETHOPRIM 400; 80 MG/1; MG/1
1 TABLET ORAL
Qty: 12 TABLET | Refills: 5 | Status: SHIPPED | OUTPATIENT
Start: 2025-04-28 | End: 2025-04-27

## 2025-04-26 RX ADMIN — HYDROXYZINE PAMOATE 25 MG: 25 CAPSULE ORAL at 01:04

## 2025-04-26 RX ADMIN — TACROLIMUS 5 MG: 1 CAPSULE ORAL at 09:04

## 2025-04-26 RX ADMIN — INSULIN ASPART 6 UNITS: 100 INJECTION, SOLUTION INTRAVENOUS; SUBCUTANEOUS at 01:04

## 2025-04-26 RX ADMIN — METHIMAZOLE 5 MG: 5 TABLET ORAL at 08:04

## 2025-04-26 RX ADMIN — ACETAMINOPHEN 650 MG: 325 TABLET ORAL at 05:04

## 2025-04-26 RX ADMIN — NIFEDIPINE 30 MG: 30 TABLET, FILM COATED, EXTENDED RELEASE ORAL at 08:04

## 2025-04-26 RX ADMIN — HEPARIN SODIUM 5000 UNITS: 5000 INJECTION INTRAVENOUS; SUBCUTANEOUS at 09:04

## 2025-04-26 RX ADMIN — MUPIROCIN 1 G: 20 OINTMENT TOPICAL at 09:04

## 2025-04-26 RX ADMIN — INSULIN ASPART 3 UNITS: 100 INJECTION, SOLUTION INTRAVENOUS; SUBCUTANEOUS at 05:04

## 2025-04-26 RX ADMIN — TACROLIMUS 6 MG: 1 CAPSULE ORAL at 05:04

## 2025-04-26 RX ADMIN — FLUOXETINE HYDROCHLORIDE 40 MG: 20 CAPSULE ORAL at 08:04

## 2025-04-26 RX ADMIN — FAMOTIDINE 20 MG: 20 TABLET, FILM COATED ORAL at 09:04

## 2025-04-26 RX ADMIN — INSULIN ASPART 6 UNITS: 100 INJECTION, SOLUTION INTRAVENOUS; SUBCUTANEOUS at 09:04

## 2025-04-26 RX ADMIN — OXYBUTYNIN CHLORIDE 5 MG: 5 TABLET ORAL at 08:04

## 2025-04-26 RX ADMIN — MUPIROCIN 1 G: 20 OINTMENT TOPICAL at 08:04

## 2025-04-26 RX ADMIN — PREDNISONE 20 MG: 20 TABLET ORAL at 09:04

## 2025-04-26 RX ADMIN — CARVEDILOL 25 MG: 12.5 TABLET, FILM COATED ORAL at 09:04

## 2025-04-26 RX ADMIN — Medication 1 TABLET: at 08:04

## 2025-04-26 RX ADMIN — SODIUM BICARBONATE 650 MG TABLET 650 MG: at 09:04

## 2025-04-26 RX ADMIN — SODIUM BICARBONATE 650 MG TABLET 650 MG: at 08:04

## 2025-04-26 RX ADMIN — HYDROXYZINE PAMOATE 25 MG: 25 CAPSULE ORAL at 09:04

## 2025-04-26 RX ADMIN — TACROLIMUS 1 MG: 1 CAPSULE ORAL at 01:04

## 2025-04-26 RX ADMIN — MYCOPHENOLATE MOFETIL 1000 MG: 250 CAPSULE ORAL at 08:04

## 2025-04-26 RX ADMIN — MYCOPHENOLATE MOFETIL 1000 MG: 250 CAPSULE ORAL at 09:04

## 2025-04-26 RX ADMIN — HEPARIN SODIUM 5000 UNITS: 5000 INJECTION INTRAVENOUS; SUBCUTANEOUS at 02:04

## 2025-04-26 RX ADMIN — OXYBUTYNIN CHLORIDE 5 MG: 5 TABLET ORAL at 02:04

## 2025-04-26 RX ADMIN — HEPARIN SODIUM 5000 UNITS: 5000 INJECTION INTRAVENOUS; SUBCUTANEOUS at 05:04

## 2025-04-26 RX ADMIN — INSULIN GLARGINE 16 UNITS: 100 INJECTION, SOLUTION SUBCUTANEOUS at 09:04

## 2025-04-26 RX ADMIN — GABAPENTIN 600 MG: 300 CAPSULE ORAL at 09:04

## 2025-04-26 RX ADMIN — INSULIN ASPART 6 UNITS: 100 INJECTION, SOLUTION INTRAVENOUS; SUBCUTANEOUS at 05:04

## 2025-04-26 RX ADMIN — OXYCODONE HYDROCHLORIDE 10 MG: 10 TABLET ORAL at 10:04

## 2025-04-26 RX ADMIN — OXYBUTYNIN CHLORIDE 5 MG: 5 TABLET ORAL at 09:04

## 2025-04-26 NOTE — SUBJECTIVE & OBJECTIVE
Subjective:   History of Present Illness:  Ms. Rocha is a 59 y.o. year old female with ESRD secondary to diabetic nephropathy and history of TIA on aspirin who presents today for kidney transplant.  She has been on the wait list for a kidney transplant at [facility] since 8/5/2018. Patient is currently on hemodialysis started on 2/2019. Patient is dialyzing on MWF schedule.  Patient reports that she is tolerating dialysis well. She dialyzes for 3 1/2 hours per session. She has a LUE AV fistula. Patient denies any recent hospitalizations or ED visits. Dry weight 70.5 kg. Minimal UOP.       Ms. Rocha is a 59 y.o. year old female who is status post Kidney Transplant - 4/24/2025  (#1).  Her maintenance immunosuppression consists of:   Immunosuppressants (From admission, onward)      Start     Stop Route Frequency Ordered    04/26/25 1800  tacrolimus capsule 6 mg  (IP TXP KIDNEY POST-OP THYMO WITH PERIPHERAL PRECHECKED IBW)         -- Oral 2 times daily 04/26/25 1241    04/24/25 2200  mycophenolate capsule 1,000 mg  (IP TXP KIDNEY POST-OP THYMO WITH PERIPHERAL PRECHECKED IBW)         -- Oral 2 times daily 04/24/25 1359                Hospital Course:  She is now s/p DBD Kidney transplant 4/24. CIT ~ 15 hours; 2 day cash; no drains. Post op kidney US satisfactory. UOP ~30-50 cc/hr. Post op labs stable. Tolerating diet, drinking water, +flatus. Increase pain regimen today. Advance pathway this afternoon. Plan for catheter removal tomorrow. Cont to monitor.     Interval history : NAEON, AAO, VSS, Good UO, Cr trending down, Tolerating deitand passing gas no BM yet. US Kidney reviewed. Cash removed , Voiding fine. Started on Ditropan for bladder spasms. Wound CDI. Aim Discharge tomorrow        Past Medical, Surgical, Family, and Social History:   Unchanged from H&P.    Scheduled Meds:   bisacodyL  10 mg Oral QHS    carvediloL  25 mg Oral BID    docusate sodium  100 mg Oral TID    famotidine  20 mg Oral QHS    FLUoxetine  40  mg Oral Daily    gabapentin  600 mg Oral QHS    heparin (porcine)  5,000 Units Subcutaneous Q8H    insulin aspart U-100  6 Units Subcutaneous TIDWM    insulin glargine U-100  16 Units Subcutaneous QHS    methIMAzole  5 mg Oral Daily    multivitamin  1 tablet Oral Daily    mupirocin  1 g Nasal BID    mycophenolate  1,000 mg Oral BID    NIFEdipine  30 mg Oral Daily    oxybutynin  5 mg Oral TID    predniSONE  20 mg Oral Daily    sodium bicarbonate  650 mg Oral BID    [START ON 5/4/2025] sulfamethoxazole-trimethoprim 400-80mg  1 tablet Oral Daily AM    tacrolimus  6 mg Oral BID    [START ON 5/4/2025] valGANciclovir  450 mg Oral Daily AM     Continuous Infusions:   glucagon (human recombinant)  1 mg Intramuscular Continuous PRN         PRN Meds:  Current Facility-Administered Medications:     dextrose 50%, 12.5 g, Intravenous, PRN    dextrose 50%, 12.5 g, Intravenous, PRN    dextrose 50%, 25 g, Intravenous, PRN    diphenhydrAMINE, 50 mg, Oral, Once PRN    EPINEPHrine (PF), 1 mg, Subcutaneous, Once PRN    glucagon (human recombinant), 1 mg, Intramuscular, Continuous PRN    glucagon (human recombinant), 1 mg, Intramuscular, PRN    glucose, 16 g, Oral, PRN    glucose, 16 g, Oral, PRN    glucose, 16 g, Oral, PRN    glucose, 24 g, Oral, PRN    glucose, 24 g, Oral, PRN    hydrALAZINE, 10 mg, Intravenous, Q6H PRN    hydrocortisone sodium succinate, 100 mg, Intravenous, Once PRN    hydrOXYzine pamoate, 25 mg, Oral, Q8H PRN    insulin aspart U-100, 0-5 Units, Subcutaneous, AC + HS + 0200 PRN    ondansetron, 4 mg, Intravenous, Q6H PRN    oxyCODONE, 5 mg, Oral, Q6H PRN    oxyCODONE, 10 mg, Oral, Q6H PRN    sodium chloride 0.9%, 10 mL, Intravenous, PRN    Intake/Output - Last 3 Shifts         04/24 0700 04/25 0659 04/25 0700 04/26 0659 04/26 0700 04/27 0659    P.O. 450      IV Piggyback 2000      Total Intake(mL/kg) 2450 (34.7)      Urine (mL/kg/hr) 1150 1825 (1) 200 (0.4)    Other 1000      Stool  0     Blood 100      Total  "Output 2250 1825 200    Net +200 -1825 -200           Urine Occurrence   2 x    Stool Occurrence  5 x              Review of Systems   Constitutional:  Negative for activity change, appetite change, chills, diaphoresis and fever.   Eyes:  Negative for visual disturbance.   Respiratory:  Negative for chest tightness, shortness of breath and stridor.    Cardiovascular:  Negative for chest pain, palpitations and leg swelling.   Gastrointestinal:  Positive for abdominal pain. Negative for abdominal distention, constipation, diarrhea, nausea and vomiting.   Genitourinary:  Negative for decreased urine volume, difficulty urinating, dysuria and flank pain.   Musculoskeletal:  Negative for neck pain and neck stiffness.   Skin:  Negative for color change and rash.   Allergic/Immunologic: Positive for immunocompromised state.   Neurological:  Negative for dizziness, tremors, syncope, light-headedness and headaches.   Psychiatric/Behavioral:  Negative for agitation, confusion and decreased concentration.       Objective:     Vital Signs (Most Recent):  Temp: 98.3 °F (36.8 °C) (04/26/25 1149)  Pulse: 82 (04/26/25 1149)  Resp: 18 (04/26/25 1149)  BP: (!) 163/77 (04/26/25 1149)  SpO2: 95 % (04/26/25 1149) Vital Signs (24h Range):  Temp:  [97.9 °F (36.6 °C)-98.6 °F (37 °C)] 98.3 °F (36.8 °C)  Pulse:  [] 82  Resp:  [17-18] 18  SpO2:  [91 %-97 %] 95 %  BP: (123-202)/(58-96) 163/77     Weight: 73.9 kg (162 lb 13 oz)  Height: 5' 4" (162.6 cm)  Body mass index is 27.95 kg/m².     Physical Exam  Vitals and nursing note reviewed.   Constitutional:       General: She is not in acute distress.     Appearance: She is not diaphoretic.   HENT:      Head: Normocephalic and atraumatic.   Eyes:      General: No scleral icterus.        Right eye: No discharge.         Left eye: No discharge.   Cardiovascular:      Rate and Rhythm: Normal rate and regular rhythm.      Heart sounds: Normal heart sounds.   Pulmonary:      Effort: Pulmonary " effort is normal.      Breath sounds: Normal breath sounds. No wheezing or rales.   Abdominal:      General: Bowel sounds are normal. There is no distension.      Palpations: Abdomen is soft.      Tenderness: There is abdominal tenderness (incisional). There is no guarding.       Genitourinary:     Comments: Garzon to gravity with yellow output  Musculoskeletal:         General: No swelling.      Cervical back: Normal range of motion and neck supple.      Comments: LUE AVF +/+   Skin:     General: Skin is warm and dry.      Capillary Refill: Capillary refill takes less than 2 seconds.   Neurological:      Mental Status: She is alert and oriented to person, place, and time.      Cranial Nerves: No cranial nerve deficit.   Psychiatric:         Thought Content: Thought content normal.         Judgment: Judgment normal.          Laboratory:  CBC:   Recent Labs   Lab 04/25/25  0642 04/25/25  1753 04/26/25  0611   WBC 15.89* 14.74* 12.96*   RBC 3.70* 3.65* 3.53*   HGB 10.3* 10.0* 9.6*   HCT 33.0* 33.2* 32.1*    253 238   MCV 89 91 91   MCH 27.8 27.4 27.2   MCHC 31.2* 30.1* 29.9*     BMP:   Recent Labs   Lab 04/25/25  0642 04/25/25  1504 04/26/25  0611   * 135* 140   K 4.5 4.1 3.8    104 105   CO2 19* 17* 23   BUN 30* 33* 41*   CREATININE 5.5* 4.9* 4.2*   CALCIUM 8.3* 8.2* 8.8       Diagnostic Results:  US - Kidney: Results for orders placed during the hospital encounter of 04/24/25    US Transplant Kidney With Doppler    Narrative  EXAMINATION:  US TRANSPLANT KIDNEY WITH DOPPLER    CLINICAL HISTORY:  pod 0 DBD kidney transplant - not making any urine;    TECHNIQUE:  Real time gray scale and doppler ultrasound was performed over the patient's renal allograft.    COMPARISON:  None    FINDINGS:  Renal allograft in the right lower quadrant.  The allograft measures 12.6 cm. Normal perfusion. No hydronephrosis.  Stent is present.    No fluid collections.    Vasculature:    Resistive indices ranged from 0.76 to  0.83.    Main renal artery peak systolic velocity: 196 cm/sec with normal waveform.    Renal artery/iliac ratio: 1.1.    The main renal vein is patent.    Impression  Satisfactory gray scale and doppler evaluation of renal allograft.      Electronically signed by: Wilfredo Franklin MD  Date:    04/24/2025  Time:    15:29

## 2025-04-26 NOTE — PLAN OF CARE
Problem: Infection  Goal: Absence of Infection Signs and Symptoms  Outcome: Progressing     Problem: Neutropenia  Goal: Absence of Infection  Outcome: Progressing     Problem: Adult Inpatient Plan of Care  Goal: Plan of Care Review  Outcome: Progressing  Goal: Patient-Specific Goal (Individualized)  Outcome: Progressing  Goal: Absence of Hospital-Acquired Illness or Injury  Outcome: Progressing  Goal: Optimal Comfort and Wellbeing  Outcome: Progressing  Goal: Readiness for Transition of Care  Outcome: Progressing     Problem: Diabetes Comorbidity  Goal: Blood Glucose Level Within Targeted Range  Outcome: Progressing     Problem: Wound  Goal: Optimal Coping  Outcome: Progressing  Goal: Optimal Functional Ability  Outcome: Progressing  Goal: Absence of Infection Signs and Symptoms  Outcome: Progressing  Goal: Improved Oral Intake  Outcome: Progressing  Goal: Optimal Pain Control and Function  Outcome: Progressing  Goal: Skin Health and Integrity  Outcome: Progressing  Goal: Optimal Wound Healing  Outcome: Progressing     Problem: Fall Injury Risk  Goal: Absence of Fall and Fall-Related Injury  Outcome: Progressing     Problem: Kidney Transplant  Goal: Optimal Coping with Organ Transplant  Outcome: Progressing  Goal: Absence of Bleeding  Outcome: Progressing  Goal: Effective Bowel Elimination  Outcome: Progressing  Goal: Effective Renal Function  Outcome: Progressing  Goal: Fluid and Electrolyte Balance  Outcome: Progressing  Goal: Blood Glucose Level Within Targeted Range  Outcome: Progressing  Goal: Absence of Infection Signs and Symptoms  Outcome: Progressing  Goal: Anesthesia/Sedation Recovery  Outcome: Progressing  Goal: Improved Oral Intake  Outcome: Progressing  Goal: Optimal Pain Control and Function  Outcome: Progressing  Goal: Nausea and Vomiting Relief  Outcome: Progressing  Goal: Effective Oxygenation and Ventilation  Outcome: Progressing  AAOx4,VSS, BP was elevated in begging of the shift SBP got up the  200's notified NP Mendez, Nifedipine added and Coreg dose adjusted   Garzon pulled @5:30 am Due to void @11:30 am  See documentation and assessment for further interventions

## 2025-04-26 NOTE — ASSESSMENT & PLAN NOTE
- s/p DBD Kidney transplant 4/24/25 (2 day cash; no drains; CIT 15 hours; thymo induction)  - UOP 30-40 cc/hr  - Post op kidney US satisfactory  - 2 day cash to be removed 4/26 at 6:00am  - Tolerating diet, +flatus, ambulating  - Increase pain regimen today  - Good UO, Cr trending down  - Voiding after removing cash

## 2025-04-26 NOTE — CARE UPDATE
-Glucose Goal 140-180    -A1C:   Hemoglobin A1C   Date Value Ref Range Status   09/03/2024 4.8 4.6 - 6.2 % Final   10/04/2023 6.2 (H) 4.0 - 5.6 % Final     Comment:     ADA Screening Guidelines:  5.7-6.4%  Consistent with prediabetes  >or=6.5%  Consistent with diabetes    High levels of fetal hemoglobin interfere with the HbA1C  assay. Heterozygous hemoglobin variants (HbS, HgC, etc)do  not significantly interfere with this assay.   However, presence of multiple variants may affect accuracy.       Hemoglobin A1c   Date Value Ref Range Status   04/24/2025 5.4 4.0 - 5.6 % Final     Comment:     ADA Screening Guidelines:  5.7-6.4%  Consistent with prediabetes  >=6.5%  Consistent with diabetes    High levels of fetal hemoglobin interfere with the HbA1C  assay. Heterozygous hemoglobin variants (HbS, HgC, etc)do  not significantly interfere with this assay.   However, presence of multiple variants may affect accuracy.         -HOME REGIMEN: Tradjenta 5 mg     -GLUCOSE TREND FOR THE PAST 24HRS:   Recent Labs   Lab 04/25/25  1225 04/25/25  1709 04/25/25  2026 04/25/25  2123 04/26/25  0242 04/26/25  0853   POCTGLUCOSE 164* 273* 305* 294* 191* 129*         -NO HYPOGYCEMIAS NOTED     - Diet  Diet Renal Non-Dialysis    -TOLERATING 100 % OF PO DIET     -Steroids - Prednisone 20 mg       Plan:   - Lantus 16 units HS  - Novolog 6 units TIDWM   - LDC SSI PRN   - POCT Glucose before meals and at bedtime  - Hypoglycemia protocol in place      ** Please notify Endocrine for any change and/or advance in diet**  ** Please call Endocrine for any BG related issues **     Discharge Planning:   TBD. Please notify endocrinology prior to discharge.

## 2025-04-26 NOTE — PLAN OF CARE
Problem: Adult Inpatient Plan of Care  Goal: Plan of Care Review  Outcome: Progressing     Problem: Wound  Goal: Optimal Coping  Outcome: Progressing     Problem: Kidney Transplant  Goal: Absence of Infection Signs and Symptoms  Outcome: Progressing      Progressing towards goals of care. RLQ inc gilmar with staples. Incision is CDI. Voiding clear yellow urine. Pt with urinary urgency and incontinent episode resulting in unmeasured urine occurences.  See flowsheet for measured output. No BM this shift however three reported on previous shift. Pt oob and in recliner and ambulatory with standby assist.  Appetite is good. Pain well managed. Blue card and self meds updated and reviewed with pt and daughter. Tolerating t medications without issue. Plan of care reviewed with pt.

## 2025-04-26 NOTE — PROGRESS NOTES
[facility]  Kidney Transplant  Progress Note      Reason for Follow-up: Reassessment of Kidney Transplant - 4/24/2025  (#1) recipient and management of immunosuppression.    ORGAN: LEFT KIDNEY      Donor Type: Donation after Brain Death      Donor CMV Status: Positive  Donor HBcAB:Negative  Donor HCV Status:Positive  Donor HBV LORNE:   Donor HCV LORNE: Negative      Subjective:   History of Present Illness:  Ms. Rocha is a 59 y.o. year old female with ESRD secondary to diabetic nephropathy and history of TIA on aspirin who presents today for kidney transplant.  She has been on the wait list for a kidney transplant at [facility] since 8/5/2018. Patient is currently on hemodialysis started on 2/2019. Patient is dialyzing on MWF schedule.  Patient reports that she is tolerating dialysis well. She dialyzes for 3 1/2 hours per session. She has a LUE AV fistula. Patient denies any recent hospitalizations or ED visits. Dry weight 70.5 kg. Minimal UOP.       Ms. Rocha is a 59 y.o. year old female who is status post Kidney Transplant - 4/24/2025  (#1).  Her maintenance immunosuppression consists of:   Immunosuppressants (From admission, onward)      Start     Stop Route Frequency Ordered    04/26/25 1800  tacrolimus capsule 6 mg  (IP TXP KIDNEY POST-OP THYMO WITH PERIPHERAL PRECHECKED IBW)         -- Oral 2 times daily 04/26/25 1241    04/24/25 2200  mycophenolate capsule 1,000 mg  (IP TXP KIDNEY POST-OP THYMO WITH PERIPHERAL PRECHECKED IBW)         -- Oral 2 times daily 04/24/25 1359                Hospital Course:  She is now s/p DBD Kidney transplant 4/24. CIT ~ 15 hours; 2 day cash; no drains. Post op kidney US satisfactory. UOP ~30-50 cc/hr. Post op labs stable. Tolerating diet, drinking water, +flatus. Increase pain regimen today. Advance pathway this afternoon. Plan for catheter removal tomorrow. Cont to monitor.     Interval history : NAEON, AAO, VSS, Good UO, Cr trending down, Tolerating deitand  passing gas no BM yet. US Kidney reviewed. Garzon removed , Voiding fine. Started on Ditropan for bladder spasms. Wound CDI. Aim Discharge tomorrow        Past Medical, Surgical, Family, and Social History:   Unchanged from H&P.    Scheduled Meds:   bisacodyL  10 mg Oral QHS    carvediloL  25 mg Oral BID    docusate sodium  100 mg Oral TID    famotidine  20 mg Oral QHS    FLUoxetine  40 mg Oral Daily    gabapentin  600 mg Oral QHS    heparin (porcine)  5,000 Units Subcutaneous Q8H    insulin aspart U-100  6 Units Subcutaneous TIDWM    insulin glargine U-100  16 Units Subcutaneous QHS    methIMAzole  5 mg Oral Daily    multivitamin  1 tablet Oral Daily    mupirocin  1 g Nasal BID    mycophenolate  1,000 mg Oral BID    NIFEdipine  30 mg Oral Daily    oxybutynin  5 mg Oral TID    predniSONE  20 mg Oral Daily    sodium bicarbonate  650 mg Oral BID    [START ON 5/4/2025] sulfamethoxazole-trimethoprim 400-80mg  1 tablet Oral Daily AM    tacrolimus  6 mg Oral BID    [START ON 5/4/2025] valGANciclovir  450 mg Oral Daily AM     Continuous Infusions:   glucagon (human recombinant)  1 mg Intramuscular Continuous PRN         PRN Meds:  Current Facility-Administered Medications:     dextrose 50%, 12.5 g, Intravenous, PRN    dextrose 50%, 12.5 g, Intravenous, PRN    dextrose 50%, 25 g, Intravenous, PRN    diphenhydrAMINE, 50 mg, Oral, Once PRN    EPINEPHrine (PF), 1 mg, Subcutaneous, Once PRN    glucagon (human recombinant), 1 mg, Intramuscular, Continuous PRN    glucagon (human recombinant), 1 mg, Intramuscular, PRN    glucose, 16 g, Oral, PRN    glucose, 16 g, Oral, PRN    glucose, 16 g, Oral, PRN    glucose, 24 g, Oral, PRN    glucose, 24 g, Oral, PRN    hydrALAZINE, 10 mg, Intravenous, Q6H PRN    hydrocortisone sodium succinate, 100 mg, Intravenous, Once PRN    hydrOXYzine pamoate, 25 mg, Oral, Q8H PRN    insulin aspart U-100, 0-5 Units, Subcutaneous, AC + HS + 0200 PRN    ondansetron, 4 mg, Intravenous, Q6H PRN    oxyCODONE,  "5 mg, Oral, Q6H PRN    oxyCODONE, 10 mg, Oral, Q6H PRN    sodium chloride 0.9%, 10 mL, Intravenous, PRN    Intake/Output - Last 3 Shifts         04/24 0700 04/25 0659 04/25 0700 04/26 0659 04/26 0700 04/27 0659    P.O. 450      IV Piggyback 2000      Total Intake(mL/kg) 2450 (34.7)      Urine (mL/kg/hr) 1150 1825 (1) 200 (0.4)    Other 1000      Stool  0     Blood 100      Total Output 2250 1825 200    Net +200 -1825 -200           Urine Occurrence   2 x    Stool Occurrence  5 x              Review of Systems   Constitutional:  Negative for activity change, appetite change, chills, diaphoresis and fever.   Eyes:  Negative for visual disturbance.   Respiratory:  Negative for chest tightness, shortness of breath and stridor.    Cardiovascular:  Negative for chest pain, palpitations and leg swelling.   Gastrointestinal:  Positive for abdominal pain. Negative for abdominal distention, constipation, diarrhea, nausea and vomiting.   Genitourinary:  Negative for decreased urine volume, difficulty urinating, dysuria and flank pain.   Musculoskeletal:  Negative for neck pain and neck stiffness.   Skin:  Negative for color change and rash.   Allergic/Immunologic: Positive for immunocompromised state.   Neurological:  Negative for dizziness, tremors, syncope, light-headedness and headaches.   Psychiatric/Behavioral:  Negative for agitation, confusion and decreased concentration.       Objective:     Vital Signs (Most Recent):  Temp: 98.3 °F (36.8 °C) (04/26/25 1149)  Pulse: 82 (04/26/25 1149)  Resp: 18 (04/26/25 1149)  BP: (!) 163/77 (04/26/25 1149)  SpO2: 95 % (04/26/25 1149) Vital Signs (24h Range):  Temp:  [97.9 °F (36.6 °C)-98.6 °F (37 °C)] 98.3 °F (36.8 °C)  Pulse:  [] 82  Resp:  [17-18] 18  SpO2:  [91 %-97 %] 95 %  BP: (123-202)/(58-96) 163/77     Weight: 73.9 kg (162 lb 13 oz)  Height: 5' 4" (162.6 cm)  Body mass index is 27.95 kg/m².     Physical Exam  Vitals and nursing note reviewed.   Constitutional:       " General: She is not in acute distress.     Appearance: She is not diaphoretic.   HENT:      Head: Normocephalic and atraumatic.   Eyes:      General: No scleral icterus.        Right eye: No discharge.         Left eye: No discharge.   Cardiovascular:      Rate and Rhythm: Normal rate and regular rhythm.      Heart sounds: Normal heart sounds.   Pulmonary:      Effort: Pulmonary effort is normal.      Breath sounds: Normal breath sounds. No wheezing or rales.   Abdominal:      General: Bowel sounds are normal. There is no distension.      Palpations: Abdomen is soft.      Tenderness: There is abdominal tenderness (incisional). There is no guarding.       Genitourinary:     Comments: Garzon to gravity with yellow output  Musculoskeletal:         General: No swelling.      Cervical back: Normal range of motion and neck supple.      Comments: LUE AVF +/+   Skin:     General: Skin is warm and dry.      Capillary Refill: Capillary refill takes less than 2 seconds.   Neurological:      Mental Status: She is alert and oriented to person, place, and time.      Cranial Nerves: No cranial nerve deficit.   Psychiatric:         Thought Content: Thought content normal.         Judgment: Judgment normal.          Laboratory:  CBC:   Recent Labs   Lab 04/25/25  0642 04/25/25  1753 04/26/25  0611   WBC 15.89* 14.74* 12.96*   RBC 3.70* 3.65* 3.53*   HGB 10.3* 10.0* 9.6*   HCT 33.0* 33.2* 32.1*    253 238   MCV 89 91 91   MCH 27.8 27.4 27.2   MCHC 31.2* 30.1* 29.9*     BMP:   Recent Labs   Lab 04/25/25  0642 04/25/25  1504 04/26/25  0611   * 135* 140   K 4.5 4.1 3.8    104 105   CO2 19* 17* 23   BUN 30* 33* 41*   CREATININE 5.5* 4.9* 4.2*   CALCIUM 8.3* 8.2* 8.8       Diagnostic Results:  US - Kidney: Results for orders placed during the hospital encounter of 04/24/25    US Transplant Kidney With Doppler    Narrative  EXAMINATION:  US TRANSPLANT KIDNEY WITH DOPPLER    CLINICAL HISTORY:  pod 0 DBD kidney transplant -  not making any urine;    TECHNIQUE:  Real time gray scale and doppler ultrasound was performed over the patient's renal allograft.    COMPARISON:  None    FINDINGS:  Renal allograft in the right lower quadrant.  The allograft measures 12.6 cm. Normal perfusion. No hydronephrosis.  Stent is present.    No fluid collections.    Vasculature:    Resistive indices ranged from 0.76 to 0.83.    Main renal artery peak systolic velocity: 196 cm/sec with normal waveform.    Renal artery/iliac ratio: 1.1.    The main renal vein is patent.    Impression  Satisfactory gray scale and doppler evaluation of renal allograft.      Electronically signed by: Wilfredo Franklin MD  Date:    04/24/2025  Time:    15:29  Assessment/Plan:     * S/P kidney transplant  - s/p DBD Kidney transplant 4/24/25 (2 day cash; no drains; CIT 15 hours; thymo induction)  - UOP 30-40 cc/hr  - Post op kidney US satisfactory  - 2 day cash to be removed 4/26 at 6:00am  - Tolerating diet, +flatus, ambulating  - Increase pain regimen today  - Good UO, Cr trending down  - Voiding after removing cash      Anemia of chronic disease  - 2/2 h/o renal disease  - daily CBC      Acute blood loss anemia  - expected post op  - acute on chronic  - daily CBC      At risk for opportunistic infections  - bactrim and valcyte to start on discharge per protocol      Long-term use of immunosuppressant medication  - see prophylactic immunotherapy      Prophylactic immunotherapy  - continue prograf, cellcept, and steroid taper per protocol  - will check daily prograf levels, monitor for toxic side effects, and adjust for therapeutic dose        Hypertension  - Home regimen includes: Hydralazine 100 mg TID, labetolol 200 mg daily, Amlodipine 10 mg daily  - start carvedilol this morning and continue to monitor      Type 2 diabetes mellitus with chronic kidney disease on chronic dialysis, with long-term current use of insulin  Endocrine on board for management      ESRD on  dialysis  - admit for kidney transplant  - NPO for OR 10am  - surgeon Dr Garcia  - pre op labs and diagnostic testing pending, to be reviewed prior to surgery  - Thymo induction        Discharge Planning:  Not ready for discharge today  Medical decision making for this encounter includes review of pertinent labs and diagnostic studies, assessment and planning, discussions with consulting providers, discussion with patient/family, and participation in multidisciplinary rounds. Time spent caring for patient: 30 minutes    Stanley Lora MD  Kidney Transplant  [facility] - Transplant Stepdown

## 2025-04-27 VITALS
SYSTOLIC BLOOD PRESSURE: 170 MMHG | HEART RATE: 79 BPM | WEIGHT: 162.06 LBS | OXYGEN SATURATION: 94 % | BODY MASS INDEX: 27.67 KG/M2 | TEMPERATURE: 98 F | DIASTOLIC BLOOD PRESSURE: 81 MMHG | HEIGHT: 64 IN | RESPIRATION RATE: 18 BRPM

## 2025-04-27 LAB
ABSOLUTE EOSINOPHIL (OHS): 0.44 K/UL
ABSOLUTE MONOCYTE (OHS): 0.61 K/UL (ref 0.3–1)
ABSOLUTE NEUTROPHIL COUNT (OHS): 6.66 K/UL (ref 1.8–7.7)
ALBUMIN SERPL BCP-MCNC: 2.9 G/DL (ref 3.5–5.2)
ANION GAP (OHS): 11 MMOL/L (ref 8–16)
BASOPHILS # BLD AUTO: 0.04 K/UL
BASOPHILS NFR BLD AUTO: 0.5 %
BUN SERPL-MCNC: 42 MG/DL (ref 6–20)
CALCIUM SERPL-MCNC: 8.4 MG/DL (ref 8.7–10.5)
CHLORIDE SERPL-SCNC: 108 MMOL/L (ref 95–110)
CO2 SERPL-SCNC: 22 MMOL/L (ref 23–29)
CREAT SERPL-MCNC: 2.8 MG/DL (ref 0.5–1.4)
ERYTHROCYTE [DISTWIDTH] IN BLOOD BY AUTOMATED COUNT: 19.1 % (ref 11.5–14.5)
GFR SERPLBLD CREATININE-BSD FMLA CKD-EPI: 19 ML/MIN/1.73/M2
GLUCOSE SERPL-MCNC: 97 MG/DL (ref 70–110)
HCT VFR BLD AUTO: 28 % (ref 37–48.5)
HGB BLD-MCNC: 8.7 GM/DL (ref 12–16)
IMM GRANULOCYTES # BLD AUTO: 0.07 K/UL (ref 0–0.04)
IMM GRANULOCYTES NFR BLD AUTO: 0.9 % (ref 0–0.5)
LYMPHOCYTES # BLD AUTO: 0.36 K/UL (ref 1–4.8)
MAGNESIUM SERPL-MCNC: 2.2 MG/DL (ref 1.6–2.6)
MCH RBC QN AUTO: 28 PG (ref 27–31)
MCHC RBC AUTO-ENTMCNC: 31.1 G/DL (ref 32–36)
MCV RBC AUTO: 90 FL (ref 82–98)
NUCLEATED RBC (/100WBC) (OHS): 0 /100 WBC
PHOSPHATE SERPL-MCNC: 2.5 MG/DL (ref 2.7–4.5)
PLATELET # BLD AUTO: 203 K/UL (ref 150–450)
PMV BLD AUTO: 12.2 FL (ref 9.2–12.9)
POCT GLUCOSE: 112 MG/DL (ref 70–110)
POCT GLUCOSE: 123 MG/DL (ref 70–110)
POCT GLUCOSE: 270 MG/DL (ref 70–110)
POTASSIUM SERPL-SCNC: 3 MMOL/L (ref 3.5–5.1)
RBC # BLD AUTO: 3.11 M/UL (ref 4–5.4)
RELATIVE EOSINOPHIL (OHS): 5.4 %
RELATIVE LYMPHOCYTE (OHS): 4.4 % (ref 18–48)
RELATIVE MONOCYTE (OHS): 7.5 % (ref 4–15)
RELATIVE NEUTROPHIL (OHS): 81.3 % (ref 38–73)
SODIUM SERPL-SCNC: 141 MMOL/L (ref 136–145)
TACROLIMUS BLD-MCNC: 6.9 NG/ML (ref 5–15)
WBC # BLD AUTO: 8.18 K/UL (ref 3.9–12.7)

## 2025-04-27 PROCEDURE — 63600175 PHARM REV CODE 636 W HCPCS

## 2025-04-27 PROCEDURE — 25000003 PHARM REV CODE 250: Performed by: NURSE PRACTITIONER

## 2025-04-27 PROCEDURE — 1111F DSCHRG MED/CURRENT MED MERGE: CPT | Mod: 24,CPTII,, | Performed by: NURSE PRACTITIONER

## 2025-04-27 PROCEDURE — 99232 SBSQ HOSP IP/OBS MODERATE 35: CPT | Mod: ,,, | Performed by: NURSE PRACTITIONER

## 2025-04-27 PROCEDURE — 25000003 PHARM REV CODE 250

## 2025-04-27 PROCEDURE — 25000003 PHARM REV CODE 250: Performed by: PHYSICIAN ASSISTANT

## 2025-04-27 PROCEDURE — 99239 HOSP IP/OBS DSCHRG MGMT >30: CPT | Mod: 24,,, | Performed by: NURSE PRACTITIONER

## 2025-04-27 PROCEDURE — 63600175 PHARM REV CODE 636 W HCPCS: Performed by: NURSE PRACTITIONER

## 2025-04-27 PROCEDURE — 80069 RENAL FUNCTION PANEL: CPT

## 2025-04-27 PROCEDURE — 83735 ASSAY OF MAGNESIUM: CPT

## 2025-04-27 PROCEDURE — 85025 COMPLETE CBC W/AUTO DIFF WBC: CPT

## 2025-04-27 PROCEDURE — 80197 ASSAY OF TACROLIMUS: CPT

## 2025-04-27 PROCEDURE — 36415 COLL VENOUS BLD VENIPUNCTURE: CPT

## 2025-04-27 RX ORDER — VALGANCICLOVIR 450 MG/1
450 TABLET, FILM COATED ORAL DAILY
Qty: 30 TABLET | Refills: 2 | Status: SHIPPED | OUTPATIENT
Start: 2025-04-27 | End: 2025-07-27

## 2025-04-27 RX ORDER — HYDROXYZINE PAMOATE 25 MG/1
25 CAPSULE ORAL EVERY 8 HOURS PRN
Qty: 60 CAPSULE | Refills: 0 | Status: SHIPPED | OUTPATIENT
Start: 2025-04-27

## 2025-04-27 RX ORDER — SODIUM BICARBONATE 650 MG/1
1300 TABLET ORAL 2 TIMES DAILY
Qty: 120 TABLET | Refills: 11 | Status: SHIPPED | OUTPATIENT
Start: 2025-04-27 | End: 2026-04-27

## 2025-04-27 RX ORDER — INSULIN GLARGINE 100 [IU]/ML
13 INJECTION, SOLUTION SUBCUTANEOUS NIGHTLY
Status: DISCONTINUED | OUTPATIENT
Start: 2025-04-27 | End: 2025-04-27 | Stop reason: HOSPADM

## 2025-04-27 RX ORDER — SULFAMETHOXAZOLE AND TRIMETHOPRIM 400; 80 MG/1; MG/1
1 TABLET ORAL DAILY
Qty: 30 TABLET | Refills: 5 | Status: SHIPPED | OUTPATIENT
Start: 2025-04-27 | End: 2026-06-21

## 2025-04-27 RX ORDER — POTASSIUM CHLORIDE 20 MEQ/1
40 TABLET, EXTENDED RELEASE ORAL ONCE
Status: COMPLETED | OUTPATIENT
Start: 2025-04-27 | End: 2025-04-27

## 2025-04-27 RX ORDER — INSULIN ASPART 100 [IU]/ML
5 INJECTION, SOLUTION INTRAVENOUS; SUBCUTANEOUS 3 TIMES DAILY
Qty: 6 ML | Refills: 0 | Status: SHIPPED | OUTPATIENT
Start: 2025-04-27 | End: 2026-04-27

## 2025-04-27 RX ORDER — NIFEDIPINE 30 MG/1
30 TABLET, EXTENDED RELEASE ORAL 2 TIMES DAILY
Qty: 60 TABLET | Refills: 11 | Status: SHIPPED | OUTPATIENT
Start: 2025-04-27 | End: 2026-04-27

## 2025-04-27 RX ORDER — INSULIN GLARGINE 100 [IU]/ML
13 INJECTION, SOLUTION SUBCUTANEOUS NIGHTLY
Qty: 6 ML | Refills: 0 | Status: SHIPPED | OUTPATIENT
Start: 2025-04-27 | End: 2026-04-27

## 2025-04-27 RX ORDER — OXYBUTYNIN CHLORIDE 5 MG/1
5 TABLET ORAL 3 TIMES DAILY
Qty: 90 TABLET | Refills: 11 | Status: SHIPPED | OUTPATIENT
Start: 2025-04-27 | End: 2026-04-27

## 2025-04-27 RX ORDER — SODIUM BICARBONATE 650 MG/1
1300 TABLET ORAL 2 TIMES DAILY
Status: DISCONTINUED | OUTPATIENT
Start: 2025-04-27 | End: 2025-04-27 | Stop reason: HOSPADM

## 2025-04-27 RX ADMIN — INSULIN ASPART 6 UNITS: 100 INJECTION, SOLUTION INTRAVENOUS; SUBCUTANEOUS at 01:04

## 2025-04-27 RX ADMIN — OXYCODONE 5 MG: 5 TABLET ORAL at 10:04

## 2025-04-27 RX ADMIN — NIFEDIPINE 30 MG: 30 TABLET, FILM COATED, EXTENDED RELEASE ORAL at 08:04

## 2025-04-27 RX ADMIN — MYCOPHENOLATE MOFETIL 1000 MG: 250 CAPSULE ORAL at 08:04

## 2025-04-27 RX ADMIN — Medication 1 TABLET: at 08:04

## 2025-04-27 RX ADMIN — FLUOXETINE HYDROCHLORIDE 40 MG: 20 CAPSULE ORAL at 08:04

## 2025-04-27 RX ADMIN — MUPIROCIN 1 G: 20 OINTMENT TOPICAL at 08:04

## 2025-04-27 RX ADMIN — HEPARIN SODIUM 5000 UNITS: 5000 INJECTION INTRAVENOUS; SUBCUTANEOUS at 05:04

## 2025-04-27 RX ADMIN — METHIMAZOLE 5 MG: 5 TABLET ORAL at 08:04

## 2025-04-27 RX ADMIN — POTASSIUM CHLORIDE 40 MEQ: 1500 TABLET, EXTENDED RELEASE ORAL at 10:04

## 2025-04-27 RX ADMIN — OXYCODONE 5 MG: 5 TABLET ORAL at 05:04

## 2025-04-27 RX ADMIN — OXYBUTYNIN CHLORIDE 5 MG: 5 TABLET ORAL at 08:04

## 2025-04-27 RX ADMIN — SODIUM BICARBONATE 650 MG TABLET 1300 MG: at 08:04

## 2025-04-27 RX ADMIN — CARVEDILOL 25 MG: 12.5 TABLET, FILM COATED ORAL at 08:04

## 2025-04-27 RX ADMIN — TACROLIMUS 6 MG: 1 CAPSULE ORAL at 08:04

## 2025-04-27 RX ADMIN — DIBASIC SODIUM PHOSPHATE, MONOBASIC POTASSIUM PHOSPHATE AND MONOBASIC SODIUM PHOSPHATE 2 TABLET: 852; 155; 130 TABLET ORAL at 08:04

## 2025-04-27 RX ADMIN — INSULIN ASPART 3 UNITS: 100 INJECTION, SOLUTION INTRAVENOUS; SUBCUTANEOUS at 01:04

## 2025-04-27 RX ADMIN — PREDNISONE 20 MG: 20 TABLET ORAL at 08:04

## 2025-04-27 NOTE — PROGRESS NOTES
"[facility] - [facility]plant Stepdown  Endocrinology  Progress Note    Admit Date: 2025     Reason for Consult: Management of T2DM, Hyperglycemia     Surgical Procedure and Date: kidney tx 25    Diabetes diagnosis year: > 10 years ago    Home Diabetes Medications:  Per chart review Lantus 30 units, Tradjenta 5 mg     How often checking glucose at home? 2x/day   BG readings on regimen: 100s  Hypoglycemia on the regimen? No   Missed doses on regimen?  No        Diabetes Complications include:     Hyperglycemia    Complicating diabetes co morbidities:   ESRD and Glucocorticoid use       HPI:   Patient is a 59 y.o. female with ESRD secondary to diabetic nephropathy and TIA on daily aspirin (last taken on ) who presents for kidney transplant. Now s/p kidney tx 25. Endocrine consulted for BG management.         Interval HPI:   Overnight events: No acute events overnight. Patient in room 29281/39064 A. Blood glucose stable. BG at goal on current insulin regimen (SSI, prandial, and basal insulin ). Steroid use- Prednisone 20 mg QD. 3 Days Post-Op  Renal function- Abnormal -    Vasopressors-  None       Endocrine will continue to follow and manage insulin orders inpatient.         Diet Renal Non-Dialysis     Eatin%  Nausea: No  Hypoglycemia and intervention: No  Fever: No  TPN and/or TF: No  If yes, type of TF/TPN and rate: n/a    BP (!) 164/79 (BP Location: Left arm, Patient Position: Lying)   Pulse 89   Temp 99.1 °F (37.3 °C) (Oral)   Resp 18   Ht 5' 4" (1.626 m)   Wt 73.5 kg (162 lb 0.6 oz)   SpO2 (!) 92%   Breastfeeding No   BMI 27.81 kg/m²     Labs Reviewed and Include    Recent Labs   Lab 25  0550   CALCIUM 8.4*   ALBUMIN 2.9*      K 3.0*   CO2 22*      BUN 42*   CREATININE 2.8*     Lab Results   Component Value Date    WBC 8.18 2025    HGB 8.7 (L) 2025    HCT 28.0 (L) 2025    MCV 90 2025     2025     No results for input(s): " ""TSH", "FREET4" in the last 168 hours.  Lab Results   Component Value Date    HGBA1C 5.4 04/24/2025       Nutritional status:   Body mass index is 27.81 kg/m².  Lab Results   Component Value Date    ALBUMIN 2.9 (L) 04/27/2025    ALBUMIN 3.3 (L) 04/26/2025    ALBUMIN 3.1 (L) 04/25/2025     No results found for: "PREALBUMIN"    Estimated Creatinine Clearance: 21.8 mL/min (A) (based on SCr of 2.8 mg/dL (H)).    Accu-Checks  Recent Labs     04/25/25  1709 04/25/25  2026 04/25/25  2123 04/26/25  0242 04/26/25  0853 04/26/25  1308 04/26/25  1731 04/26/25  2111 04/27/25  0233 04/27/25  0838   POCTGLUCOSE 273* 305* 294* 191* 129* 155* 271* 194* 123* 112*       Current Medications and/or Treatments Impacting Glycemic Control  Immunotherapy:    Immunosuppressants           Stop Route Frequency     tacrolimus capsule 6 mg         -- Oral 2 times daily     mycophenolate capsule 1,000 mg         -- Oral 2 times daily          Steroids:   Hormones (From admission, onward)      Start     Stop Route Frequency Ordered    04/26/25 0900  predniSONE tablet 20 mg  (IP TXP KIDNEY POST-OP THYMO WITH PERIPHERAL PRECHECKED IBW)         -- Oral Daily 04/24/25 1359    04/24/25 1440  hydrocortisone sodium succinate injection 100 mg  (IP TXP KIDNEY POST-OP THYMO WITH PERIPHERAL PRECHECKED IBW)         09/20/36 0540 IV Once as needed 04/24/25 1359          Pressors:    Autonomic Drugs (From admission, onward)      Start     Stop Route Frequency Ordered    04/24/25 1440  EPINEPHrine (PF) injection 1 mg  (IP TXP KIDNEY POST-OP THYMO WITH PERIPHERAL PRECHECKED IBW)         09/20/36 0540 SubQ Once as needed 04/24/25 1359          Hyperglycemia/Diabetes Medications:   Antihyperglycemics (From admission, onward)      Start     Stop Route Frequency Ordered    04/27/25 2100  insulin glargine U-100 (Lantus) pen 13 Units         -- SubQ Nightly 04/27/25 0741    04/26/25 0715  insulin aspart U-100 pen 6 Units         -- SubQ 3 times daily with meals " 04/25/25 2224    04/24/25 2017  insulin aspart U-100 pen 0-5 Units         -- SubQ Before meals, nightly and at 0200 PRN 04/24/25 1917            ASSESSMENT and PLAN    Renal/  * S/P kidney transplant  Managed per primary team        Immunology/Multi System  Prophylactic immunotherapy  May increase insulin resistance.         Endocrine  Type 2 diabetes mellitus with chronic kidney disease on chronic dialysis, with long-term current use of insulin  BG Goal 140-180       -Decrease Lantus to 13 units q HS (fasting BG below goal)   - Novolog 6 units TIDWM   - LDC SSI PRN (150/50)   - POCT Glucose ac/hs  - Hypoglycemia protocol in place      ** Please notify Endocrine for any change and/or advance in diet**  ** Please call Endocrine for any BG related issues **     Discharge Planning:   TBD. Please notify endocrinology prior to discharge.               Other  Adrenal cortical steroids causing adverse effect in therapeutic use  Glucocorticoids markedly increase glucose levels. Expect the steroid taper will help glucose control.             Lovely Pittman NP  Endocrinology  [facility] - Trans[facility] Step[facility]

## 2025-04-27 NOTE — ASSESSMENT & PLAN NOTE
BG Goal 140-180      Re-WBD at 0.5 u/kg/day     - Lantus 16 units HS  - Novolog 6 units TIDWM   - LDC SSI PRN (150/50)   - POCT Glucose ac/hs  - Hypoglycemia protocol in place      ** Please notify Endocrine for any change and/or advance in diet**  ** Please call Endocrine for any BG related issues **     Discharge Planning:   TBD. Please notify endocrinology prior to discharge.

## 2025-04-27 NOTE — PROGRESS NOTES
DISCHARGE NOTE:    Kiesha Rocha is a 59 y.o. female s/p LEFT KIDNEY     Donation after Brain Death     transplant on 4/24/2025 (Kidney) for ESRD secondary to Diabetes Mellitus - Type II.      Past Medical History:   Diagnosis Date    Anemia     Anxiety     Diabetes mellitus     Disorder of kidney and ureter     Encounter for blood transfusion     GERD (gastroesophageal reflux disease)     Hydronephrosis     Hyperlipidemia     Hypertension     Hyperthyroidism     Obesity     Thyroid disease        Hospital Course:   60 yo F DDRT 4/24/25 DM2   CMV +/+    cPRA 28; HD prior to transplant  Making great urine and progressing well     Allergies:   Review of patient's allergies indicates:   Allergen Reactions    Latex Itching    Penicillins Hives and Rash       Patient Pharmacy: [facility]    Discharge Medications:     Medication List        START taking these medications      blood sugar diagnostic Strp  To check BG 3 times daily, to use with insurance preferred meter     blood-glucose meter Misc  To check BG 3 times daily, to use with insurance preferred meter     carvediloL 25 MG tablet  Commonly known as: COREG  Take 1 tablet (25 mg total) by mouth 2 (two) times daily.     famotidine 20 MG tablet  Commonly known as: PEPCID  Take 1 tablet (20 mg total) by mouth once daily.     hydrOXYzine pamoate 25 MG Cap  Commonly known as: VISTARIL  Take 1 capsule (25 mg total) by mouth every 8 (eight) hours as needed (insomnia).     insulin aspart U-100 100 unit/mL (3 mL) Inpn pen  Commonly known as: NovoLOG  Inject 5 Units into the skin 3 (three) times daily. Plus low sliding scale. Max units per day: 30     lancets 33 gauge Misc  To check BG 3 times daily, to use with insurance preferred meter     mycophenolate 250 mg Cap  Commonly known as: CELLCEPT  Take 4 capsules (1,000 mg total) by mouth 2 (two) times daily.     NIFEdipine 30 MG (OSM) 24 hr tablet  Commonly known as: PROCARDIA-XL  Take 1 tablet (30 mg total) by mouth 2 (two)  "times a day.     oxybutynin 5 MG Tab  Commonly known as: DITROPAN  Take 1 tablet (5 mg total) by mouth 3 (three) times daily.     oxyCODONE 5 MG immediate release tablet  Commonly known as: ROXICODONE  Take 1 tablet (5 mg total) by mouth every 6 (six) hours as needed for Pain.     pen needle, diabetic 32 gauge x 5/32" Ndle  Inject 1 Needle into the skin 4 (four) times daily.  Replaces: pen needle, diabetic 31 gauge x 5/16" Ndle     PHOSPHA 250 NEUTRAL 250 mg Tab  Generic drug: k phos di & mono-sod phos mono  Take 2 tablets by mouth 2 (two) times a day.     predniSONE 5 MG tablet  Commonly known as: DELTASONE  Take by mouth daily: 4/26 to 5/2 20 mg, 5/3 to 5/9 15 mg, 5/10 to 5/16 10 mg, 5/17 to FOREVER 5 mg, DO NOT DISCONTINUE     sodium bicarbonate 650 MG tablet  Take 2 tablets (1,300 mg total) by mouth 2 (two) times daily.     sulfamethoxazole-trimethoprim 400-80mg 400-80 mg per tablet  Commonly known as: BACTRIM,SEPTRA  Take 1 tablet by mouth once daily. Stop 10/24/25     tacrolimus 1 MG Cap  Commonly known as: PROGRAF  Take 6 capsules (6 mg total) by mouth every 12 (twelve) hours.     valGANciclovir 450 mg Tab  Commonly known as: VALCYTE  Take 1 tablet (450 mg total) by mouth once daily. Stop 7/24/25            CHANGE how you take these medications      insulin glargine U-100 (Lantus) 100 unit/mL (3 mL) Inpn pen  Inject 13 Units into the skin every evening.  What changed: how much to take            CONTINUE taking these medications      albuterol 90 mcg/actuation inhaler  Commonly known as: PROVENTIL/VENTOLIN HFA     ASPIR-81 ORAL     atorvastatin 20 MG tablet  Commonly known as: LIPITOR  Take 1 tablet (20 mg total) by mouth once daily.     FLUoxetine 20 MG capsule     linaGLIPtin 5 mg Tab tablet  Commonly known as: TRADJENTA  Take 1 tablet (5 mg total) by mouth once daily.     methIMAzole 5 MG Tab  Commonly known as: TAPAZOLE  Take 1 tablet (5 mg total) by mouth once daily.     multivitamin Tab  Take 1 tablet " "by mouth once daily.            STOP taking these medications      amLODIPine 10 MG tablet  Commonly known as: NORVASC     calcium acetate(phosphat bind) 667 mg capsule  Commonly known as: PHOSLO     cholecalciferol (vitamin D3) 1,250 mcg (50,000 unit) capsule     cyclobenzaprine 10 MG tablet  Commonly known as: FLEXERIL     cyproheptadine 4 mg tablet  Commonly known as: PERIACTIN     elderberry fruit and flower 460-115 mg Cap     fluorometholone 0.1% 0.1 % Drps  Commonly known as: FML     fluticasone propionate 50 mcg/actuation nasal spray  Commonly known as: FLONASE     furosemide 40 MG tablet  Commonly known as: LASIX     gabapentin 600 MG tablet  Commonly known as: NEURONTIN     garlic 1,000 mg Cap     GLUCAGON EMERGENCY KIT (HUMAN) 1 mg injection  Generic drug: glucagon     hydrALAZINE 50 MG tablet  Commonly known as: APRESOLINE     insulin lispro 100 unit/mL pen     insulin syringe-needle U-100 1 mL 31 gauge x 5/16 Syrg     INV INSULIN GLARGINE (LANTUS) 100U/ML SOLN FOR INJ SOLOSTAR     ketoconazole 2 % cream  Commonly known as: NIZORAL     labetaloL 100 MG tablet  Commonly known as: NORMODYNE     lisinopriL 20 MG tablet  Commonly known as: PRINIVIL,ZESTRIL     loratadine 10 mg tablet  Commonly known as: CLARITIN     metoprolol tartrate 50 MG tablet  Commonly known as: LOPRESSOR     olopatadine 0.2 % Drop  Commonly known as: PATADAY     ondansetron 4 MG tablet  Commonly known as: ZOFRAN     oxyCODONE-acetaminophen 5-325 mg per tablet  Commonly known as: PERCOCET     pantoprazole 40 MG tablet  Commonly known as: PROTONIX     pen needle, diabetic 31 gauge x 5/16" Ndle  Replaced by: pen needle, diabetic 32 gauge x 5/32" Ndle     topiramate 25 MG tablet  Commonly known as: TOPAMAX     TYLENOL ARTHRITIS PAIN 650 MG Tbsr  Generic drug: acetaminophen               Where to Get Your Medications        These medications were sent to [facility]  [address] [address]      Hours: " "Always Open Phone: [phone]   blood sugar diagnostic Strp  blood-glucose meter Misc  carvediloL 25 MG tablet  famotidine 20 MG tablet  hydrOXYzine pamoate 25 MG Cap  insulin aspart U-100 100 unit/mL (3 mL) Inpn pen  insulin glargine U-100 (Lantus) 100 unit/mL (3 mL) Inpn pen  lancets 33 gauge Misc  mycophenolate 250 mg Cap  NIFEdipine 30 MG (OSM) 24 hr tablet  oxybutynin 5 MG Tab  oxyCODONE 5 MG immediate release tablet  pen needle, diabetic 32 gauge x 5/32" Ndle  PHOSPHA 250 NEUTRAL 250 mg Tab  predniSONE 5 MG tablet  sodium bicarbonate 650 MG tablet  sulfamethoxazole-trimethoprim 400-80mg 400-80 mg per tablet  tacrolimus 1 MG Cap  valGANciclovir 450 mg Tab       You can get these medications from any pharmacy    You don't need a prescription for these medications  multivitamin Tab          Pharmacy Interventions/Recommendations:  1) Transplant Immunosuppression: Induction thymo, and maintenance FK, MMF, pred     2) Opportunistic Infection prophylaxis:   Sulfamethoxazole/Trimethoprim SS daily til 10/24/25  Valganciclovir 450 mg daily until 7/24/25    3) Osteoporosis Prevention measures (liver txp): NA    4) Patient Counseling/Education: Demonstrated the use of the BP cuff, thermometer.    5) Follow-Up/Discharge Needs:  follow up on K, encouraged K rich foods in addition to phos rich foods; new start to sliding scale insulin (previously on in insulin the past); BP management     6) Patient Assistance Information: none    7) The following medications have been placed on HOLD and should be restarted in the outpatient setting (when appropriate): none     Kiesha and her caregiver verbalized their understanding and had the opportunity to ask questions.    "

## 2025-04-27 NOTE — PLAN OF CARE
Problem: Adult Inpatient Plan of Care  Goal: Plan of Care Review  Outcome: Progressing  Goal: Patient-Specific Goal (Individualized)  Outcome: Progressing  Goal: Absence of Hospital-Acquired Illness or Injury  Outcome: Progressing  Goal: Optimal Comfort and Wellbeing  Outcome: Progressing  Goal: Readiness for Transition of Care  Outcome: Progressing     Problem: Wound  Goal: Optimal Coping  Outcome: Progressing  Goal: Optimal Functional Ability  Outcome: Progressing  Goal: Absence of Infection Signs and Symptoms  Outcome: Progressing  Goal: Improved Oral Intake  Outcome: Progressing  Goal: Optimal Pain Control and Function  Outcome: Progressing  Goal: Skin Health and Integrity  Outcome: Progressing  Goal: Optimal Wound Healing  Outcome: Progressing     Problem: Kidney Transplant  Goal: Optimal Coping with Organ Transplant  Outcome: Progressing  Goal: Absence of Bleeding  Outcome: Progressing  Goal: Effective Bowel Elimination  Outcome: Progressing  Goal: Effective Renal Function  Outcome: Progressing  Goal: Fluid and Electrolyte Balance  Outcome: Progressing  Goal: Blood Glucose Level Within Targeted Range  Outcome: Progressing  Goal: Absence of Infection Signs and Symptoms  Outcome: Progressing  Goal: Anesthesia/Sedation Recovery  Outcome: Progressing  Goal: Improved Oral Intake  Outcome: Progressing  Goal: Optimal Pain Control and Function  Outcome: Progressing  Goal: Nausea and Vomiting Relief  Outcome: Progressing  Goal: Effective Oxygenation and Ventilation  Outcome: Progressing  AAOx4,VSS, afebrile   Pt pain well controlled throughout shift with scheduled oxybutynin and prn oxy    Possible d/c today   See other documentation and assessment for further interventions

## 2025-04-27 NOTE — SUBJECTIVE & OBJECTIVE
"Interval HPI:   Overnight events: No acute events overnight. Patient in room 60064/33175 A. Blood glucose stable. BG at goal on current insulin regimen (SSI, prandial, and basal insulin ). Steroid use- Prednisone 20 mg QD. 3 Days Post-Op  Renal function- Abnormal -    Vasopressors-  None       Endocrine will continue to follow and manage insulin orders inpatient.         Diet Renal Non-Dialysis     Eatin%  Nausea: No  Hypoglycemia and intervention: No  Fever: No  TPN and/or TF: No  If yes, type of TF/TPN and rate: n/a    BP (!) 164/79 (BP Location: Left arm, Patient Position: Lying)   Pulse 89   Temp 99.1 °F (37.3 °C) (Oral)   Resp 18   Ht 5' 4" (1.626 m)   Wt 73.5 kg (162 lb 0.6 oz)   SpO2 (!) 92%   Breastfeeding No   BMI 27.81 kg/m²     Labs Reviewed and Include    Recent Labs   Lab 25  0550   CALCIUM 8.4*   ALBUMIN 2.9*      K 3.0*   CO2 22*      BUN 42*   CREATININE 2.8*     Lab Results   Component Value Date    WBC 8.18 2025    HGB 8.7 (L) 2025    HCT 28.0 (L) 2025    MCV 90 2025     2025     No results for input(s): "TSH", "FREET4" in the last 168 hours.  Lab Results   Component Value Date    HGBA1C 5.4 2025       Nutritional status:   Body mass index is 27.81 kg/m².  Lab Results   Component Value Date    ALBUMIN 2.9 (L) 2025    ALBUMIN 3.3 (L) 2025    ALBUMIN 3.1 (L) 2025     No results found for: "PREALBUMIN"    Estimated Creatinine Clearance: 21.8 mL/min (A) (based on SCr of 2.8 mg/dL (H)).    Accu-Checks  Recent Labs     25  1709 25  2026 25  2123 25  0242 25  0853 25  1308 25  1731 25  2111 25  0233 25  0838   POCTGLUCOSE 273* 305* 294* 191* 129* 155* 271* 194* 123* 112*       Current Medications and/or Treatments Impacting Glycemic Control  Immunotherapy:    Immunosuppressants           Stop Route Frequency     tacrolimus capsule 6 mg         -- Oral 2 " times daily     mycophenolate capsule 1,000 mg         -- Oral 2 times daily          Steroids:   Hormones (From admission, onward)      Start     Stop Route Frequency Ordered    04/26/25 0900  predniSONE tablet 20 mg  (IP TXP KIDNEY POST-OP THYMO WITH PERIPHERAL PRECHECKED IBW)         -- Oral Daily 04/24/25 1359    04/24/25 1440  hydrocortisone sodium succinate injection 100 mg  (IP TXP KIDNEY POST-OP THYMO WITH PERIPHERAL PRECHECKED IBW)         09/20/36 0540 IV Once as needed 04/24/25 1359          Pressors:    Autonomic Drugs (From admission, onward)      Start     Stop Route Frequency Ordered    04/24/25 1440  EPINEPHrine (PF) injection 1 mg  (IP TXP KIDNEY POST-OP THYMO WITH PERIPHERAL PRECHECKED IBW)         09/20/36 0540 SubQ Once as needed 04/24/25 1359          Hyperglycemia/Diabetes Medications:   Antihyperglycemics (From admission, onward)      Start     Stop Route Frequency Ordered    04/27/25 2100  insulin glargine U-100 (Lantus) pen 13 Units         -- SubQ Nightly 04/27/25 0741    04/26/25 0715  insulin aspart U-100 pen 6 Units         -- SubQ 3 times daily with meals 04/25/25 2224    04/24/25 2017  insulin aspart U-100 pen 0-5 Units         -- SubQ Before meals, nightly and at 0200 PRN 04/24/25 1917

## 2025-04-27 NOTE — PLAN OF CARE
59 year-old female admitted 4/24/25 for Kidney Txp. Pt has hx of GERD, HTN, DM2  -AAOx4, ambulates independently  -22 G Rt FA  -Lt AV fistula +/+  -Accuchecks AC/HS  -K 3.0/Pot 40 mEq PO  -Cr 2.8  -Phos 2.5/Kphos  -daughter at bedside, pt sitting up on side of bed, bed in lowest position, wheels locked, non-skid socks in place, call light within reach  -pending discharge

## 2025-04-27 NOTE — PROGRESS NOTES
Discharge instructions and education given to pt. Pt verbalized understanding. Reviewed medication changes, new medications, future appointments and medication compliance. Pt informed to use Care Coordinator or call 24/7 [facility] on-call nurse for any further questions or concerns. Reviewed worsening signs or symptoms of kidney disease. Peripheral IV removed, catheter intact. Personal items sent with pt. Pt waiting for wheelchair services to provide transport. Pharmacy came to bedside and provided updated blue card. Vitals signs stable at time of discharge.

## 2025-04-28 ENCOUNTER — PATIENT MESSAGE (OUTPATIENT)
Dept: TRANSPLANT | Facility: CLINIC | Age: 60
End: 2025-04-28

## 2025-04-28 ENCOUNTER — RESULTS FOLLOW-UP (OUTPATIENT)
Dept: TRANSPLANT | Facility: HOSPITAL | Age: 60
End: 2025-04-28
Payer: MEDICARE

## 2025-04-28 ENCOUNTER — LAB VISIT (OUTPATIENT)
Dept: LAB | Facility: HOSPITAL | Age: 60
End: 2025-04-28
Attending: STUDENT IN AN ORGANIZED HEALTH CARE EDUCATION/TRAINING PROGRAM
Payer: MEDICARE

## 2025-04-28 ENCOUNTER — CLINICAL SUPPORT (OUTPATIENT)
Dept: TRANSPLANT | Facility: CLINIC | Age: 60
End: 2025-04-28
Payer: MEDICARE

## 2025-04-28 VITALS
TEMPERATURE: 97 F | OXYGEN SATURATION: 95 % | DIASTOLIC BLOOD PRESSURE: 75 MMHG | BODY MASS INDEX: 27.7 KG/M2 | HEART RATE: 80 BPM | SYSTOLIC BLOOD PRESSURE: 152 MMHG | WEIGHT: 162.25 LBS | BODY MASS INDEX: 27.7 KG/M2 | RESPIRATION RATE: 20 BRPM | HEIGHT: 64 IN | WEIGHT: 162.25 LBS | OXYGEN SATURATION: 95 % | RESPIRATION RATE: 20 BRPM | HEIGHT: 64 IN | TEMPERATURE: 97 F | DIASTOLIC BLOOD PRESSURE: 75 MMHG | SYSTOLIC BLOOD PRESSURE: 152 MMHG | HEART RATE: 80 BPM

## 2025-04-28 DIAGNOSIS — Z94.0 KIDNEY REPLACED BY TRANSPLANT: Primary | ICD-10-CM

## 2025-04-28 DIAGNOSIS — Z94.0 KIDNEY REPLACED BY TRANSPLANT: ICD-10-CM

## 2025-04-28 DIAGNOSIS — Z91.89 INCREASED INFECTION RISK: ICD-10-CM

## 2025-04-28 DIAGNOSIS — Z94.0 IMMUNOSUPPRESSIVE MANAGEMENT ENCOUNTER FOLLOWING KIDNEY TRANSPLANT: Primary | ICD-10-CM

## 2025-04-28 DIAGNOSIS — B25.9 CYTOMEGALOVIRUS INFECTION, UNSPECIFIED CYTOMEGALOVIRAL INFECTION TYPE: ICD-10-CM

## 2025-04-28 DIAGNOSIS — T86.10 COMPLICATION OF TRANSPLANTED KIDNEY, UNSPECIFIED COMPLICATION: ICD-10-CM

## 2025-04-28 DIAGNOSIS — N39.0 URINARY TRACT INFECTION WITHOUT HEMATURIA, SITE UNSPECIFIED: ICD-10-CM

## 2025-04-28 DIAGNOSIS — T86.19 RECEIVED KIDNEY FROM DONOR WITH HEPATITIS C: ICD-10-CM

## 2025-04-28 DIAGNOSIS — T38.0X5A STEROID-INDUCED HYPERLIPIDEMIA: ICD-10-CM

## 2025-04-28 DIAGNOSIS — E78.49 STEROID-INDUCED HYPERLIPIDEMIA: ICD-10-CM

## 2025-04-28 DIAGNOSIS — E55.9 VITAMIN D DEFICIENCY: ICD-10-CM

## 2025-04-28 DIAGNOSIS — R80.9 PROTEINURIA, UNSPECIFIED TYPE: ICD-10-CM

## 2025-04-28 DIAGNOSIS — N28.9 KIDNEY DISEASE: ICD-10-CM

## 2025-04-28 DIAGNOSIS — E83.52 HYPERCALCEMIA: ICD-10-CM

## 2025-04-28 DIAGNOSIS — D51.0 PERNICIOUS ANEMIA: ICD-10-CM

## 2025-04-28 DIAGNOSIS — Z79.899 IMMUNOSUPPRESSIVE MANAGEMENT ENCOUNTER FOLLOWING KIDNEY TRANSPLANT: Primary | ICD-10-CM

## 2025-04-28 DIAGNOSIS — Z76.82 AWAITING ORGAN TRANSPLANT: Primary | ICD-10-CM

## 2025-04-28 PROBLEM — D84.821 IMMUNODEFICIENCY DUE TO DRUG THERAPY: Status: ACTIVE | Noted: 2025-04-25

## 2025-04-28 LAB
25(OH)D3+25(OH)D2 SERPL-MCNC: 31 NG/ML (ref 30–96)
ABSOLUTE EOSINOPHIL (OHS): 0.66 K/UL
ABSOLUTE MONOCYTE (OHS): 0.66 K/UL (ref 0.3–1)
ABSOLUTE NEUTROPHIL COUNT (OHS): 8.7 K/UL (ref 1.8–7.7)
ALBUMIN SERPL BCP-MCNC: 3.5 G/DL (ref 3.5–5.2)
ANION GAP (OHS): 16 MMOL/L (ref 8–16)
BASOPHILS # BLD AUTO: 0.05 K/UL
BASOPHILS NFR BLD AUTO: 0.5 %
BUN SERPL-MCNC: 39 MG/DL (ref 6–20)
CALCIUM SERPL-MCNC: 9.4 MG/DL (ref 8.7–10.5)
CHLORIDE SERPL-SCNC: 109 MMOL/L (ref 95–110)
CO2 SERPL-SCNC: 24 MMOL/L (ref 23–29)
CREAT SERPL-MCNC: 1.8 MG/DL (ref 0.5–1.4)
ERYTHROCYTE [DISTWIDTH] IN BLOOD BY AUTOMATED COUNT: 19.9 % (ref 11.5–14.5)
GFR SERPLBLD CREATININE-BSD FMLA CKD-EPI: 33 ML/MIN/1.73/M2
GLUCOSE SERPL-MCNC: 146 MG/DL (ref 70–110)
HCT VFR BLD AUTO: 32.3 % (ref 37–48.5)
HGB BLD-MCNC: 9.6 GM/DL (ref 12–16)
IMM GRANULOCYTES # BLD AUTO: 0.11 K/UL (ref 0–0.04)
IMM GRANULOCYTES NFR BLD AUTO: 1 % (ref 0–0.5)
LYMPHOCYTES # BLD AUTO: 0.46 K/UL (ref 1–4.8)
MAGNESIUM SERPL-MCNC: 2.4 MG/DL (ref 1.6–2.6)
MCH RBC QN AUTO: 27.2 PG (ref 27–31)
MCHC RBC AUTO-ENTMCNC: 29.7 G/DL (ref 32–36)
MCV RBC AUTO: 92 FL (ref 82–98)
NUCLEATED RBC (/100WBC) (OHS): 1 /100 WBC
PHOSPHATE SERPL-MCNC: 2.3 MG/DL (ref 2.7–4.5)
PLATELET # BLD AUTO: 255 K/UL (ref 150–450)
PMV BLD AUTO: 12.4 FL (ref 9.2–12.9)
POTASSIUM SERPL-SCNC: 3.7 MMOL/L (ref 3.5–5.1)
PTH-INTACT SERPL-MCNC: 262.6 PG/ML (ref 9–77)
RBC # BLD AUTO: 3.53 M/UL (ref 4–5.4)
RELATIVE EOSINOPHIL (OHS): 6.2 %
RELATIVE LYMPHOCYTE (OHS): 4.3 % (ref 18–48)
RELATIVE MONOCYTE (OHS): 6.2 % (ref 4–15)
RELATIVE NEUTROPHIL (OHS): 81.8 % (ref 38–73)
SODIUM SERPL-SCNC: 149 MMOL/L (ref 136–145)
TACROLIMUS BLD-MCNC: 6.5 NG/ML (ref 5–15)
WBC # BLD AUTO: 10.64 K/UL (ref 3.9–12.7)

## 2025-04-28 PROCEDURE — 83735 ASSAY OF MAGNESIUM: CPT

## 2025-04-28 PROCEDURE — 80197 ASSAY OF TACROLIMUS: CPT

## 2025-04-28 PROCEDURE — 85025 COMPLETE CBC W/AUTO DIFF WBC: CPT

## 2025-04-28 PROCEDURE — 82040 ASSAY OF SERUM ALBUMIN: CPT

## 2025-04-28 PROCEDURE — 99999 PR PBB SHADOW E&M-EST. PATIENT-LVL III: CPT | Mod: PBBFAC,,,

## 2025-04-28 PROCEDURE — 83970 ASSAY OF PARATHORMONE: CPT

## 2025-04-28 PROCEDURE — 36415 COLL VENOUS BLD VENIPUNCTURE: CPT

## 2025-04-28 PROCEDURE — 82306 VITAMIN D 25 HYDROXY: CPT

## 2025-04-28 RX ORDER — POTASSIUM CHLORIDE 1500 MG/1
40 TABLET, EXTENDED RELEASE ORAL DAILY
Qty: 60 TABLET | Refills: 1 | Status: SHIPPED | OUTPATIENT
Start: 2025-04-28 | End: 2025-05-02

## 2025-04-28 RX ORDER — FLUOXETINE HYDROCHLORIDE 40 MG/1
40 CAPSULE ORAL DAILY
Qty: 30 CAPSULE | Refills: 5 | Status: SHIPPED | OUTPATIENT
Start: 2025-04-28

## 2025-04-28 NOTE — PROGRESS NOTES
"1ST NURSING VISIT POST DISCHARGE NOTE    1st RN appointment with Kiesha Rocha post discharge 4/27/25 s/p kidney transplant 4/24/25.  Patient's daughter accompanied her.  Patient reports incisional pain relieved with pain medication.  Patient says that she that she is sleeping well.  Incision intact with staples. Patient that she is able to explain daily incision care and showering instructions.  Reviewed I&O monitoring, measuring, and recording, and the need for hydration (i.e., at least 2 liters of water daily with minimal caffeine and no grapefruit products).  Medication list and rationale were reviewed.  Patient did bring blue medication card and medication bottles for review with PharmD.  Patient reports that she has not stopped Dulcolax and has continued Colace.  Patient has had a bowel movement.  Patient expressed understanding of daily care including BID VS, medications, and I&O documentation.  Patient made aware of today's creatinine level: 1.8.  Patient aware that coordinator will review today's labs with a transplant physician and call the patient with any dose changes indicated.  Next lab appointment scheduled for 5/1/25.  First post-operative transplant team appointment with labs scheduled for 5/2/25.    Using the Kidney Transplant Patient Reference Manual, the patient submitted her open book "Self-assessment of Kidney Transplant Patient Knowledge" test, which was completed in the transplant clinic this morning before 1st nursing visit.  This test includes questions regarding critical dose medications commonly used after kidney transplant, medication dosing and side effects, importance of timed lab draws, important signs and symptoms to report 24/7 immediately post-transplant as well as how to contact the transplant team 24/7.    Test Score: 24/25    After completing the test, the patient was given a copy of the Self-assessment Answer Key to reinforce accurate learning of test content.  Patient " expressed her understanding of the value of the information included in the self-assessment test.      Patient instructed on correct procedure for collection of midstream clean catch urine specimen and vebalizes understanding.

## 2025-04-28 NOTE — TELEPHONE ENCOUNTER
Notified patient's daughter Tere of Dr. Dunbar's review of 4/28/25 lab results. Instructions given for patient to increase daily water intake to at least 2.5-3Liters. Increase prograf dose to 7mg twice daily starting with tonight's dose. Daughter verbalized understanding.       ----- Message from Toribio Dunbar MD sent at 4/28/2025  2:01 PM CDT -----  Needs more water by mouth ideally and if sodium not improved wed may need d5w in infusion suite. Please ask her to double her water intake. If true tac trough, increase dose from 6/6 to 7/7 and   repeat with next labs.  ----- Message -----  From: Lab, Background User  Sent: 4/28/2025  10:14 AM CDT  To: Toribio Dunbar Jr., MD

## 2025-04-28 NOTE — PROGRESS NOTES
Transplant Coordinator  4/24-4/27/25    Pt admitted for a cadaveric kidney transplant from [facility]+/[facility]- donor. ESRD 2/2 DMII. Thymo induction, CMV ++.    Cr trending down with good UOP. Cr 6.1--->2.8    RLQ incision NELDA with staples.    Labs 4/28 and RTC to meet with coordinator/pharmD  HCV week #1 labs due 5/2/25

## 2025-04-28 NOTE — PROGRESS NOTES
Kidney Post-Transplant Assessment    Referring Physician: Lilia Malin  Current Nephrologist: Lilia Malin    ORGAN: LEFT KIDNEY  Donor Type: donation after brain death  PHS Increased Risk: no  Cold Ischemia: 866 mins  Induction Medications: thymo    Subjective:     CC:  Reassessment of renal allograft function and management of chronic immunosuppression.    HPI:  Ms. Rocha is a 59 y.o. year old Black or  female who received a donation after brain death kidney transplant on 4/24/25. Her most recent creatinine is 1.5. She takes mycophenolate mofetil, prednisone, and tacrolimus for maintenance immunosuppression. Her post transplant course has been uncomplicated to date.    Hospitalization/ ED visits  None    Interval HX:  Reports doing well. Have some anxiety but nothing new since before transplant. She is on prozac and vistaril. Not interested in following with psych. Denies SI or HI    No long book to review  Intake 2.5L  UOP good uop  BP 160s/80s  Peripheral edema: none  Weight stable  Appetite good  Wound: covered  fx assessment : ambulating well  Lab /diagnostic results reviewed with patient today.   All questions answered    Current Medications[1]      Past Medical History:   Diagnosis Date    Anemia     Anxiety     Diabetes mellitus     Disorder of kidney and ureter     Encounter for blood transfusion     GERD (gastroesophageal reflux disease)     Hydronephrosis     Hyperlipidemia     Hypertension     Hyperthyroidism     Obesity     Thyroid disease          Review of Systems   Constitutional:  Negative for appetite change, chills, fatigue and fever.   HENT:  Negative for trouble swallowing.    Respiratory:  Negative for cough, chest tightness, shortness of breath and wheezing.    Cardiovascular:  Negative for chest pain, palpitations and leg swelling.   Gastrointestinal:  Positive for abdominal distention. Negative for abdominal pain, constipation, diarrhea and nausea.  "  Genitourinary:  Negative for difficulty urinating, frequency and urgency.   Musculoskeletal:  Negative for arthralgias and myalgias.   Skin:  Negative for rash.   Allergic/Immunologic: Positive for immunocompromised state.   Neurological:  Negative for dizziness, weakness, light-headedness and headaches.   Psychiatric/Behavioral:  Negative for sleep disturbance. The patient is nervous/anxious.        Objective:   There were no vitals taken for this visit.body mass index is unknown because there is no height or weight on file.    Physical Exam  Constitutional:       General: She is not in acute distress.     Appearance: She is well-developed. She is not diaphoretic.   Cardiovascular:      Rate and Rhythm: Normal rate and regular rhythm.      Heart sounds: Normal heart sounds.   Pulmonary:      Effort: Pulmonary effort is normal.      Breath sounds: Normal breath sounds.   Abdominal:      General: Bowel sounds are normal.      Palpations: Abdomen is soft.   Musculoskeletal:         General: No tenderness. Normal range of motion.   Skin:     General: Skin is warm and dry.      Findings: No rash.      Nails: There is no clubbing.   Neurological:      Mental Status: She is alert and oriented to person, place, and time.   Psychiatric:         Behavior: Behavior normal.         Labs:  Lab Results   Component Value Date    WBC 12.46 05/01/2025    HGB 10.7 (L) 05/01/2025    HCT 34.2 (L) 05/01/2025     05/01/2025    K 5.2 (H) 05/01/2025     05/01/2025    CO2 22 (L) 05/01/2025    BUN 20 05/01/2025    CREATININE 1.5 (H) 05/01/2025    EGFRNORACEVR 40 (L) 05/01/2025    GLUCOSE 162 (H) 12/31/2024    CALCIUM 10.1 05/01/2025    PHOS 2.1 (L) 05/01/2025    MG 1.8 05/01/2025    ALBUMIN 3.6 05/01/2025    ALBUMIN 3.5 05/01/2025    AST 19 05/01/2025    ALT 19 05/01/2025    .6 (H) 04/28/2025    TACROLIMUS 14.7 05/01/2025       No results found for: "EXTANC", "EXTWBC", "EXTSEGS", "EXTPLATELETS", "EXTHEMOGLOBI", " ""EXTHEMATOCRI", "EXTCREATININ", "EXTSODIUM", "EXTPOTASSIUM", "EXTBUN", "EXTCO2", "EXTCALCIUM", "EXTPHOSPHORU", "EXTGLUCOSE", "EXTALBUMIN", "EXTAST", "EXTALT", "EXTBILITOTAL", "EXTLIPASE", "EXTAMYLASE"    No results found for: "EXTCYCLOSLVL", "EXTSIROLIMUS", "EXTTACROLVL", "EXTPROTCRE", "EXTPTHINTACT", "EXTPROTEINUA", "EXTWBCUA", "EXTRBCUA"    Labs were reviewed with the patient    Assessment:     1. S/P kidney transplant    2. Hypertension, unspecified type    3. Type 2 diabetes mellitus with chronic kidney disease on chronic dialysis, with long-term current use of insulin    4. Immunodeficiency due to drug therapy    5. Kidney replaced by transplant        Plan:   Hypertension  started chlorthalidone 25mg nightly for hypertension and high potassium.   Started vit d 2000 and calcitriol 0.25 dialy.     Elevated Tac level---decreased Tac to 5/5; repeat labs on Monday.       1. CKD stage: will continue follow up as per our center guidelines. patient to continue close follow up with the local General nephrologist. Education provided in appropriate fluid intake, potassium intake. Continue with oral hydration.      2. Immunosuppression: Prograf trough 14.7, therapeutic target 8-10. Continue decreased Prograf 5/5, [id number] Mg BID, and Prednisone QD  Lab Results   Component Value Date    TACROLIMUS 14.7 05/01/2025    TACROLIMUS 6.5 04/28/2025    TACROLIMUS 6.9 04/27/2025   Will closely monitor for toxicities, education provided about adherence to medicines and need to communicate any side effect to the transplant nurse or physician.    3. Allograft Function:stable at baseline for the patient. Continue follow up as per our guidelines and with the local General nephrologist. Communication will be sent today.  Lab Results   Component Value Date    CREATININE 1.5 (H) 05/01/2025    CREATININE 1.8 (H) 04/28/2025    CREATININE 2.8 (H) 04/27/2025     Lab Results   Component Value Date    HGBA1C 5.4 04/24/2025   Diabetes tradjenta, " "Novolog 5units TIDM, Lantus 13units nightly      4. Hypertension management:  Continue with home blood pressure monitoring, low salt and healthy life discussed with the patient.  BP Readings from Last 3 Encounters:   04/28/25 (!) 152/75   04/28/25 (!) 152/75   04/27/25 (!) 170/81   Coreg 25m,g BID, nifedipine 30mg BID    5. Metabolic Bone Disease/Secondary Hyperparathyroidism:calcium and phosphorus level discussed with the patient, patient will continue follow up with the general nephrologist for management of metabolic bone disease calcium and phosphorus as per our center protocol. Will monitor PTH, Vit D level, calcium.   Lab Results   Component Value Date    .6 (H) 04/28/2025    CALCIUM 10.1 05/01/2025    PHOS 2.1 (L) 05/01/2025    PHOS 2.3 (L) 04/28/2025    PHOS 2.5 (L) 04/27/2025       6. Electrolytes: reviewed with the patient, essentially within the normal range no need for acute changes today, will monitor as per our center guidelines.  Lab Results   Component Value Date     05/01/2025    K 5.2 (H) 05/01/2025     05/01/2025    CO2 22 (L) 05/01/2025    CO2 24 04/28/2025    CO2 22 (L) 04/27/2025   NaBicarb 1300mg BID    7. Anemia: will continue monitoring as per our center guidelines. No indication for acute intervention today.  Lab Results   Component Value Date    WBC 12.46 05/01/2025    HGB 10.7 (L) 05/01/2025    HCT 34.2 (L) 05/01/2025    MCV 90 05/01/2025     05/01/2025       8.Proteinuria: will continue with pr/cr ratio as per our center guidelines  No results found for: "PROTEINURINE", "CREATRANDUR", "UTPCR"     9. BK virus infection screening: will continue with urine or blood PCR as per our guidelines to prevent BK virus viremia and allograft dysfunction  No results found for: "BKVIRUSDNAUR", "BKQUANTURINE", "BKVIRUSLOG", "BKVIRUSURINE"      10. Weight education: provided during the clinic visit.  There is no height or weight on file to calculate BMI.     11.Patient safety " education regarding immunosuppression including prophylaxis posttransplant for CMV, PCP : Education provided about vaccination and prevention of infections.    12.  Cytopenias: no significant cytopenias will monitor as per our guidelines. Medicine list reviewed including potential causes of drug-induced cytopenias  Lab Results   Component Value Date    WBC 12.46 05/01/2025    HGB 10.7 (L) 05/01/2025    HCT 34.2 (L) 05/01/2025    MCV 90 05/01/2025     05/01/2025       13. Post-transplant Prophylaxis; CMV Infection, PJP and Candida mucosistis and other indicated for this particular patient.           Exercise: reminded Gael of the importance of regular exercise for weight management, blood sugar and blood pressure management.  I also explained exercise has been shown to improve cardiovascular health, energy level, and sleep hygiene.  Lastly, I advised him that cardiovascular complications are leading cause of death for renal transplant recipients, and regular exercise can help lower this risk.      Follow-up:   Clinic: return to transplant clinic weekly for the first month after transplant; every 2 weeks during months 2-3; then at 6-, 9-, 12-, 18-, 24-, and 36- months post-transplant to reassess for complications from immunosuppression toxicity and monitor for rejection.  Annually thereafter.    Labs: since patient remains at high risk for rejection and drug-related complications that warrant close monitoring, labs will be ordered as follows: continue twice weekly CBC, renal panel, and drug level for first month; then same labs once weekly through 3rd month post-transplant.  Urine for UA and protein/creatinine ratio monthly.  Serum BK - PCR at 1-, 3-, 6-, 9-, 12-, 18-, 24-, 36-, 48-, and 60 months post-transplant.  Hepatic panel at 1-, 2-, 3-, 6-, 9-, 12-, 18-, 24-, and 36- months post-transplant.    Bridget Betts NP       Education:   Material provided to the patient.  Patient reminded to call with any  health changes since these can be early signs of significant complications.  Also, I advised the patient to be sure any new medications or changes of old medications are discussed with either a pharmacist or physician knowledgeable with transplant to avoid rejection/drug toxicity related to significant drug interactions.    Patient advised that it is recommended that all transplanted patients, and their close contacts and household members receive Covid vaccination.             [1]   Current Outpatient Medications:     albuterol (PROVENTIL/VENTOLIN HFA) 90 mcg/actuation inhaler, Inhale 2 puffs into the lungs every 6 (six) hours as needed., Disp: , Rfl:     aspirin (ASPIR-81 ORAL), , Disp: , Rfl:     atorvastatin (LIPITOR) 20 MG tablet, Take 1 tablet (20 mg total) by mouth once daily., Disp: 90 tablet, Rfl: 3    blood sugar diagnostic Strp, To check BG 3 times daily, to use with insurance preferred meter, Disp: 100 strip, Rfl: 11    blood-glucose meter Misc, To check BG 3 times daily, to use with insurance preferred meter, Disp: 1 each, Rfl: 0    carvediloL (COREG) 25 MG tablet, Take 1 tablet (25 mg total) by mouth 2 (two) times daily., Disp: 180 tablet, Rfl: 3    famotidine (PEPCID) 20 MG tablet, Take 1 tablet (20 mg total) by mouth once daily., Disp: 30 tablet, Rfl: 0    FLUoxetine 40 MG capsule, Take 1 capsule (40 mg total) by mouth once daily., Disp: 30 capsule, Rfl: 5    hydrOXYzine pamoate (VISTARIL) 25 MG Cap, Take 1 capsule (25 mg total) by mouth every 8 (eight) hours as needed (insomnia)., Disp: 60 capsule, Rfl: 0    insulin aspart U-100 (NOVOLOG) 100 unit/mL (3 mL) InPn pen, Inject 5 Units into the skin 3 (three) times daily. Plus low sliding scale. Max units per day: 30, Disp: 6 mL, Rfl: 0    insulin glargine U-100, Lantus, 100 unit/mL (3 mL) SubQ InPn pen, Inject 13 Units into the skin every evening., Disp: 6 mL, Rfl: 0    k phos di & mono-sod phos mono (K-PHOS-NEUTRAL) 250 mg Tab, Take 2 tablets by mouth  "2 (two) times a day., Disp: 120 tablet, Rfl: 1    lancets 33 gauge Misc, To check BG 3 times daily, to use with insurance preferred meter, Disp: 100 each, Rfl: 11    linaGLIPtin (TRADJENTA) 5 mg Tab tablet, Take 1 tablet (5 mg total) by mouth once daily., Disp: 90 tablet, Rfl: 3    methIMAzole (TAPAZOLE) 5 MG Tab, Take 1 tablet (5 mg total) by mouth once daily., Disp: 90 tablet, Rfl: 3    multivitamin Tab, Take 1 tablet by mouth once daily., Disp: , Rfl:     mycophenolate (CELLCEPT) 250 mg Cap, Take 4 capsules (1,000 mg total) by mouth 2 (two) times daily., Disp: 240 capsule, Rfl: 2    NIFEdipine (PROCARDIA-XL) 30 MG (OSM) 24 hr tablet, Take 1 tablet (30 mg total) by mouth 2 (two) times a day., Disp: 60 tablet, Rfl: 11    oxybutynin (DITROPAN) 5 MG Tab, Take 1 tablet (5 mg total) by mouth 3 (three) times daily., Disp: 90 tablet, Rfl: 11    oxyCODONE (ROXICODONE) 5 MG immediate release tablet, Take 1 tablet (5 mg total) by mouth every 6 (six) hours as needed for Pain., Disp: 28 tablet, Rfl: 0    pen needle, diabetic 32 gauge x 5/32" Ndle, Inject 1 Needle into the skin 4 (four) times daily., Disp: 100 each, Rfl: 11    potassium chloride (K-TAB) 20 mEq, Take 2 tablets (40 mEq total) by mouth once daily., Disp: 60 tablet, Rfl: 1    predniSONE (DELTASONE) 5 MG tablet, Take by mouth daily: 4/26 to 5/2 20 mg, 5/3 to 5/9 15 mg, 5/10 to 5/16 10 mg, 5/17 to FOREVER 5 mg, DO NOT DISCONTINUE, Disp: 70 tablet, Rfl: 11    sodium bicarbonate 650 MG tablet, Take 2 tablets (1,300 mg total) by mouth 2 (two) times daily., Disp: 120 tablet, Rfl: 11    sulfamethoxazole-trimethoprim 400-80mg (BACTRIM,SEPTRA) 400-80 mg per tablet, Take 1 tablet by mouth once daily. Stop 10/24/25, Disp: 30 tablet, Rfl: 5    valGANciclovir (VALCYTE) 450 mg Tab, Take 1 tablet (450 mg total) by mouth once daily. Stop 7/24/25, Disp: 30 tablet, Rfl: 2    calcitRIOL (ROCALTROL) 0.25 MCG Cap, Take 1 capsule (0.25 mcg total) by mouth once daily., Disp: 30 capsule, " Rfl: 11    chlorthalidone (HYGROTEN) 25 MG Tab, Take 1 tablet (25 mg total) by mouth nightly., Disp: 30 tablet, Rfl: 11    tacrolimus (PROGRAF) 1 MG Cap, Take 5 capsules (5 mg total) by mouth every 12 (twelve) hours. Z94.0;KIDNEY TXP ON 4/24/25, Disp: 300 capsule, Rfl: 11    vitamin D (VITAMIN D3) 1000 units Tab, Take 2 tablets (2,000 Units total) by mouth once daily., Disp: 60 tablet, Rfl: 11  No current facility-administered medications for this visit.    Facility-Administered Medications Ordered in Other Visits:     0.9%  NaCl infusion, 20 mL/hr, Intravenous, Continuous, Shashi Krishnan MD, Last Rate: 150 mL/hr at 06/12/19 1205, Rate Change at 06/12/19 1205    mupirocin 2 % ointment, , Nasal, On Call Procedure, Bautista Vasques MD, Given at 06/12/19 7148

## 2025-04-28 NOTE — PROGRESS NOTES
Clinic Note: First Return to Clinic Post-  Kidney Transplant    Kiesha Rocha  is a 59 y.o. female  S/p LEFT KIDNEY     transplant on 4/24/2025 (Kidney) for Diabetes Mellitus - Type II.      Discharge Course (Issues/Concerns): Patient presents with only complaint of urinary urgency and bladder spasms. Patient has ditropan in her possession, but had not taken a dose yet. Plan to take medication scheduled over the next few days. Blood pressure improved (152/75) on recheck after patient had taken her BP meds.     Objective:   Vitals:    04/28/25 0921   BP: (!) 180/80   Pulse: 92   Resp: 20   Temp: 97.3 °F (36.3 °C)       Met with patient and her caregiver in the clinic to review current medication list.     Current Medications[1]    Pharmacy Interventions/Recommendations:     1) Graft Function & Immunosuppression Issues: Prograf 6/6, MMF, and Pred taper    2) Opportunistic Infection prophylaxis:   PCP ppx: Bactrim until 10/24/25  CMV ppx: Valcyte until 7/24/25  Fungal ppx: None    3) Donor Serologies & Monitoring:     Donor CMV Status:  Positive  Donor HCV Status:  Positive  Donor HBcAb:  Negative  Donor HBV LORNE: Negative  Donor HCV LORNE: Negative      4) Pain Management & Bowel Regimen: Oxycodone and docusate    5) Blood Pressure Management: Coreg 25mg BID and Nifedipine 30mg BID    6) Blood Sugar Management & Follow-up: Endo follow up to be determined later this week    7) Electrolyte Management: Sodium bicarb    8) Osteoporosis Prevention Strategy (Liver Transplant): NA    9) OTHER medication follow-up (patient assistance, held medications, etc): Patient was out of fluoxetine. Prescription sent to [facility] pharmacy for patient to .    10) Reinforced medication education conducted in the hospital, including medication indications, dosing, administration, side effects, monitoring-- including timing of immunosuppressant levels.     Patient received their FIRST fill of medications from [facility].  Discussed the  process for obtaining refills of medications, including verifying the dose and mailing address to have medications delivered.     Kiesha and her caregivers verbalized understanding and had the opportunity to ask questions.               [1]   Current Outpatient Medications   Medication Sig Dispense Refill    aspirin (ASPIR-81 ORAL)       atorvastatin (LIPITOR) 20 MG tablet Take 1 tablet (20 mg total) by mouth once daily. 90 tablet 3    blood sugar diagnostic Strp To check BG 3 times daily, to use with insurance preferred meter 100 strip 11    blood-glucose meter Misc To check BG 3 times daily, to use with insurance preferred meter 1 each 0    carvediloL (COREG) 25 MG tablet Take 1 tablet (25 mg total) by mouth 2 (two) times daily. 180 tablet 3    famotidine (PEPCID) 20 MG tablet Take 1 tablet (20 mg total) by mouth once daily. 30 tablet 0    hydrOXYzine pamoate (VISTARIL) 25 MG Cap Take 1 capsule (25 mg total) by mouth every 8 (eight) hours as needed (insomnia). 60 capsule 0    insulin aspart U-100 (NOVOLOG) 100 unit/mL (3 mL) InPn pen Inject 5 Units into the skin 3 (three) times daily. Plus low sliding scale. Max units per day: 30 6 mL 0    insulin glargine U-100, Lantus, 100 unit/mL (3 mL) SubQ InPn pen Inject 13 Units into the skin every evening. 6 mL 0    k phos di & mono-sod phos mono (K-PHOS-NEUTRAL) 250 mg Tab Take 2 tablets by mouth 2 (two) times a day. 120 tablet 1    lancets 33 gauge Misc To check BG 3 times daily, to use with insurance preferred meter 100 each 11    methIMAzole (TAPAZOLE) 5 MG Tab Take 1 tablet (5 mg total) by mouth once daily. 90 tablet 3    multivitamin Tab Take 1 tablet by mouth once daily.      mycophenolate (CELLCEPT) 250 mg Cap Take 4 capsules (1,000 mg total) by mouth 2 (two) times daily. 240 capsule 2    NIFEdipine (PROCARDIA-XL) 30 MG (OSM) 24 hr tablet Take 1 tablet (30 mg total) by mouth 2 (two) times a day. 60 tablet 11    oxybutynin (DITROPAN) 5 MG Tab Take 1 tablet (5 mg  "total) by mouth 3 (three) times daily. 90 tablet 11    oxyCODONE (ROXICODONE) 5 MG immediate release tablet Take 1 tablet (5 mg total) by mouth every 6 (six) hours as needed for Pain. 28 tablet 0    pen needle, diabetic 32 gauge x 5/32" Ndle Inject 1 Needle into the skin 4 (four) times daily. 100 each 11    predniSONE (DELTASONE) 5 MG tablet Take by mouth daily: 4/26 to 5/2 20 mg, 5/3 to 5/9 15 mg, 5/10 to 5/16 10 mg, 5/17 to FOREVER 5 mg, DO NOT DISCONTINUE 70 tablet 11    sodium bicarbonate 650 MG tablet Take 2 tablets (1,300 mg total) by mouth 2 (two) times daily. 120 tablet 11    sulfamethoxazole-trimethoprim 400-80mg (BACTRIM,SEPTRA) 400-80 mg per tablet Take 1 tablet by mouth once daily. Stop 10/24/25 30 tablet 5    tacrolimus (PROGRAF) 1 MG Cap Take 6 capsules (6 mg total) by mouth every 12 (twelve) hours. 360 capsule 11    valGANciclovir (VALCYTE) 450 mg Tab Take 1 tablet (450 mg total) by mouth once daily. Stop 7/24/25 30 tablet 2    albuterol (PROVENTIL/VENTOLIN HFA) 90 mcg/actuation inhaler Inhale 2 puffs into the lungs every 6 (six) hours as needed.      FLUoxetine 40 MG capsule Take 1 capsule (40 mg total) by mouth once daily. 30 capsule 5    linaGLIPtin (TRADJENTA) 5 mg Tab tablet Take 1 tablet (5 mg total) by mouth once daily. 90 tablet 3     No current facility-administered medications for this visit.     Facility-Administered Medications Ordered in Other Visits   Medication Dose Route Frequency Provider Last Rate Last Admin    0.9%  NaCl infusion  20 mL/hr Intravenous Continuous Shashi Krishnan  mL/hr at 06/12/19 1205 Rate Change at 06/12/19 1205    mupirocin 2 % ointment   Nasal On Call Procedure Bautista Vasques MD   Given at 06/12/19 0947     "

## 2025-04-29 RX ORDER — TACROLIMUS 1 MG/1
7 CAPSULE ORAL EVERY 12 HOURS
Qty: 420 CAPSULE | Refills: 11 | Status: SHIPPED | OUTPATIENT
Start: 2025-04-29 | End: 2025-05-02

## 2025-05-01 ENCOUNTER — LAB VISIT (OUTPATIENT)
Dept: LAB | Facility: HOSPITAL | Age: 60
End: 2025-05-01
Attending: STUDENT IN AN ORGANIZED HEALTH CARE EDUCATION/TRAINING PROGRAM
Payer: MEDICARE

## 2025-05-01 ENCOUNTER — RESULTS FOLLOW-UP (OUTPATIENT)
Dept: TRANSPLANT | Facility: CLINIC | Age: 60
End: 2025-05-01

## 2025-05-01 DIAGNOSIS — Z94.0 KIDNEY REPLACED BY TRANSPLANT: ICD-10-CM

## 2025-05-01 DIAGNOSIS — T86.19 RECEIVED KIDNEY FROM DONOR WITH HEPATITIS C: ICD-10-CM

## 2025-05-01 DIAGNOSIS — Z94.0 IMMUNOSUPPRESSIVE MANAGEMENT ENCOUNTER FOLLOWING KIDNEY TRANSPLANT: ICD-10-CM

## 2025-05-01 DIAGNOSIS — Z79.899 IMMUNOSUPPRESSIVE MANAGEMENT ENCOUNTER FOLLOWING KIDNEY TRANSPLANT: ICD-10-CM

## 2025-05-01 LAB
ABSOLUTE EOSINOPHIL (OHS): 0.68 K/UL
ABSOLUTE MONOCYTE (OHS): 0.98 K/UL (ref 0.3–1)
ABSOLUTE NEUTROPHIL COUNT (OHS): 9.78 K/UL (ref 1.8–7.7)
ALBUMIN SERPL BCP-MCNC: 3.5 G/DL (ref 3.5–5.2)
ALBUMIN SERPL BCP-MCNC: 3.6 G/DL (ref 3.5–5.2)
ALP SERPL-CCNC: 71 UNIT/L (ref 40–150)
ALT SERPL W/O P-5'-P-CCNC: 19 UNIT/L (ref 10–44)
ANION GAP (OHS): 14 MMOL/L (ref 8–16)
AST SERPL-CCNC: 19 UNIT/L (ref 11–45)
BASOPHILS # BLD AUTO: 0.05 K/UL
BASOPHILS NFR BLD AUTO: 0.4 %
BILIRUB DIRECT SERPL-MCNC: 0.6 MG/DL (ref 0.1–0.3)
BILIRUB SERPL-MCNC: 1.5 MG/DL (ref 0.1–1)
BUN SERPL-MCNC: 20 MG/DL (ref 6–20)
CALCIUM SERPL-MCNC: 10.1 MG/DL (ref 8.7–10.5)
CHLORIDE SERPL-SCNC: 106 MMOL/L (ref 95–110)
CO2 SERPL-SCNC: 22 MMOL/L (ref 23–29)
CREAT SERPL-MCNC: 1.5 MG/DL (ref 0.5–1.4)
ERYTHROCYTE [DISTWIDTH] IN BLOOD BY AUTOMATED COUNT: 19.7 % (ref 11.5–14.5)
GFR SERPLBLD CREATININE-BSD FMLA CKD-EPI: 40 ML/MIN/1.73/M2
GLUCOSE SERPL-MCNC: 204 MG/DL (ref 70–110)
HCT VFR BLD AUTO: 34.2 % (ref 37–48.5)
HCV AB SERPL QL IA: NORMAL
HGB BLD-MCNC: 10.7 GM/DL (ref 12–16)
IMM GRANULOCYTES # BLD AUTO: 0.13 K/UL (ref 0–0.04)
IMM GRANULOCYTES NFR BLD AUTO: 1 % (ref 0–0.5)
LYMPHOCYTES # BLD AUTO: 0.84 K/UL (ref 1–4.8)
MAGNESIUM SERPL-MCNC: 1.8 MG/DL (ref 1.6–2.6)
MCH RBC QN AUTO: 28.2 PG (ref 27–31)
MCHC RBC AUTO-ENTMCNC: 31.3 G/DL (ref 32–36)
MCV RBC AUTO: 90 FL (ref 82–98)
NUCLEATED RBC (/100WBC) (OHS): 0 /100 WBC
PHOSPHATE SERPL-MCNC: 2.1 MG/DL (ref 2.7–4.5)
PLATELET # BLD AUTO: 257 K/UL (ref 150–450)
PMV BLD AUTO: 11.9 FL (ref 9.2–12.9)
POTASSIUM SERPL-SCNC: 5.2 MMOL/L (ref 3.5–5.1)
PROT SERPL-MCNC: 8.6 GM/DL (ref 6–8.4)
RBC # BLD AUTO: 3.79 M/UL (ref 4–5.4)
RELATIVE EOSINOPHIL (OHS): 5.5 %
RELATIVE LYMPHOCYTE (OHS): 6.7 % (ref 18–48)
RELATIVE MONOCYTE (OHS): 7.9 % (ref 4–15)
RELATIVE NEUTROPHIL (OHS): 78.5 % (ref 38–73)
SODIUM SERPL-SCNC: 142 MMOL/L (ref 136–145)
WBC # BLD AUTO: 12.46 K/UL (ref 3.9–12.7)

## 2025-05-01 PROCEDURE — 36415 COLL VENOUS BLD VENIPUNCTURE: CPT | Mod: PO

## 2025-05-01 PROCEDURE — 86803 HEPATITIS C AB TEST: CPT

## 2025-05-01 PROCEDURE — 83735 ASSAY OF MAGNESIUM: CPT

## 2025-05-01 PROCEDURE — 87522 HEPATITIS C REVRS TRNSCRPJ: CPT | Mod: PO

## 2025-05-01 PROCEDURE — 85025 COMPLETE CBC W/AUTO DIFF WBC: CPT | Mod: PO

## 2025-05-01 PROCEDURE — 80197 ASSAY OF TACROLIMUS: CPT

## 2025-05-01 PROCEDURE — 82040 ASSAY OF SERUM ALBUMIN: CPT | Mod: PN

## 2025-05-01 PROCEDURE — 82248 BILIRUBIN DIRECT: CPT

## 2025-05-01 PROCEDURE — 87522 HEPATITIS C REVRS TRNSCRPJ: CPT

## 2025-05-01 NOTE — ANESTHESIA POSTPROCEDURE EVALUATION
Anesthesia Post Evaluation    Patient: Kiesha Rocha    Procedure(s) Performed: Procedure(s) (LRB):  TRANSPLANT, KIDNEY (N/A)    Final Anesthesia Type: general      Patient location during evaluation: PACU  Patient participation: Yes- Able to Participate  Level of consciousness: awake  Post-procedure vital signs: reviewed and stable  Pain management: adequate  Airway patency: patent    PONV status at discharge: No PONV  Anesthetic complications: no      Cardiovascular status: blood pressure returned to baseline  Respiratory status: unassisted  Hydration status: euvolemic  Follow-up not needed.              Vitals Value Taken Time   /75 04/28/25 10:09   Temp 36.3 °C (97.3 °F) 04/28/25 09:21   Pulse 80 04/28/25 10:09   Resp 20 04/28/25 09:21   SpO2 95 % 04/28/25 09:21         Event Time   Out of Recovery 15:15:00         Pain/Cira Score: No data recorded

## 2025-05-02 ENCOUNTER — OFFICE VISIT (OUTPATIENT)
Dept: TRANSPLANT | Facility: CLINIC | Age: 60
End: 2025-05-02
Payer: MEDICARE

## 2025-05-02 ENCOUNTER — PATIENT MESSAGE (OUTPATIENT)
Dept: TRANSPLANT | Facility: CLINIC | Age: 60
End: 2025-05-02

## 2025-05-02 ENCOUNTER — TELEPHONE (OUTPATIENT)
Dept: TRANSPLANT | Facility: CLINIC | Age: 60
End: 2025-05-02
Payer: MEDICARE

## 2025-05-02 VITALS
RESPIRATION RATE: 18 BRPM | SYSTOLIC BLOOD PRESSURE: 148 MMHG | DIASTOLIC BLOOD PRESSURE: 72 MMHG | BODY MASS INDEX: 25.33 KG/M2 | TEMPERATURE: 97 F | OXYGEN SATURATION: 94 % | WEIGHT: 148.38 LBS | HEIGHT: 64 IN | HEART RATE: 79 BPM

## 2025-05-02 DIAGNOSIS — Z79.899 IMMUNODEFICIENCY DUE TO DRUG THERAPY: ICD-10-CM

## 2025-05-02 DIAGNOSIS — Z94.0 KIDNEY REPLACED BY TRANSPLANT: ICD-10-CM

## 2025-05-02 DIAGNOSIS — Z94.0 S/P KIDNEY TRANSPLANT: Primary | ICD-10-CM

## 2025-05-02 DIAGNOSIS — D84.821 IMMUNODEFICIENCY DUE TO DRUG THERAPY: ICD-10-CM

## 2025-05-02 DIAGNOSIS — N18.6 TYPE 2 DIABETES MELLITUS WITH CHRONIC KIDNEY DISEASE ON CHRONIC DIALYSIS, WITH LONG-TERM CURRENT USE OF INSULIN: ICD-10-CM

## 2025-05-02 DIAGNOSIS — Z99.2 TYPE 2 DIABETES MELLITUS WITH CHRONIC KIDNEY DISEASE ON CHRONIC DIALYSIS, WITH LONG-TERM CURRENT USE OF INSULIN: ICD-10-CM

## 2025-05-02 DIAGNOSIS — Z79.4 TYPE 2 DIABETES MELLITUS WITH CHRONIC KIDNEY DISEASE ON CHRONIC DIALYSIS, WITH LONG-TERM CURRENT USE OF INSULIN: ICD-10-CM

## 2025-05-02 DIAGNOSIS — I10 HYPERTENSION, UNSPECIFIED TYPE: ICD-10-CM

## 2025-05-02 DIAGNOSIS — E11.22 TYPE 2 DIABETES MELLITUS WITH CHRONIC KIDNEY DISEASE ON CHRONIC DIALYSIS, WITH LONG-TERM CURRENT USE OF INSULIN: ICD-10-CM

## 2025-05-02 LAB
HCV RNA SERPL NAA+PROBE-LOG IU: NOT DETECTED {LOG_IU}/ML
TACROLIMUS BLD-MCNC: 14.7 NG/ML (ref 5–15)

## 2025-05-02 PROCEDURE — 99999 PR PBB SHADOW E&M-EST. PATIENT-LVL V: CPT | Mod: PBBFAC,,, | Performed by: NURSE PRACTITIONER

## 2025-05-02 RX ORDER — TACROLIMUS 1 MG/1
5 CAPSULE ORAL EVERY 12 HOURS
Qty: 300 CAPSULE | Refills: 11 | Status: SHIPPED | OUTPATIENT
Start: 2025-05-02 | End: 2025-05-06 | Stop reason: DRUGHIGH

## 2025-05-02 RX ORDER — CHLORTHALIDONE 25 MG/1
25 TABLET ORAL NIGHTLY
Qty: 30 TABLET | Refills: 11 | Status: SHIPPED | OUTPATIENT
Start: 2025-05-02 | End: 2026-05-02

## 2025-05-02 RX ORDER — CALCITRIOL 0.25 UG/1
0.25 CAPSULE ORAL DAILY
Qty: 30 CAPSULE | Refills: 11 | Status: SHIPPED | OUTPATIENT
Start: 2025-05-02

## 2025-05-02 RX ORDER — CHOLECALCIFEROL (VITAMIN D3) 25 MCG
2000 TABLET ORAL DAILY
Qty: 60 TABLET | Refills: 11 | Status: SHIPPED | OUTPATIENT
Start: 2025-05-02

## 2025-05-02 NOTE — LETTER
May 5, 2025        Lilia Malin  [address] [address] [address]  Phone: [phone]  Fax: [phone]             [facility] Transplant 1st Fl  [address]  [facility] [address]  Phone: [phone]   Patient: Kiesha Rocha   MR Number: [id number]   YOB: 1965   Date of Visit: 5/2/2025       Dear Dr. Lilia Malin    Thank you for referring Kiesha Rocha to me for evaluation. Attached you will find relevant portions of my assessment and plan of care.    If you have questions, please do not hesitate to call me. I look forward to following Kiesha Rocha along with you.    Sincerely,    Bridget Betts, NP    Enclosure    If you would like to receive this communication electronically, please contact [email or web address] or [phone] to request [employer] access.    [employer] is a tool which provides read-only access to select patient information with whom you have a relationship. Its easy to use and provides real time access to review your patients record including encounter summaries, notes, results, and demographic information.    If you feel you have received this communication in error or would no longer like to receive these types of communications, please e-mail [email or web address]

## 2025-05-02 NOTE — TELEPHONE ENCOUNTER
Notified patient's daughter of Dr. Dunbar recommendations to stop taking KCL 40mEq daily starting with tomorrow morning's dose since patient took her AM dose already. Daughter verbalized understanding.     My [facility] message also sent to patient.     Prince Carranza,     Per our telephone conversation, Dr. Dunbar wants Ms. Pop to stop taking potassium supplement called Potassium chloride starting with tomorrow morning's dose since she already took her dose for today. This is what the tablet looks like.     Thanks,     Jessenia

## 2025-05-05 ENCOUNTER — LAB VISIT (OUTPATIENT)
Dept: LAB | Facility: HOSPITAL | Age: 60
End: 2025-05-05
Attending: STUDENT IN AN ORGANIZED HEALTH CARE EDUCATION/TRAINING PROGRAM
Payer: MEDICARE

## 2025-05-05 DIAGNOSIS — Z94.0 IMMUNOSUPPRESSIVE MANAGEMENT ENCOUNTER FOLLOWING KIDNEY TRANSPLANT: ICD-10-CM

## 2025-05-05 DIAGNOSIS — Z79.899 IMMUNOSUPPRESSIVE MANAGEMENT ENCOUNTER FOLLOWING KIDNEY TRANSPLANT: ICD-10-CM

## 2025-05-05 DIAGNOSIS — T86.19 RECEIVED KIDNEY FROM DONOR WITH HEPATITIS C: ICD-10-CM

## 2025-05-05 DIAGNOSIS — Z94.0 KIDNEY REPLACED BY TRANSPLANT: ICD-10-CM

## 2025-05-05 LAB
ABSOLUTE EOSINOPHIL (OHS): 0.93 K/UL
ABSOLUTE MONOCYTE (OHS): 0.78 K/UL (ref 0.3–1)
ABSOLUTE NEUTROPHIL COUNT (OHS): 9.93 K/UL (ref 1.8–7.7)
ALBUMIN SERPL BCP-MCNC: 3.6 G/DL (ref 3.5–5.2)
ALBUMIN SERPL BCP-MCNC: 3.6 G/DL (ref 3.5–5.2)
ALP SERPL-CCNC: 77 UNIT/L (ref 40–150)
ALT SERPL W/O P-5'-P-CCNC: 19 UNIT/L (ref 10–44)
ANION GAP (OHS): 14 MMOL/L (ref 8–16)
AST SERPL-CCNC: 14 UNIT/L (ref 11–45)
BASOPHILS # BLD AUTO: 0.15 K/UL
BASOPHILS NFR BLD AUTO: 1.2 %
BILIRUB DIRECT SERPL-MCNC: 0.4 MG/DL (ref 0.1–0.3)
BILIRUB SERPL-MCNC: 1 MG/DL (ref 0.1–1)
BUN SERPL-MCNC: 27 MG/DL (ref 6–20)
CALCIUM SERPL-MCNC: 9.4 MG/DL (ref 8.7–10.5)
CHLORIDE SERPL-SCNC: 104 MMOL/L (ref 95–110)
CO2 SERPL-SCNC: 24 MMOL/L (ref 23–29)
CREAT SERPL-MCNC: 2.3 MG/DL (ref 0.5–1.4)
ERYTHROCYTE [DISTWIDTH] IN BLOOD BY AUTOMATED COUNT: 18.3 % (ref 11.5–14.5)
GFR SERPLBLD CREATININE-BSD FMLA CKD-EPI: 24 ML/MIN/1.73/M2
GLUCOSE SERPL-MCNC: 144 MG/DL (ref 70–110)
HCT VFR BLD AUTO: 38.1 % (ref 37–48.5)
HGB BLD-MCNC: 12.2 GM/DL (ref 12–16)
IMM GRANULOCYTES # BLD AUTO: 0.1 K/UL (ref 0–0.04)
IMM GRANULOCYTES NFR BLD AUTO: 0.8 % (ref 0–0.5)
LYMPHOCYTES # BLD AUTO: 0.81 K/UL (ref 1–4.8)
MAGNESIUM SERPL-MCNC: 1.7 MG/DL (ref 1.6–2.6)
MCH RBC QN AUTO: 27.9 PG (ref 27–31)
MCHC RBC AUTO-ENTMCNC: 32 G/DL (ref 32–36)
MCV RBC AUTO: 87 FL (ref 82–98)
NUCLEATED RBC (/100WBC) (OHS): 0 /100 WBC
PHOSPHATE SERPL-MCNC: 4 MG/DL (ref 2.7–4.5)
PLATELET # BLD AUTO: 315 K/UL (ref 150–450)
PMV BLD AUTO: 11.7 FL (ref 9.2–12.9)
POTASSIUM SERPL-SCNC: 4.2 MMOL/L (ref 3.5–5.1)
PROT SERPL-MCNC: 8.2 GM/DL (ref 6–8.4)
RBC # BLD AUTO: 4.38 M/UL (ref 4–5.4)
RELATIVE EOSINOPHIL (OHS): 7.3 %
RELATIVE LYMPHOCYTE (OHS): 6.4 % (ref 18–48)
RELATIVE MONOCYTE (OHS): 6.1 % (ref 4–15)
RELATIVE NEUTROPHIL (OHS): 78.2 % (ref 38–73)
SODIUM SERPL-SCNC: 142 MMOL/L (ref 136–145)
WBC # BLD AUTO: 12.7 K/UL (ref 3.9–12.7)

## 2025-05-05 PROCEDURE — 80197 ASSAY OF TACROLIMUS: CPT

## 2025-05-05 PROCEDURE — 82248 BILIRUBIN DIRECT: CPT

## 2025-05-05 PROCEDURE — 85025 COMPLETE CBC W/AUTO DIFF WBC: CPT | Mod: PO

## 2025-05-05 PROCEDURE — 80069 RENAL FUNCTION PANEL: CPT | Mod: PO

## 2025-05-05 PROCEDURE — 83735 ASSAY OF MAGNESIUM: CPT

## 2025-05-05 PROCEDURE — 36415 COLL VENOUS BLD VENIPUNCTURE: CPT | Mod: PO

## 2025-05-06 ENCOUNTER — RESULTS FOLLOW-UP (OUTPATIENT)
Dept: TRANSPLANT | Facility: HOSPITAL | Age: 60
End: 2025-05-06
Payer: MEDICARE

## 2025-05-06 DIAGNOSIS — Z94.0 KIDNEY REPLACED BY TRANSPLANT: Primary | ICD-10-CM

## 2025-05-06 LAB — TACROLIMUS BLD-MCNC: 26.7 NG/ML (ref 5–15)

## 2025-05-06 NOTE — TELEPHONE ENCOUNTER
----- Message from Toribio Dunbar MD sent at 5/6/2025  9:15 AM CDT -----  Please reeducate and go over meds and if true tac trough, hold tonights dose and restart at 2/2 with repeat trough level Thursday. Other labs acceptable.  ----- Message -----  From: Lab, Background User  Sent: 5/5/2025   8:15 AM CDT  To: Toribio Dunbar Jr., MD

## 2025-05-06 NOTE — TELEPHONE ENCOUNTER
Coordinator contacted patient's daughter Tere to notify her of Dr. Dunbar's review of 5/5/25 lab results. Reviewed the timing of patient's medication administration. Daughter reports the prograf level is a true 12 hour level. Patient did not take her AM dose prior to the lab draw. Instructions given for patient to HOLD tonight's dose of prograf then restart tomorrow AM on a lower dose of 2mg twice daily. Next lab appointment will be Thursday 5/8/25. Daughter verbalized understanding.     ----- Message from Toribio Dunbar MD sent at 5/6/2025  9:15 AM CDT -----  Please reeducate and go over meds and if true tac trough, hold tonights dose and restart at 2/2 with repeat trough level Thursday. Other labs acceptable.  ----- Message -----  From: Lab, Background User  Sent: 5/5/2025   8:15 AM CDT  To: Toribio Dunbar Jr., MD

## 2025-05-07 RX ORDER — TACROLIMUS 1 MG/1
2 CAPSULE ORAL EVERY 12 HOURS
Qty: 120 CAPSULE | Refills: 11 | Status: SHIPPED | OUTPATIENT
Start: 2025-05-07 | End: 2025-05-08

## 2025-05-08 ENCOUNTER — PATIENT MESSAGE (OUTPATIENT)
Dept: TRANSPLANT | Facility: CLINIC | Age: 60
End: 2025-05-08
Payer: MEDICARE

## 2025-05-08 ENCOUNTER — RESULTS FOLLOW-UP (OUTPATIENT)
Dept: TRANSPLANT | Facility: HOSPITAL | Age: 60
End: 2025-05-08
Payer: MEDICARE

## 2025-05-08 ENCOUNTER — LAB VISIT (OUTPATIENT)
Dept: LAB | Facility: HOSPITAL | Age: 60
End: 2025-05-08
Attending: STUDENT IN AN ORGANIZED HEALTH CARE EDUCATION/TRAINING PROGRAM
Payer: MEDICARE

## 2025-05-08 DIAGNOSIS — Z79.899 IMMUNOSUPPRESSIVE MANAGEMENT ENCOUNTER FOLLOWING KIDNEY TRANSPLANT: ICD-10-CM

## 2025-05-08 DIAGNOSIS — T86.19 RECEIVED KIDNEY FROM DONOR WITH HEPATITIS C: ICD-10-CM

## 2025-05-08 DIAGNOSIS — Z94.0 KIDNEY REPLACED BY TRANSPLANT: Primary | ICD-10-CM

## 2025-05-08 DIAGNOSIS — Z94.0 IMMUNOSUPPRESSIVE MANAGEMENT ENCOUNTER FOLLOWING KIDNEY TRANSPLANT: ICD-10-CM

## 2025-05-08 DIAGNOSIS — Z94.0 KIDNEY REPLACED BY TRANSPLANT: ICD-10-CM

## 2025-05-08 LAB
ABSOLUTE EOSINOPHIL (OHS): 0.63 K/UL
ABSOLUTE MONOCYTE (OHS): 0.77 K/UL (ref 0.3–1)
ABSOLUTE NEUTROPHIL COUNT (OHS): 8.76 K/UL (ref 1.8–7.7)
ALBUMIN SERPL BCP-MCNC: 3.6 G/DL (ref 3.5–5.2)
ALBUMIN SERPL BCP-MCNC: 3.6 G/DL (ref 3.5–5.2)
ALP SERPL-CCNC: 84 UNIT/L (ref 40–150)
ALT SERPL W/O P-5'-P-CCNC: 14 UNIT/L (ref 10–44)
ANION GAP (OHS): 15 MMOL/L (ref 8–16)
AST SERPL-CCNC: 12 UNIT/L (ref 11–45)
BASOPHILS # BLD AUTO: 0.14 K/UL
BASOPHILS NFR BLD AUTO: 1.2 %
BILIRUB DIRECT SERPL-MCNC: 0.3 MG/DL (ref 0.1–0.3)
BILIRUB SERPL-MCNC: 0.7 MG/DL (ref 0.1–1)
BUN SERPL-MCNC: 32 MG/DL (ref 6–20)
CALCIUM SERPL-MCNC: 8.6 MG/DL (ref 8.7–10.5)
CHLORIDE SERPL-SCNC: 101 MMOL/L (ref 95–110)
CO2 SERPL-SCNC: 23 MMOL/L (ref 23–29)
CREAT SERPL-MCNC: 2.4 MG/DL (ref 0.5–1.4)
ERYTHROCYTE [DISTWIDTH] IN BLOOD BY AUTOMATED COUNT: 17.7 % (ref 11.5–14.5)
GFR SERPLBLD CREATININE-BSD FMLA CKD-EPI: 23 ML/MIN/1.73/M2
GLUCOSE SERPL-MCNC: 190 MG/DL (ref 70–110)
HCT VFR BLD AUTO: 37.4 % (ref 37–48.5)
HCV AB SERPL QL IA: NORMAL
HGB BLD-MCNC: 12.1 GM/DL (ref 12–16)
IMM GRANULOCYTES # BLD AUTO: 0.06 K/UL (ref 0–0.04)
IMM GRANULOCYTES NFR BLD AUTO: 0.5 % (ref 0–0.5)
LYMPHOCYTES # BLD AUTO: 1.15 K/UL (ref 1–4.8)
MAGNESIUM SERPL-MCNC: 1.6 MG/DL (ref 1.6–2.6)
MCH RBC QN AUTO: 28.2 PG (ref 27–31)
MCHC RBC AUTO-ENTMCNC: 32.4 G/DL (ref 32–36)
MCV RBC AUTO: 87 FL (ref 82–98)
NUCLEATED RBC (/100WBC) (OHS): 0 /100 WBC
PHOSPHATE SERPL-MCNC: 4.3 MG/DL (ref 2.7–4.5)
PLATELET # BLD AUTO: 286 K/UL (ref 150–450)
PMV BLD AUTO: 12.5 FL (ref 9.2–12.9)
POTASSIUM SERPL-SCNC: 3.4 MMOL/L (ref 3.5–5.1)
PROT SERPL-MCNC: 8.1 GM/DL (ref 6–8.4)
RBC # BLD AUTO: 4.29 M/UL (ref 4–5.4)
RELATIVE EOSINOPHIL (OHS): 5.5 %
RELATIVE LYMPHOCYTE (OHS): 10 % (ref 18–48)
RELATIVE MONOCYTE (OHS): 6.7 % (ref 4–15)
RELATIVE NEUTROPHIL (OHS): 76.1 % (ref 38–73)
SODIUM SERPL-SCNC: 139 MMOL/L (ref 136–145)
TACROLIMUS BLD-MCNC: 12.9 NG/ML (ref 5–15)
WBC # BLD AUTO: 11.51 K/UL (ref 3.9–12.7)

## 2025-05-08 PROCEDURE — 82374 ASSAY BLOOD CARBON DIOXIDE: CPT | Mod: PO

## 2025-05-08 PROCEDURE — 83735 ASSAY OF MAGNESIUM: CPT

## 2025-05-08 PROCEDURE — 87522 HEPATITIS C REVRS TRNSCRPJ: CPT | Mod: PO

## 2025-05-08 PROCEDURE — 36415 COLL VENOUS BLD VENIPUNCTURE: CPT | Mod: PO

## 2025-05-08 PROCEDURE — 86803 HEPATITIS C AB TEST: CPT

## 2025-05-08 PROCEDURE — 87522 HEPATITIS C REVRS TRNSCRPJ: CPT

## 2025-05-08 PROCEDURE — 85025 COMPLETE CBC W/AUTO DIFF WBC: CPT | Mod: PO

## 2025-05-08 PROCEDURE — 80197 ASSAY OF TACROLIMUS: CPT

## 2025-05-08 PROCEDURE — 82248 BILIRUBIN DIRECT: CPT

## 2025-05-08 NOTE — TELEPHONE ENCOUNTER
Notified patient's daughter, Tere of Dr. Dunbar's review of 5/8/25 lab results. Instructions given for patient to decrease prograf dose to 1.5mg in the AM & 1mg in the PM; repeat labs next week as scheduled.     Instructed daughter to give patient 2mg in the AM & 1mg in the PM until she receive the 0.5mg capsules from the pharmacy. Daughter verbalized understanding.     ----- Message from Toribio Dunbar MD sent at 5/8/2025  1:49 PM CDT -----  If true tac trough, decrease dose from 2/2 to 1.5/1 and repeat in 1 week  ----- Message -----  From: Lab, Background User  Sent: 5/8/2025   8:43 AM CDT  To: Toribio Dunbar Jr., MD

## 2025-05-08 NOTE — TELEPHONE ENCOUNTER
Notified patient's daughter of Dr. Dunbar's review of 5/8/25 lab results via telephone & [facility] message.     Prince Carranza,     Per our telephone conversation, Dr. Dunbar reviewed Ms. Pop's lab results. Her prograf level 12.9 is still higher than it should be. He wants her to decrease the prograf dose to 1.5mg (3 of the 0.5mg capsules) in the AM & 1mg (2 of the 0.5mg capsules) in the PM. The Prograf/tacrolimus prescription for the 0.5mg capsules will be sent to [facility]. We'll check her labs next week as scheduled.     Until Ms. Pop receives the 0.5mg capsules give her 2mg in the AM & 1mg in the PM.     Thanks,     Jessenia

## 2025-05-09 LAB — HCV RNA SERPL NAA+PROBE-LOG IU: NOT DETECTED {LOG_IU}/ML

## 2025-05-09 RX ORDER — TACROLIMUS 0.5 MG/1
CAPSULE ORAL
Qty: 150 CAPSULE | Refills: 11 | Status: SHIPPED | OUTPATIENT
Start: 2025-05-09 | End: 2026-05-09

## 2025-05-12 ENCOUNTER — TELEPHONE (OUTPATIENT)
Dept: TRANSPLANT | Facility: CLINIC | Age: 60
End: 2025-05-12
Payer: MEDICARE

## 2025-05-12 ENCOUNTER — PATIENT MESSAGE (OUTPATIENT)
Dept: TRANSPLANT | Facility: CLINIC | Age: 60
End: 2025-05-12
Payer: MEDICARE

## 2025-05-12 ENCOUNTER — TELEPHONE (OUTPATIENT)
Dept: UROLOGY | Facility: CLINIC | Age: 60
End: 2025-05-12
Payer: MEDICARE

## 2025-05-12 ENCOUNTER — LAB VISIT (OUTPATIENT)
Dept: LAB | Facility: HOSPITAL | Age: 60
End: 2025-05-12
Attending: NURSE PRACTITIONER
Payer: MEDICARE

## 2025-05-12 ENCOUNTER — RESULTS FOLLOW-UP (OUTPATIENT)
Dept: TRANSPLANT | Facility: HOSPITAL | Age: 60
End: 2025-05-12
Payer: MEDICARE

## 2025-05-12 DIAGNOSIS — Z94.0 KIDNEY REPLACED BY TRANSPLANT: ICD-10-CM

## 2025-05-12 DIAGNOSIS — Z94.0 IMMUNOSUPPRESSIVE MANAGEMENT ENCOUNTER FOLLOWING KIDNEY TRANSPLANT: ICD-10-CM

## 2025-05-12 DIAGNOSIS — T86.10 COMPLICATION OF TRANSPLANTED KIDNEY, UNSPECIFIED COMPLICATION: Primary | ICD-10-CM

## 2025-05-12 DIAGNOSIS — Z79.899 IMMUNOSUPPRESSIVE MANAGEMENT ENCOUNTER FOLLOWING KIDNEY TRANSPLANT: ICD-10-CM

## 2025-05-12 LAB
ABSOLUTE EOSINOPHIL (OHS): 0.39 K/UL
ABSOLUTE MONOCYTE (OHS): 0.53 K/UL (ref 0.3–1)
ABSOLUTE NEUTROPHIL COUNT (OHS): 5.77 K/UL (ref 1.8–7.7)
ALBUMIN SERPL BCP-MCNC: 3.6 G/DL (ref 3.5–5.2)
ANION GAP (OHS): 13 MMOL/L (ref 8–16)
BASOPHILS # BLD AUTO: 0.15 K/UL
BASOPHILS NFR BLD AUTO: 1.8 %
BUN SERPL-MCNC: 27 MG/DL (ref 6–20)
CALCIUM SERPL-MCNC: 9.1 MG/DL (ref 8.7–10.5)
CHLORIDE SERPL-SCNC: 104 MMOL/L (ref 95–110)
CO2 SERPL-SCNC: 26 MMOL/L (ref 23–29)
CREAT SERPL-MCNC: 2.1 MG/DL (ref 0.5–1.4)
ERYTHROCYTE [DISTWIDTH] IN BLOOD BY AUTOMATED COUNT: 17.1 % (ref 11.5–14.5)
GFR SERPLBLD CREATININE-BSD FMLA CKD-EPI: 27 ML/MIN/1.73/M2
GLUCOSE SERPL-MCNC: 147 MG/DL (ref 70–110)
HCT VFR BLD AUTO: 35.9 % (ref 37–48.5)
HGB BLD-MCNC: 11.4 GM/DL (ref 12–16)
IMM GRANULOCYTES # BLD AUTO: 0.04 K/UL (ref 0–0.04)
IMM GRANULOCYTES NFR BLD AUTO: 0.5 % (ref 0–0.5)
LYMPHOCYTES # BLD AUTO: 1.49 K/UL (ref 1–4.8)
MAGNESIUM SERPL-MCNC: 1.7 MG/DL (ref 1.6–2.6)
MCH RBC QN AUTO: 28.1 PG (ref 27–31)
MCHC RBC AUTO-ENTMCNC: 31.8 G/DL (ref 32–36)
MCV RBC AUTO: 89 FL (ref 82–98)
NUCLEATED RBC (/100WBC) (OHS): 0 /100 WBC
PHOSPHATE SERPL-MCNC: 3.3 MG/DL (ref 2.7–4.5)
PLATELET # BLD AUTO: 286 K/UL (ref 150–450)
PMV BLD AUTO: 13.2 FL (ref 9.2–12.9)
POTASSIUM SERPL-SCNC: 3.8 MMOL/L (ref 3.5–5.1)
RBC # BLD AUTO: 4.05 M/UL (ref 4–5.4)
RELATIVE EOSINOPHIL (OHS): 4.7 %
RELATIVE LYMPHOCYTE (OHS): 17.8 % (ref 18–48)
RELATIVE MONOCYTE (OHS): 6.3 % (ref 4–15)
RELATIVE NEUTROPHIL (OHS): 68.9 % (ref 38–73)
SODIUM SERPL-SCNC: 143 MMOL/L (ref 136–145)
WBC # BLD AUTO: 8.37 K/UL (ref 3.9–12.7)

## 2025-05-12 PROCEDURE — 83735 ASSAY OF MAGNESIUM: CPT

## 2025-05-12 PROCEDURE — 85025 COMPLETE CBC W/AUTO DIFF WBC: CPT | Mod: PO

## 2025-05-12 PROCEDURE — 82310 ASSAY OF CALCIUM: CPT | Mod: PO

## 2025-05-12 PROCEDURE — 80197 ASSAY OF TACROLIMUS: CPT

## 2025-05-12 PROCEDURE — 36415 COLL VENOUS BLD VENIPUNCTURE: CPT | Mod: PO

## 2025-05-12 RX ORDER — SODIUM CHLORIDE 0.9 % (FLUSH) 0.9 %
10 SYRINGE (ML) INJECTION
OUTPATIENT
Start: 2025-05-12

## 2025-05-12 NOTE — PROGRESS NOTES
Kidney Post-Transplant Assessment    Referring Physician: Lilia Malin  Current Nephrologist: Lilia Malin    ORGAN: LEFT KIDNEY  Donor Type: donation after brain death  PHS Increased Risk: no  Cold Ischemia: 866 mins  Induction Medications: thymo    Subjective:     CC:  Reassessment of renal allograft function and management of chronic immunosuppression.    HPI:  Ms. Rocha is a 59 y.o. year old Black or  female who received a donation after brain death kidney transplant on 4/24/25. Her most recent creatinine is 1.5. She takes mycophenolate mofetil, prednisone, and tacrolimus for maintenance immunosuppression. Her post transplant course has been uncomplicated to date.    Hospitalization/ ED visits  None    Interval HX:  Reports doing well. Have some anxiety but nothing new since before transplant. She is on prozac and vistaril. Not interested in following with psych. Denies SI or HI    Bloods sugar 250s  Not taking her insulin properly  Extensive education provided to patient and family on how to administer insulin  Stopping drinking sprite    Picc line in right arm receiving antibiotics Ecoli ESBL    No long book to review  Intake 2.5L  UOP good uop  /80  Peripheral edema: none  Weight stable  Appetite good  fx assessment : ambulating well  Lab /diagnostic results reviewed with patient today.   All questions answered    Current Medications[1]      Past Medical History:   Diagnosis Date    Anemia     Anxiety     Diabetes mellitus     Disorder of kidney and ureter     Encounter for blood transfusion     GERD (gastroesophageal reflux disease)     Hydronephrosis     Hyperlipidemia     Hypertension     Hyperthyroidism     Obesity     Thyroid disease          Review of Systems   Constitutional:  Negative for appetite change, chills, fatigue and fever.   HENT:  Negative for trouble swallowing.    Respiratory:  Negative for cough, chest tightness, shortness of breath and wheezing.   "  Cardiovascular:  Negative for chest pain, palpitations and leg swelling.   Gastrointestinal:  Positive for abdominal distention. Negative for abdominal pain, constipation, diarrhea and nausea.   Genitourinary:  Negative for difficulty urinating, frequency and urgency.   Musculoskeletal:  Negative for arthralgias and myalgias.   Skin:  Negative for rash.   Allergic/Immunologic: Positive for immunocompromised state.   Neurological:  Negative for dizziness, weakness, light-headedness and headaches.   Psychiatric/Behavioral:  Negative for sleep disturbance. The patient is nervous/anxious.        Objective:   Blood pressure (!) 144/77, pulse 76, temperature 97.2 °F (36.2 °C), temperature source Temporal, resp. rate 16, height 5' 4" (1.626 m), weight 67.2 kg (148 lb 2.4 oz), SpO2 98%.body mass index is 25.43 kg/m².    Physical Exam  Constitutional:       General: She is not in acute distress.     Appearance: She is well-developed. She is not diaphoretic.   Cardiovascular:      Rate and Rhythm: Normal rate and regular rhythm.      Heart sounds: Normal heart sounds.   Pulmonary:      Effort: Pulmonary effort is normal.      Breath sounds: Normal breath sounds.   Abdominal:      General: Bowel sounds are normal.      Palpations: Abdomen is soft.   Musculoskeletal:         General: No tenderness. Normal range of motion.   Skin:     General: Skin is warm and dry.      Findings: No rash.      Nails: There is no clubbing.   Neurological:      Mental Status: She is alert and oriented to person, place, and time.   Psychiatric:         Behavior: Behavior normal.         Labs:  Lab Results   Component Value Date    WBC 8.37 05/12/2025    HGB 11.4 (L) 05/12/2025    HCT 35.9 (L) 05/12/2025     05/12/2025    K 3.8 05/12/2025     05/12/2025    CO2 26 05/12/2025    BUN 27 (H) 05/12/2025    CREATININE 2.1 (H) 05/12/2025    EGFRNORACEVR 27 (L) 05/12/2025    GLUCOSE 162 (H) 12/31/2024    CALCIUM 9.1 05/12/2025    PHOS 3.3 " "05/12/2025    MG 1.7 05/12/2025    ALBUMIN 3.6 05/12/2025    AST 12 05/08/2025    ALT 14 05/08/2025    UTPCR 0.41 (H) 05/08/2025    .6 (H) 04/28/2025    TACROLIMUS 5.8 05/12/2025       No results found for: "EXTANC", "EXTWBC", "EXTSEGS", "EXTPLATELETS", "EXTHEMOGLOBI", "EXTHEMATOCRI", "EXTCREATININ", "EXTSODIUM", "EXTPOTASSIUM", "EXTBUN", "EXTCO2", "EXTCALCIUM", "EXTPHOSPHORU", "EXTGLUCOSE", "EXTALBUMIN", "EXTAST", "EXTALT", "EXTBILITOTAL", "EXTLIPASE", "EXTAMYLASE"    No results found for: "EXTCYCLOSLVL", "EXTSIROLIMUS", "EXTTACROLVL", "EXTPROTCRE", "EXTPTHINTACT", "EXTPROTEINUA", "EXTWBCUA", "EXTRBCUA"    Labs were reviewed with the patient    Assessment:     1. S/P kidney transplant    2. Hypertension, unspecified type    3. Immunodeficiency due to drug therapy    4. Type 2 diabetes mellitus with chronic kidney disease on chronic dialysis, with long-term current use of insulin        Plan:   Recently elevated Cr. DSA, allosure, Kidney US. Routine biopsy---pending    Bloods sugar 250s  Not taking her insulin properly  Extensive education provided to patient and family on how to administer insulin  Stopping drinking sprite  Needs to be seen by endocrine next appointment June 2025      1. CKD stage: will continue follow up as per our center guidelines. patient to continue close follow up with the local General nephrologist. Education provided in appropriate fluid intake, potassium intake. Continue with oral hydration.      2. Immunosuppression: Prograf trough 5.8. Continue Prograf 1.5/1.5, [id number] Mg BID, and Prednisone QD  Lab Results   Component Value Date    TACROLIMUS 5.8 05/12/2025    TACROLIMUS 12.9 05/08/2025    TACROLIMUS 26.7 (H) 05/05/2025   Will closely monitor for toxicities, education provided about adherence to medicines and need to communicate any side effect to the transplant nurse or physician.    3. Allograft Function:stable at baseline for the patient. Continue follow up as per our " guidelines and with the local General nephrologist. Communication will be sent today.  Lab Results   Component Value Date    CREATININE 2.1 (H) 05/12/2025    CREATININE 2.4 (H) 05/08/2025    CREATININE 2.3 (H) 05/05/2025     Lab Results   Component Value Date    HGBA1C 5.4 04/24/2025   Diabetes tradjenta, Novolog 5units TIDM, Lantus 13units nightly      4. Hypertension management:  Continue with home blood pressure monitoring, low salt and healthy life discussed with the patient.  BP Readings from Last 3 Encounters:   05/14/25 (!) 144/77   05/02/25 (!) 148/72   04/28/25 (!) 152/75   Coreg 25m,g BID, nifedipine 30mg BID, chlorthalidone 25mg nightly    5. Metabolic Bone Disease/Secondary Hyperparathyroidism:calcium and phosphorus level discussed with the patient, patient will continue follow up with the general nephrologist for management of metabolic bone disease calcium and phosphorus as per our center protocol. Will monitor PTH, Vit D level, calcium.   Lab Results   Component Value Date    .6 (H) 04/28/2025    CALCIUM 9.1 05/12/2025    PHOS 3.3 05/12/2025    PHOS 4.3 05/08/2025    PHOS 4.0 05/05/2025       6. Electrolytes: reviewed with the patient, essentially within the normal range no need for acute changes today, will monitor as per our center guidelines.  Lab Results   Component Value Date     05/12/2025    K 3.8 05/12/2025     05/12/2025    CO2 26 05/12/2025    CO2 23 05/08/2025    CO2 24 05/05/2025   NaBicarb 1300mg BID    7. Anemia: will continue monitoring as per our center guidelines. No indication for acute intervention today.  Lab Results   Component Value Date    WBC 8.37 05/12/2025    HGB 11.4 (L) 05/12/2025    HCT 35.9 (L) 05/12/2025    MCV 89 05/12/2025     05/12/2025       8.Proteinuria: will continue with pr/cr ratio as per our center guidelines  Lab Results   Component Value Date    PROTEINURINE 31 (H) 05/08/2025    CREATRANDUR 75.0 05/08/2025    UTPCR 0.41 (H)  "05/08/2025        9. BK virus infection screening: will continue with urine or blood PCR as per our guidelines to prevent BK virus viremia and allograft dysfunction  No results found for: "BKVIRUSDNAUR", "BKQUANTURINE", "BKVIRUSLOG", "BKVIRUSURINE"      10. Weight education: provided during the clinic visit.  Body mass index is 25.43 kg/m².     11.Patient safety education regarding immunosuppression including prophylaxis posttransplant for CMV, PCP : Education provided about vaccination and prevention of infections.    12.  Cytopenias: no significant cytopenias will monitor as per our guidelines. Medicine list reviewed including potential causes of drug-induced cytopenias  Lab Results   Component Value Date    WBC 8.37 05/12/2025    HGB 11.4 (L) 05/12/2025    HCT 35.9 (L) 05/12/2025    MCV 89 05/12/2025     05/12/2025       13. Post-transplant Prophylaxis; CMV Infection, PJP and Candida mucosistis and other indicated for this particular patient.         Exercise: reminded Gael of the importance of regular exercise for weight management, blood sugar and blood pressure management.  I also explained exercise has been shown to improve cardiovascular health, energy level, and sleep hygiene.  Lastly, I advised him that cardiovascular complications are leading cause of death for renal transplant recipients, and regular exercise can help lower this risk.      Follow-up:   Clinic: return to transplant clinic weekly for the first month after transplant; every 2 weeks during months 2-3; then at 6-, 9-, 12-, 18-, 24-, and 36- months post-transplant to reassess for complications from immunosuppression toxicity and monitor for rejection.  Annually thereafter.    Labs: since patient remains at high risk for rejection and drug-related complications that warrant close monitoring, labs will be ordered as follows: continue twice weekly CBC, renal panel, and drug level for first month; then same labs once weekly through 3rd " month post-transplant.  Urine for UA and protein/creatinine ratio monthly.  Serum BK - PCR at 1-, 3-, 6-, 9-, 12-, 18-, 24-, 36-, 48-, and 60 months post-transplant.  Hepatic panel at 1-, 2-, 3-, 6-, 9-, 12-, 18-, 24-, and 36- months post-transplant.    Bridget Betts NP       Education:   Material provided to the patient.  Patient reminded to call with any health changes since these can be early signs of significant complications.  Also, I advised the patient to be sure any new medications or changes of old medications are discussed with either a pharmacist or physician knowledgeable with transplant to avoid rejection/drug toxicity related to significant drug interactions.    Patient advised that it is recommended that all transplanted patients, and their close contacts and household members receive Covid vaccination.               [1]   Current Outpatient Medications:     albuterol (PROVENTIL/VENTOLIN HFA) 90 mcg/actuation inhaler, Inhale 2 puffs into the lungs every 6 (six) hours as needed., Disp: , Rfl:     atorvastatin (LIPITOR) 20 MG tablet, Take 1 tablet (20 mg total) by mouth once daily., Disp: 90 tablet, Rfl: 3    blood sugar diagnostic Strp, To check BG 3 times daily, to use with insurance preferred meter, Disp: 100 strip, Rfl: 11    blood-glucose meter Misc, To check BG 3 times daily, to use with insurance preferred meter, Disp: 1 each, Rfl: 0    calcitRIOL (ROCALTROL) 0.25 MCG Cap, Take 1 capsule (0.25 mcg total) by mouth once daily., Disp: 30 capsule, Rfl: 11    carvediloL (COREG) 25 MG tablet, Take 1 tablet (25 mg total) by mouth 2 (two) times daily., Disp: 180 tablet, Rfl: 3    chlorthalidone (HYGROTEN) 25 MG Tab, Take 1 tablet (25 mg total) by mouth nightly., Disp: 30 tablet, Rfl: 11    famotidine (PEPCID) 20 MG tablet, Take 1 tablet (20 mg total) by mouth once daily., Disp: 30 tablet, Rfl: 0    FLUoxetine 40 MG capsule, Take 1 capsule (40 mg total) by mouth once daily., Disp: 30 capsule, Rfl: 5     "hydrOXYzine pamoate (VISTARIL) 25 MG Cap, Take 1 capsule (25 mg total) by mouth every 8 (eight) hours as needed (insomnia)., Disp: 60 capsule, Rfl: 0    insulin aspart U-100 (NOVOLOG) 100 unit/mL (3 mL) InPn pen, Inject 5 Units into the skin 3 (three) times daily. Plus low sliding scale. Max units per day: 30, Disp: 6 mL, Rfl: 0    insulin glargine U-100, Lantus, 100 unit/mL (3 mL) SubQ InPn pen, Inject 13 Units into the skin every evening., Disp: 6 mL, Rfl: 0    lancets 33 gauge Misc, To check BG 3 times daily, to use with insurance preferred meter, Disp: 100 each, Rfl: 11    linaGLIPtin (TRADJENTA) 5 mg Tab tablet, Take 1 tablet (5 mg total) by mouth once daily., Disp: 90 tablet, Rfl: 3    methIMAzole (TAPAZOLE) 5 MG Tab, Take 1 tablet (5 mg total) by mouth once daily., Disp: 90 tablet, Rfl: 3    multivitamin Tab, Take 1 tablet by mouth once daily., Disp: , Rfl:     mycophenolate (CELLCEPT) 250 mg Cap, Take 4 capsules (1,000 mg total) by mouth 2 (two) times daily., Disp: 240 capsule, Rfl: 2    NIFEdipine (PROCARDIA-XL) 30 MG (OSM) 24 hr tablet, Take 1 tablet (30 mg total) by mouth 2 (two) times a day., Disp: 60 tablet, Rfl: 11    oxybutynin (DITROPAN) 5 MG Tab, Take 1 tablet (5 mg total) by mouth 3 (three) times daily. (Patient taking differently: Take 5 mg by mouth 3 (three) times daily as needed.), Disp: 90 tablet, Rfl: 11    pen needle, diabetic 32 gauge x 5/32" Ndle, Inject 1 Needle into the skin 4 (four) times daily., Disp: 100 each, Rfl: 11    predniSONE (DELTASONE) 5 MG tablet, Take by mouth daily: 4/26 to 5/2 20 mg, 5/3 to 5/9 15 mg, 5/10 to 5/16 10 mg, 5/17 to FOREVER 5 mg, DO NOT DISCONTINUE, Disp: 70 tablet, Rfl: 11    sulfamethoxazole-trimethoprim 400-80mg (BACTRIM,SEPTRA) 400-80 mg per tablet, Take 1 tablet by mouth once daily. Stop 10/24/25, Disp: 30 tablet, Rfl: 5    tacrolimus (PROGRAF) 0.5 MG Cap, Take 3 capsules (1.5 mg total) by mouth every 12 (twelve) hours. Z94.0;Kidney txp on 4/24/2025, Disp: " 180 capsule, Rfl: 11    valGANciclovir (VALCYTE) 450 mg Tab, Take 1 tablet (450 mg total) by mouth once daily. Stop 7/24/25, Disp: 30 tablet, Rfl: 2    vitamin D (VITAMIN D3) 1000 units Tab, Take 2 tablets (2,000 Units total) by mouth once daily., Disp: 60 tablet, Rfl: 11    aspirin (ASPIR-81 ORAL), As of 5/13/25 On HOLD for kidney biopsy (Patient not taking: Reported on 5/14/2025), Disp: , Rfl:     k phos di & mono-sod phos mono (K-PHOS-NEUTRAL) 250 mg Tab, Take 1 tablet by mouth 2 (two) times a day., Disp: 120 tablet, Rfl: 1    sodium bicarbonate 650 MG tablet, Take 1 tablet (650 mg total) by mouth 2 (two) times daily., Disp: 60 tablet, Rfl: 11  No current facility-administered medications for this visit.    Facility-Administered Medications Ordered in Other Visits:     0.9%  NaCl infusion, 20 mL/hr, Intravenous, Continuous, Shashi Krishnan MD, Last Rate: 150 mL/hr at 06/12/19 1205, Rate Change at 06/12/19 1205    mupirocin 2 % ointment, , Nasal, On Call Procedure, Bautista Vasques MD, Given at 06/12/19 4903

## 2025-05-12 NOTE — TELEPHONE ENCOUNTER
Notified patient's daughter of Dr. Contreras's review of 5/8/25 urine culture results. Informed patient's daughter the patient will need IV antibiotic therapy with ertapenem. Informed daughter she will need to receive the 1st dose at the [facility] center today for 3pm then she will get the remainder of doses via home infusion. Provided  daughter with [facility] location - [address] [place] [address]. Next to [employer] [facility]. Daughter verbalized understanding.         Daughter reports patient's blood sugars have been elevated but she's not exactly sure of the results. Instructed daughter to bring blood sugar log to clinic this Wed. Daughter verbalized understanding.

## 2025-05-13 ENCOUNTER — TELEPHONE (OUTPATIENT)
Dept: INTERVENTIONAL RADIOLOGY/VASCULAR | Facility: CLINIC | Age: 60
End: 2025-05-13
Payer: MEDICARE

## 2025-05-13 DIAGNOSIS — Z94.0 KIDNEY REPLACED BY TRANSPLANT: Primary | ICD-10-CM

## 2025-05-13 LAB — TACROLIMUS BLD-MCNC: 5.8 NG/ML (ref 5–15)

## 2025-05-13 RX ORDER — TACROLIMUS 0.5 MG/1
1.5 CAPSULE ORAL EVERY 12 HOURS
Qty: 180 CAPSULE | Refills: 11 | Status: SHIPPED | OUTPATIENT
Start: 2025-05-13 | End: 2026-05-13

## 2025-05-13 NOTE — TELEPHONE ENCOUNTER
----- Message from Jessenia Lee sent at 5/12/2025  7:22 PM CDT -----    ----- Message -----  From: Toribio Dunbar Jr., MD  Sent: 5/12/2025   4:47 PM CDT  To: [facility] Post-Kidney Transplant Clinical    DSA allosure kidney ultrasound this week. Biopsy routine. Can cancel if creatinine and other labs improved.  ----- Message -----  From: Lab, Background User  Sent: 5/12/2025   8:38 AM CDT  To: Toribio Dunbar Jr., MD

## 2025-05-13 NOTE — TELEPHONE ENCOUNTER
Notified patient's daughter Tere of Dr. Dunbar's review of 5/12/25 lab results via telephone & [facility] message.     Instructions given for patient to increase prograf dose to 1.5mg twice daily. Also informed daughter the patient will need to have a kidney US tomorrow 5/13/25 & a kidney biopsy. Instructions given for patient to HOLD ASA in preparation for the kidney biopsy. Daughter verbalized understanding.     [facility] message also sent to patient.     Prince Carranza,     Per our telephone conversation, Dr. Dunbar reviewed Ms. Pop's 5/12/25 lab results. Her prograf level 5.8 is lower than it should be. He wants her to increase the prograf dose to 1.5mg (take 3 of the 0.5mg capsules) twice daily starting with tonight's dose.     The kidney ultrasound is scheduled tomorrow for 2:45pm.     Ms. Pop will also need a kidney biopsy to further evaluate the kidney function. Someone from Interventional Radiology will give you a call later this week with an appointment time. In preparation for the Kidney biopsy please HOLD the Aspirin until after the biopsy.     Thanks,     Jessenia       ----- Message from Toribio Dunbar MD sent at 5/13/2025 12:08 PM CDT -----  If true tac trough, increase dose from 1.5/1 to 1.5/1.5 and repeat in 2 weeks  ----- Message -----  From: Lab, Background User  Sent: 5/12/2025   8:38 AM CDT  To: Toribio Dunbar Jr., MD

## 2025-05-14 ENCOUNTER — RESULTS FOLLOW-UP (OUTPATIENT)
Dept: TRANSPLANT | Facility: HOSPITAL | Age: 60
End: 2025-05-14

## 2025-05-14 ENCOUNTER — OFFICE VISIT (OUTPATIENT)
Dept: TRANSPLANT | Facility: CLINIC | Age: 60
End: 2025-05-14
Payer: MEDICARE

## 2025-05-14 ENCOUNTER — HOSPITAL ENCOUNTER (OUTPATIENT)
Dept: RADIOLOGY | Facility: HOSPITAL | Age: 60
Discharge: HOME | End: 2025-05-14
Attending: STUDENT IN AN ORGANIZED HEALTH CARE EDUCATION/TRAINING PROGRAM
Payer: MEDICARE

## 2025-05-14 VITALS
HEART RATE: 76 BPM | HEIGHT: 64 IN | OXYGEN SATURATION: 98 % | TEMPERATURE: 97 F | WEIGHT: 148.13 LBS | BODY MASS INDEX: 25.29 KG/M2 | RESPIRATION RATE: 16 BRPM | DIASTOLIC BLOOD PRESSURE: 77 MMHG | SYSTOLIC BLOOD PRESSURE: 144 MMHG

## 2025-05-14 DIAGNOSIS — I10 HYPERTENSION, UNSPECIFIED TYPE: ICD-10-CM

## 2025-05-14 DIAGNOSIS — E11.22 TYPE 2 DIABETES MELLITUS WITH CHRONIC KIDNEY DISEASE ON CHRONIC DIALYSIS, WITH LONG-TERM CURRENT USE OF INSULIN: ICD-10-CM

## 2025-05-14 DIAGNOSIS — Z94.0 S/P KIDNEY TRANSPLANT: Primary | ICD-10-CM

## 2025-05-14 DIAGNOSIS — D84.821 IMMUNODEFICIENCY DUE TO DRUG THERAPY: ICD-10-CM

## 2025-05-14 DIAGNOSIS — Z79.4 TYPE 2 DIABETES MELLITUS WITH CHRONIC KIDNEY DISEASE ON CHRONIC DIALYSIS, WITH LONG-TERM CURRENT USE OF INSULIN: ICD-10-CM

## 2025-05-14 DIAGNOSIS — T86.10 COMPLICATION OF TRANSPLANTED KIDNEY, UNSPECIFIED COMPLICATION: ICD-10-CM

## 2025-05-14 DIAGNOSIS — N18.6 TYPE 2 DIABETES MELLITUS WITH CHRONIC KIDNEY DISEASE ON CHRONIC DIALYSIS, WITH LONG-TERM CURRENT USE OF INSULIN: ICD-10-CM

## 2025-05-14 DIAGNOSIS — Z79.899 IMMUNODEFICIENCY DUE TO DRUG THERAPY: ICD-10-CM

## 2025-05-14 DIAGNOSIS — Z99.2 TYPE 2 DIABETES MELLITUS WITH CHRONIC KIDNEY DISEASE ON CHRONIC DIALYSIS, WITH LONG-TERM CURRENT USE OF INSULIN: ICD-10-CM

## 2025-05-14 PROCEDURE — 76776 US EXAM K TRANSPL W/DOPPLER: CPT | Mod: TC

## 2025-05-14 PROCEDURE — 76776 US EXAM K TRANSPL W/DOPPLER: CPT | Mod: 26,,, | Performed by: RADIOLOGY

## 2025-05-14 PROCEDURE — 99999 PR PBB SHADOW E&M-EST. PATIENT-LVL V: CPT | Mod: PBBFAC,,, | Performed by: NURSE PRACTITIONER

## 2025-05-14 RX ORDER — SODIUM BICARBONATE 650 MG/1
650 TABLET ORAL 2 TIMES DAILY
Qty: 60 TABLET | Refills: 11 | Status: SHIPPED | OUTPATIENT
Start: 2025-05-14 | End: 2026-05-14

## 2025-05-14 RX ORDER — LIDOCAINE HYDROCHLORIDE 10 MG/ML
1 INJECTION, SOLUTION EPIDURAL; INFILTRATION; INTRACAUDAL; PERINEURAL ONCE
Status: CANCELLED | OUTPATIENT
Start: 2025-05-14 | End: 2025-05-14

## 2025-05-14 NOTE — LETTER
May 14, 2025        Lilia Malin  [address] [address] [address]  Phone: [phone]  Fax: [phone]             [facility] Trans[facility] 1st Fl  [address]  [facility] [address]  Phone: [phone]   Patient: Kiesha Rocha   MR Number: [id number]   YOB: 1965   Date of Visit: 5/14/2025       Dear Dr. Lilia Malin    Thank you for referring Kiesha Rocha to me for evaluation. Attached you will find relevant portions of my assessment and plan of care.    If you have questions, please do not hesitate to call me. I look forward to following Kiesha Rocha along with you.    Sincerely,    Bridget Betts, NP    Enclosure    If you would like to receive this communication electronically, please contact [email or web address] or [phone] to request [employer] access.    [employer] is a tool which provides read-only access to select patient information with whom you have a relationship. Its easy to use and provides real time access to review your patients record including encounter summaries, notes, results, and demographic information.    If you feel you have received this communication in error or would no longer like to receive these types of communications, please e-mail [email or web address]

## 2025-05-15 ENCOUNTER — LAB VISIT (OUTPATIENT)
Dept: LAB | Facility: HOSPITAL | Age: 60
End: 2025-05-15
Attending: STUDENT IN AN ORGANIZED HEALTH CARE EDUCATION/TRAINING PROGRAM
Payer: MEDICARE

## 2025-05-15 ENCOUNTER — PATIENT MESSAGE (OUTPATIENT)
Dept: INTERVENTIONAL RADIOLOGY/VASCULAR | Facility: HOSPITAL | Age: 60
End: 2025-05-15
Payer: MEDICARE

## 2025-05-15 DIAGNOSIS — Z79.899 IMMUNOSUPPRESSIVE MANAGEMENT ENCOUNTER FOLLOWING KIDNEY TRANSPLANT: ICD-10-CM

## 2025-05-15 DIAGNOSIS — Z94.0 IMMUNOSUPPRESSIVE MANAGEMENT ENCOUNTER FOLLOWING KIDNEY TRANSPLANT: ICD-10-CM

## 2025-05-15 DIAGNOSIS — Z94.0 S/P KIDNEY TRANSPLANT: ICD-10-CM

## 2025-05-15 DIAGNOSIS — Z94.0 KIDNEY REPLACED BY TRANSPLANT: ICD-10-CM

## 2025-05-15 DIAGNOSIS — T86.19 RECEIVED KIDNEY FROM DONOR WITH HEPATITIS C: ICD-10-CM

## 2025-05-15 LAB
ABSOLUTE EOSINOPHIL (OHS): 0.41 K/UL
ABSOLUTE EOSINOPHIL (OHS): 0.43 K/UL
ABSOLUTE MONOCYTE (OHS): 0.39 K/UL (ref 0.3–1)
ABSOLUTE MONOCYTE (OHS): 0.41 K/UL (ref 0.3–1)
ABSOLUTE NEUTROPHIL COUNT (OHS): 5.76 K/UL (ref 1.8–7.7)
ABSOLUTE NEUTROPHIL COUNT (OHS): 5.83 K/UL (ref 1.8–7.7)
ALBUMIN SERPL BCP-MCNC: 3.5 G/DL (ref 3.5–5.2)
ALBUMIN SERPL BCP-MCNC: 3.5 G/DL (ref 3.5–5.2)
ALP SERPL-CCNC: 75 UNIT/L (ref 40–150)
ALT SERPL W/O P-5'-P-CCNC: 15 UNIT/L (ref 10–44)
ANION GAP (OHS): 14 MMOL/L (ref 8–16)
AST SERPL-CCNC: 16 UNIT/L (ref 11–45)
BASOPHILS # BLD AUTO: 0.12 K/UL
BASOPHILS # BLD AUTO: 0.13 K/UL
BASOPHILS NFR BLD AUTO: 1.5 %
BASOPHILS NFR BLD AUTO: 1.6 %
BILIRUB DIRECT SERPL-MCNC: 0.3 MG/DL (ref 0.1–0.3)
BILIRUB SERPL-MCNC: 0.7 MG/DL (ref 0.1–1)
BUN SERPL-MCNC: 19 MG/DL (ref 6–20)
CALCIUM SERPL-MCNC: 9 MG/DL (ref 8.7–10.5)
CHLORIDE SERPL-SCNC: 103 MMOL/L (ref 95–110)
CO2 SERPL-SCNC: 26 MMOL/L (ref 23–29)
CREAT SERPL-MCNC: 1.9 MG/DL (ref 0.5–1.4)
ERYTHROCYTE [DISTWIDTH] IN BLOOD BY AUTOMATED COUNT: 16.6 % (ref 11.5–14.5)
ERYTHROCYTE [DISTWIDTH] IN BLOOD BY AUTOMATED COUNT: 16.8 % (ref 11.5–14.5)
GFR SERPLBLD CREATININE-BSD FMLA CKD-EPI: 30 ML/MIN/1.73/M2
GLUCOSE SERPL-MCNC: 91 MG/DL (ref 70–110)
HCT VFR BLD AUTO: 36 % (ref 37–48.5)
HCT VFR BLD AUTO: 36.9 % (ref 37–48.5)
HCV AB SERPL QL IA: NORMAL
HGB BLD-MCNC: 11.3 GM/DL (ref 12–16)
HGB BLD-MCNC: 11.7 GM/DL (ref 12–16)
IMM GRANULOCYTES # BLD AUTO: 0.03 K/UL (ref 0–0.04)
IMM GRANULOCYTES # BLD AUTO: 0.04 K/UL (ref 0–0.04)
IMM GRANULOCYTES NFR BLD AUTO: 0.4 % (ref 0–0.5)
IMM GRANULOCYTES NFR BLD AUTO: 0.5 % (ref 0–0.5)
INR PPP: 1 (ref 0.8–1.2)
LYMPHOCYTES # BLD AUTO: 1.19 K/UL (ref 1–4.8)
LYMPHOCYTES # BLD AUTO: 1.21 K/UL (ref 1–4.8)
MAGNESIUM SERPL-MCNC: 1.8 MG/DL (ref 1.6–2.6)
MCH RBC QN AUTO: 28.3 PG (ref 27–31)
MCH RBC QN AUTO: 28.5 PG (ref 27–31)
MCHC RBC AUTO-ENTMCNC: 31.4 G/DL (ref 32–36)
MCHC RBC AUTO-ENTMCNC: 31.7 G/DL (ref 32–36)
MCV RBC AUTO: 90 FL (ref 82–98)
MCV RBC AUTO: 90 FL (ref 82–98)
NUCLEATED RBC (/100WBC) (OHS): 0 /100 WBC
NUCLEATED RBC (/100WBC) (OHS): 0 /100 WBC
PHOSPHATE SERPL-MCNC: 2.5 MG/DL (ref 2.7–4.5)
PLATELET # BLD AUTO: 241 K/UL (ref 150–450)
PLATELET # BLD AUTO: 249 K/UL (ref 150–450)
PMV BLD AUTO: 12.2 FL (ref 9.2–12.9)
PMV BLD AUTO: 12.8 FL (ref 9.2–12.9)
POTASSIUM SERPL-SCNC: 3.2 MMOL/L (ref 3.5–5.1)
PROT SERPL-MCNC: 7.7 GM/DL (ref 6–8.4)
PROTHROMBIN TIME: 11 SECONDS (ref 9–12.5)
RBC # BLD AUTO: 4 M/UL (ref 4–5.4)
RBC # BLD AUTO: 4.11 M/UL (ref 4–5.4)
RELATIVE EOSINOPHIL (OHS): 5.2 %
RELATIVE EOSINOPHIL (OHS): 5.4 %
RELATIVE LYMPHOCYTE (OHS): 14.9 % (ref 18–48)
RELATIVE LYMPHOCYTE (OHS): 15.2 % (ref 18–48)
RELATIVE MONOCYTE (OHS): 4.9 % (ref 4–15)
RELATIVE MONOCYTE (OHS): 5.2 % (ref 4–15)
RELATIVE NEUTROPHIL (OHS): 72.4 % (ref 38–73)
RELATIVE NEUTROPHIL (OHS): 72.8 % (ref 38–73)
SODIUM SERPL-SCNC: 143 MMOL/L (ref 136–145)
WBC # BLD AUTO: 7.95 K/UL (ref 3.9–12.7)
WBC # BLD AUTO: 8 K/UL (ref 3.9–12.7)

## 2025-05-15 PROCEDURE — 86803 HEPATITIS C AB TEST: CPT

## 2025-05-15 PROCEDURE — 82248 BILIRUBIN DIRECT: CPT

## 2025-05-15 PROCEDURE — 85025 COMPLETE CBC W/AUTO DIFF WBC: CPT | Mod: 91,PO

## 2025-05-15 PROCEDURE — 80197 ASSAY OF TACROLIMUS: CPT

## 2025-05-15 PROCEDURE — 36415 COLL VENOUS BLD VENIPUNCTURE: CPT | Mod: PO

## 2025-05-15 PROCEDURE — 85025 COMPLETE CBC W/AUTO DIFF WBC: CPT | Mod: PO

## 2025-05-15 PROCEDURE — 87522 HEPATITIS C REVRS TRNSCRPJ: CPT | Mod: PO

## 2025-05-15 PROCEDURE — 83735 ASSAY OF MAGNESIUM: CPT

## 2025-05-15 PROCEDURE — 82040 ASSAY OF SERUM ALBUMIN: CPT | Mod: PO

## 2025-05-15 PROCEDURE — 85610 PROTHROMBIN TIME: CPT | Mod: PO

## 2025-05-15 PROCEDURE — 87522 HEPATITIS C REVRS TRNSCRPJ: CPT

## 2025-05-16 ENCOUNTER — HOSPITAL ENCOUNTER (OUTPATIENT)
Dept: INTERVENTIONAL RADIOLOGY/VASCULAR | Facility: HOSPITAL | Age: 60
Discharge: HOME | End: 2025-05-16
Attending: STUDENT IN AN ORGANIZED HEALTH CARE EDUCATION/TRAINING PROGRAM
Payer: MEDICARE

## 2025-05-16 ENCOUNTER — RESULTS FOLLOW-UP (OUTPATIENT)
Dept: TRANSPLANT | Facility: HOSPITAL | Age: 60
End: 2025-05-16

## 2025-05-16 VITALS
WEIGHT: 147 LBS | TEMPERATURE: 98 F | OXYGEN SATURATION: 98 % | SYSTOLIC BLOOD PRESSURE: 152 MMHG | BODY MASS INDEX: 25.1 KG/M2 | RESPIRATION RATE: 20 BRPM | DIASTOLIC BLOOD PRESSURE: 71 MMHG | HEART RATE: 75 BPM | HEIGHT: 64 IN

## 2025-05-16 DIAGNOSIS — Z94.0 S/P KIDNEY TRANSPLANT: ICD-10-CM

## 2025-05-16 DIAGNOSIS — T86.10 COMPLICATION OF TRANSPLANTED KIDNEY, UNSPECIFIED COMPLICATION: ICD-10-CM

## 2025-05-16 LAB
HCV RNA SERPL NAA+PROBE-LOG IU: NOT DETECTED {LOG_IU}/ML
POCT GLUCOSE: 104 MG/DL (ref 70–110)
TACROLIMUS BLD-MCNC: 5.5 NG/ML (ref 5–15)

## 2025-05-16 PROCEDURE — 99152 MOD SED SAME PHYS/QHP 5/>YRS: CPT | Mod: ,,, | Performed by: RADIOLOGY

## 2025-05-16 PROCEDURE — 50200 RENAL BIOPSY PERQ: CPT | Mod: LT,,, | Performed by: RADIOLOGY

## 2025-05-16 PROCEDURE — 99153 MOD SED SAME PHYS/QHP EA: CPT | Performed by: RADIOLOGY

## 2025-05-16 PROCEDURE — 99153 MOD SED SAME PHYS/QHP EA: CPT

## 2025-05-16 PROCEDURE — 63600175 PHARM REV CODE 636 W HCPCS: Performed by: RADIOLOGY

## 2025-05-16 PROCEDURE — 99152 MOD SED SAME PHYS/QHP 5/>YRS: CPT

## 2025-05-16 PROCEDURE — 76942 ECHO GUIDE FOR BIOPSY: CPT | Mod: 26,,, | Performed by: RADIOLOGY

## 2025-05-16 PROCEDURE — 99152 MOD SED SAME PHYS/QHP 5/>YRS: CPT | Performed by: RADIOLOGY

## 2025-05-16 PROCEDURE — 76942 ECHO GUIDE FOR BIOPSY: CPT | Mod: TC | Performed by: RADIOLOGY

## 2025-05-16 PROCEDURE — 88305 TISSUE EXAM BY PATHOLOGIST: CPT | Performed by: STUDENT IN AN ORGANIZED HEALTH CARE EDUCATION/TRAINING PROGRAM

## 2025-05-16 PROCEDURE — 50200 RENAL BIOPSY PERQ: CPT | Mod: LT | Performed by: RADIOLOGY

## 2025-05-16 PROCEDURE — 25000003 PHARM REV CODE 250: Performed by: RADIOLOGY

## 2025-05-16 RX ORDER — FENTANYL CITRATE 50 UG/ML
INJECTION, SOLUTION INTRAMUSCULAR; INTRAVENOUS
Status: COMPLETED | OUTPATIENT
Start: 2025-05-16 | End: 2025-05-16

## 2025-05-16 RX ORDER — MIDAZOLAM HYDROCHLORIDE 1 MG/ML
INJECTION, SOLUTION INTRAMUSCULAR; INTRAVENOUS
Status: COMPLETED | OUTPATIENT
Start: 2025-05-16 | End: 2025-05-16

## 2025-05-16 RX ORDER — LIDOCAINE HYDROCHLORIDE 10 MG/ML
1 INJECTION, SOLUTION EPIDURAL; INFILTRATION; INTRACAUDAL; PERINEURAL ONCE
Status: DISCONTINUED | OUTPATIENT
Start: 2025-05-16 | End: 2025-05-17 | Stop reason: HOSPADM

## 2025-05-16 RX ORDER — HYDRALAZINE HYDROCHLORIDE 20 MG/ML
INJECTION INTRAMUSCULAR; INTRAVENOUS
Status: COMPLETED | OUTPATIENT
Start: 2025-05-16 | End: 2025-05-16

## 2025-05-16 RX ORDER — LIDOCAINE HYDROCHLORIDE 10 MG/ML
INJECTION, SOLUTION INFILTRATION; PERINEURAL
Status: COMPLETED | OUTPATIENT
Start: 2025-05-16 | End: 2025-05-16

## 2025-05-16 RX ADMIN — FENTANYL CITRATE 50 MCG: 50 INJECTION INTRAMUSCULAR; INTRAVENOUS at 08:05

## 2025-05-16 RX ADMIN — MIDAZOLAM 2 MG: 1 INJECTION INTRAMUSCULAR; INTRAVENOUS at 08:05

## 2025-05-16 RX ADMIN — Medication 1 G: at 08:05

## 2025-05-16 RX ADMIN — HYDRALAZINE HYDROCHLORIDE 10 MG: 20 INJECTION INTRAMUSCULAR; INTRAVENOUS at 10:05

## 2025-05-16 RX ADMIN — LIDOCAINE HYDROCHLORIDE 5 ML: 10 INJECTION, SOLUTION INFILTRATION; PERINEURAL at 08:05

## 2025-05-16 NOTE — SEDATION DOCUMENTATION
Pt arrived to C ARM room for renal transplant bx. Pt oriented to unit and staff. Plan of care reviewed with patient, patient verbalizes understanding. Comfort measures utilized. Fall risk reviewed with patient, fall risk interventions maintained. Safety strap applied, positioner pillows utilized to minimize pressure points. Blankets applied. Pt prepped and draped utilizing standard sterile technique. Patient placed on continuous monitoring, as required by sedation policy. Timeouts completed utilizing standard universal time-out, per department and facility policy. RN to remain at bedside, continuous monitoring maintained. Pt resting comfortably. Denies pain/discomfort. Will continue to monitor. See flow sheets for monitoring, medication administration, and updates.

## 2025-05-16 NOTE — PLAN OF CARE
Pt VSS and in NAD. PIV and CRM equipment removed. Discharge instructions and AVS provided. Pt verbalized understanding, questions and concerns addressed. No further needs at this time. Pt discharged from recovery area at 1055.

## 2025-05-16 NOTE — SEDATION DOCUMENTATION
Procedure complete, pt tolerated well; vss. Patient alert and oriented x4 unlabored on Room Air. Patient denies pain and is resting comfortably. Surgical site dressed with mepore. Dressing is clean, dry, and intact. Patient transported to recovery.

## 2025-05-16 NOTE — DISCHARGE INSTRUCTIONS
KIDNEY BIOPSY DISCHARGE EDUCATION:    Information:   A kidney (renal) biopsy is a procedure in which a biopsy needle is used to remove small amounts of kidney tissue to evaluate for kidney function and/or if there is a kidney mass to evaluate if it is cancerous versus benign (non-cancerous).     What should I expect after the Kidney Biopsy?    You may have some blood in your urine after the procedure, this should resolve in a few days.    There may be some soreness around the biopsy site.    You may return to work after 24 hours unless your primary doctor instructs you otherwise.    You do not have any diet restrictions because of this procedure but should continue any that were given to you by any other doctors.    Continue all previously prescribed medications with the exception of Coumadin/warfarin which should be resumed after 24 hours. Pradaxa, Xarelto, Eliquis or Savaysa can be resumed after 48 hours.     Bathing & Wound Care:    You may shower after 24 hours; do not tub bath or submerge in water for 3 days (bath tub, hot tub, swimming pool, river or any other body of water).    Dressing to stay on 2-3 days (clean and dry)    If the biopsy site(s) become red, tender, swollen, or starts to drain, contact us.    Avoid heavy lifting and strenuous activity for 3 days.     Follow-up visit information:   Your ordering physician will typically get your biopsy results in three to five business days. If you do not hear from the ordering physician in 7 business days, please call his/her office to set up an appointment to review the results. Follow up with Interventional Radiology is not usually necessary.       Occasionally, a situation will require prompt attention and an emergency room visit is necessary:    Sudden chest pain, shortness of breath, or fainting    Increasing blood in your urine    Increasing redness, swelling or drainage from the biopsy site    Increasing pain not relieved by medication    Bleeding or  drainage from the needle site that is saturating the dressing    You have shaking, chills and/or a temperature over 100.3°F    New, sudden difficulty breathing    Drop in blood pressure, and/or light-headed feeling    Interventional Radiology Clinic   For complications   [phone]. Monday - Friday, 8:00 am - 4:00 pm    [phone] After hours and on holidays. Ask to speak with the interventional radiologist on call.     For Scheduling   [phone] Monday - Friday, 8:00 am - 4:00 pm

## 2025-05-16 NOTE — PROCEDURES
Radiology Post-Procedure Note    Pre Op Diagnosis: Renal transplant  Post Op Diagnosis: Same    Procedure: US-guided renal transplant biopsy    Procedure performed by: Herbert Kimble MD    Written Informed Consent Obtained: Yes  Specimen Removed: YES 3, 18g x 20 mm core biopsies  Estimated Blood Loss: Minimal    Findings:   Unremarkable sonographic appearance of the transplant kidney    Patient tolerated procedure well.    Herbert Kimble MD  Interventional Radiology  Department of Radiology

## 2025-05-16 NOTE — H&P
Radiology History & Physical      SUBJECTIVE:     Chief Complaint: renal transplant    History of Present Illness:  Kiesha Rocha is a 59 y.o. female who presents for renal transplant biopsy.     Past Medical History:   Diagnosis Date    Anemia     Anxiety     Diabetes mellitus     Disorder of kidney and ureter     Encounter for blood transfusion     GERD (gastroesophageal reflux disease)     Hydronephrosis     Hyperlipidemia     Hypertension     Hyperthyroidism     Obesity     Thyroid disease      Past Surgical History:   Procedure Laterality Date    ARTHROSCOPY OF KNEE Right     AV FISTULA PLACEMENT Left 06/12/2019    Procedure: CREATION, AV FISTULA - Basilic;  Surgeon: Bautista Vasques MD;  Location: [facility] OR;  Service: Vascular;  Laterality: Left;    BREAST BIOPSY Left     BREAST SURGERY Left     biopsy    ENDOBRONCHIAL ULTRASOUND N/A 2/27/2024    Procedure: ENDOBRONCHIAL ULTRASOUND (EBUS);  Surgeon: Dick Harding MD;  Location: [facility] OR [address] [place] [address];  Service: Pulmonary;  Laterality: N/A;    HYSTERECTOMY  2017    TLH/BSO    KIDNEY TRANSPLANT N/A 4/24/2025    Procedure: TRANSPLANT, KIDNEY;  Surgeon: Shandra Garcia MD;  Location: [facility] OR [address] [place] [address];  Service: Transplant;  Laterality: N/A;    KNEE SURGERY Right     URETERAL STENT PLACEMENT Bilateral     10/2018       Home Meds:   Prior to Admission medications    Medication Sig Start Date End Date Taking? Authorizing Provider   aspirin (ASPIR-81 ORAL) As of 5/13/25 On HOLD for kidney biopsy   Yes Provider, Historical   atorvastatin (LIPITOR) 20 MG tablet Take 1 tablet (20 mg total) by mouth once daily. 5/5/22  Yes Prasanna Ruelas, DO   blood sugar diagnostic Strp To check BG 3 times daily, to use with insurance preferred meter 4/25/25  Yes Toribio Dunbar Jr., MD   blood-glucose meter Misc To check BG 3 times daily, to use with insurance preferred meter 4/25/25  Yes Toribio Dunbar Jr., MD   calcitRIOL (ROCALTROL) 0.25 MCG Cap Take 1 capsule (0.25  mcg total) by mouth once daily. 5/2/25  Yes Bridget Betts NP   carvediloL (COREG) 25 MG tablet Take 1 tablet (25 mg total) by mouth 2 (two) times daily. 4/26/25 4/26/26 Yes Toribio Dunbar Jr., MD   chlorthalidone (HYGROTEN) 25 MG Tab Take 1 tablet (25 mg total) by mouth nightly. 5/2/25 5/2/26 Yes Bridget Betts NP   famotidine (PEPCID) 20 MG tablet Take 1 tablet (20 mg total) by mouth once daily. 4/24/25  Yes Toribio Dunbar Jr., MD   FLUoxetine 40 MG capsule Take 1 capsule (40 mg total) by mouth once daily. 4/28/25  Yes Leno Christine MD   hydrOXYzine pamoate (VISTARIL) 25 MG Cap Take 1 capsule (25 mg total) by mouth every 8 (eight) hours as needed (insomnia). 4/27/25  Yes Toribio Dunbar Jr., MD   insulin aspart U-100 (NOVOLOG) 100 unit/mL (3 mL) InPn pen Inject 5 Units into the skin 3 (three) times daily. Plus low sliding scale. Max units per day: 30 4/27/25 4/27/26 Yes Toribio Dunbar Jr., MD   insulin glargine U-100, Lantus, 100 unit/mL (3 mL) SubQ InPn pen Inject 13 Units into the skin every evening. 4/27/25 4/27/26 Yes Toribio Dunbar Jr., MD   k phos di & mono-sod phos mono (K-PHOS-NEUTRAL) 250 mg Tab Take 1 tablet by mouth 2 (two) times a day. 5/14/25 5/14/26 Yes Bridget Betts NP   lancets 33 gauge Misc To check BG 3 times daily, to use with insurance preferred meter 4/25/25  Yes Toribio Dunbar Jr., MD   methIMAzole (TAPAZOLE) 5 MG Tab Take 1 tablet (5 mg total) by mouth once daily. 7/15/24 7/15/25 Yes Scobel, Katherine M., NP   multivitamin Tab Take 1 tablet by mouth once daily. 4/26/25  Yes Toribio Dunbar Jr., MD   mycophenolate (CELLCEPT) 250 mg Cap Take 4 capsules (1,000 mg total) by mouth 2 (two) times daily. 4/24/25  Yes Toribio Dunbar Jr., MD   NIFEdipine (PROCARDIA-XL) 30 MG (OSM) 24 hr tablet Take 1 tablet (30 mg total) by mouth 2 (two) times a day. 4/27/25 4/27/26 Yes Toribio Dunbar Jr., MD   oxybutynin (DITROPAN) 5 MG Tab Take 1 tablet  "(5 mg total) by mouth 3 (three) times daily.  Patient taking differently: Take 5 mg by mouth 3 (three) times daily as needed. 4/27/25 4/27/26 Yes Toribio Dunbar Jr., MD   pen needle, diabetic 32 gauge x 5/32" Ndle Inject 1 Needle into the skin 4 (four) times daily. 4/25/25  Yes Toribio Dunbar Jr., MD   predniSONE (DELTASONE) 5 MG tablet Take by mouth daily: 4/26 to 5/2 20 mg, 5/3 to 5/9 15 mg, 5/10 to 5/16 10 mg, 5/17 to FOREVER 5 mg, DO NOT DISCONTINUE 4/24/25  Yes Toribio Dunbar Jr., MD   sodium bicarbonate 650 MG tablet Take 1 tablet (650 mg total) by mouth 2 (two) times daily. 5/14/25 5/14/26 Yes Bridget Betts NP   sulfamethoxazole-trimethoprim 400-80mg (BACTRIM,SEPTRA) 400-80 mg per tablet Take 1 tablet by mouth once daily. Stop 10/24/25 4/27/25 6/21/26 Yes Toribio Dunbar Jr., MD   tacrolimus (PROGRAF) 0.5 MG Cap Take 3 capsules (1.5 mg total) by mouth every 12 (twelve) hours. Z94.0;Kidney txp on 4/24/2025 5/13/25 5/13/26 Yes Toribio Dunbar Jr., MD   valGANciclovir (VALCYTE) 450 mg Tab Take 1 tablet (450 mg total) by mouth once daily. Stop 7/24/25 4/27/25 7/27/25 Yes Toribio Dunbar Jr., MD   vitamin D (VITAMIN D3) 1000 units Tab Take 2 tablets (2,000 Units total) by mouth once daily. 5/2/25  Yes Bridget Betts NP   albuterol (PROVENTIL/VENTOLIN HFA) 90 mcg/actuation inhaler Inhale 2 puffs into the lungs every 6 (six) hours as needed. 1/30/21   Provider, Historical   linaGLIPtin (TRADJENTA) 5 mg Tab tablet Take 1 tablet (5 mg total) by mouth once daily. 10/29/19 5/14/25  Katherine Roldan, NP     Anticoagulants/Antiplatelets: no anticoagulation    Allergies:   Review of patient's allergies indicates:   Allergen Reactions    Latex Itching    Penicillins Hives and Rash     Sedation History:  no adverse reactions          OBJECTIVE:     Vital Signs (Most Recent)  Temp: 98 °F (36.7 °C) (05/16/25 0755)  Pulse: 67 (05/16/25 0755)  Resp: 11 (05/16/25 0755)  BP: (!) 152/70 " (05/16/25 0758)  SpO2: 100 % (05/16/25 0755)    Physical Exam:  ASA: 2  Mallampati: 2    General: no acute distress  Mental Status: alert and oriented to person, place and time  HEENT: normocephalic, atraumatic  Chest: unlabored breathing  Heart: regular heart rate  Abdomen: nondistended  Extremity: moves all extremities    Laboratory  Lab Results   Component Value Date    INR 1.0 05/15/2025       Lab Results   Component Value Date    WBC 7.95 05/15/2025    WBC 8.00 05/15/2025    HGB 11.3 (L) 05/15/2025    HGB 11.7 (L) 05/15/2025    HCT 36.0 (L) 05/15/2025    HCT 36.9 (L) 05/15/2025    MCV 90 05/15/2025    MCV 90 05/15/2025     05/15/2025     05/15/2025      Lab Results   Component Value Date    GLU 91 05/15/2025     05/15/2025    K 3.2 (L) 05/15/2025     05/15/2025    CO2 26 05/15/2025    BUN 19 05/15/2025    CREATININE 1.9 (H) 05/15/2025    CALCIUM 9.0 05/15/2025    MG 1.8 05/15/2025    ALT 15 05/15/2025    AST 16 05/15/2025    ALBUMIN 3.5 05/15/2025    ALBUMIN 3.5 05/15/2025    BILITOT 0.7 05/15/2025    BILIDIR 0.3 05/15/2025       ASSESSMENT/PLAN:     Sedation Plan: moderate  Patient will undergo US-guided renal transplant biopsy.    Herbert Kimble MD  Interventional Radiology  Department of Radiology

## 2025-05-19 ENCOUNTER — LAB VISIT (OUTPATIENT)
Dept: LAB | Facility: HOSPITAL | Age: 60
End: 2025-05-19
Attending: STUDENT IN AN ORGANIZED HEALTH CARE EDUCATION/TRAINING PROGRAM
Payer: MEDICARE

## 2025-05-19 DIAGNOSIS — Z94.0 IMMUNOSUPPRESSIVE MANAGEMENT ENCOUNTER FOLLOWING KIDNEY TRANSPLANT: ICD-10-CM

## 2025-05-19 DIAGNOSIS — Z94.0 KIDNEY REPLACED BY TRANSPLANT: ICD-10-CM

## 2025-05-19 DIAGNOSIS — Z79.899 IMMUNOSUPPRESSIVE MANAGEMENT ENCOUNTER FOLLOWING KIDNEY TRANSPLANT: ICD-10-CM

## 2025-05-19 LAB
ABSOLUTE EOSINOPHIL (OHS): 0.32 K/UL
ABSOLUTE MONOCYTE (OHS): 0.39 K/UL (ref 0.3–1)
ABSOLUTE NEUTROPHIL COUNT (OHS): 5.5 K/UL (ref 1.8–7.7)
ALBUMIN SERPL BCP-MCNC: 3.4 G/DL (ref 3.5–5.2)
ANION GAP (OHS): 13 MMOL/L (ref 8–16)
BASOPHILS # BLD AUTO: 0.1 K/UL
BASOPHILS NFR BLD AUTO: 1.3 %
BUN SERPL-MCNC: 19 MG/DL (ref 6–20)
CALCIUM SERPL-MCNC: 8.9 MG/DL (ref 8.7–10.5)
CHLORIDE SERPL-SCNC: 106 MMOL/L (ref 95–110)
CO2 SERPL-SCNC: 24 MMOL/L (ref 23–29)
CREAT SERPL-MCNC: 1.7 MG/DL (ref 0.5–1.4)
ERYTHROCYTE [DISTWIDTH] IN BLOOD BY AUTOMATED COUNT: 16.3 % (ref 11.5–14.5)
GFR SERPLBLD CREATININE-BSD FMLA CKD-EPI: 35 ML/MIN/1.73/M2
GLUCOSE SERPL-MCNC: 114 MG/DL (ref 70–110)
HCT VFR BLD AUTO: 32.4 % (ref 37–48.5)
HGB BLD-MCNC: 10.3 GM/DL (ref 12–16)
IMM GRANULOCYTES # BLD AUTO: 0.04 K/UL (ref 0–0.04)
IMM GRANULOCYTES NFR BLD AUTO: 0.5 % (ref 0–0.5)
LYMPHOCYTES # BLD AUTO: 1.06 K/UL (ref 1–4.8)
MAGNESIUM SERPL-MCNC: 2 MG/DL (ref 1.6–2.6)
MCH RBC QN AUTO: 28.6 PG (ref 27–31)
MCHC RBC AUTO-ENTMCNC: 31.8 G/DL (ref 32–36)
MCV RBC AUTO: 90 FL (ref 82–98)
NUCLEATED RBC (/100WBC) (OHS): 0 /100 WBC
PHOSPHATE SERPL-MCNC: 2 MG/DL (ref 2.7–4.5)
PLATELET # BLD AUTO: 221 K/UL (ref 150–450)
PMV BLD AUTO: 12.7 FL (ref 9.2–12.9)
POTASSIUM SERPL-SCNC: 3.7 MMOL/L (ref 3.5–5.1)
RBC # BLD AUTO: 3.6 M/UL (ref 4–5.4)
RELATIVE EOSINOPHIL (OHS): 4.3 %
RELATIVE LYMPHOCYTE (OHS): 14.3 % (ref 18–48)
RELATIVE MONOCYTE (OHS): 5.3 % (ref 4–15)
RELATIVE NEUTROPHIL (OHS): 74.3 % (ref 38–73)
SODIUM SERPL-SCNC: 143 MMOL/L (ref 136–145)
WBC # BLD AUTO: 7.41 K/UL (ref 3.9–12.7)

## 2025-05-19 PROCEDURE — 85025 COMPLETE CBC W/AUTO DIFF WBC: CPT | Mod: PO

## 2025-05-19 PROCEDURE — 83735 ASSAY OF MAGNESIUM: CPT

## 2025-05-19 PROCEDURE — 82040 ASSAY OF SERUM ALBUMIN: CPT | Mod: PO

## 2025-05-19 PROCEDURE — 36415 COLL VENOUS BLD VENIPUNCTURE: CPT | Mod: PO

## 2025-05-19 PROCEDURE — 80197 ASSAY OF TACROLIMUS: CPT

## 2025-05-20 ENCOUNTER — TELEPHONE (OUTPATIENT)
Dept: UROLOGY | Facility: CLINIC | Age: 60
End: 2025-05-20
Payer: MEDICARE

## 2025-05-20 ENCOUNTER — RESULTS FOLLOW-UP (OUTPATIENT)
Dept: TRANSPLANT | Facility: CLINIC | Age: 60
End: 2025-05-20
Payer: MEDICARE

## 2025-05-20 DIAGNOSIS — Z94.0 KIDNEY REPLACED BY TRANSPLANT: Primary | ICD-10-CM

## 2025-05-20 LAB — TACROLIMUS BLD-MCNC: 4.2 NG/ML (ref 5–15)

## 2025-05-20 RX ORDER — TACROLIMUS 0.5 MG/1
3 CAPSULE ORAL EVERY 12 HOURS
Qty: 360 CAPSULE | Refills: 11 | Status: SHIPPED | OUTPATIENT
Start: 2025-05-20 | End: 2026-05-20

## 2025-05-20 NOTE — TELEPHONE ENCOUNTER
Notified patient of Dr. Ballesteros's review of 5/19/25 lab results via telephone & [facility].     Dr. Manjinder Perez reviewed Ms. Pop's 5/19/25 lab results. Your prograf level 4.2 is lower than it should be. He wants her to increase the prograf/tacrolimus dose to 3mg (6 of the 0.5mg capsules) every 12 hours. We'll recheck her labs as scheduled on 5/26/25.     Thanks,     Jessenia    ----- Message from Toribio Dunbar MD sent at 5/20/2025 12:00 PM CDT -----  If true tac trough, increase dose from 1.5/1.5 to 3/3 and repeat in 1-2 weeks. Other labs acceptable.  ----- Message -----  From: Lab, Background User  Sent: 5/19/2025   9:01 AM CDT  To: Toribio Dunbar Jr., MD

## 2025-05-20 NOTE — TELEPHONE ENCOUNTER
Spoke with patient's daughter to remind of patient's upcoming appointment 05/21 @4451 at [facility] 2nd floor Urology. Request patient arrive 15 min prior and need a urine sample.

## 2025-05-20 NOTE — TELEPHONE ENCOUNTER
----- Message from Toribio Dunbar MD sent at 5/20/2025 12:00 PM CDT -----  If true tac trough, increase dose from 1.5/1.5 to 3/3 and repeat in 1-2 weeks. Other labs acceptable.  ----- Message -----  From: Lab, Background User  Sent: 5/19/2025   9:01 AM CDT  To: Toribio Dunbar Jr., MD

## 2025-05-21 ENCOUNTER — OFFICE VISIT (OUTPATIENT)
Dept: TRANSPLANT | Facility: CLINIC | Age: 60
End: 2025-05-21
Payer: MEDICARE

## 2025-05-21 ENCOUNTER — PROCEDURE VISIT (OUTPATIENT)
Dept: UROLOGY | Facility: CLINIC | Age: 60
End: 2025-05-21
Payer: MEDICARE

## 2025-05-21 VITALS
RESPIRATION RATE: 18 BRPM | SYSTOLIC BLOOD PRESSURE: 111 MMHG | HEART RATE: 73 BPM | DIASTOLIC BLOOD PRESSURE: 55 MMHG | WEIGHT: 151.38 LBS | TEMPERATURE: 98 F | HEIGHT: 64 IN | BODY MASS INDEX: 25.84 KG/M2

## 2025-05-21 VITALS
WEIGHT: 151.25 LBS | TEMPERATURE: 97 F | SYSTOLIC BLOOD PRESSURE: 129 MMHG | RESPIRATION RATE: 18 BRPM | HEART RATE: 80 BPM | OXYGEN SATURATION: 97 % | DIASTOLIC BLOOD PRESSURE: 58 MMHG | HEIGHT: 64 IN | BODY MASS INDEX: 25.82 KG/M2

## 2025-05-21 DIAGNOSIS — Z79.899 IMMUNODEFICIENCY DUE TO DRUG THERAPY: ICD-10-CM

## 2025-05-21 DIAGNOSIS — Z94.0 KIDNEY REPLACED BY TRANSPLANT: ICD-10-CM

## 2025-05-21 DIAGNOSIS — Z99.2 TYPE 2 DIABETES MELLITUS WITH CHRONIC KIDNEY DISEASE ON CHRONIC DIALYSIS, WITH LONG-TERM CURRENT USE OF INSULIN: ICD-10-CM

## 2025-05-21 DIAGNOSIS — E55.9 VITAMIN D DEFICIENCY: ICD-10-CM

## 2025-05-21 DIAGNOSIS — N18.6 TYPE 2 DIABETES MELLITUS WITH CHRONIC KIDNEY DISEASE ON CHRONIC DIALYSIS, WITH LONG-TERM CURRENT USE OF INSULIN: ICD-10-CM

## 2025-05-21 DIAGNOSIS — D84.821 IMMUNODEFICIENCY DUE TO DRUG THERAPY: ICD-10-CM

## 2025-05-21 DIAGNOSIS — I10 HYPERTENSION, UNSPECIFIED TYPE: ICD-10-CM

## 2025-05-21 DIAGNOSIS — E11.22 TYPE 2 DIABETES MELLITUS WITH CHRONIC KIDNEY DISEASE ON CHRONIC DIALYSIS, WITH LONG-TERM CURRENT USE OF INSULIN: ICD-10-CM

## 2025-05-21 DIAGNOSIS — Z79.899 IMMUNOSUPPRESSIVE MANAGEMENT ENCOUNTER FOLLOWING KIDNEY TRANSPLANT: Primary | ICD-10-CM

## 2025-05-21 DIAGNOSIS — E78.5 HYPERLIPIDEMIA, UNSPECIFIED HYPERLIPIDEMIA TYPE: Primary | ICD-10-CM

## 2025-05-21 DIAGNOSIS — Z94.0 IMMUNOSUPPRESSIVE MANAGEMENT ENCOUNTER FOLLOWING KIDNEY TRANSPLANT: Primary | ICD-10-CM

## 2025-05-21 DIAGNOSIS — Z79.4 TYPE 2 DIABETES MELLITUS WITH CHRONIC KIDNEY DISEASE ON CHRONIC DIALYSIS, WITH LONG-TERM CURRENT USE OF INSULIN: ICD-10-CM

## 2025-05-21 DIAGNOSIS — Z76.82 AWAITING ORGAN TRANSPLANT: ICD-10-CM

## 2025-05-21 DIAGNOSIS — Z94.0 S/P KIDNEY TRANSPLANT: ICD-10-CM

## 2025-05-21 PROCEDURE — 99999 PR PBB SHADOW E&M-EST. PATIENT-LVL III: CPT | Mod: PBBFAC,,, | Performed by: STUDENT IN AN ORGANIZED HEALTH CARE EDUCATION/TRAINING PROGRAM

## 2025-05-21 RX ORDER — CHOLECALCIFEROL (VITAMIN D3) 25 MCG
2000 TABLET ORAL DAILY
Qty: 180 TABLET | Refills: 3 | Status: SHIPPED | OUTPATIENT
Start: 2025-05-21 | End: 2026-05-21

## 2025-05-21 RX ORDER — SULFAMETHOXAZOLE AND TRIMETHOPRIM 400; 80 MG/1; MG/1
1 TABLET ORAL DAILY
Qty: 30 TABLET | Refills: 5 | Status: SHIPPED | OUTPATIENT
Start: 2025-05-21 | End: 2026-07-15

## 2025-05-21 RX ORDER — HYDROXYZINE PAMOATE 25 MG/1
25 CAPSULE ORAL EVERY 8 HOURS PRN
Qty: 60 CAPSULE | Refills: 0 | Status: SHIPPED | OUTPATIENT
Start: 2025-05-21

## 2025-05-21 RX ORDER — FAMOTIDINE 20 MG/1
20 TABLET, FILM COATED ORAL DAILY
Qty: 30 TABLET | Refills: 0 | Status: SHIPPED | OUTPATIENT
Start: 2025-05-21

## 2025-05-21 RX ORDER — INSULIN GLARGINE 100 [IU]/ML
13 INJECTION, SOLUTION SUBCUTANEOUS NIGHTLY
Qty: 6 ML | Refills: 2 | Status: SHIPPED | OUTPATIENT
Start: 2025-05-21 | End: 2026-05-21

## 2025-05-21 RX ORDER — INSULIN ASPART 100 [IU]/ML
5 INJECTION, SOLUTION INTRAVENOUS; SUBCUTANEOUS 3 TIMES DAILY
Qty: 6 ML | Refills: 2 | Status: SHIPPED | OUTPATIENT
Start: 2025-05-21 | End: 2026-05-21

## 2025-05-21 RX ORDER — LIDOCAINE HYDROCHLORIDE 20 MG/ML
JELLY TOPICAL ONCE
Status: COMPLETED | OUTPATIENT
Start: 2025-05-21 | End: 2025-05-21

## 2025-05-21 RX ORDER — CHLORTHALIDONE 25 MG/1
25 TABLET ORAL NIGHTLY
Qty: 30 TABLET | Refills: 11 | Status: SHIPPED | OUTPATIENT
Start: 2025-05-21 | End: 2026-05-21

## 2025-05-21 RX ORDER — PREDNISONE 5 MG/1
5 TABLET ORAL DAILY
Qty: 90 TABLET | Refills: 3 | Status: SHIPPED | OUTPATIENT
Start: 2025-05-21 | End: 2026-05-21

## 2025-05-21 RX ORDER — CARVEDILOL 25 MG/1
25 TABLET ORAL 2 TIMES DAILY
Qty: 180 TABLET | Refills: 3 | Status: SHIPPED | OUTPATIENT
Start: 2025-05-21 | End: 2026-05-21

## 2025-05-21 RX ORDER — FLUOXETINE HYDROCHLORIDE 40 MG/1
40 CAPSULE ORAL DAILY
Qty: 30 CAPSULE | Refills: 5 | Status: SHIPPED | OUTPATIENT
Start: 2025-05-21

## 2025-05-21 RX ORDER — NIFEDIPINE 30 MG/1
30 TABLET, EXTENDED RELEASE ORAL 2 TIMES DAILY
Qty: 180 TABLET | Refills: 3 | Status: SHIPPED | OUTPATIENT
Start: 2025-05-21 | End: 2026-05-21

## 2025-05-21 RX ORDER — VALGANCICLOVIR 450 MG/1
450 TABLET, FILM COATED ORAL DAILY
Qty: 30 TABLET | Refills: 2 | Status: SHIPPED | OUTPATIENT
Start: 2025-05-21 | End: 2025-08-20

## 2025-05-21 RX ORDER — ATORVASTATIN CALCIUM 20 MG/1
20 TABLET, FILM COATED ORAL DAILY
Qty: 90 TABLET | Refills: 3 | Status: SHIPPED | OUTPATIENT
Start: 2025-05-21

## 2025-05-21 RX ORDER — CALCITRIOL 0.25 UG/1
0.25 CAPSULE ORAL DAILY
Qty: 90 CAPSULE | Refills: 3 | Status: SHIPPED | OUTPATIENT
Start: 2025-05-21 | End: 2026-05-21

## 2025-05-21 RX ORDER — MYCOPHENOLATE MOFETIL 250 MG/1
1000 CAPSULE ORAL 2 TIMES DAILY
Qty: 240 CAPSULE | Refills: 11 | Status: SHIPPED | OUTPATIENT
Start: 2025-05-21 | End: 2026-05-21

## 2025-05-21 RX ADMIN — LIDOCAINE HYDROCHLORIDE 5 ML: 20 JELLY TOPICAL at 02:05

## 2025-05-21 NOTE — LETTER
May 21, 2025        Lilia Malin  [id number] [address] [address]  Phone: [phone]  Fax: [phone]             [facility] Transplant 1st Fl  [address]  [facility] [address]  Phone: [phone]   Patient: Kiesha Rocha   MR Number: [id number]   YOB: 1965   Date of Visit: 5/21/2025       Dear Dr. Lilia Malin    Thank you for referring Kiesha Rocha to me for evaluation. Attached you will find relevant portions of my assessment and plan of care.    If you have questions, please do not hesitate to call me. I look forward to following Kiesha Rocha along with you.    Sincerely,    Manuel Oneil MD, PhD    Enclosure    If you would like to receive this communication electronically, please contact [email or web address] or [phone] to request [email or web address] Link access.    [email or web address] is a tool which provides read-only access to select patient information with whom you have a relationship. Its easy to use and provides real time access to review your patients record including encounter summaries, notes, results, and demographic information.    If you feel you have received this communication in error or would no longer like to receive these types of communications, please e-mail [email or web address]

## 2025-05-21 NOTE — PROGRESS NOTES
Transplant Surgery  Kidney Transplant Recipient Follow-up    Referring Physician: Lilia Malin  Current Nephrologist: Lilia Malin    Subjective:     Chief Complaint: Kiesha Rocha is a 59 y.o. year old Black or  female who is status post Kidney transplant performed on 4/24/2025.    ORGAN: LEFT KIDNEY    Disease Etiology: Diabetes Mellitus - Type II  Donor Type: Donation after Brain Death    Donor CMV Status: Positive    Donor HBcAB: Negative    Donor HCV Status: Positive      History of Present Illness: She reports no concerns. Except for some skin peeling from R arm  From a transplant perspective, she reports normal urination, frequency, no incisional pain, and no dysuria.  Kiesha is here for management of her immunosuppression medication.  Kiesha states that her immunosuppression is being well tolerated.  Hypertension is not present.    Protocol biopsy last week; no complication.  No bleeding.  Path pending.      External provider notes reviewed: Yes    Review of Systems   Constitutional:  Negative for activity change, fatigue and fever.   Respiratory:  Negative for chest tightness and shortness of breath.    Cardiovascular:  Negative for chest pain.   Gastrointestinal:  Negative for abdominal pain, constipation, diarrhea and nausea.   Genitourinary:  Negative for difficulty urinating and dysuria.   Allergic/Immunologic: Positive for immunocompromised state.   Neurological:  Negative for tremors.       Objective:     Physical Exam  Constitutional:       Appearance: Normal appearance.   HENT:      Head: Normocephalic and atraumatic.   Eyes:      Extraocular Movements: Extraocular movements intact.      Pupils: Pupils are equal, round, and reactive to light.   Cardiovascular:      Rate and Rhythm: Normal rate.   Pulmonary:      Effort: No respiratory distress.   Abdominal:      General: Abdomen is flat. There is no distension.      Palpations: Abdomen is soft. There is no mass.       Tenderness: There is no abdominal tenderness.      Hernia: No hernia is present.      Comments: R sided arreguin incision, healing well with staples, no erythema or drainage   Musculoskeletal:      Comments: Some skin peeling R arm without evidence of irritation or infection   Skin:     General: Skin is warm and dry.   Neurological:      General: No focal deficit present.      Mental Status: She is alert and oriented to person, place, and time.   Psychiatric:         Mood and Affect: Mood normal.         Behavior: Behavior normal.       Lab Results   Component Value Date    CREATININE 1.7 (H) 05/19/2025    BUN 19 05/19/2025     Lab Results   Component Value Date    WBC 7.41 05/19/2025    HGB 10.3 (L) 05/19/2025    HGB 9.7 (L) 08/15/2023    HGB 10.3 (L) 04/29/2020    HCT 32.4 (L) 05/19/2025    HCT 30.6 (L) 08/15/2023    HCT 33.7 (L) 04/29/2020     05/19/2025     04/29/2020     Lab Results   Component Value Date    TACROLIMUS 4.2 (L) 05/19/2025       Diagnostics:  The following labs have been reviewed: CBC  BMP  TACROLIMUS LEVEL  The following radiology images have been independently reviewed and interpreted: none    Assessment and Plan:        S/P Kidney transplant. Doing well; Cr continues to fall; excellent UOP; biopsy pending  Chronic immunosuppressive medications for rejection prophylaxis subtherapeutic.  Plan: already increased by Dr. Dunbar.  Continue monitoring symptoms, labs and drug levels for drug-related toxicity and side effects.  Renal hypertension at target.    Additional testing to be completed according to the Kidney: Written Order Guideline for Kidney Transplant Follow-Up (KI-09)    Interpretation of tests and discussion of patient management involves all members of the multidisciplinary transplant team.  Patient advised that it is recommended that all transplant candidates, and their close contacts and household members receive Covid vaccination.  Follow-up: Patient reminded to  call with any health changes, since these can be early signs of significant complications.  Also, I advised the patient to be sure any new medications or changes of old medications are discussed with either a pharmacist, or physician knowledgeable with transplant to avoid rejection/drug toxicity related to significant drug interactions.    Manuel Oneil MD, PhD       [facility] Patient Status  Functional Status: 70% - Cares for self: unable to carry on normal activity or active work  Physical Capacity: No Limitations

## 2025-05-21 NOTE — PROGRESS NOTES
STAPLE REMOVAL NOTE    Staples removed from kidney transplant incision, steri-strips applied per Dr. Oneil's order.  Patient tolerated procedure well.  Skin dry and intact.  Patient instructed to shower with back to water spray and to pat dry incision and to let the steri-strips wear off on their own.  Patient instructed to report any redness, warmth, or drainage from incision to transplant coordinators.  All questions answered.

## 2025-05-21 NOTE — PATIENT INSTRUCTIONS
What to Expect After a Cystoscopy with Stent Removal  For the next 24-48 hours, you may feel a mild burning when you urinate. This burning is normal and expected. Drink plenty of water to dilute the urine to help relieve the burning sensation. You may also see a small amount of blood in your urine after the procedure.    Unless you are already taking antibiotics, you may be given an antibiotic after the test to prevent infection.    Signs and Symptoms to Report  Call the [facility] at [phone] if you develop any of the following:  Fever of 101 degrees or higher  Chills or persistent bleeding  Inability to urinate    After hours or on weekends, you may reach a urology resident on call at this number: [phone].

## 2025-05-21 NOTE — PROCEDURES
Procedure: Flexible cysto-uretheroscopy and stent removal   Pre Procedure Diagnosis:s/p kidney transplant   Post Procedure Diagnosis:same   Surgeon: Herbert Corado MD   Anesthesia: 2% uro-jet lidocaine jelly for local analgesia   Flexible cysto-urethroscopy was performed after consent was obtained. The risks and benefits were explained.   2% lidocaine urojet was used for local analgesia.   The genitalia was prepped and draped in the sterile fashion with betadine.   The flexible scope was advanced into the urethra and into the bladder. The stent was removed without difficulty.   The patient tolerated the procedure well without complication.   They will follow up with transplant.

## 2025-05-22 ENCOUNTER — LAB VISIT (OUTPATIENT)
Dept: LAB | Facility: HOSPITAL | Age: 60
End: 2025-05-22
Attending: STUDENT IN AN ORGANIZED HEALTH CARE EDUCATION/TRAINING PROGRAM
Payer: MEDICARE

## 2025-05-22 ENCOUNTER — RESULTS FOLLOW-UP (OUTPATIENT)
Dept: TRANSPLANT | Facility: HOSPITAL | Age: 60
End: 2025-05-22
Payer: MEDICARE

## 2025-05-22 DIAGNOSIS — T86.10 COMPLICATION OF TRANSPLANTED KIDNEY, UNSPECIFIED COMPLICATION: Primary | ICD-10-CM

## 2025-05-22 DIAGNOSIS — Z79.899 IMMUNOSUPPRESSIVE MANAGEMENT ENCOUNTER FOLLOWING KIDNEY TRANSPLANT: ICD-10-CM

## 2025-05-22 DIAGNOSIS — T86.19 RECEIVED KIDNEY FROM DONOR WITH HEPATITIS C: ICD-10-CM

## 2025-05-22 DIAGNOSIS — Z94.0 KIDNEY REPLACED BY TRANSPLANT: ICD-10-CM

## 2025-05-22 DIAGNOSIS — Z94.0 IMMUNOSUPPRESSIVE MANAGEMENT ENCOUNTER FOLLOWING KIDNEY TRANSPLANT: ICD-10-CM

## 2025-05-22 LAB
ABSOLUTE EOSINOPHIL (OHS): 0.22 K/UL
ABSOLUTE MONOCYTE (OHS): 0.3 K/UL (ref 0.3–1)
ABSOLUTE NEUTROPHIL COUNT (OHS): 4.78 K/UL (ref 1.8–7.7)
ALBUMIN SERPL BCP-MCNC: 3.4 G/DL (ref 3.5–5.2)
ALBUMIN SERPL BCP-MCNC: 3.5 G/DL (ref 3.5–5.2)
ALP SERPL-CCNC: 78 UNIT/L (ref 40–150)
ALT SERPL W/O P-5'-P-CCNC: 14 UNIT/L (ref 10–44)
ANION GAP (OHS): 13 MMOL/L (ref 8–16)
AST SERPL-CCNC: 14 UNIT/L (ref 11–45)
BASOPHILS # BLD AUTO: 0.07 K/UL
BASOPHILS NFR BLD AUTO: 1.1 %
BILIRUB DIRECT SERPL-MCNC: 0.3 MG/DL (ref 0.1–0.3)
BILIRUB SERPL-MCNC: 1 MG/DL (ref 0.1–1)
BUN SERPL-MCNC: 21 MG/DL (ref 6–20)
CALCIUM SERPL-MCNC: 9.2 MG/DL (ref 8.7–10.5)
CHLORIDE SERPL-SCNC: 104 MMOL/L (ref 95–110)
CO2 SERPL-SCNC: 24 MMOL/L (ref 23–29)
CREAT SERPL-MCNC: 1.7 MG/DL (ref 0.5–1.4)
ERYTHROCYTE [DISTWIDTH] IN BLOOD BY AUTOMATED COUNT: 16.9 % (ref 11.5–14.5)
ESTROGEN SERPL-MCNC: NORMAL PG/ML
GFR SERPLBLD CREATININE-BSD FMLA CKD-EPI: 35 ML/MIN/1.73/M2
GLUCOSE SERPL-MCNC: 124 MG/DL (ref 70–110)
HCT VFR BLD AUTO: 29.8 % (ref 37–48.5)
HCV AB SERPL QL IA: NORMAL
HGB BLD-MCNC: 9.6 GM/DL (ref 12–16)
IMM GRANULOCYTES # BLD AUTO: 0.03 K/UL (ref 0–0.04)
IMM GRANULOCYTES NFR BLD AUTO: 0.5 % (ref 0–0.5)
INSULIN SERPL-ACNC: NORMAL U[IU]/ML
LAB AP CLINICAL INFORMATION: NORMAL
LAB AP GROSS DESCRIPTION: NORMAL
LAB AP PERFORMING LOCATION(S): NORMAL
LAB AP REPORT FOOTNOTES: NORMAL
LYMPHOCYTES # BLD AUTO: 0.94 K/UL (ref 1–4.8)
MAGNESIUM SERPL-MCNC: 1.9 MG/DL (ref 1.6–2.6)
MCH RBC QN AUTO: 28.7 PG (ref 27–31)
MCHC RBC AUTO-ENTMCNC: 32.2 G/DL (ref 32–36)
MCV RBC AUTO: 89 FL (ref 82–98)
NUCLEATED RBC (/100WBC) (OHS): 0 /100 WBC
PHOSPHATE SERPL-MCNC: 2.3 MG/DL (ref 2.7–4.5)
PLATELET # BLD AUTO: 212 K/UL (ref 150–450)
PMV BLD AUTO: 11.9 FL (ref 9.2–12.9)
POTASSIUM SERPL-SCNC: 3.4 MMOL/L (ref 3.5–5.1)
PROT SERPL-MCNC: 7.6 GM/DL (ref 6–8.4)
RBC # BLD AUTO: 3.34 M/UL (ref 4–5.4)
RELATIVE EOSINOPHIL (OHS): 3.5 %
RELATIVE LYMPHOCYTE (OHS): 14.8 % (ref 18–48)
RELATIVE MONOCYTE (OHS): 4.7 % (ref 4–15)
RELATIVE NEUTROPHIL (OHS): 75.4 % (ref 38–73)
SODIUM SERPL-SCNC: 141 MMOL/L (ref 136–145)
WBC # BLD AUTO: 6.34 K/UL (ref 3.9–12.7)

## 2025-05-22 PROCEDURE — 80197 ASSAY OF TACROLIMUS: CPT

## 2025-05-22 PROCEDURE — 82248 BILIRUBIN DIRECT: CPT

## 2025-05-22 PROCEDURE — 36415 COLL VENOUS BLD VENIPUNCTURE: CPT | Mod: PO

## 2025-05-22 PROCEDURE — 83735 ASSAY OF MAGNESIUM: CPT

## 2025-05-22 PROCEDURE — 80069 RENAL FUNCTION PANEL: CPT | Mod: PO

## 2025-05-22 PROCEDURE — 86803 HEPATITIS C AB TEST: CPT

## 2025-05-22 PROCEDURE — 85025 COMPLETE CBC W/AUTO DIFF WBC: CPT | Mod: PO

## 2025-05-22 PROCEDURE — 87522 HEPATITIS C REVRS TRNSCRPJ: CPT

## 2025-05-23 ENCOUNTER — RESULTS FOLLOW-UP (OUTPATIENT)
Dept: TRANSPLANT | Facility: HOSPITAL | Age: 60
End: 2025-05-23

## 2025-05-23 LAB — TACROLIMUS BLD-MCNC: 6.6 NG/ML (ref 5–15)

## 2025-05-26 ENCOUNTER — LAB VISIT (OUTPATIENT)
Dept: LAB | Facility: HOSPITAL | Age: 60
End: 2025-05-26
Attending: STUDENT IN AN ORGANIZED HEALTH CARE EDUCATION/TRAINING PROGRAM
Payer: MEDICARE

## 2025-05-26 ENCOUNTER — TELEPHONE (OUTPATIENT)
Dept: INTERVENTIONAL RADIOLOGY/VASCULAR | Facility: CLINIC | Age: 60
End: 2025-05-26
Payer: MEDICARE

## 2025-05-26 DIAGNOSIS — Z79.899 IMMUNOSUPPRESSIVE MANAGEMENT ENCOUNTER FOLLOWING KIDNEY TRANSPLANT: ICD-10-CM

## 2025-05-26 DIAGNOSIS — Z94.0 KIDNEY REPLACED BY TRANSPLANT: ICD-10-CM

## 2025-05-26 DIAGNOSIS — T86.10 COMPLICATION OF TRANSPLANTED KIDNEY, UNSPECIFIED COMPLICATION: ICD-10-CM

## 2025-05-26 DIAGNOSIS — Z91.89 INCREASED INFECTION RISK: ICD-10-CM

## 2025-05-26 DIAGNOSIS — Z94.0 IMMUNOSUPPRESSIVE MANAGEMENT ENCOUNTER FOLLOWING KIDNEY TRANSPLANT: ICD-10-CM

## 2025-05-26 LAB
ABSOLUTE EOSINOPHIL (OHS): 0.07 K/UL
ABSOLUTE MONOCYTE (OHS): 0.39 K/UL (ref 0.3–1)
ABSOLUTE NEUTROPHIL COUNT (OHS): 5.07 K/UL (ref 1.8–7.7)
ALBUMIN SERPL BCP-MCNC: 3.7 G/DL (ref 3.5–5.2)
ANION GAP (OHS): 14 MMOL/L (ref 8–16)
BASOPHILS # BLD AUTO: 0.07 K/UL
BASOPHILS NFR BLD AUTO: 1 %
BUN SERPL-MCNC: 24 MG/DL (ref 6–20)
CALCIUM SERPL-MCNC: 9.6 MG/DL (ref 8.7–10.5)
CHLORIDE SERPL-SCNC: 103 MMOL/L (ref 95–110)
CO2 SERPL-SCNC: 22 MMOL/L (ref 23–29)
CREAT SERPL-MCNC: 1.6 MG/DL (ref 0.5–1.4)
ERYTHROCYTE [DISTWIDTH] IN BLOOD BY AUTOMATED COUNT: 16.8 % (ref 11.5–14.5)
GFR SERPLBLD CREATININE-BSD FMLA CKD-EPI: 37 ML/MIN/1.73/M2
GLUCOSE SERPL-MCNC: 178 MG/DL (ref 70–110)
HCT VFR BLD AUTO: 35.7 % (ref 37–48.5)
HGB BLD-MCNC: 11.3 GM/DL (ref 12–16)
IMM GRANULOCYTES # BLD AUTO: 0.03 K/UL (ref 0–0.04)
IMM GRANULOCYTES NFR BLD AUTO: 0.4 % (ref 0–0.5)
LYMPHOCYTES # BLD AUTO: 1.09 K/UL (ref 1–4.8)
MAGNESIUM SERPL-MCNC: 1.9 MG/DL (ref 1.6–2.6)
MCH RBC QN AUTO: 28.9 PG (ref 27–31)
MCHC RBC AUTO-ENTMCNC: 31.7 G/DL (ref 32–36)
MCV RBC AUTO: 91 FL (ref 82–98)
NUCLEATED RBC (/100WBC) (OHS): 0 /100 WBC
PHOSPHATE SERPL-MCNC: 2.8 MG/DL (ref 2.7–4.5)
PLATELET # BLD AUTO: 226 K/UL (ref 150–450)
PMV BLD AUTO: 12.2 FL (ref 9.2–12.9)
POTASSIUM SERPL-SCNC: 4.2 MMOL/L (ref 3.5–5.1)
RBC # BLD AUTO: 3.91 M/UL (ref 4–5.4)
RELATIVE EOSINOPHIL (OHS): 1 %
RELATIVE LYMPHOCYTE (OHS): 16.2 % (ref 18–48)
RELATIVE MONOCYTE (OHS): 5.8 % (ref 4–15)
RELATIVE NEUTROPHIL (OHS): 75.6 % (ref 38–73)
SODIUM SERPL-SCNC: 139 MMOL/L (ref 136–145)
WBC # BLD AUTO: 6.72 K/UL (ref 3.9–12.7)

## 2025-05-26 PROCEDURE — 36415 COLL VENOUS BLD VENIPUNCTURE: CPT | Mod: PO

## 2025-05-26 PROCEDURE — 80069 RENAL FUNCTION PANEL: CPT | Mod: PO

## 2025-05-26 PROCEDURE — 87536 HIV-1 QUANT&REVRSE TRNSCRPJ: CPT

## 2025-05-26 PROCEDURE — 83735 ASSAY OF MAGNESIUM: CPT | Mod: PO

## 2025-05-26 PROCEDURE — 86833 HLA CLASS II HIGH DEFIN QUAL: CPT | Performed by: STUDENT IN AN ORGANIZED HEALTH CARE EDUCATION/TRAINING PROGRAM

## 2025-05-26 PROCEDURE — 87522 HEPATITIS C REVRS TRNSCRPJ: CPT

## 2025-05-26 PROCEDURE — 87517 HEPATITIS B DNA QUANT: CPT

## 2025-05-26 PROCEDURE — 86832 HLA CLASS I HIGH DEFIN QUAL: CPT | Performed by: STUDENT IN AN ORGANIZED HEALTH CARE EDUCATION/TRAINING PROGRAM

## 2025-05-26 PROCEDURE — 85025 COMPLETE CBC W/AUTO DIFF WBC: CPT | Mod: PO

## 2025-05-26 PROCEDURE — 86977 RBC SERUM PRETX INCUBJ/INHIB: CPT | Mod: 59 | Performed by: STUDENT IN AN ORGANIZED HEALTH CARE EDUCATION/TRAINING PROGRAM

## 2025-05-27 ENCOUNTER — RESULTS FOLLOW-UP (OUTPATIENT)
Dept: TRANSPLANT | Facility: HOSPITAL | Age: 60
End: 2025-05-27
Payer: MEDICARE

## 2025-05-27 ENCOUNTER — TELEPHONE (OUTPATIENT)
Dept: TRANSPLANT | Facility: CLINIC | Age: 60
End: 2025-05-27
Payer: MEDICARE

## 2025-05-27 DIAGNOSIS — Z94.0 KIDNEY REPLACED BY TRANSPLANT: ICD-10-CM

## 2025-05-27 LAB
HBV DNA SERPL NAA+PROBE-ACNC: NORMAL [IU]/ML
HCV RNA SERPL NAA+PROBE-LOG IU: NOT DETECTED {LOG_IU}/ML
HIV1 RNA SERPL NAA+PROBE-LOG#: NOT DETECTED {LOG_COPIES}/ML

## 2025-05-27 NOTE — TELEPHONE ENCOUNTER
[facility] message sent to patient:    Prince Carranza,     That was me trying to call you &  earlier. Dr. Dunbar reviewed Ms. Pop's lab results. He entered orders for her to have a repeat kidney biopsy Thurs or Friday next week if her kidney function doesn't improve (if creatinine doesn't decrease less than 1.7). Please make sure Ms. Pop is hydrating with at least 3 liters (6 of the 17oz water bottles) daily. We'll recheck her renal panel this Thursday.     Someone will contact you to schedule the kidney biopsy for Thursday or Friday. If the kidney function improves then we can cancel the biopsy.     Thanks,     Jessenia

## 2025-05-27 NOTE — TELEPHONE ENCOUNTER
Message sent to Dr. Dunbar letting him know the patient is inquiring why she has to get a repeat kidney biopsy so soon being her kidney function is improving.     Received the ok for patient to have kidney biopsy later next week (Thurs or Fri) after reviewing her next lab results.     Attempted to contact patient & her daughter to inform her of Dr. Dunbar's plan to have kidney bx scheduled next week. No answer; message left on [facility] voicemail.

## 2025-05-29 ENCOUNTER — LAB VISIT (OUTPATIENT)
Dept: LAB | Facility: HOSPITAL | Age: 60
End: 2025-05-29
Attending: STUDENT IN AN ORGANIZED HEALTH CARE EDUCATION/TRAINING PROGRAM
Payer: MEDICARE

## 2025-05-29 DIAGNOSIS — Z79.899 IMMUNOSUPPRESSIVE MANAGEMENT ENCOUNTER FOLLOWING KIDNEY TRANSPLANT: ICD-10-CM

## 2025-05-29 DIAGNOSIS — Z94.0 KIDNEY REPLACED BY TRANSPLANT: ICD-10-CM

## 2025-05-29 DIAGNOSIS — Z94.0 IMMUNOSUPPRESSIVE MANAGEMENT ENCOUNTER FOLLOWING KIDNEY TRANSPLANT: ICD-10-CM

## 2025-05-29 LAB
ALBUMIN SERPL BCP-MCNC: 3.7 G/DL (ref 3.5–5.2)
ANION GAP (OHS): 10 MMOL/L (ref 8–16)
BUN SERPL-MCNC: 29 MG/DL (ref 6–20)
C ALLOSURE SCORE: 0.48 %
C CAREDX_ORDER_ID: NORMAL
C CENTER_ORDER_ID: NORMAL
C CLIENT SPECIMEN ID: NORMAL
C DONOR RELATION: NORMAL
C NOTES: NORMAL
C RELATIVE CHANGE VALUE: NORMAL
C TEST COMMENTS: NORMAL
C TIME POST TX: NORMAL
C TRANSPLANTED ORGAN: NORMAL
C TX DATE: NORMAL
C WP_ORDER_ID: NORMAL
CALCIUM SERPL-MCNC: 9.5 MG/DL (ref 8.7–10.5)
CHLORIDE SERPL-SCNC: 104 MMOL/L (ref 95–110)
CO2 SERPL-SCNC: 22 MMOL/L (ref 23–29)
CREAT SERPL-MCNC: 1.7 MG/DL (ref 0.5–1.4)
GFR SERPLBLD CREATININE-BSD FMLA CKD-EPI: 35 ML/MIN/1.73/M2
GLUCOSE SERPL-MCNC: 231 MG/DL (ref 70–110)
PHOSPHATE SERPL-MCNC: 2.7 MG/DL (ref 2.7–4.5)
POTASSIUM SERPL-SCNC: 4.7 MMOL/L (ref 3.5–5.1)
SODIUM SERPL-SCNC: 136 MMOL/L (ref 136–145)

## 2025-05-29 PROCEDURE — 80197 ASSAY OF TACROLIMUS: CPT

## 2025-05-29 PROCEDURE — 80069 RENAL FUNCTION PANEL: CPT

## 2025-05-29 PROCEDURE — 36415 COLL VENOUS BLD VENIPUNCTURE: CPT | Mod: PO

## 2025-05-30 ENCOUNTER — PATIENT MESSAGE (OUTPATIENT)
Dept: TRANSPLANT | Facility: CLINIC | Age: 60
End: 2025-05-30
Payer: MEDICARE

## 2025-05-30 ENCOUNTER — TELEPHONE (OUTPATIENT)
Dept: INTERVENTIONAL RADIOLOGY/VASCULAR | Facility: CLINIC | Age: 60
End: 2025-05-30
Payer: MEDICARE

## 2025-05-30 LAB
CLASS I ANTIBODY COMMENTS - LUMINEX: NORMAL
CLASS II ANTIBODY COMMENTS - LUMINEX: NORMAL
DSA1 TESTING DATE: NORMAL
DSA12 TESTING DATE: NORMAL
DSA2 TESTING DATE: NORMAL
SERUM COLLECTION DT - LUMINEX CLASS I: NORMAL
SERUM COLLECTION DT - LUMINEX CLASS II: NORMAL
TACROLIMUS BLD-MCNC: 10.9 NG/ML (ref 5–15)

## 2025-05-30 RX ORDER — TACROLIMUS 0.5 MG/1
2 CAPSULE ORAL EVERY 12 HOURS
Qty: 240 CAPSULE | Refills: 11 | OUTPATIENT
Start: 2025-05-30 | End: 2026-05-30

## 2025-05-30 NOTE — TELEPHONE ENCOUNTER
Pt made aware of below orders              ----- Message from Toribio Dunbar MD sent at 5/30/2025  9:31 AM CDT -----  If true tac trough, please decrease dose from 3/3 to 2/2 and repeat next week with other labs if scheduled already.  ----- Message -----  From: Lab, Background User  Sent: 5/29/2025   6:24 PM CDT  To: Toribio Dunbar Jr., MD

## 2025-06-02 ENCOUNTER — LAB VISIT (OUTPATIENT)
Dept: LAB | Facility: HOSPITAL | Age: 60
End: 2025-06-02
Attending: STUDENT IN AN ORGANIZED HEALTH CARE EDUCATION/TRAINING PROGRAM
Payer: MEDICARE

## 2025-06-02 DIAGNOSIS — Z79.899 IMMUNOSUPPRESSIVE MANAGEMENT ENCOUNTER FOLLOWING KIDNEY TRANSPLANT: ICD-10-CM

## 2025-06-02 DIAGNOSIS — Z94.0 KIDNEY REPLACED BY TRANSPLANT: ICD-10-CM

## 2025-06-02 DIAGNOSIS — Z94.0 IMMUNOSUPPRESSIVE MANAGEMENT ENCOUNTER FOLLOWING KIDNEY TRANSPLANT: ICD-10-CM

## 2025-06-02 DIAGNOSIS — Z94.0 S/P KIDNEY TRANSPLANT: Primary | ICD-10-CM

## 2025-06-02 DIAGNOSIS — T86.19 RECEIVED KIDNEY FROM DONOR WITH HEPATITIS C: ICD-10-CM

## 2025-06-02 LAB
ABSOLUTE EOSINOPHIL (OHS): 0.12 K/UL
ABSOLUTE MONOCYTE (OHS): 0.42 K/UL (ref 0.3–1)
ABSOLUTE NEUTROPHIL COUNT (OHS): 5.88 K/UL (ref 1.8–7.7)
ALBUMIN SERPL BCP-MCNC: 3.7 G/DL (ref 3.5–5.2)
ALBUMIN SERPL BCP-MCNC: 3.9 G/DL (ref 3.5–5.2)
ALP SERPL-CCNC: 90 UNIT/L (ref 40–150)
ALT SERPL W/O P-5'-P-CCNC: 14 UNIT/L (ref 10–44)
ANION GAP (OHS): 11 MMOL/L (ref 8–16)
AST SERPL-CCNC: 14 UNIT/L (ref 11–45)
BASOPHILS # BLD AUTO: 0.06 K/UL
BASOPHILS NFR BLD AUTO: 0.8 %
BILIRUB DIRECT SERPL-MCNC: 0.2 MG/DL (ref 0.1–0.3)
BILIRUB SERPL-MCNC: 0.6 MG/DL (ref 0.1–1)
BUN SERPL-MCNC: 29 MG/DL (ref 6–20)
CALCIUM SERPL-MCNC: 9.7 MG/DL (ref 8.7–10.5)
CHLORIDE SERPL-SCNC: 104 MMOL/L (ref 95–110)
CO2 SERPL-SCNC: 22 MMOL/L (ref 23–29)
CREAT SERPL-MCNC: 2 MG/DL (ref 0.5–1.4)
ERYTHROCYTE [DISTWIDTH] IN BLOOD BY AUTOMATED COUNT: 16.6 % (ref 11.5–14.5)
GFR SERPLBLD CREATININE-BSD FMLA CKD-EPI: 29 ML/MIN/1.73/M2
GLUCOSE SERPL-MCNC: 198 MG/DL (ref 70–110)
HCT VFR BLD AUTO: 34.1 % (ref 37–48.5)
HCV AB SERPL QL IA: NORMAL
HGB BLD-MCNC: 10.7 GM/DL (ref 12–16)
IMM GRANULOCYTES # BLD AUTO: 0.05 K/UL (ref 0–0.04)
IMM GRANULOCYTES NFR BLD AUTO: 0.6 % (ref 0–0.5)
LYMPHOCYTES # BLD AUTO: 1.41 K/UL (ref 1–4.8)
MAGNESIUM SERPL-MCNC: 1.9 MG/DL (ref 1.6–2.6)
MCH RBC QN AUTO: 29.2 PG (ref 27–31)
MCHC RBC AUTO-ENTMCNC: 31.4 G/DL (ref 32–36)
MCV RBC AUTO: 93 FL (ref 82–98)
NUCLEATED RBC (/100WBC) (OHS): 0 /100 WBC
PHOSPHATE SERPL-MCNC: 3 MG/DL (ref 2.7–4.5)
PLATELET # BLD AUTO: 259 K/UL (ref 150–450)
PMV BLD AUTO: 11.6 FL (ref 9.2–12.9)
POTASSIUM SERPL-SCNC: 5 MMOL/L (ref 3.5–5.1)
PROT SERPL-MCNC: 7.9 GM/DL (ref 6–8.4)
RBC # BLD AUTO: 3.66 M/UL (ref 4–5.4)
RELATIVE EOSINOPHIL (OHS): 1.5 %
RELATIVE LYMPHOCYTE (OHS): 17.8 % (ref 18–48)
RELATIVE MONOCYTE (OHS): 5.3 % (ref 4–15)
RELATIVE NEUTROPHIL (OHS): 74 % (ref 38–73)
SODIUM SERPL-SCNC: 137 MMOL/L (ref 136–145)
WBC # BLD AUTO: 7.94 K/UL (ref 3.9–12.7)

## 2025-06-02 PROCEDURE — 87522 HEPATITIS C REVRS TRNSCRPJ: CPT

## 2025-06-02 PROCEDURE — 85025 COMPLETE CBC W/AUTO DIFF WBC: CPT | Mod: PO

## 2025-06-02 PROCEDURE — 82248 BILIRUBIN DIRECT: CPT

## 2025-06-02 PROCEDURE — 80069 RENAL FUNCTION PANEL: CPT | Mod: PO

## 2025-06-02 PROCEDURE — 83735 ASSAY OF MAGNESIUM: CPT

## 2025-06-02 PROCEDURE — 80197 ASSAY OF TACROLIMUS: CPT

## 2025-06-02 PROCEDURE — 36415 COLL VENOUS BLD VENIPUNCTURE: CPT | Mod: PO

## 2025-06-02 PROCEDURE — 86803 HEPATITIS C AB TEST: CPT

## 2025-06-02 RX ORDER — LIDOCAINE HYDROCHLORIDE 10 MG/ML
1 INJECTION, SOLUTION EPIDURAL; INFILTRATION; INTRACAUDAL; PERINEURAL ONCE
Status: CANCELLED | OUTPATIENT
Start: 2025-06-02 | End: 2025-06-02

## 2025-06-03 ENCOUNTER — RESULTS FOLLOW-UP (OUTPATIENT)
Dept: TRANSPLANT | Facility: CLINIC | Age: 60
End: 2025-06-03

## 2025-06-03 LAB
HCV RNA SERPL NAA+PROBE-LOG IU: NOT DETECTED {LOG_IU}/ML
TACROLIMUS BLD-MCNC: 11.6 NG/ML (ref 5–15)

## 2025-06-04 ENCOUNTER — OFFICE VISIT (OUTPATIENT)
Dept: TRANSPLANT | Facility: CLINIC | Age: 60
End: 2025-06-04
Payer: MEDICARE

## 2025-06-04 VITALS
SYSTOLIC BLOOD PRESSURE: 127 MMHG | DIASTOLIC BLOOD PRESSURE: 60 MMHG | RESPIRATION RATE: 16 BRPM | TEMPERATURE: 98 F | WEIGHT: 149.06 LBS | OXYGEN SATURATION: 97 % | BODY MASS INDEX: 25.45 KG/M2 | HEART RATE: 92 BPM | HEIGHT: 64 IN

## 2025-06-04 DIAGNOSIS — Z79.4 TYPE 2 DIABETES MELLITUS WITH CHRONIC KIDNEY DISEASE ON CHRONIC DIALYSIS, WITH LONG-TERM CURRENT USE OF INSULIN: ICD-10-CM

## 2025-06-04 DIAGNOSIS — D84.821 IMMUNODEFICIENCY DUE TO DRUG THERAPY: ICD-10-CM

## 2025-06-04 DIAGNOSIS — E55.9 VITAMIN D DEFICIENCY: ICD-10-CM

## 2025-06-04 DIAGNOSIS — N18.6 TYPE 2 DIABETES MELLITUS WITH CHRONIC KIDNEY DISEASE ON CHRONIC DIALYSIS, WITH LONG-TERM CURRENT USE OF INSULIN: ICD-10-CM

## 2025-06-04 DIAGNOSIS — Z79.899 IMMUNODEFICIENCY DUE TO DRUG THERAPY: ICD-10-CM

## 2025-06-04 DIAGNOSIS — Z99.2 TYPE 2 DIABETES MELLITUS WITH CHRONIC KIDNEY DISEASE ON CHRONIC DIALYSIS, WITH LONG-TERM CURRENT USE OF INSULIN: ICD-10-CM

## 2025-06-04 DIAGNOSIS — E11.22 TYPE 2 DIABETES MELLITUS WITH CHRONIC KIDNEY DISEASE ON CHRONIC DIALYSIS, WITH LONG-TERM CURRENT USE OF INSULIN: ICD-10-CM

## 2025-06-04 DIAGNOSIS — Z94.0 S/P KIDNEY TRANSPLANT: Primary | ICD-10-CM

## 2025-06-04 PROCEDURE — 99999 PR PBB SHADOW E&M-EST. PATIENT-LVL IV: CPT | Mod: PBBFAC,,, | Performed by: NURSE PRACTITIONER

## 2025-06-04 RX ORDER — CHOLECALCIFEROL (VITAMIN D3) 25 MCG
2000 TABLET ORAL DAILY
Qty: 180 TABLET | Refills: 3 | Status: CANCELLED | OUTPATIENT
Start: 2025-06-04 | End: 2026-06-04

## 2025-06-05 ENCOUNTER — PATIENT MESSAGE (OUTPATIENT)
Dept: INTERVENTIONAL RADIOLOGY/VASCULAR | Facility: HOSPITAL | Age: 60
End: 2025-06-05
Payer: MEDICARE

## 2025-06-05 ENCOUNTER — LAB VISIT (OUTPATIENT)
Dept: LAB | Facility: HOSPITAL | Age: 60
End: 2025-06-05
Attending: PHYSICIAN ASSISTANT
Payer: MEDICARE

## 2025-06-05 DIAGNOSIS — Z94.0 S/P KIDNEY TRANSPLANT: ICD-10-CM

## 2025-06-05 DIAGNOSIS — E55.9 VITAMIN D DEFICIENCY: ICD-10-CM

## 2025-06-05 LAB
ANION GAP (OHS): 13 MMOL/L (ref 8–16)
BUN SERPL-MCNC: 27 MG/DL (ref 6–20)
CALCIUM SERPL-MCNC: 9.7 MG/DL (ref 8.7–10.5)
CHLORIDE SERPL-SCNC: 104 MMOL/L (ref 95–110)
CO2 SERPL-SCNC: 19 MMOL/L (ref 23–29)
CREAT SERPL-MCNC: 1.8 MG/DL (ref 0.5–1.4)
GFR SERPLBLD CREATININE-BSD FMLA CKD-EPI: 33 ML/MIN/1.73/M2
GLUCOSE SERPL-MCNC: 175 MG/DL (ref 70–110)
POTASSIUM SERPL-SCNC: 4.4 MMOL/L (ref 3.5–5.1)
SODIUM SERPL-SCNC: 136 MMOL/L (ref 136–145)

## 2025-06-05 PROCEDURE — 80048 BASIC METABOLIC PNL TOTAL CA: CPT | Mod: PO

## 2025-06-05 PROCEDURE — 36415 COLL VENOUS BLD VENIPUNCTURE: CPT | Mod: PO

## 2025-06-05 RX ORDER — CHOLECALCIFEROL (VITAMIN D3) 25 MCG
2000 TABLET ORAL DAILY
Qty: 180 TABLET | Refills: 3 | Status: CANCELLED | OUTPATIENT
Start: 2025-06-04 | End: 2026-06-04

## 2025-06-06 ENCOUNTER — HOSPITAL ENCOUNTER (OUTPATIENT)
Dept: INTERVENTIONAL RADIOLOGY/VASCULAR | Facility: HOSPITAL | Age: 60
Discharge: HOME | End: 2025-06-06
Attending: STUDENT IN AN ORGANIZED HEALTH CARE EDUCATION/TRAINING PROGRAM
Payer: MEDICARE

## 2025-06-06 VITALS
HEART RATE: 78 BPM | TEMPERATURE: 98 F | DIASTOLIC BLOOD PRESSURE: 61 MMHG | BODY MASS INDEX: 25.1 KG/M2 | OXYGEN SATURATION: 100 % | RESPIRATION RATE: 23 BRPM | SYSTOLIC BLOOD PRESSURE: 133 MMHG | HEIGHT: 64 IN | WEIGHT: 147 LBS

## 2025-06-06 DIAGNOSIS — Z94.0 S/P KIDNEY TRANSPLANT: ICD-10-CM

## 2025-06-06 DIAGNOSIS — T86.10 COMPLICATION OF TRANSPLANTED KIDNEY, UNSPECIFIED COMPLICATION: ICD-10-CM

## 2025-06-06 LAB — POCT GLUCOSE: 210 MG/DL (ref 70–110)

## 2025-06-06 PROCEDURE — 99152 MOD SED SAME PHYS/QHP 5/>YRS: CPT

## 2025-06-06 PROCEDURE — 99153 MOD SED SAME PHYS/QHP EA: CPT

## 2025-06-06 PROCEDURE — 88305 TISSUE EXAM BY PATHOLOGIST: CPT | Performed by: STUDENT IN AN ORGANIZED HEALTH CARE EDUCATION/TRAINING PROGRAM

## 2025-06-06 PROCEDURE — 99152 MOD SED SAME PHYS/QHP 5/>YRS: CPT | Mod: ,,, | Performed by: STUDENT IN AN ORGANIZED HEALTH CARE EDUCATION/TRAINING PROGRAM

## 2025-06-06 PROCEDURE — 63600175 PHARM REV CODE 636 W HCPCS: Performed by: STUDENT IN AN ORGANIZED HEALTH CARE EDUCATION/TRAINING PROGRAM

## 2025-06-06 PROCEDURE — 50200 RENAL BIOPSY PERQ: CPT | Mod: RT | Performed by: STUDENT IN AN ORGANIZED HEALTH CARE EDUCATION/TRAINING PROGRAM

## 2025-06-06 PROCEDURE — 99152 MOD SED SAME PHYS/QHP 5/>YRS: CPT | Performed by: STUDENT IN AN ORGANIZED HEALTH CARE EDUCATION/TRAINING PROGRAM

## 2025-06-06 PROCEDURE — 25000003 PHARM REV CODE 250: Performed by: STUDENT IN AN ORGANIZED HEALTH CARE EDUCATION/TRAINING PROGRAM

## 2025-06-06 PROCEDURE — 76942 ECHO GUIDE FOR BIOPSY: CPT | Mod: TC | Performed by: STUDENT IN AN ORGANIZED HEALTH CARE EDUCATION/TRAINING PROGRAM

## 2025-06-06 PROCEDURE — 76942 ECHO GUIDE FOR BIOPSY: CPT | Mod: 26,,, | Performed by: STUDENT IN AN ORGANIZED HEALTH CARE EDUCATION/TRAINING PROGRAM

## 2025-06-06 PROCEDURE — 99153 MOD SED SAME PHYS/QHP EA: CPT | Performed by: STUDENT IN AN ORGANIZED HEALTH CARE EDUCATION/TRAINING PROGRAM

## 2025-06-06 RX ORDER — LIDOCAINE HYDROCHLORIDE 10 MG/ML
INJECTION, SOLUTION INFILTRATION; PERINEURAL
Status: COMPLETED | OUTPATIENT
Start: 2025-06-06 | End: 2025-06-06

## 2025-06-06 RX ORDER — FENTANYL CITRATE 50 UG/ML
INJECTION, SOLUTION INTRAMUSCULAR; INTRAVENOUS
Status: COMPLETED | OUTPATIENT
Start: 2025-06-06 | End: 2025-06-06

## 2025-06-06 RX ORDER — LIDOCAINE HYDROCHLORIDE 10 MG/ML
1 INJECTION, SOLUTION EPIDURAL; INFILTRATION; INTRACAUDAL; PERINEURAL ONCE
Status: DISCONTINUED | OUTPATIENT
Start: 2025-06-06 | End: 2025-06-08 | Stop reason: HOSPADM

## 2025-06-06 RX ORDER — MIDAZOLAM HYDROCHLORIDE 1 MG/ML
INJECTION, SOLUTION INTRAMUSCULAR; INTRAVENOUS
Status: COMPLETED | OUTPATIENT
Start: 2025-06-06 | End: 2025-06-06

## 2025-06-06 RX ADMIN — LIDOCAINE HYDROCHLORIDE 5 ML: 10 INJECTION, SOLUTION INFILTRATION; PERINEURAL at 10:06

## 2025-06-06 RX ADMIN — FENTANYL CITRATE 50 MCG: 50 INJECTION INTRAMUSCULAR; INTRAVENOUS at 10:06

## 2025-06-06 RX ADMIN — MIDAZOLAM 2 MG: 1 INJECTION INTRAMUSCULAR; INTRAVENOUS at 10:06

## 2025-06-06 RX ADMIN — Medication 1 G: at 10:06

## 2025-06-06 NOTE — NURSING
Rapid Response NurseNote     22g PIV placed in right forearm using ultrasound guidance. Blood return noted. Flushed and saline locked.    Please call Rapid Response RN, YULIET Lira with any questions or concerns at [phone]

## 2025-06-06 NOTE — DISCHARGE INSTRUCTIONS
KIDNEY BIOPSY DISCHARGE EDUCATION:    Information:   A kidney (renal) biopsy is a procedure in which a biopsy needle is used to remove small amounts of kidney tissue to evaluate for kidney function and/or if there is a kidney mass to evaluate if it is cancerous versus benign (non-cancerous).     What should I expect after the Kidney Biopsy?    You may have some blood in your urine after the procedure, this should resolve in a few days.    There may be some soreness around the biopsy site.    You may return to work after 24 hours unless your primary doctor instructs you otherwise.    You do not have any diet restrictions because of this procedure but should continue any that were given to you by any other doctors.    Continue all previously prescribed medications with the exception of Coumadin/warfarin which should be resumed after 24 hours. Pradaxa, Xarelto, Eliquis or Savaysa can be resumed after 48 hours.     Bathing & Wound Care:    You may shower after 24 hours; do not tub bath or submerge in water for 3 days (bath tub, hot tub, swimming pool, river or any other body of water).    Dressing to stay on 2-3 days (clean and dry)    If the biopsy site(s) become red, tender, swollen, or starts to drain, contact us.    Avoid heavy lifting and strenuous activity for 3 days.     Follow-up visit information:   Your ordering physician will typically get your biopsy results in three to five business days. If you do not hear from the ordering physician in 7 business days, please call his/her office to set up an appointment to review the results. Follow up with Interventional Radiology is not usually necessary.       Occasionally, a situation will require prompt attention and an emergency room visit is necessary:    Sudden chest pain, shortness of breath, or fainting    Increasing blood in your urine    Increasing redness, swelling or drainage from the biopsy site    Increasing pain not relieved by medication    Bleeding or  drainage from the needle site that is saturating the dressing    You have shaking, chills and/or a temperature over 100.3°F    New, sudden difficulty breathing    Drop in blood pressure, and/or light-headed feeling    Interventional Radiology Clinic   For complications   [phone]. Monday - Friday, 8:00 am - 4:00 pm    [phone] After hours and on holidays. Ask to speak with the interventional radiologist on call.     For Scheduling   [phone] Monday - Friday, 8:00 am - 4:00 pm

## 2025-06-06 NOTE — PROCEDURES
"  Pre Op Diagnosis: Renal dysfunction  Post Op Diagnosis: Same    Procedure: transplant kidney biopsy    Procedure performed by: Ezequiel    Written Informed Consent Obtained: Yes  Specimen Removed: YES 3 18 g cores  Estimated Blood Loss: Minimal    Findings:   Successful MRATINEZ guided renal transplant biopsy.     Patient tolerated procedure well.    Carroll Nieves MD ([place])  Interventional Radiology  [phone]      "

## 2025-06-06 NOTE — H&P
Radiology History & Physical      SUBJECTIVE:     Chief Complaint: renal dysfunction    History of Present Illness:  Kiesha Rocha is a 59 y.o. female who presents for Transplant kidney biopsy.  Past Medical History:   Diagnosis Date    Anemia     Anxiety     Diabetes mellitus     Disorder of kidney and ureter     Encounter for blood transfusion     GERD (gastroesophageal reflux disease)     Hydronephrosis     Hyperlipidemia     Hypertension     Hyperthyroidism     Obesity     Thyroid disease      Past Surgical History:   Procedure Laterality Date    ARTHROSCOPY OF KNEE Right     AV FISTULA PLACEMENT Left 06/12/2019    Procedure: CREATION, AV FISTULA - Basilic;  Surgeon: Bautista Vasques MD;  Location: [facility] OR;  Service: Vascular;  Laterality: Left;    BREAST BIOPSY Left     BREAST SURGERY Left     biopsy    ENDOBRONCHIAL ULTRASOUND N/A 2/27/2024    Procedure: ENDOBRONCHIAL ULTRASOUND (EBUS);  Surgeon: Dick Harding MD;  Location: [facility] OR [address] [place] [address];  Service: Pulmonary;  Laterality: N/A;    HYSTERECTOMY  2017    TLH/BSO    KIDNEY TRANSPLANT N/A 4/24/2025    Procedure: TRANSPLANT, KIDNEY;  Surgeon: Shandra Garcia MD;  Location: [facility] OR [address] [place] [address];  Service: Transplant;  Laterality: N/A;    KNEE SURGERY Right     URETERAL STENT PLACEMENT Bilateral     10/2018       Home Meds:   Prior to Admission medications    Medication Sig Start Date End Date Taking? Authorizing Provider   aspirin (ASPIR-81 ORAL) Take 81 mg by mouth once daily. As of 5/13/25 On HOLD for kidney biopsy   Yes Provider, Historical   calcitRIOL (ROCALTROL) 0.25 MCG Cap Take 1 capsule (0.25 mcg total) by mouth once daily. 5/21/25 5/21/26 Yes Toribio Dunbar Jr., MD   carvediloL (COREG) 25 MG tablet Take 1 tablet (25 mg total) by mouth 2 (two) times daily. 5/21/25 5/21/26 Yes Toribio Dunbar Jr., MD   chlorthalidone (HYGROTEN) 25 MG Tab Take 1 tablet (25 mg total) by mouth nightly. 5/21/25 5/21/26 Yes Toribio Dunbar Jr., MD    FLUoxetine 40 MG capsule Take 1 capsule (40 mg total) by mouth once daily. 5/21/25  Yes Toribio Dunbar Jr., MD   hydrOXYzine pamoate (VISTARIL) 25 MG Cap Take 1 capsule (25 mg total) by mouth every 8 (eight) hours as needed (insomnia). 5/21/25  Yes Toribio Dunbar Jr., MD   methIMAzole (TAPAZOLE) 5 MG Tab Take 1 tablet (5 mg total) by mouth once daily. 7/15/24 7/15/25 Yes Katherine Roldan NP   mycophenolate (CELLCEPT) 250 mg Cap Take 4 capsules (1,000 mg total) by mouth 2 (two) times daily. Z94.0;Kidney transplant on 4/24/2025 5/21/25 5/21/26 Yes Toribio Dunbar Jr., MD   NIFEdipine (PROCARDIA-XL) 30 MG (OSM) 24 hr tablet Take 1 tablet (30 mg total) by mouth 2 (two) times a day. 5/21/25 5/21/26 Yes Toribio Dunbar Jr., MD   sulfamethoxazole-trimethoprim 400-80mg (BACTRIM,SEPTRA) 400-80 mg per tablet Take 1 tablet by mouth once daily. Stop 10/24/25 5/21/25 7/15/26 Yes Toribio Dunbar Jr., MD   tacrolimus (PROGRAF) 0.5 MG Cap Take 6 capsules (3 mg total) by mouth every 12 (twelve) hours. Z94.0;Kidney txp on 4/24/25 5/20/25 5/20/26 Yes Toribio Dunabr Jr., MD   valGANciclovir (VALCYTE) 450 mg Tab Take 1 tablet (450 mg total) by mouth once daily. Stop 7/24/25 5/21/25 8/20/25 Yes Toribio Dunbar Jr., MD   vitamin D (VITAMIN D3) 1000 units Tab Take 2 tablets (2,000 Units total) by mouth once daily. 5/21/25 5/21/26 Yes Toribio Dunbar Jr., MD   albuterol (PROVENTIL/VENTOLIN HFA) 90 mcg/actuation inhaler Inhale 2 puffs into the lungs every 6 (six) hours as needed. 1/30/21   Provider, Historical   atorvastatin (LIPITOR) 20 MG tablet Take 1 tablet (20 mg total) by mouth once daily. 5/21/25   Toribio Dunbar Jr., MD   blood sugar diagnostic Strp To check BG 3 times daily, to use with insurance preferred meter 4/25/25   Toribio Dunbar Jr., MD   blood-glucose meter Misc To check BG 3 times daily, to use with insurance preferred meter 4/25/25   Toribio Dunbar Jr., MD  "  famotidine (PEPCID) 20 MG tablet Take 1 tablet (20 mg total) by mouth once daily.  Patient not taking: Reported on 6/4/2025 5/21/25   Toribio Dunbar Jr., MD   insulin aspart U-100 (NOVOLOG) 100 unit/mL (3 mL) InPn pen Inject 5 Units into the skin 3 (three) times daily. Plus low sliding scale. Max units per day: 30 5/21/25 5/21/26  Toribio Dunbar Jr., MD   insulin glargine U-100, Lantus, 100 unit/mL (3 mL) SubQ InPn pen Inject 13 Units into the skin every evening. 5/21/25 5/21/26  Toribio Dunbar Jr., MD   lancets 33 gauge Misc To check BG 3 times daily, to use with insurance preferred meter 4/25/25   Toribio Dunbar Jr., MD   linaGLIPtin (TRADJENTA) 5 mg Tab tablet Take 1 tablet (5 mg total) by mouth once daily. 10/29/19 6/4/25  Katherine Roldan NP   multivitamin Tab Take 1 tablet by mouth once daily. 4/26/25   Toribio Dunbar Jr., MD   oxybutynin (DITROPAN) 5 MG Tab Take 1 tablet (5 mg total) by mouth 3 (three) times daily.  Patient not taking: Reported on 6/4/2025 4/27/25 4/27/26  Toribio Dunbar Jr., MD   pen needle, diabetic 32 gauge x 5/32" Ndle Inject 1 Needle into the skin 4 (four) times daily. 4/25/25   Toribio Dunbar Jr., MD   predniSONE (DELTASONE) 5 MG tablet Take 1 tablet (5 mg total) by mouth once daily. DO NOT DISCONTINUE 5/21/25 5/21/26  Toribio Dunbar Jr., MD   sodium bicarbonate 650 MG tablet Take 1 tablet (650 mg total) by mouth 2 (two) times daily. 5/14/25 5/14/26  Bridget Betts, NP     Anticoagulants/Antiplatelets: no anticoagulation    Allergies:   Review of patient's allergies indicates:   Allergen Reactions    Latex Itching    Penicillins Hives and Rash     Sedation History:  no adverse reactions    Labs:  Lab Results   Component Value Date    INR 1.0 05/15/2025       Lab Results   Component Value Date    WBC 7.94 06/02/2025    HGB 10.7 (L) 06/02/2025    HCT 34.1 (L) 06/02/2025    MCV 93 06/02/2025     06/02/2025     Lab Results " "  Component Value Date     (H) 06/05/2025     06/05/2025    K 4.4 06/05/2025     06/05/2025    CO2 19 (L) 06/05/2025    BUN 27 (H) 06/05/2025    CREATININE 1.8 (H) 06/05/2025    CALCIUM 9.7 06/05/2025    MG 1.9 06/02/2025    ALT 14 06/02/2025    AST 14 06/02/2025    ALBUMIN 3.7 06/02/2025    ALBUMIN 3.9 06/02/2025    BILITOT 0.6 06/02/2025    BILIDIR 0.2 06/02/2025       Review of Systems:   Hematological: no known coagulopathies  Respiratory: no shortness of breath  Cardiovascular: no chest pain  Gastrointestinal: no abdominal pain  Genito-Urinary: no dysuria  Musculoskeletal: negative  Neurological: no TIA or stroke symptoms         OBJECTIVE:     Vital Signs (Most Recent)  Temp: 97.8 °F (36.6 °C) (06/06/25 0929)  Pulse: 73 (06/06/25 0940)  Resp: (!) 22 (06/06/25 0929)  BP: (!) 145/66 (06/06/25 0940)  SpO2: 100 % (06/06/25 0929)    Physical Exam:  ASA: 2  Mallampati: 2    General: no acute distress  Mental Status: alert and oriented to person, place and time  HEENT: normocephalic, atraumatic  Chest: unlabored breathing  Heart: regular heart rate  Abdomen: nondistended  Extremity: moves all extremities    ASSESSMENT/PLAN:     Sedation Plan: moderate  Patient will undergo renal transplant biopsy.    Carroll Nieves MD ([facility])  Interventional Radiology        "

## 2025-06-06 NOTE — NURSING
IR recovery completed, VS stable, room air, denies pain, removed PIV, tip intact, RLQ dressing, CDI, reviewed discharge instructions with pt, stated understood, tolerating oral intake, assisted to bathroom, pt brought to front of hospital via WC into care of son in law, IR care complete

## 2025-06-07 DIAGNOSIS — E55.9 VITAMIN D DEFICIENCY: ICD-10-CM

## 2025-06-07 RX ORDER — CHOLECALCIFEROL (VITAMIN D3) 25 MCG
2000 TABLET ORAL DAILY
Qty: 180 TABLET | Refills: 3 | Status: CANCELLED | OUTPATIENT
Start: 2025-06-04 | End: 2026-06-04

## 2025-06-09 ENCOUNTER — LAB VISIT (OUTPATIENT)
Dept: LAB | Facility: HOSPITAL | Age: 60
End: 2025-06-09
Attending: STUDENT IN AN ORGANIZED HEALTH CARE EDUCATION/TRAINING PROGRAM
Payer: MEDICARE

## 2025-06-09 DIAGNOSIS — Z79.899 IMMUNOSUPPRESSIVE MANAGEMENT ENCOUNTER FOLLOWING KIDNEY TRANSPLANT: ICD-10-CM

## 2025-06-09 DIAGNOSIS — Z94.0 KIDNEY REPLACED BY TRANSPLANT: ICD-10-CM

## 2025-06-09 DIAGNOSIS — T86.19 RECEIVED KIDNEY FROM DONOR WITH HEPATITIS C: ICD-10-CM

## 2025-06-09 DIAGNOSIS — Z94.0 IMMUNOSUPPRESSIVE MANAGEMENT ENCOUNTER FOLLOWING KIDNEY TRANSPLANT: ICD-10-CM

## 2025-06-09 LAB
ABSOLUTE EOSINOPHIL (OHS): 0.05 K/UL
ABSOLUTE MONOCYTE (OHS): 0.4 K/UL (ref 0.3–1)
ABSOLUTE NEUTROPHIL COUNT (OHS): 6.15 K/UL (ref 1.8–7.7)
ALBUMIN SERPL BCP-MCNC: 3.9 G/DL (ref 3.5–5.2)
ALBUMIN SERPL BCP-MCNC: 4 G/DL (ref 3.5–5.2)
ALP SERPL-CCNC: 88 UNIT/L (ref 40–150)
ALT SERPL W/O P-5'-P-CCNC: 17 UNIT/L (ref 10–44)
ANION GAP (OHS): 14 MMOL/L (ref 8–16)
AST SERPL-CCNC: 18 UNIT/L (ref 11–45)
BASOPHILS # BLD AUTO: 0.04 K/UL
BASOPHILS NFR BLD AUTO: 0.5 %
BILIRUB DIRECT SERPL-MCNC: 0.2 MG/DL (ref 0.1–0.3)
BILIRUB SERPL-MCNC: 0.6 MG/DL (ref 0.1–1)
BUN SERPL-MCNC: 23 MG/DL (ref 6–20)
CALCIUM SERPL-MCNC: 9.7 MG/DL (ref 8.7–10.5)
CHLORIDE SERPL-SCNC: 106 MMOL/L (ref 95–110)
CO2 SERPL-SCNC: 19 MMOL/L (ref 23–29)
CREAT SERPL-MCNC: 1.6 MG/DL (ref 0.5–1.4)
ERYTHROCYTE [DISTWIDTH] IN BLOOD BY AUTOMATED COUNT: 16.3 % (ref 11.5–14.5)
GFR SERPLBLD CREATININE-BSD FMLA CKD-EPI: 37 ML/MIN/1.73/M2
GLUCOSE SERPL-MCNC: 189 MG/DL (ref 70–110)
HCT VFR BLD AUTO: 34.4 % (ref 37–48.5)
HCV AB SERPL QL IA: NORMAL
HGB BLD-MCNC: 10.9 GM/DL (ref 12–16)
IMM GRANULOCYTES # BLD AUTO: 0.1 K/UL (ref 0–0.04)
IMM GRANULOCYTES NFR BLD AUTO: 1.3 % (ref 0–0.5)
LYMPHOCYTES # BLD AUTO: 1.14 K/UL (ref 1–4.8)
MAGNESIUM SERPL-MCNC: 1.7 MG/DL (ref 1.6–2.6)
MCH RBC QN AUTO: 29.5 PG (ref 27–31)
MCHC RBC AUTO-ENTMCNC: 31.7 G/DL (ref 32–36)
MCV RBC AUTO: 93 FL (ref 82–98)
NUCLEATED RBC (/100WBC) (OHS): 0 /100 WBC
PHOSPHATE SERPL-MCNC: 3 MG/DL (ref 2.7–4.5)
PLATELET # BLD AUTO: 279 K/UL (ref 150–450)
PMV BLD AUTO: 11.5 FL (ref 9.2–12.9)
POTASSIUM SERPL-SCNC: 4.1 MMOL/L (ref 3.5–5.1)
PROT SERPL-MCNC: 8.2 GM/DL (ref 6–8.4)
RBC # BLD AUTO: 3.7 M/UL (ref 4–5.4)
RELATIVE EOSINOPHIL (OHS): 0.6 %
RELATIVE LYMPHOCYTE (OHS): 14.5 % (ref 18–48)
RELATIVE MONOCYTE (OHS): 5.1 % (ref 4–15)
RELATIVE NEUTROPHIL (OHS): 78 % (ref 38–73)
SODIUM SERPL-SCNC: 139 MMOL/L (ref 136–145)
WBC # BLD AUTO: 7.88 K/UL (ref 3.9–12.7)

## 2025-06-09 PROCEDURE — 82310 ASSAY OF CALCIUM: CPT | Mod: PO

## 2025-06-09 PROCEDURE — 83735 ASSAY OF MAGNESIUM: CPT

## 2025-06-09 PROCEDURE — 85025 COMPLETE CBC W/AUTO DIFF WBC: CPT | Mod: PO

## 2025-06-09 PROCEDURE — 87522 HEPATITIS C REVRS TRNSCRPJ: CPT

## 2025-06-09 PROCEDURE — 82248 BILIRUBIN DIRECT: CPT

## 2025-06-09 PROCEDURE — 80197 ASSAY OF TACROLIMUS: CPT

## 2025-06-09 PROCEDURE — 86803 HEPATITIS C AB TEST: CPT

## 2025-06-09 PROCEDURE — 36415 COLL VENOUS BLD VENIPUNCTURE: CPT | Mod: PO

## 2025-06-10 ENCOUNTER — RESULTS FOLLOW-UP (OUTPATIENT)
Dept: TRANSPLANT | Facility: HOSPITAL | Age: 60
End: 2025-06-10

## 2025-06-10 ENCOUNTER — HOSPITAL ENCOUNTER (INPATIENT)
Facility: HOSPITAL | Age: 60
LOS: 4 days | Discharge: HOME | DRG: 699 | End: 2025-06-14
Attending: STUDENT IN AN ORGANIZED HEALTH CARE EDUCATION/TRAINING PROGRAM | Admitting: STUDENT IN AN ORGANIZED HEALTH CARE EDUCATION/TRAINING PROGRAM
Payer: MEDICARE

## 2025-06-10 ENCOUNTER — TELEPHONE (OUTPATIENT)
Dept: TRANSPLANT | Facility: CLINIC | Age: 60
End: 2025-06-10
Payer: MEDICARE

## 2025-06-10 DIAGNOSIS — N18.6 TYPE 2 DIABETES MELLITUS WITH CHRONIC KIDNEY DISEASE ON CHRONIC DIALYSIS, WITH LONG-TERM CURRENT USE OF INSULIN: ICD-10-CM

## 2025-06-10 DIAGNOSIS — K59.1 FUNCTIONAL DIARRHEA: ICD-10-CM

## 2025-06-10 DIAGNOSIS — Z94.0 S/P KIDNEY TRANSPLANT: ICD-10-CM

## 2025-06-10 DIAGNOSIS — Z79.4 TYPE 2 DIABETES MELLITUS WITH CHRONIC KIDNEY DISEASE ON CHRONIC DIALYSIS, WITH LONG-TERM CURRENT USE OF INSULIN: ICD-10-CM

## 2025-06-10 DIAGNOSIS — Z91.89 AT RISK FOR OPPORTUNISTIC INFECTIONS: ICD-10-CM

## 2025-06-10 DIAGNOSIS — E05.90 HYPERTHYROIDISM: ICD-10-CM

## 2025-06-10 DIAGNOSIS — D63.8 ANEMIA OF CHRONIC DISEASE: ICD-10-CM

## 2025-06-10 DIAGNOSIS — I50.32 CHRONIC DIASTOLIC HEART FAILURE: ICD-10-CM

## 2025-06-10 DIAGNOSIS — E55.9 VITAMIN D DEFICIENCY: ICD-10-CM

## 2025-06-10 DIAGNOSIS — R06.02 SHORTNESS OF BREATH: ICD-10-CM

## 2025-06-10 DIAGNOSIS — E11.22 TYPE 2 DIABETES MELLITUS WITH CHRONIC KIDNEY DISEASE ON CHRONIC DIALYSIS, WITH LONG-TERM CURRENT USE OF INSULIN: ICD-10-CM

## 2025-06-10 DIAGNOSIS — Z91.89 AT RISK FOR PROLONGED QT INTERVAL SYNDROME: ICD-10-CM

## 2025-06-10 DIAGNOSIS — I50.9 HEART FAILURE: ICD-10-CM

## 2025-06-10 DIAGNOSIS — T38.0X5A ADRENAL CORTICAL STEROIDS CAUSING ADVERSE EFFECT IN THERAPEUTIC USE: ICD-10-CM

## 2025-06-10 DIAGNOSIS — I10 HYPERTENSION, UNSPECIFIED TYPE: ICD-10-CM

## 2025-06-10 DIAGNOSIS — R19.7 DIARRHEA, UNSPECIFIED TYPE: ICD-10-CM

## 2025-06-10 DIAGNOSIS — Z29.89 PROPHYLACTIC IMMUNOTHERAPY: ICD-10-CM

## 2025-06-10 DIAGNOSIS — T86.11 ACUTE ANTIBODY MEDIATED REJECTION OF TRANSPLANTED KIDNEY: ICD-10-CM

## 2025-06-10 DIAGNOSIS — Z99.2 TYPE 2 DIABETES MELLITUS WITH CHRONIC KIDNEY DISEASE ON CHRONIC DIALYSIS, WITH LONG-TERM CURRENT USE OF INSULIN: ICD-10-CM

## 2025-06-10 DIAGNOSIS — T86.11 ANTIBODY MEDIATED REJECTION OF KIDNEY TRANSPLANT: Primary | ICD-10-CM

## 2025-06-10 LAB
ABSOLUTE EOSINOPHIL (OHS): 0.02 K/UL
ABSOLUTE MONOCYTE (OHS): 0.32 K/UL (ref 0.3–1)
ABSOLUTE NEUTROPHIL COUNT (OHS): 8.24 K/UL (ref 1.8–7.7)
ALBUMIN SERPL BCP-MCNC: 4.1 G/DL (ref 3.5–5.2)
ANION GAP (OHS): 10 MMOL/L (ref 8–16)
BASOPHILS # BLD AUTO: 0.03 K/UL
BASOPHILS NFR BLD AUTO: 0.3 %
BUN SERPL-MCNC: 20 MG/DL (ref 6–20)
CALCIUM SERPL-MCNC: 9 MG/DL (ref 8.7–10.5)
CHLORIDE SERPL-SCNC: 107 MMOL/L (ref 95–110)
CO2 SERPL-SCNC: 19 MMOL/L (ref 23–29)
CREAT SERPL-MCNC: 1.5 MG/DL (ref 0.5–1.4)
ERYTHROCYTE [DISTWIDTH] IN BLOOD BY AUTOMATED COUNT: 16.6 % (ref 11.5–14.5)
FIBRINOGEN PPP-MCNC: 309 MG/DL (ref 182–400)
GFR SERPLBLD CREATININE-BSD FMLA CKD-EPI: 40 ML/MIN/1.73/M2
GLUCOSE SERPL-MCNC: 258 MG/DL (ref 70–110)
HCT VFR BLD AUTO: 33.2 % (ref 37–48.5)
HGB BLD-MCNC: 10.7 GM/DL (ref 12–16)
IMM GRANULOCYTES # BLD AUTO: 0.1 K/UL (ref 0–0.04)
IMM GRANULOCYTES NFR BLD AUTO: 1 % (ref 0–0.5)
LYMPHOCYTES # BLD AUTO: 1.03 K/UL (ref 1–4.8)
MAGNESIUM SERPL-MCNC: 1.8 MG/DL (ref 1.6–2.6)
MCH RBC QN AUTO: 29.8 PG (ref 27–31)
MCHC RBC AUTO-ENTMCNC: 32.2 G/DL (ref 32–36)
MCV RBC AUTO: 93 FL (ref 82–98)
NUCLEATED RBC (/100WBC) (OHS): 0 /100 WBC
PHOSPHATE SERPL-MCNC: 3.4 MG/DL (ref 2.7–4.5)
PLATELET # BLD AUTO: 266 K/UL (ref 150–450)
PMV BLD AUTO: 12.2 FL (ref 9.2–12.9)
POCT GLUCOSE: 228 MG/DL (ref 70–110)
POCT GLUCOSE: 404 MG/DL (ref 70–110)
POCT GLUCOSE: 491 MG/DL (ref 70–110)
POCT GLUCOSE: >500 MG/DL (ref 70–110)
POTASSIUM SERPL-SCNC: 3.8 MMOL/L (ref 3.5–5.1)
RBC # BLD AUTO: 3.59 M/UL (ref 4–5.4)
RELATIVE EOSINOPHIL (OHS): 0.2 %
RELATIVE LYMPHOCYTE (OHS): 10.6 % (ref 18–48)
RELATIVE MONOCYTE (OHS): 3.3 % (ref 4–15)
RELATIVE NEUTROPHIL (OHS): 84.6 % (ref 38–73)
SODIUM SERPL-SCNC: 136 MMOL/L (ref 136–145)
TACROLIMUS BLD-MCNC: 8.6 NG/ML (ref 5–15)
WBC # BLD AUTO: 9.74 K/UL (ref 3.9–12.7)

## 2025-06-10 PROCEDURE — 86901 BLOOD TYPING SEROLOGIC RH(D): CPT | Performed by: CLINICAL NURSE SPECIALIST

## 2025-06-10 PROCEDURE — 6A551Z3 PHERESIS OF PLASMA, MULTIPLE: ICD-10-PCS | Performed by: PATHOLOGY

## 2025-06-10 PROCEDURE — 63600175 PHARM REV CODE 636 W HCPCS: Mod: TB | Performed by: CLINICAL NURSE SPECIALIST

## 2025-06-10 PROCEDURE — 63600175 PHARM REV CODE 636 W HCPCS: Mod: JZ,TB | Performed by: PATHOLOGY

## 2025-06-10 PROCEDURE — 27000207 HC ISOLATION

## 2025-06-10 PROCEDURE — 83735 ASSAY OF MAGNESIUM: CPT | Performed by: CLINICAL NURSE SPECIALIST

## 2025-06-10 PROCEDURE — 36415 COLL VENOUS BLD VENIPUNCTURE: CPT | Performed by: CLINICAL NURSE SPECIALIST

## 2025-06-10 PROCEDURE — 36514 APHERESIS PLASMA: CPT

## 2025-06-10 PROCEDURE — 63600175 PHARM REV CODE 636 W HCPCS: Performed by: NURSE PRACTITIONER

## 2025-06-10 PROCEDURE — 99223 1ST HOSP IP/OBS HIGH 75: CPT | Mod: AI,,, | Performed by: CLINICAL NURSE SPECIALIST

## 2025-06-10 PROCEDURE — 25000003 PHARM REV CODE 250: Performed by: CLINICAL NURSE SPECIALIST

## 2025-06-10 PROCEDURE — 85384 FIBRINOGEN ACTIVITY: CPT | Performed by: CLINICAL NURSE SPECIALIST

## 2025-06-10 PROCEDURE — 85025 COMPLETE CBC W/AUTO DIFF WBC: CPT | Performed by: CLINICAL NURSE SPECIALIST

## 2025-06-10 PROCEDURE — 20600001 HC STEP DOWN PRIVATE ROOM

## 2025-06-10 PROCEDURE — 94761 N-INVAS EAR/PLS OXIMETRY MLT: CPT

## 2025-06-10 PROCEDURE — P9045 ALBUMIN (HUMAN), 5%, 250 ML: HCPCS | Mod: JZ,TB | Performed by: PATHOLOGY

## 2025-06-10 PROCEDURE — 80069 RENAL FUNCTION PANEL: CPT | Performed by: CLINICAL NURSE SPECIALIST

## 2025-06-10 PROCEDURE — 25000003 PHARM REV CODE 250: Performed by: PATHOLOGY

## 2025-06-10 RX ORDER — MYCOPHENOLATE MOFETIL 250 MG/1
1000 CAPSULE ORAL 2 TIMES DAILY
Status: DISCONTINUED | OUTPATIENT
Start: 2025-06-10 | End: 2025-06-14 | Stop reason: HOSPADM

## 2025-06-10 RX ORDER — ONDANSETRON 8 MG/1
8 TABLET, ORALLY DISINTEGRATING ORAL EVERY 6 HOURS PRN
Status: DISCONTINUED | OUTPATIENT
Start: 2025-06-10 | End: 2025-06-14 | Stop reason: HOSPADM

## 2025-06-10 RX ORDER — INSULIN ASPART 100 [IU]/ML
0-5 INJECTION, SOLUTION INTRAVENOUS; SUBCUTANEOUS
Status: DISCONTINUED | OUTPATIENT
Start: 2025-06-10 | End: 2025-06-10

## 2025-06-10 RX ORDER — GLUCAGON 1 MG
1 KIT INJECTION
Status: DISCONTINUED | OUTPATIENT
Start: 2025-06-10 | End: 2025-06-11

## 2025-06-10 RX ORDER — INSULIN GLARGINE 100 [IU]/ML
13 INJECTION, SOLUTION SUBCUTANEOUS NIGHTLY
Status: DISCONTINUED | OUTPATIENT
Start: 2025-06-10 | End: 2025-06-11

## 2025-06-10 RX ORDER — INSULIN ASPART 100 [IU]/ML
6 INJECTION, SOLUTION INTRAVENOUS; SUBCUTANEOUS
Status: DISCONTINUED | OUTPATIENT
Start: 2025-06-10 | End: 2025-06-11

## 2025-06-10 RX ORDER — LOPERAMIDE HYDROCHLORIDE 2 MG/1
2 CAPSULE ORAL 4 TIMES DAILY PRN
Status: DISCONTINUED | OUTPATIENT
Start: 2025-06-10 | End: 2025-06-14 | Stop reason: HOSPADM

## 2025-06-10 RX ORDER — HEPARIN SODIUM 5000 [USP'U]/ML
5000 INJECTION, SOLUTION INTRAVENOUS; SUBCUTANEOUS EVERY 8 HOURS
Status: DISCONTINUED | OUTPATIENT
Start: 2025-06-10 | End: 2025-06-14 | Stop reason: HOSPADM

## 2025-06-10 RX ORDER — CHOLECALCIFEROL (VITAMIN D3) 25 MCG
2000 TABLET ORAL DAILY
Qty: 180 TABLET | Refills: 3 | Status: CANCELLED | OUTPATIENT
Start: 2025-06-04 | End: 2026-06-04

## 2025-06-10 RX ORDER — NAPROXEN SODIUM 220 MG/1
81 TABLET, FILM COATED ORAL DAILY
Status: DISCONTINUED | OUTPATIENT
Start: 2025-06-11 | End: 2025-06-14 | Stop reason: HOSPADM

## 2025-06-10 RX ORDER — INSULIN ASPART 100 [IU]/ML
0-10 INJECTION, SOLUTION INTRAVENOUS; SUBCUTANEOUS
Status: DISCONTINUED | OUTPATIENT
Start: 2025-06-10 | End: 2025-06-11

## 2025-06-10 RX ORDER — CALCITRIOL 0.25 UG/1
0.25 CAPSULE ORAL DAILY
Status: DISCONTINUED | OUTPATIENT
Start: 2025-06-11 | End: 2025-06-14 | Stop reason: HOSPADM

## 2025-06-10 RX ORDER — CHLORTHALIDONE 25 MG/1
25 TABLET ORAL NIGHTLY
Status: DISCONTINUED | OUTPATIENT
Start: 2025-06-10 | End: 2025-06-14 | Stop reason: HOSPADM

## 2025-06-10 RX ORDER — SODIUM CHLORIDE 0.9 % (FLUSH) 0.9 %
3 SYRINGE (ML) INJECTION
Status: DISCONTINUED | OUTPATIENT
Start: 2025-06-10 | End: 2025-06-14 | Stop reason: HOSPADM

## 2025-06-10 RX ORDER — METHIMAZOLE 5 MG/1
5 TABLET ORAL DAILY
Status: DISCONTINUED | OUTPATIENT
Start: 2025-06-11 | End: 2025-06-14 | Stop reason: HOSPADM

## 2025-06-10 RX ORDER — CARVEDILOL 12.5 MG/1
25 TABLET ORAL 2 TIMES DAILY
Status: DISCONTINUED | OUTPATIENT
Start: 2025-06-10 | End: 2025-06-14 | Stop reason: HOSPADM

## 2025-06-10 RX ORDER — SODIUM BICARBONATE 650 MG/1
650 TABLET ORAL 2 TIMES DAILY
Status: DISCONTINUED | OUTPATIENT
Start: 2025-06-10 | End: 2025-06-11

## 2025-06-10 RX ORDER — CHOLECALCIFEROL (VITAMIN D3) 25 MCG
2000 TABLET ORAL DAILY
Status: DISCONTINUED | OUTPATIENT
Start: 2025-06-11 | End: 2025-06-14 | Stop reason: HOSPADM

## 2025-06-10 RX ORDER — IBUPROFEN 200 MG
24 TABLET ORAL
Status: DISCONTINUED | OUTPATIENT
Start: 2025-06-10 | End: 2025-06-11

## 2025-06-10 RX ORDER — SULFAMETHOXAZOLE AND TRIMETHOPRIM 400; 80 MG/1; MG/1
1 TABLET ORAL DAILY
Status: DISCONTINUED | OUTPATIENT
Start: 2025-06-11 | End: 2025-06-14 | Stop reason: HOSPADM

## 2025-06-10 RX ORDER — TACROLIMUS 1 MG/1
3 CAPSULE ORAL 2 TIMES DAILY
Status: DISCONTINUED | OUTPATIENT
Start: 2025-06-10 | End: 2025-06-14 | Stop reason: HOSPADM

## 2025-06-10 RX ORDER — ATORVASTATIN CALCIUM 20 MG/1
20 TABLET, FILM COATED ORAL DAILY
Status: DISCONTINUED | OUTPATIENT
Start: 2025-06-11 | End: 2025-06-14 | Stop reason: HOSPADM

## 2025-06-10 RX ORDER — IBUPROFEN 200 MG
16 TABLET ORAL
Status: DISCONTINUED | OUTPATIENT
Start: 2025-06-10 | End: 2025-06-11

## 2025-06-10 RX ORDER — ALBUTEROL SULFATE 90 UG/1
2 INHALANT RESPIRATORY (INHALATION) EVERY 6 HOURS PRN
Status: DISCONTINUED | OUTPATIENT
Start: 2025-06-10 | End: 2025-06-14 | Stop reason: HOSPADM

## 2025-06-10 RX ORDER — ACETAMINOPHEN 325 MG/1
650 TABLET ORAL EVERY 6 HOURS PRN
Status: DISCONTINUED | OUTPATIENT
Start: 2025-06-10 | End: 2025-06-14 | Stop reason: HOSPADM

## 2025-06-10 RX ORDER — ALBUMIN HUMAN 50 G/1000ML
125 SOLUTION INTRAVENOUS ONCE
Status: COMPLETED | OUTPATIENT
Start: 2025-06-10 | End: 2025-06-10

## 2025-06-10 RX ORDER — FLUOXETINE 20 MG/1
40 CAPSULE ORAL DAILY
Status: DISCONTINUED | OUTPATIENT
Start: 2025-06-11 | End: 2025-06-14 | Stop reason: HOSPADM

## 2025-06-10 RX ORDER — CALCIUM GLUCONATE 20 MG/ML
2 INJECTION, SOLUTION INTRAVENOUS ONCE
Status: COMPLETED | OUTPATIENT
Start: 2025-06-10 | End: 2025-06-10

## 2025-06-10 RX ORDER — FAMOTIDINE 20 MG/1
20 TABLET, FILM COATED ORAL DAILY
Status: DISCONTINUED | OUTPATIENT
Start: 2025-06-11 | End: 2025-06-14 | Stop reason: HOSPADM

## 2025-06-10 RX ORDER — VALGANCICLOVIR 450 MG/1
450 TABLET, FILM COATED ORAL DAILY
Status: DISCONTINUED | OUTPATIENT
Start: 2025-06-11 | End: 2025-06-14 | Stop reason: HOSPADM

## 2025-06-10 RX ORDER — NIFEDIPINE 30 MG/1
30 TABLET, EXTENDED RELEASE ORAL 2 TIMES DAILY
Status: DISCONTINUED | OUTPATIENT
Start: 2025-06-10 | End: 2025-06-14 | Stop reason: HOSPADM

## 2025-06-10 RX ORDER — TALC
6 POWDER (GRAM) TOPICAL NIGHTLY PRN
Status: DISCONTINUED | OUTPATIENT
Start: 2025-06-10 | End: 2025-06-14 | Stop reason: HOSPADM

## 2025-06-10 RX ADMIN — ALBUMIN (HUMAN) 125 G: 12.5 SOLUTION INTRAVENOUS at 02:06

## 2025-06-10 RX ADMIN — INSULIN GLARGINE 13 UNITS: 100 INJECTION, SOLUTION SUBCUTANEOUS at 08:06

## 2025-06-10 RX ADMIN — NIFEDIPINE 30 MG: 30 TABLET, FILM COATED, EXTENDED RELEASE ORAL at 08:06

## 2025-06-10 RX ADMIN — METHYLPREDNISOLONE SODIUM SUCCINATE 336.5 MG: 500 INJECTION INTRAMUSCULAR; INTRAVENOUS at 12:06

## 2025-06-10 RX ADMIN — SODIUM BICARBONATE 650 MG TABLET 650 MG: at 08:06

## 2025-06-10 RX ADMIN — INSULIN ASPART 5 UNITS: 100 INJECTION, SOLUTION INTRAVENOUS; SUBCUTANEOUS at 08:06

## 2025-06-10 RX ADMIN — CARVEDILOL 25 MG: 12.5 TABLET, FILM COATED ORAL at 08:06

## 2025-06-10 RX ADMIN — CALCIUM GLUCONATE 2 G: 20 INJECTION, SOLUTION INTRAVENOUS at 02:06

## 2025-06-10 RX ADMIN — INSULIN ASPART 6 UNITS: 100 INJECTION, SOLUTION INTRAVENOUS; SUBCUTANEOUS at 05:06

## 2025-06-10 RX ADMIN — MYCOPHENOLATE MOFETIL 1000 MG: 250 CAPSULE ORAL at 08:06

## 2025-06-10 RX ADMIN — HEPARIN SODIUM 5000 UNITS: 5000 INJECTION INTRAVENOUS; SUBCUTANEOUS at 10:06

## 2025-06-10 RX ADMIN — CHLORTHALIDONE 25 MG: 25 TABLET ORAL at 08:06

## 2025-06-10 RX ADMIN — INSULIN ASPART 10 UNITS: 100 INJECTION, SOLUTION INTRAVENOUS; SUBCUTANEOUS at 05:06

## 2025-06-10 RX ADMIN — INSULIN ASPART 2 UNITS: 100 INJECTION, SOLUTION INTRAVENOUS; SUBCUTANEOUS at 12:06

## 2025-06-10 RX ADMIN — TACROLIMUS 3 MG: 1 CAPSULE ORAL at 05:06

## 2025-06-10 NOTE — HPI
Ms. Rocha is a 59 y.o. year old female with ESRD secondary to diabetic nephropathy now s/p DBD Kidney transplant 4/24/25. Received thymo induction. Progressed well post operatively with downtrending Cr. She takes mycophenolate mofetil, prednisone, and tacrolimus for maintenance immunosuppression. Post-transplant course as follows:  UTI: Urine cx 5/8 w/ E.coli ESBL. Completed course IV Ertapenem    Patient recently underwent a transplant kidney biopsy d/t elevated Cr. Biopsy 6/6/25 with 10 gloms 0 sclerosed. Minimal 5% fibrosis. Focal severe arteriosclerosis. Moderate micro circulatory changes with positive c4d suggestive of ABMR. No ACR or AVR. No viral changes. DSA Class II positive: DPA1*03:01(6758) DR7(3080), DQ2(1713). Discussed case with Dr. Dunbar, plan to treat ABMR with SMP and PLEX followed by IVIG and Velcade. Will consult Apheresis.

## 2025-06-10 NOTE — NURSING
Patient arrived to the unit. DR Dunbar at the bedside. Patient is AAOx3, independent. Patient reports having diarrhea at home. [facility]TS is aware. Reminded the patient to call for assistance.

## 2025-06-10 NOTE — ASSESSMENT & PLAN NOTE
- chronic in nature, follows with pulmonology outpt. Seen Jan 2025 by pulm, dyspnea at that time felt due to chronic diastolic heart failure and end-stage renal disease on hemodialysis

## 2025-06-10 NOTE — SUBJECTIVE & OBJECTIVE
Subjective:     Chief Complaint/Reason for Admission: Acute kidney allograft rejection    History of Present Illness:  Ms. Rocha is a 59 y.o. year old female with ESRD secondary to diabetic nephropathy now s/p DBD Kidney transplant 4/24/25. Received thymo induction. Progressed well post operatively with downtrending Cr. She takes mycophenolate mofetil, prednisone, and tacrolimus for maintenance immunosuppression. Post-transplant course as follows:  UTI: Urine cx 5/8 w/ E.coli ESBL. Completed course IV Ertapenem    Patient recently underwent a transplant kidney biopsy d/t elevated Cr. Biopsy 6/6/25 with 10 gloms 0 sclerosed. Minimal 5% fibrosis. Focal severe arteriosclerosis. Moderate micro circulatory changes with positive c4d suggestive of ABMR. No ACR or AVR. No viral changes. DSA Class II positive: DPA1*03:01(6758) DR7(3080), DQ2(9373). Discussed case with Dr. Dunbar, plan to treat ABMR with SMP and PLEX followed by IVIG and Velcade. Will consult Apheresis.       PTA Medications   Medication Sig    albuterol (PROVENTIL/VENTOLIN HFA) 90 mcg/actuation inhaler Inhale 2 puffs into the lungs every 6 (six) hours as needed.    aspirin (ASPIR-81 ORAL) Take 81 mg by mouth once daily. As of 5/13/25 On HOLD for kidney biopsy    atorvastatin (LIPITOR) 20 MG tablet Take 1 tablet (20 mg total) by mouth once daily.    blood sugar diagnostic Strp To check BG 3 times daily, to use with insurance preferred meter    blood-glucose meter Misc To check BG 3 times daily, to use with insurance preferred meter    calcitRIOL (ROCALTROL) 0.25 MCG Cap Take 1 capsule (0.25 mcg total) by mouth once daily.    carvediloL (COREG) 25 MG tablet Take 1 tablet (25 mg total) by mouth 2 (two) times daily.    chlorthalidone (HYGROTEN) 25 MG Tab Take 1 tablet (25 mg total) by mouth nightly.    famotidine (PEPCID) 20 MG tablet Take 1 tablet (20 mg total) by mouth once daily. (Patient not taking: Reported on 6/4/2025)    FLUoxetine 40 MG capsule Take  "1 capsule (40 mg total) by mouth once daily.    hydrOXYzine pamoate (VISTARIL) 25 MG Cap Take 1 capsule (25 mg total) by mouth every 8 (eight) hours as needed (insomnia).    insulin aspart U-100 (NOVOLOG) 100 unit/mL (3 mL) InPn pen Inject 5 Units into the skin 3 (three) times daily. Plus low sliding scale. Max units per day: 30    insulin glargine U-100, Lantus, 100 unit/mL (3 mL) SubQ InPn pen Inject 13 Units into the skin every evening.    lancets 33 gauge Misc To check BG 3 times daily, to use with insurance preferred meter    linaGLIPtin (TRADJENTA) 5 mg Tab tablet Take 1 tablet (5 mg total) by mouth once daily.    methIMAzole (TAPAZOLE) 5 MG Tab Take 1 tablet (5 mg total) by mouth once daily.    multivitamin Tab Take 1 tablet by mouth once daily.    mycophenolate (CELLCEPT) 250 mg Cap Take 4 capsules (1,000 mg total) by mouth 2 (two) times daily. Z94.0;Kidney transplant on 4/24/2025    NIFEdipine (PROCARDIA-XL) 30 MG (OSM) 24 hr tablet Take 1 tablet (30 mg total) by mouth 2 (two) times a day.    oxybutynin (DITROPAN) 5 MG Tab Take 1 tablet (5 mg total) by mouth 3 (three) times daily. (Patient not taking: Reported on 6/4/2025)    pen needle, diabetic 32 gauge x 5/32" Ndle Inject 1 Needle into the skin 4 (four) times daily.    predniSONE (DELTASONE) 5 MG tablet Take 1 tablet (5 mg total) by mouth once daily. DO NOT DISCONTINUE    sodium bicarbonate 650 MG tablet Take 1 tablet (650 mg total) by mouth 2 (two) times daily.    sulfamethoxazole-trimethoprim 400-80mg (BACTRIM,SEPTRA) 400-80 mg per tablet Take 1 tablet by mouth once daily. Stop 10/24/25    tacrolimus (PROGRAF) 0.5 MG Cap Take 6 capsules (3 mg total) by mouth every 12 (twelve) hours. Z94.0;Kidney txp on 4/24/25    valGANciclovir (VALCYTE) 450 mg Tab Take 1 tablet (450 mg total) by mouth once daily. Stop 7/24/25    vitamin D (VITAMIN D3) 1000 units Tab Take 2 tablets (2,000 Units total) by mouth once daily.       Review of patient's allergies indicates: "   Allergen Reactions    Latex Itching    Penicillins Hives and Rash       Past Medical History:   Diagnosis Date    Anemia     Anxiety     Diabetes mellitus     Disorder of kidney and ureter     Encounter for blood transfusion     GERD (gastroesophageal reflux disease)     Hydronephrosis     Hyperlipidemia     Hypertension     Hyperthyroidism     Obesity     Thyroid disease      Past Surgical History:   Procedure Laterality Date    ARTHROSCOPY OF KNEE Right     AV FISTULA PLACEMENT Left 06/12/2019    Procedure: CREATION, AV FISTULA - Basilic;  Surgeon: Bautista Vasques MD;  Location: [facility];  Service: Vascular;  Laterality: Left;    BREAST BIOPSY Left     BREAST SURGERY Left     biopsy    ENDOBRONCHIAL ULTRASOUND N/A 2/27/2024    Procedure: ENDOBRONCHIAL ULTRASOUND (EBUS);  Surgeon: Dick Harding MD;  Location: [facility] OR [address] [place] [address];  Service: Pulmonary;  Laterality: N/A;    HYSTERECTOMY  2017    TLH/BSO    KIDNEY TRANSPLANT N/A 4/24/2025    Procedure: TRANSPLANT, KIDNEY;  Surgeon: Shandra Garcia MD;  Location: [facility] OR [address] [place] [address];  Service: Transplant;  Laterality: N/A;    KNEE SURGERY Right     URETERAL STENT PLACEMENT Bilateral     10/2018     Family History       Problem Relation (Age of Onset)    Breast cancer Paternal Aunt    Cataracts Mother    Diabetes Father, Brother, Maternal Aunt    Heart attack Cousin    Heart disease Father    Heart failure Cousin    Hypertension Father    No Known Problems Brother, Maternal Uncle, Paternal Uncle, Maternal Grandmother, Maternal Grandfather, Paternal Grandmother, Paternal Grandfather, Other    Ovarian cancer Sister          Tobacco Use    Smoking status: Never    Smokeless tobacco: Never   Substance and Sexual Activity    Alcohol use: Never    Drug use: Never    Sexual activity: Not on file        Review of Systems   Constitutional:  Positive for appetite change and fatigue.   HENT: Negative.     Respiratory:  Positive for shortness of breath.    Cardiovascular:   "Negative for leg swelling.   Gastrointestinal:  Positive for diarrhea. Negative for abdominal distention, abdominal pain, constipation and vomiting.   Genitourinary:  Negative for dysuria.   Skin:  Negative for wound.   Allergic/Immunologic: Positive for immunocompromised state.   Psychiatric/Behavioral:  Negative for agitation and confusion.      Objective:     Vital Signs (Most Recent):  Temp: 98.5 °F (36.9 °C) (06/10/25 1115)  Pulse: 79 (06/10/25 1115)  Resp: 18 (06/10/25 1115)  BP: (!) 119/57 (06/10/25 1115)  SpO2: 100 % (06/10/25 1115)  Height: 5' 4" (162.6 cm)  Weight: 67.3 kg (148 lb 4.2 oz)  Body mass index is 25.45 kg/m².      Physical Exam  Vitals and nursing note reviewed.   Constitutional:       Appearance: Normal appearance.   Cardiovascular:      Rate and Rhythm: Normal rate.   Pulmonary:      Effort: Pulmonary effort is normal.   Abdominal:      General: Bowel sounds are normal.      Palpations: Abdomen is soft.      Tenderness: There is no abdominal tenderness.      Comments: Well healed kidney txp incision   Neurological:      Mental Status: She is alert and oriented to person, place, and time. Mental status is at baseline.   Psychiatric:         Mood and Affect: Mood normal.         Behavior: Behavior normal.          Laboratory  CBC:   Recent Labs   Lab 06/09/25  0840 06/10/25  1215   WBC 7.88 9.74   RBC 3.70* 3.59*   HGB 10.9* 10.7*   HCT 34.4* 33.2*    266   MCV 93 93   MCH 29.5 29.8   MCHC 31.7* 32.2     CMP:   Recent Labs   Lab 06/05/25  0851 06/09/25  0840 06/10/25  1215   * 189* 258*   CALCIUM 9.7 9.7 9.0   ALBUMIN  --  4.0  3.9 4.1   PROT  --  8.2  --     139 136   K 4.4 4.1 3.8   CO2 19* 19* 19*    106 107   BUN 27* 23* 20   CREATININE 1.8* 1.6* 1.5*   ALKPHOS  --  88  --    ALT  --  17  --    AST  --  18  --        Diagnostic Results:  None      "

## 2025-06-10 NOTE — PROCEDURES
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 year old female with ESRD secondary to diabetic nephropathy now s/p DBD Kidney transplant 4/24/25. Received thymo induction. Progressed well post operatively with downtrending Cr. She takes mycophenolate mofetil, prednisone, and tacrolimus for maintenance immunosuppression. Patient recently underwent a transplant kidney biopsy d/t elevated Cr. Biopsy 6/6/25 with 10 gloms 0 sclerosed. Minimal 5% fibrosis. Focal severe arteriosclerosis. Moderate micro circulatory changes with positive c4d suggestive of ABMR. No ACR or AVR. No viral changes. DSA Class II positive: DPA1*03:01(6758) DR7(3080), DQ2(1713). Patient admitted for treatment of ABMR with SMP and PLEX, followed by IVIG and Velcade. Plasma exchange requested by Kidney Transplant Service to antibody mediated kidney transplant rejection.     Today's procedure (#1 of 5) was well tolerated and without complications. Next treatment scheduled for 6/11/2025.      Pertinent Laboratory Data:   Complete Blood Count:   Lab Results   Component Value Date    HGB 10.7 (L) 06/10/2025    HCT 33.2 (L) 06/10/2025     06/10/2025    WBC 9.74 06/10/2025     Basic Metabolic Panel:   Lab Results   Component Value Date     06/10/2025    K 3.8 06/10/2025     06/10/2025    CO2 19 (L) 06/10/2025     (H) 06/10/2025    BUN 20 06/10/2025    CREATININE 1.5 (H) 06/10/2025    CALCIUM 9.0 06/10/2025    ANIONGAP  10 06/10/2025    ESTGFRAFRICA 6.6 (A) 11/05/2020    EGFRNONAA 5.7 (A) 11/05/2020     Comprehensive Metabolic Panel:   Lab Results   Component Value Date     06/10/2025    K 3.8 06/10/2025     06/10/2025    CO2 19 (L) 06/10/2025     (H) 06/10/2025    BUN 20 06/10/2025    CREATININE 1.5 (H) 06/10/2025    CALCIUM 9.0 06/10/2025    PROT 8.2 06/09/2025    ALBUMIN 4.1 06/10/2025    BILITOT 0.6 06/09/2025    ALKPHOS 88 06/09/2025    AST 18 06/09/2025    ALT 17 06/09/2025    ANIONGAP 10 06/10/2025    EGFRNONAA 5.7 (A) 11/05/2020       Pertinent Medications: None contraindicated in PLEX    Review of patient's allergies indicates:   Allergen Reactions    Latex Itching    Penicillins Hives and Rash       Anesthesia: None     Technical Procedures Used: Plasma Exchange: Volume exchanged - 2.5 Liters; Replacement fluid - Albumin; Number of procedures - 1; Date of next procedure - 6/11/2025.    Description of the Findings of the Procedure:     Please see Apheresis Nurse flowsheet for details.    The patient was evaluated and all clinical and laboratory data relevant to the treatment was reviewed, and a decision was made to proceed with the Apheresis procedure.    I was available to the clinical staff throughout the procedure.    Significant Surgical Tasks Conducted by the Assistant(s): Not applicable    Complications: None    Estimated Blood Loss (EBL): None    Implants: None     Specimens: None

## 2025-06-10 NOTE — ASSESSMENT & PLAN NOTE
- Continue prograf, cellcept  - Check prograf level daily and adjust for therapeutic dosage. Monitor for toxic side effects

## 2025-06-10 NOTE — H&P
[facility] - Transplant Stepdown  Kidney Transplant  H&P      Subjective:     Chief Complaint/Reason for Admission: Acute kidney allograft rejection    History of Present Illness:  Ms. Rocha is a 59 y.o. year old female with ESRD secondary to diabetic nephropathy now s/p DBD Kidney transplant 4/24/25. Received thymo induction. Progressed well post operatively with downtrending Cr. She takes mycophenolate mofetil, prednisone, and tacrolimus for maintenance immunosuppression. Post-transplant course as follows:  UTI: Urine cx 5/8 w/ E.coli ESBL. Completed course IV Ertapenem    Patient recently underwent a transplant kidney biopsy d/t elevated Cr. Biopsy 6/6/25 with 10 gloms 0 sclerosed. Minimal 5% fibrosis. Focal severe arteriosclerosis. Moderate micro circulatory changes with positive c4d suggestive of ABMR. No ACR or AVR. No viral changes. DSA Class II positive: DPA1*03:01(6758) DR7(3080), DQ2(6123). Discussed case with Dr. Dunbar, plan to treat ABMR with SMP and PLEX followed by IVIG and Velcade. Will consult Apheresis.       PTA Medications   Medication Sig    albuterol (PROVENTIL/VENTOLIN HFA) 90 mcg/actuation inhaler Inhale 2 puffs into the lungs every 6 (six) hours as needed.    aspirin (ASPIR-81 ORAL) Take 81 mg by mouth once daily. As of 5/13/25 On HOLD for kidney biopsy    atorvastatin (LIPITOR) 20 MG tablet Take 1 tablet (20 mg total) by mouth once daily.    blood sugar diagnostic Strp To check BG 3 times daily, to use with insurance preferred meter    blood-glucose meter Misc To check BG 3 times daily, to use with insurance preferred meter    calcitRIOL (ROCALTROL) 0.25 MCG Cap Take 1 capsule (0.25 mcg total) by mouth once daily.    carvediloL (COREG) 25 MG tablet Take 1 tablet (25 mg total) by mouth 2 (two) times daily.    chlorthalidone (HYGROTEN) 25 MG Tab Take 1 tablet (25 mg total) by mouth nightly.    famotidine (PEPCID) 20 MG tablet Take 1 tablet (20 mg total) by mouth once daily. (Patient not  "taking: Reported on 6/4/2025)    FLUoxetine 40 MG capsule Take 1 capsule (40 mg total) by mouth once daily.    hydrOXYzine pamoate (VISTARIL) 25 MG Cap Take 1 capsule (25 mg total) by mouth every 8 (eight) hours as needed (insomnia).    insulin aspart U-100 (NOVOLOG) 100 unit/mL (3 mL) InPn pen Inject 5 Units into the skin 3 (three) times daily. Plus low sliding scale. Max units per day: 30    insulin glargine U-100, Lantus, 100 unit/mL (3 mL) SubQ InPn pen Inject 13 Units into the skin every evening.    lancets 33 gauge Misc To check BG 3 times daily, to use with insurance preferred meter    linaGLIPtin (TRADJENTA) 5 mg Tab tablet Take 1 tablet (5 mg total) by mouth once daily.    methIMAzole (TAPAZOLE) 5 MG Tab Take 1 tablet (5 mg total) by mouth once daily.    multivitamin Tab Take 1 tablet by mouth once daily.    mycophenolate (CELLCEPT) 250 mg Cap Take 4 capsules (1,000 mg total) by mouth 2 (two) times daily. Z94.0;Kidney transplant on 4/24/2025    NIFEdipine (PROCARDIA-XL) 30 MG (OSM) 24 hr tablet Take 1 tablet (30 mg total) by mouth 2 (two) times a day.    oxybutynin (DITROPAN) 5 MG Tab Take 1 tablet (5 mg total) by mouth 3 (three) times daily. (Patient not taking: Reported on 6/4/2025)    pen needle, diabetic 32 gauge x 5/32" Ndle Inject 1 Needle into the skin 4 (four) times daily.    predniSONE (DELTASONE) 5 MG tablet Take 1 tablet (5 mg total) by mouth once daily. DO NOT DISCONTINUE    sodium bicarbonate 650 MG tablet Take 1 tablet (650 mg total) by mouth 2 (two) times daily.    sulfamethoxazole-trimethoprim 400-80mg (BACTRIM,SEPTRA) 400-80 mg per tablet Take 1 tablet by mouth once daily. Stop 10/24/25    tacrolimus (PROGRAF) 0.5 MG Cap Take 6 capsules (3 mg total) by mouth every 12 (twelve) hours. Z94.0;Kidney txp on 4/24/25    valGANciclovir (VALCYTE) 450 mg Tab Take 1 tablet (450 mg total) by mouth once daily. Stop 7/24/25    vitamin D (VITAMIN D3) 1000 units Tab Take 2 tablets (2,000 Units total) by " mouth once daily.       Review of patient's allergies indicates:   Allergen Reactions    Latex Itching    Penicillins Hives and Rash       Past Medical History:   Diagnosis Date    Anemia     Anxiety     Diabetes mellitus     Disorder of kidney and ureter     Encounter for blood transfusion     GERD (gastroesophageal reflux disease)     Hydronephrosis     Hyperlipidemia     Hypertension     Hyperthyroidism     Obesity     Thyroid disease      Past Surgical History:   Procedure Laterality Date    ARTHROSCOPY OF KNEE Right     AV FISTULA PLACEMENT Left 06/12/2019    Procedure: CREATION, AV FISTULA - Basilic;  Surgeon: Bautista Vasques MD;  Location: [facility];  Service: Vascular;  Laterality: Left;    BREAST BIOPSY Left     BREAST SURGERY Left     biopsy    ENDOBRONCHIAL ULTRASOUND N/A 2/27/2024    Procedure: ENDOBRONCHIAL ULTRASOUND (EBUS);  Surgeon: Dick Harding MD;  [facility];  Service: Pulmonary;  Laterality: N/A;    HYSTERECTOMY  2017    TLH/BSO    KIDNEY TRANSPLANT N/A 4/24/2025    Procedure: TRANSPLANT, KIDNEY;  Surgeon: Shandra Garcia MD;  [facility];  Service: Transplant;  Laterality: N/A;    KNEE SURGERY Right     URETERAL STENT PLACEMENT Bilateral     10/2018     Family History       Problem Relation (Age of Onset)    Breast cancer Paternal Aunt    Cataracts Mother    Diabetes Father, Brother, Maternal Aunt    Heart attack Cousin    Heart disease Father    Heart failure Cousin    Hypertension Father    No Known Problems Brother, Maternal Uncle, Paternal Uncle, Maternal Grandmother, Maternal Grandfather, Paternal Grandmother, Paternal Grandfather, Other    Ovarian cancer Sister          Tobacco Use    Smoking status: Never    Smokeless tobacco: Never   Substance and Sexual Activity    Alcohol use: Never    Drug use: Never    Sexual activity: Not on file        Review of Systems   Constitutional:  Positive for appetite change and fatigue.   HENT: Negative.    "  Respiratory:  Positive for shortness of breath.    Cardiovascular:  Negative for leg swelling.   Gastrointestinal:  Positive for diarrhea. Negative for abdominal distention, abdominal pain, constipation and vomiting.   Genitourinary:  Negative for dysuria.   Skin:  Negative for wound.   Allergic/Immunologic: Positive for immunocompromised state.   Psychiatric/Behavioral:  Negative for agitation and confusion.      Objective:     Vital Signs (Most Recent):  Temp: 98.5 °F (36.9 °C) (06/10/25 1115)  Pulse: 79 (06/10/25 1115)  Resp: 18 (06/10/25 1115)  BP: (!) 119/57 (06/10/25 1115)  SpO2: 100 % (06/10/25 1115)  Height: 5' 4" (162.6 cm)  Weight: 67.3 kg (148 lb 4.2 oz)  Body mass index is 25.45 kg/m².      Physical Exam  Vitals and nursing note reviewed.   Constitutional:       Appearance: Normal appearance.   Cardiovascular:      Rate and Rhythm: Normal rate.   Pulmonary:      Effort: Pulmonary effort is normal.   Abdominal:      General: Bowel sounds are normal.      Palpations: Abdomen is soft.      Tenderness: There is no abdominal tenderness.      Comments: Well healed kidney txp incision   Neurological:      Mental Status: She is alert and oriented to person, place, and time. Mental status is at baseline.   Psychiatric:         Mood and Affect: Mood normal.         Behavior: Behavior normal.          Laboratory  CBC:   Recent Labs   Lab 06/09/25  0840 06/10/25  1215   WBC 7.88 9.74   RBC 3.70* 3.59*   HGB 10.9* 10.7*   HCT 34.4* 33.2*    266   MCV 93 93   MCH 29.5 29.8   MCHC 31.7* 32.2     CMP:   Recent Labs   Lab 06/05/25  0851 06/09/25  0840 06/10/25  1215   * 189* 258*   CALCIUM 9.7 9.7 9.0   ALBUMIN  --  4.0  3.9 4.1   PROT  --  8.2  --     139 136   K 4.4 4.1 3.8   CO2 19* 19* 19*    106 107   BUN 27* 23* 20   CREATININE 1.8* 1.6* 1.5*   ALKPHOS  --  88  --    ALT  --  17  --    AST  --  18  --        Diagnostic Results:  None      Assessment/Plan:     Pulmonary  Shortness of " "breath  - chronic in nature, follows with pulmonology outpt. Seen Jan 2025 by pulm, dyspnea at that time felt due to chronic diastolic heart failure and end-stage renal disease on hemodialysis     Cardiac/Vascular  Chronic diastolic heart failure  - Echo pending      Hypertension  - continue home antihypertensives      Renal/  * Antibody mediated rejection of kidney transplant  - Confirmed on biopsy 6/6. Class II DSA +  - Plan for SMP X 3 plus 5 PLEX, followed by IVIG and Velcade  - Daily labs      S/P kidney transplant  - see "acute rejection"      ID  At risk for opportunistic infections  - OI prophylaxis per transplant protocol      Immunology/Multi System  Prophylactic immunotherapy  - Continue prograf, cellcept  - Check prograf level daily and adjust for therapeutic dosage. Monitor for toxic side effects       Oncology  Anemia of chronic disease  - CBC stable on admit  - monitor daily      Endocrine  Hyperthyroidism  - continue methimazole      Type 2 diabetes mellitus with chronic kidney disease on chronic dialysis, with long-term current use of insulin  - Endocrine consulted, appreciate assistance      GI  Diarrhea  - reports previous issues with diarrhea prior to transplant d/t phos binders  - Was prescribed linzess in past by her PCP with some improvement, will order inpatient  - No stool studies on file, check C diff (recently completed abx therapy for UTI)      Discharge Planning:  Not suitable for discharge at this time    Medical decision making for this encounter includes review of pertinent labs and diagnostic studies, assessment and planning, discussions with consulting providers, discussion with patient/family, and participation in multidisciplinary rounds. Time spent caring for patient: 60 minutes    Britton Celestin NP  Kidney Transplant  [facility] - Transplant Stepdown    "

## 2025-06-10 NOTE — CONSULTS
[facility] - Transplant Stepdown  Transfusion Medicine  Consult Note    Patient Name: Kiesha Rocha  MRN: [id number]  Admission Date: 6/10/2025  Hospital Length of Stay: 0 days  Attending Physician: Toribio Dunbar Jr.*  Primary Care Provider: Laverne Azevedo NP     Inpatient consult to [facility]  Consult performed by: Cecille Hernandez MD  Consult ordered by: Britton Celestin NP        Subjective:     Principal Problem: Antibody mediated kidney transplant rejection    History of Present Illness: Kiesha Rocha is a 59 y.o. year old female with ESRD secondary to diabetic nephropathy now s/p DBD Kidney transplant 4/24/25. Received thymo induction. Progressed well post operatively with downtrending Cr. She takes mycophenolate mofetil, prednisone, and tacrolimus for maintenance immunosuppression. Patient recently underwent a transplant kidney biopsy d/t elevated Cr. Biopsy 6/6/25 with 10 gloms 0 sclerosed. Minimal 5% fibrosis. Focal severe arteriosclerosis. Moderate micro circulatory changes with positive c4d suggestive of ABMR. No ACR or AVR. No viral changes. DSA Class II positive: DPA1*03:01(6758) DR7(3080), DQ2(1713). Patient admitted for treatment of ABMR with SMP and PLEX, followed by IVIG and Velcade. Plasma exchange requested by Kidney Transplant Service to antibody mediated kidney transplant rejection.    PMH and PSH reviewed 06/10/2025 and relevant items addressed in HPI.    Past Medical History:   Diagnosis Date    Anemia     Anxiety     Diabetes mellitus     Disorder of kidney and ureter     Encounter for blood transfusion     GERD (gastroesophageal reflux disease)     Hydronephrosis     Hyperlipidemia     Hypertension     Hyperthyroidism     Obesity     Thyroid disease      Past Surgical History:   Procedure Laterality Date    ARTHROSCOPY OF KNEE Right     AV FISTULA PLACEMENT Left 06/12/2019    Procedure: CREATION, AV FISTULA - Basilic;  Surgeon: Bautista Vasques MD;   Location: [facility];  Service: Vascular;  Laterality: Left;    BREAST BIOPSY Left     BREAST SURGERY Left     biopsy    ENDOBRONCHIAL ULTRASOUND N/A 2/27/2024    Procedure: ENDOBRONCHIAL ULTRASOUND (EBUS);  Surgeon: Dick Harding MD;  [facility];  Service: Pulmonary;  Laterality: N/A;    HYSTERECTOMY  2017    TLH/BSO    KIDNEY TRANSPLANT N/A 4/24/2025    Procedure: TRANSPLANT, KIDNEY;  Surgeon: Shandra Garcia MD;  [facility];  Service: Transplant;  Laterality: N/A;    KNEE SURGERY Right     URETERAL STENT PLACEMENT Bilateral     10/2018     Review of patient's allergies indicates:   Allergen Reactions    Latex Itching    Penicillins Hives and Rash       All medications reviewed 06/10/2025 and ace inhibitors not identified.    Scheduled Meds:   [START ON 6/11/2025] aspirin  81 mg Oral Daily    [START ON 6/11/2025] atorvastatin  20 mg Oral Daily    [START ON 6/11/2025] calcitRIOL  0.25 mcg Oral Daily    carvediloL  25 mg Oral BID    chlorthalidone  25 mg Oral Nightly    [START ON 6/11/2025] famotidine  20 mg Oral Daily    [START ON 6/11/2025] FLUoxetine  40 mg Oral Daily    heparin (porcine)  5,000 Units Subcutaneous Q8H    insulin glargine U-100 (Lantus)  13 Units Subcutaneous QHS    [START ON 6/11/2025] linaCLOtide  72 mcg Oral Before breakfast    [START ON 6/11/2025] methIMAzole  5 mg Oral Daily    mycophenolate  1,000 mg Oral BID    NIFEdipine  30 mg Oral BID    sodium bicarbonate  650 mg Oral BID    [START ON 6/11/2025] sulfamethoxazole-trimethoprim 400-80mg  1 tablet Oral Daily    tacrolimus  3 mg Oral BID    [START ON 6/11/2025] valGANciclovir  450 mg Oral Daily    [START ON 6/11/2025] vitamin D  2,000 Units Oral Daily     Continuous Infusions:  PRN Meds:.  Current Facility-Administered Medications:     acetaminophen, 650 mg, Oral, Q6H PRN    albuterol, 2 puff, Inhalation, Q6H PRN    dextrose 50%, 12.5 g, Intravenous, PRN    dextrose 50%, 25 g, Intravenous, PRN    glucagon  (human recombinant), 1 mg, Intramuscular, PRN    glucose, 16 g, Oral, PRN    glucose, 24 g, Oral, PRN    insulin aspart U-100, 0-5 Units, Subcutaneous, QID (AC + HS) PRN    loperamide, 2 mg, Oral, QID PRN    melatonin, 6 mg, Oral, Nightly PRN    ondansetron, 8 mg, Oral, Q6H PRN    sodium chloride 0.9%, 3 mL, Intravenous, PRN      Family History       Problem Relation (Age of Onset)    Breast cancer Paternal Aunt    Cataracts Mother    Diabetes Father, Brother, Maternal Aunt    Heart attack Cousin    Heart disease Father    Heart failure Cousin    Hypertension Father    No Known Problems Brother, Maternal Uncle, Paternal Uncle, Maternal Grandmother, Maternal Grandfather, Paternal Grandmother, Paternal Grandfather, Other    Ovarian cancer Sister          Tobacco Use    Smoking status: Never    Smokeless tobacco: Never   Substance and Sexual Activity    Alcohol use: Never    Drug use: Never    Sexual activity: Not on file     Review of Systems:  As above in HPI    Objective:     Vital Signs (Most Recent):  Temp: 98.5 °F (36.9 °C) (06/10/25 1115)  Pulse: 79 (06/10/25 1115)  Resp: 18 (06/10/25 1115)  BP: (!) 119/57 (06/10/25 1115)  SpO2: 100 % (06/10/25 1115) Vital Signs (24h Range):  Temp:  [98.5 °F (36.9 °C)] 98.5 °F (36.9 °C)  Pulse:  [79] 79  Resp:  [18] 18  SpO2:  [100 %] 100 %  BP: (119)/(57) 119/57     Weight: 67.3 kg (148 lb 4.2 oz)    Physical Exam  Vitals, nursing and provider notes reviewed    Significant Labs:   BMP:   Recent Labs   Lab 06/10/25  1215   *      K 3.8      CO2 19*   BUN 20   CREATININE 1.5*   CALCIUM 9.0   MG 1.8     CBC:   Recent Labs   Lab 06/09/25  0840 06/10/25  1215   WBC 7.88 9.74   HGB 10.9* 10.7*   HCT 34.4* 33.2*    266     CMP:   Recent Labs   Lab 06/09/25  0840 06/10/25  1215    136   K 4.1 3.8    107   CO2 19* 19*   * 258*   BUN 23* 20   CREATININE 1.6* 1.5*   CALCIUM 9.7 9.0   PROT 8.2  --    ALBUMIN 4.0  3.9 4.1   BILITOT 0.6  --   "  ALKPHOS 88  --    AST 18  --    ALT 17  --    ANIONGAP 14 10     Coagulation: No results for input(s): "PT", "INR", "APTT" in the last 48 hours.    Fibrinogen   Date Value Ref Range Status   06/10/2025 309 182 - 400 mg/dL Final     Assessment/Plan:     Antibody mediated kidney transplant rejection carries a Category I Grade 1B indication for therapeutic apheresis via the 2023 Journal of Clinical Apheresis Guidelines (Carolina EDDY et al. Journal of Clinical Apheresis 2023; 38:.)     The TPE plan is as follows: Plasma exchange, 1x volume, with albumin, 5 procedures over 7 days.  Please draw daily CBC, CMP, and Fibrinogen.  Procedures may be held for fibrinogen < 90 mg/dL.     There are no hospital problems to display for this patient.      Cecille Hernandez M.D., Ph.D.  Section of Transfusion Medicine & Blood Donor Services  Department of Pathology and Laboratory Medicine  [facility]  [phone] (Blood Bank)  06/10/2025    Cecille Hernandez MD  Transfusion Medicine  [facility] - Transplant Stepdown   "

## 2025-06-10 NOTE — UM SECONDARY REVIEW
"Admit for [facility]    antibody rejection. Needs admission and treatment.   10 gloms 0 sclerosed. Minimal 5% fibrosis. Focal severe arteriosclerosis. Moderate micro circulatory changes with positive c4d suggestive of ABMR. No ACR or AVR. No viral changes."  "

## 2025-06-10 NOTE — PROGRESS NOTES
Pt presented to the outpt apheresis dept in a wheelchair, accompanied by ZEKE Sanderson, [facility] escort staff.  She is oriented x4, states no pain.  EFLICIANO fistula successfully accessed with 2, 16 gauge dialysis needles, + for blood return, + for thrill.  Apheresis tx #1 started at 14:41 without difficulty.  2.5 liter plasma exchange.  Replacement fluids 5% albumin.  2 grams of calcium gluconate given IVPB over duration of exchange.  Tx ended at 15:30.  Both needles removed from fistula, dressings applied appropriately after hemostasis achieved, + thrill  Pt tolerated tx well.  Next tx 06/11/2025.  Report sent to [facility] staff nurse BRYON Luna RN, via secure chat.  Pt exited dept in a wheelchair accompanied by ZEKE Sanderson, going back to her room, [id number].

## 2025-06-10 NOTE — NURSING
Patient transported off the unit to the [facility] for Plex treatment in stable condition.

## 2025-06-10 NOTE — TELEPHONE ENCOUNTER
"A phone call to pt, her daughter Tere answered the phone. Per Dr. Dunbar: "She has antibody rejection. Needs admission and treatment.   10 gloms 0 sclerosed. Minimal 5% fibrosis. Focal severe arteriosclerosis. Moderate micro circulatory changes with positive c4d suggestive of ABMR. No ACR or AVR. No viral changes."    Pt's daughter told me that they will be here at 9 am.  Report sent to coordinator on call and Jessenia was notified by Dr. Dunbar.  "

## 2025-06-10 NOTE — ASSESSMENT & PLAN NOTE
- Confirmed on biopsy 6/6. Class II DSA +  - Plan for SMP X 3 plus 5 PLEX, followed by IVIG and Velcade  - Daily labs

## 2025-06-10 NOTE — HPI
Reason for Consult: Management of T2DM, Hyperglycemia     Surgical Procedure and Date: Kidney transplant 4/24/25    Diabetes diagnosis year: > 10 years ago     Home Diabetes Medications:  Novolog 5 units TID with meals, Lantus 13 units q HS, Tradjenta 5 mg once daily     Lab Results   Component Value Date    HGBA1C 5.4 04/24/2025       How often checking glucose at home? 4x daily   BG readings on regimen: variable low 100s-500  Hypoglycemia on the regimen?  Yes-once dropped into the 50s  Missed doses on regimen?  No       Diabetes Complications include:     Hyperglycemia     Complicating diabetes co morbidities:   ESRD and Glucocorticoid use     HPI:   Patient is a 59 y.o. female with a diagnosis of ESRD secondary to diabetic nephropathy now s/p DBD Kidney transplant 4/24/25. Patient recently underwent a transplant kidney biopsy d/t elevated Cr. Biopsy 6/6/25 with 10 gloms 0 sclerosed. Minimal 5% fibrosis. Focal severe arteriosclerosis. Moderate micro circulatory changes with positive c4d suggestive of ABMR. No ACR or AVR. No viral changes. Plan to treat ABMR with SMP and PLEX followed by IVIG and Velcade. Endocrinology consulted for management of T2DM.

## 2025-06-10 NOTE — ASSESSMENT & PLAN NOTE
- reports previous issues with diarrhea prior to transplant d/t phos binders  - Was prescribed linzess in past by her PCP with some improvement, will order inpatient  - No stool studies on file, check C diff (recently completed abx therapy for UTI)

## 2025-06-10 NOTE — SUBJECTIVE & OBJECTIVE
Interval HPI:   Overnight events: No acute events overnight. Patient in room 12617/57889 A. Blood glucose worsening. BG above goal on current insulin regimen (Basal, Prandial, and SSI ). Steroid use- Methylprednisolone  336.5 mg, SMP.    Renal function- Abnormal -    Vasopressors-  None       Endocrine will continue to follow and manage insulin orders inpatient.         Diet Consistent Carbohydrate 2000 Calories (up to 75 gm per meal)     Eatin%  Nausea: No  Hypoglycemia and intervention: No  Fever: No  TPN and/or TF: No  If yes, type of TF/TPN and rate: n/a    PMH, PSH, FH, SH reviewed     ROS:  Constitutional: Negative for weight changes.  Eyes: Negative for visual disturbance.  Respiratory: Negative for cough.   Cardiovascular: Negative for chest pain.  Gastrointestinal: Negative for nausea.  Endocrine: Negative for polyuria, polydipsia.  Musculoskeletal: Negative for back pain.  Skin: Negative for rash.  Neurological: Negative for syncope.  Psychiatric/Behavioral: Negative for depression.      Review of Systems    Current Medications and/or Treatments Impacting Glycemic Control  Immunotherapy:    Immunosuppressants           Stop Route Frequency     mycophenolate capsule 1,000 mg         -- Oral 2 times daily     tacrolimus capsule 3 mg         -- Oral 2 times daily          Steroids:   Hormones (From admission, onward)      Start     Stop Route Frequency Ordered    06/10/25 1137  melatonin tablet 6 mg         -- Oral Nightly PRN 06/10/25 1109          Pressors:    Autonomic Drugs (From admission, onward)      None          Hyperglycemia/Diabetes Medications:   Antihyperglycemics (From admission, onward)      Start     Stop Route Frequency Ordered    06/10/25 2100  insulin glargine U-100 (Lantus) pen 13 Units         -- SubQ Nightly 06/10/25 1120    06/10/25 1221  insulin aspart U-100 pen 0-5 Units         -- SubQ Before meals & nightly PRN 06/10/25 1121             PHYSICAL EXAMINATION:  Vitals:     06/10/25 1441   BP: (!) 168/78   Pulse: 87   Resp: 17   Temp: 98.3 °F (36.8 °C)     Body mass index is 25.45 kg/m².     Physical Exam   Constitutional: Well developed, well nourished, NAD.  ENT: External ears no masses with nose patent; normal hearing.  Neck: Supple; trachea midline.  Cardiovascular: Normal heart sounds  Lungs: Normal effort; lungs anterior bilaterally clear to auscultation.  Abdomen: Soft, no masses, no hernias.  MS: No clubbing or cyanosis of nails noted; unable to assess gait.  Skin: No rashes, lesions, or ulcers; no nodules.   Psychiatric: Good judgement and insight; normal mood and affect.  Neurological: Cranial nerves are grossly intact.   Foot: Nails in good condition, no amputations noted.

## 2025-06-10 NOTE — CARE UPDATE
Endocrine consult acknowledged. Full consult note to follow.         Home Diabetes Medications:  Novolog 5 units TID with meals, Lantus 13 units q HS, Tradjenta 5 mg once daily     Lab Results   Component Value Date    HGBA1C 5.4 04/24/2025       HPI:   Patient is a 59 y.o. female with a diagnosis of ESRD secondary to diabetic nephropathy now s/p DBD Kidney transplant 4/24/25. Patient recently underwent a transplant kidney biopsy d/t elevated Cr. Biopsy 6/6/25 with 10 gloms 0 sclerosed. Minimal 5% fibrosis. Focal severe arteriosclerosis. Moderate micro circulatory changes with positive c4d suggestive of ABMR. No ACR or AVR. No viral changes. Plan to treat ABMR with SMP and PLEX followed by IVIG and Velcade. Endocrinology consulted for management of T2DM.      Plan:  -Start Lantus 13 units q HS home dosing)  -Start Novolog 6 units TID with meals (0.5 u/kg dosing)  -Start Moderate Dose Correction Scale (Given high dose steroids)  -BG monitoring ac/hs  Diet Consistent Carbohydrate 2000 Calories (up to 75 gm per meal)    ** Please call Endocrine for any BG related issues **

## 2025-06-11 ENCOUNTER — RESULTS FOLLOW-UP (OUTPATIENT)
Dept: TRANSPLANT | Facility: HOSPITAL | Age: 60
End: 2025-06-11

## 2025-06-11 LAB
ABSOLUTE EOSINOPHIL (OHS): 0 K/UL
ABSOLUTE MONOCYTE (OHS): 0.74 K/UL (ref 0.3–1)
ABSOLUTE NEUTROPHIL COUNT (OHS): 18.82 K/UL (ref 1.8–7.7)
ALBUMIN SERPL BCP-MCNC: 5 G/DL (ref 3.5–5.2)
ANION GAP (OHS): 10 MMOL/L (ref 8–16)
AORTIC SIZE INDEX (SOV): 1.5 CM/M2
AORTIC SIZE INDEX: 1.3 CM/M2
ASCENDING AORTA: 2.3 CM
AV AREA BY CONTINUOUS VTI: 2.6 CM2
AV INDEX (PROSTH): 0.76
AV LVOT MEAN GRADIENT: 4 MMHG
AV LVOT PEAK GRADIENT: 7 MMHG
AV MEAN GRADIENT: 8 MMHG
AV PEAK GRADIENT: 16 MMHG
AV VALVE AREA BY VELOCITY RATIO: 2.4 CM²
AV VALVE AREA: 2.6 CM2
AV VELOCITY RATIO: 0.7
BASOPHILS # BLD AUTO: 0.04 K/UL
BASOPHILS NFR BLD AUTO: 0.2 %
BSA FOR ECHO PROCEDURE: 1.74 M2
BUN SERPL-MCNC: 27 MG/DL (ref 6–20)
C DIFF GDH STL QL: NEGATIVE
C DIFF TOX A+B STL QL IA: NEGATIVE
CALCIUM SERPL-MCNC: 9.5 MG/DL (ref 8.7–10.5)
CHLORIDE SERPL-SCNC: 104 MMOL/L (ref 95–110)
CO2 SERPL-SCNC: 18 MMOL/L (ref 23–29)
CREAT SERPL-MCNC: 1.5 MG/DL (ref 0.5–1.4)
CV ECHO LV RWT: 0.43 CM
DOP CALC AO PEAK VEL: 2 M/S
DOP CALC AO VTI: 37.3 CM
DOP CALC LVOT AREA: 3.5 CM2
DOP CALC LVOT DIAMETER: 2.1 CM
DOP CALC LVOT PEAK VEL: 1.4 M/S
DOP CALC LVOT STROKE VOLUME: 98.7 CM3
DOP CALCLVOT PEAK VEL VTI: 28.5 CM
E WAVE DECELERATION TIME: 270 MS
E/A RATIO: 0.62
E/E' RATIO: 11 M/S
ECHO EF ESTIMATED: 64 %
ECHO LV POSTERIOR WALL: 1 CM (ref 0.6–1.1)
ERYTHROCYTE [DISTWIDTH] IN BLOOD BY AUTOMATED COUNT: 16.2 % (ref 11.5–14.5)
ESTROGEN SERPL-MCNC: NORMAL PG/ML
FIBRINOGEN PPP-MCNC: 113 MG/DL (ref 182–400)
FRACTIONAL SHORTENING: 41.3 % (ref 28–44)
GFR SERPLBLD CREATININE-BSD FMLA CKD-EPI: 40 ML/MIN/1.73/M2
GLUCOSE SERPL-MCNC: 355 MG/DL (ref 70–110)
HCT VFR BLD AUTO: 34.1 % (ref 37–48.5)
HCV RNA SERPL NAA+PROBE-ACNC: NORMAL IU/ML
HGB BLD-MCNC: 10.6 GM/DL (ref 12–16)
IMM GRANULOCYTES # BLD AUTO: 0.73 K/UL (ref 0–0.04)
IMM GRANULOCYTES NFR BLD AUTO: 3.4 % (ref 0–0.5)
INR PPP: 1.1 (ref 0.8–1.2)
INSULIN SERPL-ACNC: NORMAL U[IU]/ML
INTERVENTRICULAR SEPTUM: 1 CM (ref 0.6–1.1)
LA MAJOR: 5.7 CM
LA MINOR: 5.8 CM
LA WIDTH: 3.8 CM
LAB AP CLINICAL INFORMATION: NORMAL
LAB AP GROSS DESCRIPTION: NORMAL
LAB AP PERFORMING LOCATION(S): NORMAL
LAB AP REPORT FOOTNOTES: NORMAL
LEFT ATRIUM SIZE: 4.3 CM
LEFT ATRIUM VOLUME INDEX MOD: 33 ML/M2
LEFT ATRIUM VOLUME INDEX: 46 ML/M2
LEFT ATRIUM VOLUME MOD: 56 ML
LEFT ATRIUM VOLUME: 80 CM3
LEFT INTERNAL DIMENSION IN SYSTOLE: 2.7 CM (ref 2.1–4)
LEFT VENTRICLE DIASTOLIC VOLUME INDEX: 43.02 ML/M2
LEFT VENTRICLE DIASTOLIC VOLUME: 74 ML
LEFT VENTRICLE MASS INDEX: 92.3 G/M2
LEFT VENTRICLE SYSTOLIC VOLUME INDEX: 15.7 ML/M2
LEFT VENTRICLE SYSTOLIC VOLUME: 27 ML
LEFT VENTRICULAR INTERNAL DIMENSION IN DIASTOLE: 4.6 CM (ref 3.5–6)
LEFT VENTRICULAR MASS: 158.8 G
LV LATERAL E/E' RATIO: 9
LV SEPTAL E/E' RATIO: 13.5
LYMPHOCYTES # BLD AUTO: 1 K/UL (ref 1–4.8)
MAGNESIUM SERPL-MCNC: 1.7 MG/DL (ref 1.6–2.6)
MCH RBC QN AUTO: 29.3 PG (ref 27–31)
MCHC RBC AUTO-ENTMCNC: 31.1 G/DL (ref 32–36)
MCV RBC AUTO: 94 FL (ref 82–98)
MV PEAK A VEL: 1.31 M/S
MV PEAK E VEL: 0.81 M/S
NUCLEATED RBC (/100WBC) (OHS): 0 /100 WBC
OHS CV RV/LV RATIO: 0.65 CM
OHS QRS DURATION: 96 MS
OHS QTC CALCULATION: 440 MS
PHOSPHATE SERPL-MCNC: 2.7 MG/DL (ref 2.7–4.5)
PLATELET # BLD AUTO: 266 K/UL (ref 150–450)
PMV BLD AUTO: 12.9 FL (ref 9.2–12.9)
POCT GLUCOSE: 130 MG/DL (ref 70–110)
POCT GLUCOSE: 224 MG/DL (ref 70–110)
POCT GLUCOSE: 273 MG/DL (ref 70–110)
POCT GLUCOSE: 319 MG/DL (ref 70–110)
POCT GLUCOSE: 396 MG/DL (ref 70–110)
POTASSIUM SERPL-SCNC: 4.7 MMOL/L (ref 3.5–5.1)
PROTHROMBIN TIME: 12.4 SECONDS (ref 9–12.5)
RA MAJOR: 4.53 CM
RA PRESSURE ESTIMATED: 3 MMHG
RA WIDTH: 2.87 CM
RBC # BLD AUTO: 3.62 M/UL (ref 4–5.4)
RELATIVE EOSINOPHIL (OHS): 0 %
RELATIVE LYMPHOCYTE (OHS): 4.7 % (ref 18–48)
RELATIVE MONOCYTE (OHS): 3.5 % (ref 4–15)
RELATIVE NEUTROPHIL (OHS): 88.2 % (ref 38–73)
RIGHT VENTRICLE DIASTOLIC BASEL DIMENSION: 3 CM
SINUS: 2.59 CM
SODIUM SERPL-SCNC: 132 MMOL/L (ref 136–145)
STJ: 2 CM
TACROLIMUS BLD-MCNC: 7.2 NG/ML (ref 5–15)
TDI LATERAL: 0.09 M/S
TDI SEPTAL: 0.06 M/S
TDI: 0.08 M/S
TRICUSPID ANNULAR PLANE SYSTOLIC EXCURSION: 1.7 CM
WBC # BLD AUTO: 21.33 K/UL (ref 3.9–12.7)
Z-SCORE OF LEFT VENTRICULAR DIMENSION IN END DIASTOLE: -0.36
Z-SCORE OF LEFT VENTRICULAR DIMENSION IN END SYSTOLE: -0.71

## 2025-06-11 PROCEDURE — 83735 ASSAY OF MAGNESIUM: CPT | Performed by: CLINICAL NURSE SPECIALIST

## 2025-06-11 PROCEDURE — 25000003 PHARM REV CODE 250: Performed by: PATHOLOGY

## 2025-06-11 PROCEDURE — 63600175 PHARM REV CODE 636 W HCPCS: Performed by: NURSE PRACTITIONER

## 2025-06-11 PROCEDURE — 85610 PROTHROMBIN TIME: CPT | Performed by: STUDENT IN AN ORGANIZED HEALTH CARE EDUCATION/TRAINING PROGRAM

## 2025-06-11 PROCEDURE — 36514 APHERESIS PLASMA: CPT

## 2025-06-11 PROCEDURE — 99233 SBSQ HOSP IP/OBS HIGH 50: CPT | Mod: ,,, | Performed by: CLINICAL NURSE SPECIALIST

## 2025-06-11 PROCEDURE — 25000003 PHARM REV CODE 250: Performed by: NURSE PRACTITIONER

## 2025-06-11 PROCEDURE — 93010 ELECTROCARDIOGRAM REPORT: CPT | Mod: ,,, | Performed by: INTERNAL MEDICINE

## 2025-06-11 PROCEDURE — 20600001 HC STEP DOWN PRIVATE ROOM

## 2025-06-11 PROCEDURE — 25000003 PHARM REV CODE 250

## 2025-06-11 PROCEDURE — 36415 COLL VENOUS BLD VENIPUNCTURE: CPT | Performed by: CLINICAL NURSE SPECIALIST

## 2025-06-11 PROCEDURE — P9045 ALBUMIN (HUMAN), 5%, 250 ML: HCPCS | Mod: JZ,TB | Performed by: PATHOLOGY

## 2025-06-11 PROCEDURE — 25000003 PHARM REV CODE 250: Performed by: CLINICAL NURSE SPECIALIST

## 2025-06-11 PROCEDURE — 80197 ASSAY OF TACROLIMUS: CPT | Performed by: CLINICAL NURSE SPECIALIST

## 2025-06-11 PROCEDURE — 85025 COMPLETE CBC W/AUTO DIFF WBC: CPT | Performed by: CLINICAL NURSE SPECIALIST

## 2025-06-11 PROCEDURE — 63600175 PHARM REV CODE 636 W HCPCS: Mod: JZ,TB | Performed by: PATHOLOGY

## 2025-06-11 PROCEDURE — 99222 1ST HOSP IP/OBS MODERATE 55: CPT | Mod: ,,, | Performed by: NURSE PRACTITIONER

## 2025-06-11 PROCEDURE — 93005 ELECTROCARDIOGRAM TRACING: CPT

## 2025-06-11 PROCEDURE — 63600175 PHARM REV CODE 636 W HCPCS: Performed by: CLINICAL NURSE SPECIALIST

## 2025-06-11 PROCEDURE — 94761 N-INVAS EAR/PLS OXIMETRY MLT: CPT

## 2025-06-11 PROCEDURE — 80069 RENAL FUNCTION PANEL: CPT | Performed by: CLINICAL NURSE SPECIALIST

## 2025-06-11 PROCEDURE — 87324 CLOSTRIDIUM AG IA: CPT | Performed by: CLINICAL NURSE SPECIALIST

## 2025-06-11 PROCEDURE — 85384 FIBRINOGEN ACTIVITY: CPT | Performed by: CLINICAL NURSE SPECIALIST

## 2025-06-11 RX ORDER — EPINEPHRINE 0.3 MG/.3ML
0.3 INJECTION SUBCUTANEOUS ONCE AS NEEDED
OUTPATIENT
Start: 2025-06-16

## 2025-06-11 RX ORDER — HEPARIN 100 UNIT/ML
500 SYRINGE INTRAVENOUS
OUTPATIENT
Start: 2025-06-18

## 2025-06-11 RX ORDER — SODIUM BICARBONATE 650 MG/1
1300 TABLET ORAL 3 TIMES DAILY
Status: DISCONTINUED | OUTPATIENT
Start: 2025-06-11 | End: 2025-06-14 | Stop reason: HOSPADM

## 2025-06-11 RX ORDER — INSULIN ASPART 100 [IU]/ML
8 INJECTION, SOLUTION INTRAVENOUS; SUBCUTANEOUS
Status: DISCONTINUED | OUTPATIENT
Start: 2025-06-11 | End: 2025-06-11

## 2025-06-11 RX ORDER — ALBUMIN HUMAN 50 G/1000ML
125 SOLUTION INTRAVENOUS ONCE
Status: COMPLETED | OUTPATIENT
Start: 2025-06-11 | End: 2025-06-11

## 2025-06-11 RX ORDER — GLUCAGON 1 MG
1 KIT INJECTION
Status: DISCONTINUED | OUTPATIENT
Start: 2025-06-11 | End: 2025-06-14 | Stop reason: HOSPADM

## 2025-06-11 RX ORDER — ACETAMINOPHEN 500 MG
500 TABLET ORAL
OUTPATIENT
Start: 2025-06-16

## 2025-06-11 RX ORDER — DIPHENHYDRAMINE HYDROCHLORIDE 50 MG/ML
50 INJECTION, SOLUTION INTRAMUSCULAR; INTRAVENOUS ONCE AS NEEDED
OUTPATIENT
Start: 2025-06-16

## 2025-06-11 RX ORDER — SODIUM CHLORIDE 0.9 % (FLUSH) 0.9 %
10 SYRINGE (ML) INJECTION
OUTPATIENT
Start: 2025-06-18

## 2025-06-11 RX ORDER — SODIUM CHLORIDE 0.9 % (FLUSH) 0.9 %
10 SYRINGE (ML) INJECTION
OUTPATIENT
Start: 2025-06-16

## 2025-06-11 RX ORDER — DIPHENHYDRAMINE HCL 25 MG
25 CAPSULE ORAL
OUTPATIENT
Start: 2025-06-16

## 2025-06-11 RX ORDER — HEPARIN 100 UNIT/ML
500 SYRINGE INTRAVENOUS
OUTPATIENT
Start: 2025-06-16

## 2025-06-11 RX ORDER — DIPHENHYDRAMINE HYDROCHLORIDE 50 MG/ML
25 INJECTION, SOLUTION INTRAMUSCULAR; INTRAVENOUS
OUTPATIENT
Start: 2025-06-16

## 2025-06-11 RX ORDER — IBUPROFEN 200 MG
24 TABLET ORAL
Status: DISCONTINUED | OUTPATIENT
Start: 2025-06-11 | End: 2025-06-14 | Stop reason: HOSPADM

## 2025-06-11 RX ORDER — BORTEZOMIB 3.5 MG/1
1.3 INJECTION, POWDER, LYOPHILIZED, FOR SOLUTION INTRAVENOUS; SUBCUTANEOUS
OUTPATIENT
Start: 2025-06-18

## 2025-06-11 RX ORDER — CALCIUM GLUCONATE 20 MG/ML
2 INJECTION, SOLUTION INTRAVENOUS ONCE
Status: COMPLETED | OUTPATIENT
Start: 2025-06-11 | End: 2025-06-11

## 2025-06-11 RX ORDER — INSULIN ASPART 100 [IU]/ML
8 INJECTION, SOLUTION INTRAVENOUS; SUBCUTANEOUS
Status: DISCONTINUED | OUTPATIENT
Start: 2025-06-11 | End: 2025-06-14 | Stop reason: HOSPADM

## 2025-06-11 RX ORDER — INSULIN ASPART 100 [IU]/ML
0-10 INJECTION, SOLUTION INTRAVENOUS; SUBCUTANEOUS
Status: DISCONTINUED | OUTPATIENT
Start: 2025-06-11 | End: 2025-06-14 | Stop reason: HOSPADM

## 2025-06-11 RX ORDER — IBUPROFEN 200 MG
16 TABLET ORAL
Status: DISCONTINUED | OUTPATIENT
Start: 2025-06-11 | End: 2025-06-14 | Stop reason: HOSPADM

## 2025-06-11 RX ADMIN — HEPARIN SODIUM 5000 UNITS: 5000 INJECTION INTRAVENOUS; SUBCUTANEOUS at 06:06

## 2025-06-11 RX ADMIN — TACROLIMUS 3 MG: 1 CAPSULE ORAL at 05:06

## 2025-06-11 RX ADMIN — CARVEDILOL 25 MG: 12.5 TABLET, FILM COATED ORAL at 08:06

## 2025-06-11 RX ADMIN — MYCOPHENOLATE MOFETIL 1000 MG: 250 CAPSULE ORAL at 08:06

## 2025-06-11 RX ADMIN — INSULIN ASPART 10 UNITS: 100 INJECTION, SOLUTION INTRAVENOUS; SUBCUTANEOUS at 08:06

## 2025-06-11 RX ADMIN — METHYLPREDNISOLONE SODIUM SUCCINATE 335 MG: 500 INJECTION INTRAMUSCULAR; INTRAVENOUS at 03:06

## 2025-06-11 RX ADMIN — SODIUM BICARBONATE 650 MG TABLET 650 MG: at 08:06

## 2025-06-11 RX ADMIN — LOPERAMIDE HYDROCHLORIDE 2 MG: 2 CAPSULE ORAL at 01:06

## 2025-06-11 RX ADMIN — Medication 2000 UNITS: at 08:06

## 2025-06-11 RX ADMIN — ONDANSETRON 8 MG: 8 TABLET, ORALLY DISINTEGRATING ORAL at 02:06

## 2025-06-11 RX ADMIN — CALCIUM GLUCONATE 2 G: 20 INJECTION, SOLUTION INTRAVENOUS at 02:06

## 2025-06-11 RX ADMIN — LINACLOTIDE 72 MCG: 72 CAPSULE, GELATIN COATED ORAL at 06:06

## 2025-06-11 RX ADMIN — SODIUM BICARBONATE 650 MG TABLET 1300 MG: at 01:06

## 2025-06-11 RX ADMIN — INSULIN ASPART 5 UNITS: 100 INJECTION, SOLUTION INTRAVENOUS; SUBCUTANEOUS at 01:06

## 2025-06-11 RX ADMIN — NIFEDIPINE 30 MG: 30 TABLET, FILM COATED, EXTENDED RELEASE ORAL at 08:06

## 2025-06-11 RX ADMIN — ATORVASTATIN CALCIUM 20 MG: 20 TABLET, FILM COATED ORAL at 08:06

## 2025-06-11 RX ADMIN — INSULIN HUMAN 1 UNITS/HR: 1 INJECTION, SOLUTION INTRAVENOUS at 08:06

## 2025-06-11 RX ADMIN — HEPARIN SODIUM 5000 UNITS: 5000 INJECTION INTRAVENOUS; SUBCUTANEOUS at 01:06

## 2025-06-11 RX ADMIN — SULFAMETHOXAZOLE AND TRIMETHOPRIM 1 TABLET: 400; 80 TABLET ORAL at 08:06

## 2025-06-11 RX ADMIN — Medication 6 MG: at 08:06

## 2025-06-11 RX ADMIN — LOPERAMIDE HYDROCHLORIDE 2 MG: 2 CAPSULE ORAL at 08:06

## 2025-06-11 RX ADMIN — ONDANSETRON 8 MG: 8 TABLET, ORALLY DISINTEGRATING ORAL at 07:06

## 2025-06-11 RX ADMIN — INSULIN ASPART 6 UNITS: 100 INJECTION, SOLUTION INTRAVENOUS; SUBCUTANEOUS at 06:06

## 2025-06-11 RX ADMIN — METHIMAZOLE 5 MG: 5 TABLET ORAL at 08:06

## 2025-06-11 RX ADMIN — INSULIN ASPART 4 UNITS: 100 INJECTION, SOLUTION INTRAVENOUS; SUBCUTANEOUS at 10:06

## 2025-06-11 RX ADMIN — INSULIN ASPART 8 UNITS: 100 INJECTION, SOLUTION INTRAVENOUS; SUBCUTANEOUS at 08:06

## 2025-06-11 RX ADMIN — SODIUM BICARBONATE 650 MG TABLET 1300 MG: at 08:06

## 2025-06-11 RX ADMIN — ASPIRIN 81 MG CHEWABLE TABLET 81 MG: 81 TABLET CHEWABLE at 08:06

## 2025-06-11 RX ADMIN — FLUOXETINE HYDROCHLORIDE 40 MG: 20 CAPSULE ORAL at 08:06

## 2025-06-11 RX ADMIN — HEPARIN SODIUM 5000 UNITS: 5000 INJECTION INTRAVENOUS; SUBCUTANEOUS at 08:06

## 2025-06-11 RX ADMIN — TACROLIMUS 3 MG: 1 CAPSULE ORAL at 08:06

## 2025-06-11 RX ADMIN — ALBUMIN (HUMAN) 125 G: 12.5 SOLUTION INTRAVENOUS at 02:06

## 2025-06-11 RX ADMIN — CALCITRIOL CAPSULES 0.25 MCG 0.25 MCG: 0.25 CAPSULE ORAL at 08:06

## 2025-06-11 RX ADMIN — VALGANCICLOVIR 450 MG: 450 TABLET, FILM COATED ORAL at 08:06

## 2025-06-11 RX ADMIN — CHLORTHALIDONE 25 MG: 25 TABLET ORAL at 08:06

## 2025-06-11 RX ADMIN — INSULIN ASPART 8 UNITS: 100 INJECTION, SOLUTION INTRAVENOUS; SUBCUTANEOUS at 05:06

## 2025-06-11 RX ADMIN — FAMOTIDINE 20 MG: 20 TABLET, FILM COATED ORAL at 08:06

## 2025-06-11 NOTE — CONSULTS
[facility]  Endocrinology  Diabetes Consult Note    Consult Requested by: Toribio Dunbar Jr.*   Reason for admit: Antibody mediated rejection of kidney transplant    HISTORY OF PRESENT ILLNESS:  Reason for Consult: Management of T2DM, Hyperglycemia     Surgical Procedure and Date: Kidney transplant 25    Diabetes diagnosis year: > 10 years ago     Home Diabetes Medications:  Novolog 5 units TID with meals, Lantus 13 units q HS, Tradjenta 5 mg once daily     Lab Results   Component Value Date    HGBA1C 5.4 2025       How often checking glucose at home? 4x daily   BG readings on regimen: variable low 100s-500  Hypoglycemia on the regimen?  Yes-once dropped into the 50s  Missed doses on regimen?  No       Diabetes Complications include:     Hyperglycemia     Complicating diabetes co morbidities:   ESRD and Glucocorticoid use     HPI:   Patient is a 59 y.o. female with a diagnosis of ESRD secondary to diabetic nephropathy now s/p DBD Kidney transplant 25. Patient recently underwent a transplant kidney biopsy d/t elevated Cr. Biopsy 25 with 10 gloms 0 sclerosed. Minimal 5% fibrosis. Focal severe arteriosclerosis. Moderate micro circulatory changes with positive c4d suggestive of ABMR. No ACR or AVR. No viral changes. Plan to treat ABMR with SMP and PLEX followed by IVIG and Velcade. Endocrinology consulted for management of T2DM.             Interval HPI:   Overnight events: No acute events overnight. Patient in room 95446/79492 A. Blood glucose worsening. BG above goal on current insulin regimen (Basal, Prandial, and SSI ). Steroid use- Methylprednisolone  336.5 mg, SMP.    Renal function- Abnormal -    Vasopressors-  None       Endocrine will continue to follow and manage insulin orders inpatient.         Diet Consistent Carbohydrate 2000 Calories (up to 75 gm per meal)     Eatin%  Nausea: No  Hypoglycemia and intervention: No  Fever: No  TPN and/or TF: No  If yes,  type of TF/TPN and rate: n/a    PMH, PSH, FH, SH reviewed     ROS:  Constitutional: Negative for weight changes.  Eyes: Negative for visual disturbance.  Respiratory: Negative for cough.   Cardiovascular: Negative for chest pain.  Gastrointestinal: Negative for nausea.  Endocrine: Negative for polyuria, polydipsia.  Musculoskeletal: Negative for back pain.  Skin: Negative for rash.  Neurological: Negative for syncope.  Psychiatric/Behavioral: Negative for depression.      Review of Systems    Current Medications and/or Treatments Impacting Glycemic Control  Immunotherapy:    Immunosuppressants           Stop Route Frequency     mycophenolate capsule 1,000 mg         -- Oral 2 times daily     tacrolimus capsule 3 mg         -- Oral 2 times daily          Steroids:   Hormones (From admission, onward)      Start     Stop Route Frequency Ordered    06/10/25 1137  melatonin tablet 6 mg         -- Oral Nightly PRN 06/10/25 1109          Pressors:    Autonomic Drugs (From admission, onward)      None          Hyperglycemia/Diabetes Medications:   Antihyperglycemics (From admission, onward)      Start     Stop Route Frequency Ordered    06/10/25 2100  insulin glargine U-100 (Lantus) pen 13 Units         -- SubQ Nightly 06/10/25 1120    06/10/25 1221  insulin aspart U-100 pen 0-5 Units         -- SubQ Before meals & nightly PRN 06/10/25 1121             PHYSICAL EXAMINATION:  Vitals:    06/10/25 1441   BP: (!) 168/78   Pulse: 87   Resp: 17   Temp: 98.3 °F (36.8 °C)     Body mass index is 25.45 kg/m².     Physical Exam   Constitutional: Well developed, well nourished, NAD.  ENT: External ears no masses with nose patent; normal hearing.  Neck: Supple; trachea midline.  Cardiovascular: Normal heart sounds  Lungs: Normal effort; lungs anterior bilaterally clear to auscultation.  Abdomen: Soft, no masses, no hernias.  MS: No clubbing or cyanosis of nails noted; unable to assess gait.  Skin: No rashes, lesions, or ulcers; no  "nodules.   Psychiatric: Good judgement and insight; normal mood and affect.  Neurological: Cranial nerves are grossly intact.   Foot: Nails in good condition, no amputations noted.      Labs Reviewed and Include   Recent Labs   Lab 06/11/25  0823   *   CALCIUM 9.5   ALBUMIN 5.0   *   K 4.7   CO2 18*      BUN 27*   CREATININE 1.5*     Lab Results   Component Value Date    WBC 21.33 (H) 06/11/2025    HGB 10.6 (L) 06/11/2025    HCT 34.1 (L) 06/11/2025    MCV 94 06/11/2025     06/11/2025     No results for input(s): "TSH", "FREET4" in the last 168 hours.  Lab Results   Component Value Date    HGBA1C 5.4 04/24/2025       Nutritional status:   Body mass index is 25.35 kg/m².  Lab Results   Component Value Date    ALBUMIN 5.0 06/11/2025    ALBUMIN 4.1 06/10/2025    ALBUMIN 3.9 06/09/2025    ALBUMIN 4.0 06/09/2025     No results found for: "PREALBUMIN"    Estimated Creatinine Clearance: 38.9 mL/min (A) (based on SCr of 1.5 mg/dL (H)).    Accu-Checks  Recent Labs     06/10/25  1237 06/10/25  1752 06/10/25  2004 06/10/25  2136 06/11/25  0328 06/11/25  0728   POCTGLUCOSE 228* 491* >500* 404* 319* 396*        ASSESSMENT and PLAN    Renal/  * Antibody mediated rejection of kidney transplant  Managed per primary team  Optimize BG control        S/P kidney transplant  Managed per primary team  Optimize BG control        Immunology/Multi System  Prophylactic immunotherapy  May increase insulin resistance.         Endocrine  Type 2 diabetes mellitus with chronic kidney disease on chronic dialysis, with long-term current use of insulin  BG goal 140-180    Discontinue Lantus 13 units q HS  Start Transition IV insulin infusion at 1 u/hr with stepdown parameters  Increase Novolog to 8 units TID with meals   Continue Moderate Dose Correction Scale  BG monitoring a/hs/0200  Diet Consistent Carbohydrate 2000 Calories (up to 75 gm per meal)    ** Please call Endocrine for any BG related issues " **        Other  Adrenal cortical steroids causing adverse effect in therapeutic use  Glucocorticoids markedly increase glucoses. Expect increase insulin requirements while on SMP.          Plan discussed with patient at bedside.     Lovely Pittman NP  Endocrinology  [facility]

## 2025-06-11 NOTE — NURSING
Patient's blood sugar before lunch is 130. Insulin drip decreased to 0.8 units/hr per orders. Lovely ABEL with endocrine notified, orders given to give 5 units of Novolog with her meal. Patient aware of the plan.

## 2025-06-11 NOTE — PROGRESS NOTES
Admit Note     Met with patient to assess needs. Patient is a 59 y.o.  female, admitted for Acute kidney allograft rejection .      Patient admitted from home on 6/10/2025 .  At this time, patient presents as alert and oriented x 4, pleasant, good eye contact, calm, communicative, cooperative, and asking and answering questions appropriately.      Household/Family Systems     Patient resides with patient's , daughter, and grandchildren, at  [address] [place] [address].  Support system includes: , adult children and extended family.  Patient does not have dependents that are need of being cared for.     Patients primary caregiver is Juliette Huggins , patients daughter, phone number [phone].  Confirmed patients contact information is [phone] (home);   Telephone Information:   Mobile [phone]   .    During admission, patient's caregiver plans to stay at home.  Confirmed patient and patients caregivers do have access to reliable transportation.    Cognitive Status/Learning     Patient reports reading ability as 12th grade and states patient does not have difficulty with reading, writing, seeing, hearing, comprehension, learning, and memory.  Patient reports patient learns best by multisensory learning style.   Needed: No.   Highest education level: High School (9-12) or GED    Vocation/Disability     Working for Income: No  If no, reason not working: Disability  The patient receives SSD.     Adherence     Patient reports a high level of adherence to patients health care regimen.  Adherence counseling and education provided. Patient verbalizes understanding.    Substance Use    Patient reports the following substance usage.    Tobacco: none, patient denies any use.  Alcohol: none, patient denies any use.  Illicit Drugs/Non-prescribed Medications: none, patient denies any use.  Patient states clear understanding of the potential impact of substance use.  Substance  abstinence/cessation counseling, education and resources provided and reviewed.     Services Utilizing/ADLS    Infusion Service: Prior to admission, patient utilizing? no  Home Health: Prior to admission, patient utilizing? no  DME: Prior to admission, yes- BPC, Glucose, and Cane   Pulmonary/Cardiac Rehab: Prior to admission, no  Dialysis:  Prior to admission, no  Transplant Specialty Pharmacy:  Prior to admission, yes; [facility] .    Prior to admission, patient reports patient was independent with ADLS and was not driving.  Patient reports patient is able to care for self at this time..  Patient indicates a willingness to care for self once medically cleared to do so.    Insurance/Medications    Insured by   Payer/Plan Subscr  Sex Relation Sub. Ins. ID Effective Group Num   1. HUMANA MANAGE* JOSSUE JOSE 1965 Female Self [id number]                                   [address] [address]   2. [place]* JOSSUE JOSE 1965 Female Self [id number]* [id number]                                   [address] [address]      Primary Insurance (for UNOS reporting): Public Insurance - Medicare & Choice  Secondary Insurance (for UNOS reporting): Public Insurance - Medicaid    Patient reports patient is able to obtain and afford medications at this time and at time of discharge.    Living Will/Healthcare Power of     Patient states patient does not have a LW and/or HCPA.   provided education regarding LW and HCPA and the completion of forms.    Coping/Mental Health    Patient is coping adequately with the aid of  family members.  Patient denies mental health difficulties.     Discharge Planning    At time of discharge, patient plans to return to patient's home under the care of daughter Juliette Huggins and  Moo Rodrigez.  Patients daughter will transport patient.  Per rounds today, expected discharge date has not been medically determined at this  time. Patient and patients caregiver  verbalize understanding and are involved in treatment planning and discharge process.    Additional Concerns    Patient is being followed for needs, education, resources, information, emotional support, supportive counseling, and for supportive and skilled discharge plan of care.  providing ongoing psychosocial support, education, resources and d/c planning as needed.  SW remains available.  provided resource list, patient choice, psychosocial and supportive counseling, resources, education, assistance and discharge planning with patient and caregiver involvement, ongoing SW availability and services as appropriate.  remains available.

## 2025-06-11 NOTE — PLAN OF CARE
-Patient is AAOX4, afebrile, independent, VSS  -Patient received all meds as schedule   -Patent nighttime  Bg  > 500 (patient was eating a bag of peppermints) covered with 5 units , repeat BG at 0328 (319)  NP was notified.   -Stool sample sent off   -Patient 1 of 5 plex and 1 of 3 steroids  -Personal belonging and call bell within reach, patient ambulated to restroom with non-skid socks, bed in low locked position, no falls or injuries reported

## 2025-06-11 NOTE — HOSPITAL COURSE
Patient admitted with ABMR on kidney biopsy 6/6/25.  ABMR: kidney biopsy 6/6/25 with ABMR. DSA 5/26 with MFI 2700-2330. SMP x 3 (6/10-6/12), PLEX x 5 (6/10-6/14), IVIG and velcade to be completed outpatient.   SOB: c/o FULLER. TTE 6/11 stable, EF 65-70%. Spo2 stable on RA. Suspect SOB related to endurance and/or anxiety.     Interval History: no acute events overnight. Feels well. Reports diarrhea improved with PRN imodium. Cdiff negative 6/11. Cr down 1.4 from 1.6, encourage PO hydration. Kidney biopsy 6/6 with ABMR. PLEX #4 today, plan for #5 tomorrow then discharge home to complete ABMR treatment outpatient.

## 2025-06-11 NOTE — SUBJECTIVE & OBJECTIVE
Subjective:   History of Present Illness:  Ms. Rocha is a 59 y.o. year old female with ESRD secondary to diabetic nephropathy now s/p DBD Kidney transplant 4/24/25. Received thymo induction. Progressed well post operatively with downtrending Cr. She takes mycophenolate mofetil, prednisone, and tacrolimus for maintenance immunosuppression. Post-transplant course as follows:  UTI: Urine cx 5/8 w/ E.coli ESBL. Completed course IV Ertapenem    Patient recently underwent a transplant kidney biopsy d/t elevated Cr. Biopsy 6/6/25 with 10 gloms 0 sclerosed. Minimal 5% fibrosis. Focal severe arteriosclerosis. Moderate micro circulatory changes with positive c4d suggestive of ABMR. No ACR or AVR. No viral changes. DSA Class II positive: DPA1*03:01(6758) DR7(3080), DQ2(4643). Discussed case with Dr. Dunbar, plan to treat ABMR with SMP and PLEX followed by IVIG and Velcade. Will consult Apheresis.     Hospital Course:  Patient admitted for treatment of biopsy proven ABMR. Reports occasional SOB on admit, CXR and Echo ordered.     Interval note:  - NAEON. Received SMP and PLEX yesterday. Pt w/ history of DM, BG > 500 after SMP. Endocrine following, now on insulin drip. Plan for SMP and PLEX # 2 today  - Echo reviewed, stable findings. SOB possibly anxiety related      Past Medical, Surgical, Family, and Social History:   Unchanged from H&P.    Scheduled Meds:   albumin human 5%  125 g Intravenous Once    aspirin  81 mg Oral Daily    atorvastatin  20 mg Oral Daily    calcitRIOL  0.25 mcg Oral Daily    calcium gluconate IVPB  2 g Intravenous Once    carvediloL  25 mg Oral BID    chlorthalidone  25 mg Oral Nightly    famotidine  20 mg Oral Daily    FLUoxetine  40 mg Oral Daily    heparin (porcine)  5,000 Units Subcutaneous Q8H    insulin aspart U-100  8 Units Subcutaneous TIDWM    linaCLOtide  72 mcg Oral Before breakfast    methIMAzole  5 mg Oral Daily    methylPREDNISolone injection (PEDS and ADULTS)  5 mg/kg Intravenous  Once    mycophenolate  1,000 mg Oral BID    NIFEdipine  30 mg Oral BID    sodium bicarbonate  1,300 mg Oral TID    sulfamethoxazole-trimethoprim 400-80mg  1 tablet Oral Daily    tacrolimus  3 mg Oral BID    valGANciclovir  450 mg Oral Daily    vitamin D  2,000 Units Oral Daily     Continuous Infusions:   insulin regular 1 units/mL infusion orderable (TRANSFER)  1 Units/hr Intravenous Continuous 1 mL/hr at 06/11/25 0839 1 Units/hr at 06/11/25 0839     PRN Meds:  Current Facility-Administered Medications:     acetaminophen, 650 mg, Oral, Q6H PRN    albuterol, 2 puff, Inhalation, Q6H PRN    dextrose 50%, 12.5 g, Intravenous, PRN    dextrose 50%, 25 g, Intravenous, PRN    glucagon (human recombinant), 1 mg, Intramuscular, PRN    glucose, 16 g, Oral, PRN    glucose, 24 g, Oral, PRN    insulin aspart U-100, 0-10 Units, Subcutaneous, AC + HS + 0200 PRN    loperamide, 2 mg, Oral, QID PRN    melatonin, 6 mg, Oral, Nightly PRN    ondansetron, 8 mg, Oral, Q6H PRN    sodium chloride 0.9%, 3 mL, Intravenous, PRN    Intake/Output - Last 3 Shifts         06/09 0700  06/10 0659 06/10 0700 06/11 0659 06/11 0700  06/12 0659    P.O.  960     Blood  2783     IV Piggyback  100     Total Intake(mL/kg)  3843 (56.9)     Urine (mL/kg/hr)  450     Blood  2758     Total Output  3208     Net  +635            Unmeasured Urine Occurrence  1 x     Unmeasured Stool Occurrence  0 x              Review of Systems   Constitutional:  Positive for appetite change and fatigue.   HENT: Negative.     Respiratory:  Positive for shortness of breath (ocassional).    Cardiovascular:  Negative for leg swelling.   Gastrointestinal:  Positive for nausea. Negative for abdominal distention, abdominal pain, constipation and vomiting.   Genitourinary:  Negative for dysuria.   Skin:  Negative for wound.   Allergic/Immunologic: Positive for immunocompromised state.   Psychiatric/Behavioral:  Negative for agitation and confusion.       Objective:     Vital Signs (Most  "Recent):  Temp: 97.8 °F (36.6 °C) (06/11/25 0805)  Pulse: 87 (06/11/25 0805)  Resp: 18 (06/11/25 0805)  BP: 130/63 (06/11/25 0805)  SpO2: 98 % (06/11/25 0805) Vital Signs (24h Range):  Temp:  [97.8 °F (36.6 °C)-98.5 °F (36.9 °C)] 97.8 °F (36.6 °C)  Pulse:  [79-97] 87  Resp:  [17-20] 18  SpO2:  [98 %-100 %] 98 %  BP: (119-168)/(57-80) 130/63     Weight: 67 kg (147 lb 11.3 oz)  Height: 5' 4" (162.6 cm)  Body mass index is 25.35 kg/m².     Physical Exam  Vitals and nursing note reviewed.   Constitutional:       Appearance: Normal appearance.   Cardiovascular:      Rate and Rhythm: Normal rate.   Pulmonary:      Effort: Pulmonary effort is normal.   Abdominal:      General: Bowel sounds are normal.      Palpations: Abdomen is soft.      Tenderness: There is no abdominal tenderness.      Comments: Well healed kidney txp incision   Neurological:      Mental Status: She is alert and oriented to person, place, and time. Mental status is at baseline.   Psychiatric:         Mood and Affect: Mood normal.         Behavior: Behavior normal.          Laboratory:  CBC:   Recent Labs   Lab 06/09/25  0840 06/10/25  1215 06/11/25  0823   WBC 7.88 9.74 21.33*   RBC 3.70* 3.59* 3.62*   HGB 10.9* 10.7* 10.6*   HCT 34.4* 33.2* 34.1*    266 266   MCV 93 93 94   MCH 29.5 29.8 29.3   MCHC 31.7* 32.2 31.1*     CMP:   Recent Labs   Lab 06/09/25  0840 06/10/25  1215 06/11/25  0823   * 258* 355*   CALCIUM 9.7 9.0 9.5   ALBUMIN 4.0  3.9 4.1 5.0   PROT 8.2  --   --     136 132*   K 4.1 3.8 4.7   CO2 19* 19* 18*    107 104   BUN 23* 20 27*   CREATININE 1.6* 1.5* 1.5*   ALKPHOS 88  --   --    ALT 17  --   --    AST 18  --   --        Diagnostic Results:  None  "

## 2025-06-11 NOTE — ASSESSMENT & PLAN NOTE
- chronic in nature, follows with pulmonology outpt. Seen Jan 2025 by pulm, dyspnea at that time felt due to chronic diastolic heart failure and end-stage renal disease on hemodialysis   - Echo 6/11 unremarkable. EF 65-70 %, normal diastolic fxn

## 2025-06-11 NOTE — ASSESSMENT & PLAN NOTE
BG goal 140-180    Discontinue Lantus 13 units q HS  Start Transition IV insulin infusion at 1 u/hr with stepdown parameters  Increase Novolog to 8 units TID with meals   Continue Moderate Dose Correction Scale  BG monitoring a/hs/0200  Diet Consistent Carbohydrate 2000 Calories (up to 75 gm per meal)    ** Please call Endocrine for any BG related issues **

## 2025-06-11 NOTE — PLAN OF CARE
Patient is AAOx3. Patient started on an Insulin drip this morning. Monitoring the patient's blood sugar AC/HS/0200. Patient received Plex 2/5 following by Steroids 2/3. C-diff still in process, imodium given per orders. Patient c/o nausea, PRN Zofran given per orders. Reminded the patient to call for assistance.

## 2025-06-11 NOTE — PROCEDURES
"[facility] - [facility]  Transfusion Medicine  Procedure Note    SUMMARY   Procedures  Date of Procedure: 6/11/2025     Procedure: Plasma Exchange    Provider: Ramón Lorenzana Jr, MD     Assisting Provider: None    Pre-Procedure Diagnosis: Antibody mediated rejection of kidney transplant    Post-Procedure Diagnosis: Antibody mediated rejection of kidney transplant    Follow-up Assessment: Kiesha Rocha is a 59 y.o. year old female with ESRD secondary to diabetic nephropathy now s/p DBD Kidney transplant 4/24/25. Received thymo induction. Progressed well post operatively with downtrending Cr. She takes mycophenolate mofetil, prednisone, and tacrolimus for maintenance immunosuppression. Patient recently underwent a transplant kidney biopsy d/t elevated Cr. Biopsy 6/6/25 with 10 gloms 0 sclerosed. Minimal 5% fibrosis. Focal severe arteriosclerosis. Moderate micro circulatory changes with positive c4d suggestive of ABMR. No ACR or AVR. No viral changes. DSA Class II positive: DPA1*03:01(6758) DR7(3080), DQ2(1713). Patient admitted for treatment of ABMR with SMP and PLEX, followed by IVIG and Velcade. Plasma exchange requested by Kidney Transplant Service to antibody mediated kidney transplant rejection.      Today's procedure (#2 of 5) was well tolerated and without complications. Next treatment scheduled for 6/11 or 6/12/2025.  We will check the Fibrinogen level in the am.     Pertinent Laboratory Data: Complete Blood Count:   Lab Results   Component Value Date    HGB 10.6 (L) 06/11/2025    HCT 34.1 (L) 06/11/2025     06/11/2025    WBC 21.33 (H) 06/11/2025     Lactate Dehydrogenase (LDH): No results found for: "LDH"  Basic Metabolic Panel:   Lab Results   Component Value Date     (L) 06/11/2025    K 4.7 06/11/2025     06/11/2025    CO2 18 (L) 06/11/2025     (H) 06/11/2025    BUN 27 (H) 06/11/2025    CREATININE 1.5 (H) 06/11/2025    CALCIUM 9.5 06/11/2025    ANIONGAP 10 06/11/2025    " ESTGFRAFRICA 6.6 (A) 11/05/2020    EGFRNONAA 5.7 (A) 11/05/2020     Comprehensive Metabolic Panel:   Lab Results   Component Value Date     (L) 06/11/2025    K 4.7 06/11/2025     06/11/2025    CO2 18 (L) 06/11/2025     (H) 06/11/2025    BUN 27 (H) 06/11/2025    CREATININE 1.5 (H) 06/11/2025    CALCIUM 9.5 06/11/2025    PROT 8.2 06/09/2025    ALBUMIN 5.0 06/11/2025    BILITOT 0.6 06/09/2025    ALKPHOS 88 06/09/2025    AST 18 06/09/2025    ALT 17 06/09/2025    ANIONGAP 10 06/11/2025    EGFRNONAA 5.7 (A) 11/05/2020       Pertinent Medications: n/a    Review of patient's allergies indicates:   Allergen Reactions    Latex Itching    Penicillins Hives and Rash       Anesthesia: None     Technical Procedures Used: Plasma Exchange: Volume exchanged - 2.5 Liters; Replacement fluid - Albumin; Number of procedures 2; Date of next procedure 6/11 or 12.    Description of the Findings of the Procedure:     Please see Apheresis Nurse flowsheet for details.    The patient was evaluated and all clinical and laboratory data relevant to the treatment was reviewed, and a decision was made to proceed with the Apheresis procedure.    I was available to the clinical staff throughout the procedure.    Significant Surgical Tasks Conducted by the Assistant(s): Not applicable    Complications: None    Estimated Blood Loss (EBL): None    Implants: None     Specimens: None

## 2025-06-11 NOTE — PROGRESS NOTES
TPE #2     1340 Arrived to the patient room with equipment set up for treatment. Primary nurse Cecille HORVATH is here with patient,. Patient awake alert oriented and denies pain at this time. She's on room air. Patient went to the bathroom and had a BM before the writer access her fistula.       Access: FELICIANO fistula +bruit +thrill successfully accessed with 2, 16 gauge dialysis needles per protocol positive for blood return x2. Treatment initiated at 1420 without any incident. See apheresis flowsheet for details.      Medications/Replacement Fluids:     2.5L 5% albumin   2 GM calcium gluconate IVPB    Rinse back @1514 and treatment completed @1519  all blood returned. Vitals are stable.   YULIET Oden notified treatment is completed and she will run again on 6/12/25. Patient left in her room in stable condition call light in reach.

## 2025-06-11 NOTE — NURSING
Patient reports 5 episodes of diarrhea and request imodium. C-diff results in process. Robert NP notified of the patient's request, orders given for 1 dose of imodium while waiting on the C-diff results.

## 2025-06-11 NOTE — ASSESSMENT & PLAN NOTE
- Confirmed on biopsy 6/6. Class II DSA +  - Plan for SMP X 3 plus 5 PLEX, followed by IVIG and Velcade. SMP and PLEX started 6/10. Plan for SMP and PLEX #2 6/11  - Daily labs

## 2025-06-11 NOTE — PROGRESS NOTES
[facility]  Kidney Transplant  Progress Note      Reason for Follow-up: Reassessment of Kidney Transplant - 4/24/2025  (#1) recipient and management of immunosuppression.    ORGAN: LEFT KIDNEY      Donor Type: Donation after Brain Death      Donor CMV Status: Positive  Donor HBcAB:Negative  Donor HCV Status:Positive  Donor HBV LORNE:   Donor HCV LORNE: Negative      Subjective:   History of Present Illness:  Ms. Rocha is a 59 y.o. year old female with ESRD secondary to diabetic nephropathy now s/p DBD Kidney transplant 4/24/25. Received thymo induction. Progressed well post operatively with downtrending Cr. She takes mycophenolate mofetil, prednisone, and tacrolimus for maintenance immunosuppression. Post-transplant course as follows:  UTI: Urine cx 5/8 w/ E.coli ESBL. Completed course IV Ertapenem    Patient recently underwent a transplant kidney biopsy d/t elevated Cr. Biopsy 6/6/25 with 10 gloms 0 sclerosed. Minimal 5% fibrosis. Focal severe arteriosclerosis. Moderate micro circulatory changes with positive c4d suggestive of ABMR. No ACR or AVR. No viral changes. DSA Class II positive: DPA1*03:01(6758) DR7(3080), DQ2(1713). Discussed case with Dr. Dunbar, plan to treat ABMR with SMP and PLEX followed by IVIG and Velcade. Will consult Apheresis.     Hospital Course:  Patient admitted for treatment of biopsy proven ABMR. Reports occasional SOB on admit, CXR and Echo ordered.     Interval note:  - NAEON. Received SMP and PLEX yesterday. Pt w/ history of DM, BG > 500 after SMP. Endocrine following, now on insulin drip. Plan for SMP and PLEX # 2 today  - Echo reviewed, stable findings. SOB possibly anxiety related      Past Medical, Surgical, Family, and Social History:   Unchanged from H&P.    Scheduled Meds:   albumin human 5%  125 g Intravenous Once    aspirin  81 mg Oral Daily    atorvastatin  20 mg Oral Daily    calcitRIOL  0.25 mcg Oral Daily    calcium gluconate IVPB  2 g Intravenous Once     carvediloL  25 mg Oral BID    chlorthalidone  25 mg Oral Nightly    famotidine  20 mg Oral Daily    FLUoxetine  40 mg Oral Daily    heparin (porcine)  5,000 Units Subcutaneous Q8H    insulin aspart U-100  8 Units Subcutaneous TIDWM    linaCLOtide  72 mcg Oral Before breakfast    methIMAzole  5 mg Oral Daily    methylPREDNISolone injection (PEDS and ADULTS)  5 mg/kg Intravenous Once    mycophenolate  1,000 mg Oral BID    NIFEdipine  30 mg Oral BID    sodium bicarbonate  1,300 mg Oral TID    sulfamethoxazole-trimethoprim 400-80mg  1 tablet Oral Daily    tacrolimus  3 mg Oral BID    valGANciclovir  450 mg Oral Daily    vitamin D  2,000 Units Oral Daily     Continuous Infusions:   insulin regular 1 units/mL infusion orderable (TRANSFER)  1 Units/hr Intravenous Continuous 1 mL/hr at 06/11/25 0839 1 Units/hr at 06/11/25 0839     PRN Meds:  Current Facility-Administered Medications:     acetaminophen, 650 mg, Oral, Q6H PRN    albuterol, 2 puff, Inhalation, Q6H PRN    dextrose 50%, 12.5 g, Intravenous, PRN    dextrose 50%, 25 g, Intravenous, PRN    glucagon (human recombinant), 1 mg, Intramuscular, PRN    glucose, 16 g, Oral, PRN    glucose, 24 g, Oral, PRN    insulin aspart U-100, 0-10 Units, Subcutaneous, AC + HS + 0200 PRN    loperamide, 2 mg, Oral, QID PRN    melatonin, 6 mg, Oral, Nightly PRN    ondansetron, 8 mg, Oral, Q6H PRN    sodium chloride 0.9%, 3 mL, Intravenous, PRN    Intake/Output - Last 3 Shifts         06/09 0700  06/10 0659 06/10 0700 06/11 0659 06/11 0700  06/12 0659    P.O.  960     Blood  2783     IV Piggyback  100     Total Intake(mL/kg)  3843 (56.9)     Urine (mL/kg/hr)  450     Blood  2758     Total Output  3208     Net  +635            Unmeasured Urine Occurrence  1 x     Unmeasured Stool Occurrence  0 x              Review of Systems   Constitutional:  Positive for appetite change and fatigue.   HENT: Negative.     Respiratory:  Positive for shortness of breath (ocassional).    Cardiovascular:   "Negative for leg swelling.   Gastrointestinal:  Positive for nausea. Negative for abdominal distention, abdominal pain, constipation and vomiting.   Genitourinary:  Negative for dysuria.   Skin:  Negative for wound.   Allergic/Immunologic: Positive for immunocompromised state.   Psychiatric/Behavioral:  Negative for agitation and confusion.       Objective:     Vital Signs (Most Recent):  Temp: 97.8 °F (36.6 °C) (06/11/25 0805)  Pulse: 87 (06/11/25 0805)  Resp: 18 (06/11/25 0805)  BP: 130/63 (06/11/25 0805)  SpO2: 98 % (06/11/25 0805) Vital Signs (24h Range):  Temp:  [97.8 °F (36.6 °C)-98.5 °F (36.9 °C)] 97.8 °F (36.6 °C)  Pulse:  [79-97] 87  Resp:  [17-20] 18  SpO2:  [98 %-100 %] 98 %  BP: (119-168)/(57-80) 130/63     Weight: 67 kg (147 lb 11.3 oz)  Height: 5' 4" (162.6 cm)  Body mass index is 25.35 kg/m².     Physical Exam  Vitals and nursing note reviewed.   Constitutional:       Appearance: Normal appearance.   Cardiovascular:      Rate and Rhythm: Normal rate.   Pulmonary:      Effort: Pulmonary effort is normal.   Abdominal:      General: Bowel sounds are normal.      Palpations: Abdomen is soft.      Tenderness: There is no abdominal tenderness.      Comments: Well healed kidney txp incision   Neurological:      Mental Status: She is alert and oriented to person, place, and time. Mental status is at baseline.   Psychiatric:         Mood and Affect: Mood normal.         Behavior: Behavior normal.          Laboratory:  CBC:   Recent Labs   Lab 06/09/25  0840 06/10/25  1215 06/11/25  0823   WBC 7.88 9.74 21.33*   RBC 3.70* 3.59* 3.62*   HGB 10.9* 10.7* 10.6*   HCT 34.4* 33.2* 34.1*    266 266   MCV 93 93 94   MCH 29.5 29.8 29.3   MCHC 31.7* 32.2 31.1*     CMP:   Recent Labs   Lab 06/09/25  0840 06/10/25  1215 06/11/25  0823   * 258* 355*   CALCIUM 9.7 9.0 9.5   ALBUMIN 4.0  3.9 4.1 5.0   PROT 8.2  --   --     136 132*   K 4.1 3.8 4.7   CO2 19* 19* 18*    107 104   BUN 23* 20 27* " "  CREATININE 1.6* 1.5* 1.5*   ALKPHOS 88  --   --    ALT 17  --   --    AST 18  --   --        Diagnostic Results:  None  Assessment/Plan:     * Antibody mediated rejection of kidney transplant  - Confirmed on biopsy 6/6. Class II DSA +  - Plan for SMP X 3 plus 5 PLEX, followed by IVIG and Velcade. SMP and PLEX started 6/10. Plan for SMP and PLEX #2 6/11  - Daily labs      Chronic diastolic heart failure  - Echo pending      Diarrhea  - reports previous issues with diarrhea prior to transplant d/t phos binders  - Was prescribed linzess in past by her PCP with some improvement, will order inpatient  - No stool studies on file, check C diff (recently completed abx therapy for UTI)    Anemia of chronic disease  - CBC stable on admit  - monitor daily      At risk for opportunistic infections  - OI prophylaxis per transplant protocol      Prophylactic immunotherapy  - Continue prograf, cellcept  - Check prograf level daily and adjust for therapeutic dosage. Monitor for toxic side effects       S/P kidney transplant  - see "acute rejection"      Hypertension  - continue home antihypertensives      Shortness of breath  - chronic in nature, follows with pulmonology outpt. Seen Jan 2025 by pulm, dyspnea at that time felt due to chronic diastolic heart failure and end-stage renal disease on hemodialysis   - Echo 6/11 unremarkable. EF 65-70 %, normal diastolic fxn     Hyperthyroidism  - continue methimazole      Type 2 diabetes mellitus with chronic kidney disease on chronic dialysis, with long-term current use of insulin  - Endocrine consulted, appreciate assistance  - Insulin drip started 6/11          Discharge Planning:  Not suitable for discharge at this time    Medical decision making for this encounter includes review of pertinent labs and diagnostic studies, assessment and planning, discussions with consulting providers, discussion with patient/family, and participation in multidisciplinary rounds. Time spent caring " for patient: 60 minutes    Britton Celestin, COLTEN  Kidney Transplant  [facility]

## 2025-06-12 LAB
ABSOLUTE EOSINOPHIL (OHS): 0 K/UL
ABSOLUTE MONOCYTE (OHS): 0.59 K/UL (ref 0.3–1)
ABSOLUTE NEUTROPHIL COUNT (OHS): 19.89 K/UL (ref 1.8–7.7)
ALBUMIN SERPL BCP-MCNC: 4.9 G/DL (ref 3.5–5.2)
ANION GAP (OHS): 7 MMOL/L (ref 8–16)
BASOPHILS # BLD AUTO: 0.03 K/UL
BASOPHILS NFR BLD AUTO: 0.1 %
BUN SERPL-MCNC: 37 MG/DL (ref 6–20)
CALCIUM SERPL-MCNC: 9.3 MG/DL (ref 8.7–10.5)
CHLORIDE SERPL-SCNC: 110 MMOL/L (ref 95–110)
CO2 SERPL-SCNC: 19 MMOL/L (ref 23–29)
CREAT SERPL-MCNC: 1.6 MG/DL (ref 0.5–1.4)
ERYTHROCYTE [DISTWIDTH] IN BLOOD BY AUTOMATED COUNT: 17 % (ref 11.5–14.5)
FIBRINOGEN PPP-MCNC: 88 MG/DL (ref 182–400)
GFR SERPLBLD CREATININE-BSD FMLA CKD-EPI: 37 ML/MIN/1.73/M2
GLUCOSE SERPL-MCNC: 239 MG/DL (ref 70–110)
HCT VFR BLD AUTO: 31.9 % (ref 37–48.5)
HGB BLD-MCNC: 9.8 GM/DL (ref 12–16)
IMM GRANULOCYTES # BLD AUTO: 0.84 K/UL (ref 0–0.04)
IMM GRANULOCYTES NFR BLD AUTO: 3.8 % (ref 0–0.5)
LYMPHOCYTES # BLD AUTO: 0.84 K/UL (ref 1–4.8)
MAGNESIUM SERPL-MCNC: 1.6 MG/DL (ref 1.6–2.6)
MCH RBC QN AUTO: 29 PG (ref 27–31)
MCHC RBC AUTO-ENTMCNC: 30.7 G/DL (ref 32–36)
MCV RBC AUTO: 94 FL (ref 82–98)
NUCLEATED RBC (/100WBC) (OHS): 0 /100 WBC
PHOSPHATE SERPL-MCNC: 2.6 MG/DL (ref 2.7–4.5)
PLATELET # BLD AUTO: 216 K/UL (ref 150–450)
PMV BLD AUTO: 13 FL (ref 9.2–12.9)
POCT GLUCOSE: 192 MG/DL (ref 70–110)
POCT GLUCOSE: 202 MG/DL (ref 70–110)
POCT GLUCOSE: 228 MG/DL (ref 70–110)
POCT GLUCOSE: 251 MG/DL (ref 70–110)
POTASSIUM SERPL-SCNC: 4.2 MMOL/L (ref 3.5–5.1)
RBC # BLD AUTO: 3.38 M/UL (ref 4–5.4)
RELATIVE EOSINOPHIL (OHS): 0 %
RELATIVE LYMPHOCYTE (OHS): 3.8 % (ref 18–48)
RELATIVE MONOCYTE (OHS): 2.7 % (ref 4–15)
RELATIVE NEUTROPHIL (OHS): 89.6 % (ref 38–73)
SODIUM SERPL-SCNC: 136 MMOL/L (ref 136–145)
TACROLIMUS BLD-MCNC: 6 NG/ML (ref 5–15)
WBC # BLD AUTO: 22.19 K/UL (ref 3.9–12.7)

## 2025-06-12 PROCEDURE — 25000003 PHARM REV CODE 250: Performed by: CLINICAL NURSE SPECIALIST

## 2025-06-12 PROCEDURE — 25000003 PHARM REV CODE 250: Performed by: PATHOLOGY

## 2025-06-12 PROCEDURE — 63600175 PHARM REV CODE 636 W HCPCS: Performed by: CLINICAL NURSE SPECIALIST

## 2025-06-12 PROCEDURE — 82040 ASSAY OF SERUM ALBUMIN: CPT | Performed by: CLINICAL NURSE SPECIALIST

## 2025-06-12 PROCEDURE — 36415 COLL VENOUS BLD VENIPUNCTURE: CPT | Performed by: CLINICAL NURSE SPECIALIST

## 2025-06-12 PROCEDURE — P9045 ALBUMIN (HUMAN), 5%, 250 ML: HCPCS | Mod: JZ,TB | Performed by: PATHOLOGY

## 2025-06-12 PROCEDURE — 20600001 HC STEP DOWN PRIVATE ROOM

## 2025-06-12 PROCEDURE — 99232 SBSQ HOSP IP/OBS MODERATE 35: CPT | Mod: ,,, | Performed by: PHYSICIAN ASSISTANT

## 2025-06-12 PROCEDURE — 63600175 PHARM REV CODE 636 W HCPCS: Mod: JZ,TB | Performed by: PATHOLOGY

## 2025-06-12 PROCEDURE — 83735 ASSAY OF MAGNESIUM: CPT | Performed by: CLINICAL NURSE SPECIALIST

## 2025-06-12 PROCEDURE — 85025 COMPLETE CBC W/AUTO DIFF WBC: CPT | Performed by: CLINICAL NURSE SPECIALIST

## 2025-06-12 PROCEDURE — 99233 SBSQ HOSP IP/OBS HIGH 50: CPT | Mod: ,,, | Performed by: CLINICAL NURSE SPECIALIST

## 2025-06-12 PROCEDURE — 86900 BLOOD TYPING SEROLOGIC ABO: CPT | Performed by: STUDENT IN AN ORGANIZED HEALTH CARE EDUCATION/TRAINING PROGRAM

## 2025-06-12 PROCEDURE — 85384 FIBRINOGEN ACTIVITY: CPT | Performed by: CLINICAL NURSE SPECIALIST

## 2025-06-12 PROCEDURE — 80197 ASSAY OF TACROLIMUS: CPT | Performed by: CLINICAL NURSE SPECIALIST

## 2025-06-12 PROCEDURE — 36514 APHERESIS PLASMA: CPT

## 2025-06-12 RX ORDER — CALCIUM GLUCONATE 20 MG/ML
2 INJECTION, SOLUTION INTRAVENOUS ONCE
Status: COMPLETED | OUTPATIENT
Start: 2025-06-12 | End: 2025-06-12

## 2025-06-12 RX ORDER — SODIUM BICARBONATE 650 MG/1
1300 TABLET ORAL 3 TIMES DAILY
Qty: 180 TABLET | Refills: 11 | Status: SHIPPED | OUTPATIENT
Start: 2025-06-12 | End: 2026-06-12

## 2025-06-12 RX ORDER — ALBUMIN HUMAN 50 G/1000ML
100 SOLUTION INTRAVENOUS ONCE
Status: COMPLETED | OUTPATIENT
Start: 2025-06-14 | End: 2025-06-14

## 2025-06-12 RX ORDER — ALBUMIN HUMAN 50 G/1000ML
125 SOLUTION INTRAVENOUS ONCE
Status: DISCONTINUED | OUTPATIENT
Start: 2025-06-14 | End: 2025-06-12

## 2025-06-12 RX ORDER — PREDNISONE 20 MG/1
20 TABLET ORAL DAILY
Status: DISCONTINUED | OUTPATIENT
Start: 2025-06-13 | End: 2025-06-14 | Stop reason: HOSPADM

## 2025-06-12 RX ORDER — SULFAMETHOXAZOLE AND TRIMETHOPRIM 400; 80 MG/1; MG/1
1 TABLET ORAL DAILY
Qty: 30 TABLET | Refills: 5 | Status: SHIPPED | OUTPATIENT
Start: 2025-06-12 | End: 2025-06-13

## 2025-06-12 RX ORDER — ALBUMIN HUMAN 50 G/1000ML
125 SOLUTION INTRAVENOUS ONCE
Status: COMPLETED | OUTPATIENT
Start: 2025-06-12 | End: 2025-06-12

## 2025-06-12 RX ORDER — ALBUMIN HUMAN 50 G/1000ML
125 SOLUTION INTRAVENOUS ONCE
Status: DISCONTINUED | OUTPATIENT
Start: 2025-06-13 | End: 2025-06-12

## 2025-06-12 RX ORDER — CALCIUM GLUCONATE 20 MG/ML
2 INJECTION, SOLUTION INTRAVENOUS ONCE
Status: COMPLETED | OUTPATIENT
Start: 2025-06-14 | End: 2025-06-14

## 2025-06-12 RX ORDER — ALBUMIN HUMAN 50 G/1000ML
100 SOLUTION INTRAVENOUS ONCE
Status: DISCONTINUED | OUTPATIENT
Start: 2025-06-13 | End: 2025-06-13

## 2025-06-12 RX ORDER — PREDNISONE 5 MG/1
TABLET ORAL
Qty: 90 TABLET | Refills: 11 | Status: SHIPPED | OUTPATIENT
Start: 2025-06-13 | End: 2026-07-04

## 2025-06-12 RX ORDER — CALCIUM GLUCONATE 20 MG/ML
2 INJECTION, SOLUTION INTRAVENOUS ONCE
Status: DISCONTINUED | OUTPATIENT
Start: 2025-06-13 | End: 2025-06-13

## 2025-06-12 RX ADMIN — CARVEDILOL 25 MG: 12.5 TABLET, FILM COATED ORAL at 08:06

## 2025-06-12 RX ADMIN — CHLORTHALIDONE 25 MG: 25 TABLET ORAL at 08:06

## 2025-06-12 RX ADMIN — FLUOXETINE HYDROCHLORIDE 40 MG: 20 CAPSULE ORAL at 08:06

## 2025-06-12 RX ADMIN — NIFEDIPINE 30 MG: 30 TABLET, FILM COATED, EXTENDED RELEASE ORAL at 08:06

## 2025-06-12 RX ADMIN — INSULIN ASPART 6 UNITS: 100 INJECTION, SOLUTION INTRAVENOUS; SUBCUTANEOUS at 02:06

## 2025-06-12 RX ADMIN — INSULIN ASPART 8 UNITS: 100 INJECTION, SOLUTION INTRAVENOUS; SUBCUTANEOUS at 08:06

## 2025-06-12 RX ADMIN — ATORVASTATIN CALCIUM 20 MG: 20 TABLET, FILM COATED ORAL at 08:06

## 2025-06-12 RX ADMIN — SULFAMETHOXAZOLE AND TRIMETHOPRIM 1 TABLET: 400; 80 TABLET ORAL at 08:06

## 2025-06-12 RX ADMIN — INSULIN ASPART 4 UNITS: 100 INJECTION, SOLUTION INTRAVENOUS; SUBCUTANEOUS at 01:06

## 2025-06-12 RX ADMIN — Medication 2000 UNITS: at 08:06

## 2025-06-12 RX ADMIN — LINACLOTIDE 72 MCG: 72 CAPSULE, GELATIN COATED ORAL at 06:06

## 2025-06-12 RX ADMIN — Medication 6 MG: at 08:06

## 2025-06-12 RX ADMIN — SODIUM BICARBONATE 650 MG TABLET 1300 MG: at 02:06

## 2025-06-12 RX ADMIN — INSULIN ASPART 2 UNITS: 100 INJECTION, SOLUTION INTRAVENOUS; SUBCUTANEOUS at 08:06

## 2025-06-12 RX ADMIN — HEPARIN SODIUM 5000 UNITS: 5000 INJECTION INTRAVENOUS; SUBCUTANEOUS at 06:06

## 2025-06-12 RX ADMIN — CALCITRIOL CAPSULES 0.25 MCG 0.25 MCG: 0.25 CAPSULE ORAL at 08:06

## 2025-06-12 RX ADMIN — HEPARIN SODIUM 5000 UNITS: 5000 INJECTION INTRAVENOUS; SUBCUTANEOUS at 02:06

## 2025-06-12 RX ADMIN — ALBUMIN (HUMAN) 125 G: 12.5 SOLUTION INTRAVENOUS at 12:06

## 2025-06-12 RX ADMIN — CALCIUM GLUCONATE 2 G: 20 INJECTION, SOLUTION INTRAVENOUS at 12:06

## 2025-06-12 RX ADMIN — MYCOPHENOLATE MOFETIL 1000 MG: 250 CAPSULE ORAL at 08:06

## 2025-06-12 RX ADMIN — HEPARIN SODIUM 5000 UNITS: 5000 INJECTION INTRAVENOUS; SUBCUTANEOUS at 08:06

## 2025-06-12 RX ADMIN — TACROLIMUS 3 MG: 1 CAPSULE ORAL at 05:06

## 2025-06-12 RX ADMIN — ASPIRIN 81 MG CHEWABLE TABLET 81 MG: 81 TABLET CHEWABLE at 08:06

## 2025-06-12 RX ADMIN — FAMOTIDINE 20 MG: 20 TABLET, FILM COATED ORAL at 08:06

## 2025-06-12 RX ADMIN — METHYLPREDNISOLONE SODIUM SUCCINATE 325.5 MG: 500 INJECTION INTRAMUSCULAR; INTRAVENOUS at 03:06

## 2025-06-12 RX ADMIN — INSULIN ASPART 8 UNITS: 100 INJECTION, SOLUTION INTRAVENOUS; SUBCUTANEOUS at 01:06

## 2025-06-12 RX ADMIN — TACROLIMUS 3 MG: 1 CAPSULE ORAL at 10:06

## 2025-06-12 RX ADMIN — INSULIN ASPART 6 UNITS: 100 INJECTION, SOLUTION INTRAVENOUS; SUBCUTANEOUS at 10:06

## 2025-06-12 RX ADMIN — INSULIN ASPART 4 UNITS: 100 INJECTION, SOLUTION INTRAVENOUS; SUBCUTANEOUS at 05:06

## 2025-06-12 RX ADMIN — VALGANCICLOVIR 450 MG: 450 TABLET, FILM COATED ORAL at 08:06

## 2025-06-12 RX ADMIN — SODIUM BICARBONATE 650 MG TABLET 1300 MG: at 08:06

## 2025-06-12 RX ADMIN — INSULIN ASPART 8 UNITS: 100 INJECTION, SOLUTION INTRAVENOUS; SUBCUTANEOUS at 05:06

## 2025-06-12 RX ADMIN — METHIMAZOLE 5 MG: 5 TABLET ORAL at 08:06

## 2025-06-12 NOTE — ASSESSMENT & PLAN NOTE
- chronic in nature, follows with pulmonology outpt. Seen Jan 2025 by pulm, dyspnea at that time felt due to chronic diastolic heart failure and end-stage renal disease on hemodialysis   - Echo 6/11 unremarkable. EF 65-70 %, normal diastolic fxn   - SOB felt d/t patient's anxiety.

## 2025-06-12 NOTE — PROGRESS NOTES
TPE #3     1230 Arrived to the patient room with equipment set up for treatment. Primary nurse Cecille HORVATH is here with patient,. Patient awake alert oriented and denies pain at this time. Vitals are stable remains in insulin drip and room air. .        Access: FELICIANO fistula +bruit +thrill successfully accessed with (2) 16 gauge dialysis needles per protocol positive for blood return x2. Flushed with 10 cc NS pushes and pulls easily. Treatment initiated at 1300 without any incident. See apheresis flowsheet for details of treatment.      Medications/Replacement Fluids:     2.5L 5% albumin   2 GM calcium gluconate IVPB    Rinse back 1345@ Treatment completed @1351 all blood returned. Needles removed sites held til hemostasis obtained site dressed with guaze and tape. Cecille HORVATH notified treatment is completed vitals are stable and patient denies pain. Next treatment will be tomorrow. Pt left in her room in bed in stable condition call light in reach.

## 2025-06-12 NOTE — ASSESSMENT & PLAN NOTE
- Confirmed on biopsy 6/6. Class II DSA +  - Plan for SMP X 3 plus 5 PLEX, followed by IVIG and Velcade. SMP and PLEX started 6/10. Plan for SMP and PLEX #3 on 6/12  - Daily labs

## 2025-06-12 NOTE — PROCEDURES
"[facility]  Transfusion Medicine  Procedure Note    SUMMARY   Procedures  Date of Procedure: 6/12/2025     Procedure: Plasma Exchange    Provider: Ramón Lorenzana Jr, MD     Assisting Provider: None    Pre-Procedure Diagnosis: Antibody mediated rejection of kidney transplant    Post-Procedure Diagnosis: Antibody mediated rejection of kidney transplant    Follow-up Assessment: Kiesha Rocha is a 59 y.o. year old female with ESRD secondary to diabetic nephropathy now s/p DBD Kidney transplant 4/24/25. Received thymo induction. Progressed well post operatively with downtrending Cr. She takes mycophenolate mofetil, prednisone, and tacrolimus for maintenance immunosuppression. Patient recently underwent a transplant kidney biopsy d/t elevated Cr. Biopsy 6/6/25 with 10 gloms 0 sclerosed. Minimal 5% fibrosis. Focal severe arteriosclerosis. Moderate micro circulatory changes with positive c4d suggestive of ABMR. No ACR or AVR. No viral changes. DSA Class II positive: DPA1*03:01(6758) DR7(3080), DQ2(1713). Patient admitted for treatment of ABMR with SMP and PLEX, followed by IVIG and Velcade. Plasma exchange requested by Kidney Transplant Service to antibody mediated kidney transplant rejection.      Today's procedure (#3 of 5) was well tolerated and without complications. Next treatment scheduled for 6/13, with final planned for Saturday morning.  Fibrinogen is trending down, will add some plasma to the next 2 procedures.     Pertinent Laboratory Data: Complete Blood Count:   Lab Results   Component Value Date    HGB 9.8 (L) 06/12/2025    HCT 31.9 (L) 06/12/2025     06/12/2025    WBC 22.19 (H) 06/12/2025     Lactate Dehydrogenase (LDH): No results found for: "LDH"  Basic Metabolic Panel:   Lab Results   Component Value Date     06/12/2025    K 4.2 06/12/2025     06/12/2025    CO2 19 (L) 06/12/2025     (H) 06/12/2025    BUN 37 (H) 06/12/2025    CREATININE 1.6 (H) 06/12/2025 "    CALCIUM 9.3 06/12/2025    ANIONGAP 7 (L) 06/12/2025    ESTGFRAFRICA 6.6 (A) 11/05/2020    EGFRNONAA 5.7 (A) 11/05/2020     Comprehensive Metabolic Panel:   Lab Results   Component Value Date     06/12/2025    K 4.2 06/12/2025     06/12/2025    CO2 19 (L) 06/12/2025     (H) 06/12/2025    BUN 37 (H) 06/12/2025    CREATININE 1.6 (H) 06/12/2025    CALCIUM 9.3 06/12/2025    PROT 8.2 06/09/2025    ALBUMIN 4.9 06/12/2025    BILITOT 0.6 06/09/2025    ALKPHOS 88 06/09/2025    AST 18 06/09/2025    ALT 17 06/09/2025    ANIONGAP 7 (L) 06/12/2025    EGFRNONAA 5.7 (A) 11/05/2020       Pertinent Medications: n/a    Review of patient's allergies indicates:   Allergen Reactions    Latex Itching    Penicillins Hives and Rash       Anesthesia: None     Technical Procedures Used: Plasma Exchange: Volume exchanged - 2.5 Liters; Replacement fluid - Albumin; Number of procedures 3; Date of next procedure 6/13.    Description of the Findings of the Procedure:     Please see Apheresis Nurse flowsheet for details.    The patient was evaluated and all clinical and laboratory data relevant to the treatment was reviewed, and a decision was made to proceed with the Apheresis procedure.    I was available to the clinical staff throughout the procedure.    Significant Surgical Tasks Conducted by the Assistant(s): Not applicable    Complications: None    Estimated Blood Loss (EBL): None    Implants: None     Specimens: None

## 2025-06-12 NOTE — CONSULTS
Food & Nutrition Education    Diet Education: A1C  Time Spent: 25 minutes  Learners: Patient    Nutrition Education provided with handouts: Meal Planning Using the Plate Method, CHO Counting for People with Diabetes    Comments: Discussed importance of choosing healthy carbohydrates and to control the amount of carbohydrates eaten at each meal for blood sugar management. Reminded patient not to skip meals. Encouraged intake of fresh, unprocessed foods. Reviewed limiting sugary beverages such as soda, fruit juice and other sweetened beverages. Food labels, CHO counting, and recommended CHO intake reviewed. Educated patient on meal planning and eating out. Patient is aware of A1C value and what the lab value means. Patient agrees to continue complying with diabetic diet. All questions/concerns were addressed. RD contact information provided.    Follow-Up: Yes  Please Re-consult as needed    Thanks!

## 2025-06-12 NOTE — ASSESSMENT & PLAN NOTE
- reports previous issues with diarrhea prior to transplant d/t phos binders  - Was prescribed linzess in past by her PCP with some improvement, will order inpatient  - C diff neg  - Improving with Imodium

## 2025-06-12 NOTE — PLAN OF CARE
Pt AAOx4, afebrile, VSS, stable on RA. Insulin gtt continued at 0.8 units/hr. ACHS/2AM accuchecks, PRN SSI administered as ordered. C/O diarrhea - PRN imodium administered. L UA fistula +/+. Plan to continue treatment of ABMR. Bed in lowest locked position, call light and personal items in reach, nonskids on, verbalized understanding to call for assistance.

## 2025-06-12 NOTE — PROGRESS NOTES
[facility]  Kidney Transplant  Progress Note      Reason for Follow-up: Reassessment of Kidney Transplant - 4/24/2025  (#1) recipient and management of immunosuppression.    ORGAN: LEFT KIDNEY      Donor Type: Donation after Brain Death      Donor CMV Status: Positive  Donor HBcAB:Negative  Donor HCV Status:Positive  Donor HBV LORNE:   Donor HCV LORNE: Negative      Subjective:   History of Present Illness:  Ms. Rocha is a 59 y.o. year old female with ESRD secondary to diabetic nephropathy now s/p DBD Kidney transplant 4/24/25. Received thymo induction. Progressed well post operatively with downtrending Cr. She takes mycophenolate mofetil, prednisone, and tacrolimus for maintenance immunosuppression. Post-transplant course as follows:  UTI: Urine cx 5/8 w/ E.coli ESBL. Completed course IV Ertapenem    Patient recently underwent a transplant kidney biopsy d/t elevated Cr. Biopsy 6/6/25 with 10 gloms 0 sclerosed. Minimal 5% fibrosis. Focal severe arteriosclerosis. Moderate micro circulatory changes with positive c4d suggestive of ABMR. No ACR or AVR. No viral changes. DSA Class II positive: DPA1*03:01(6758) DR7(3080), DQ2(1713). Discussed case with Dr. Dunbar, plan to treat ABMR with SMP and PLEX followed by IVIG and Velcade. Will consult Apheresis.     Hospital Course:  Patient admitted for treatment of biopsy proven ABMR. Apheresis consulted and PLEX started 6/10. Plan for 5 daily doses. Also given SMP X 3 daily doses. Endocrine consulted for blood sugar management, placed on insulin drip 6/11 d/t hyperglycemia.     Reports occasional SOB on admit, has history of diastolic heart failure but Echo 6/11 with no evidence of HF. EF 65-70 %. SOB felt d/t patient's underlying anxiety.     Interval note:  - NAEON. Receiving PLEX #3 today. Last dose of SMP to be given today. Remains on insulin drip.   - SOB improved. Pt states anxiety better today  - Plan for D/c Saturday after 5th and final PLEX    Past  Medical, Surgical, Family, and Social History:   Unchanged from H&P.    Scheduled Meds:   [START ON 6/13/2025] albumin human 5%  125 g Intravenous Once    [START ON 6/14/2025] albumin human 5%  125 g Intravenous Once    aspirin  81 mg Oral Daily    atorvastatin  20 mg Oral Daily    calcitRIOL  0.25 mcg Oral Daily    [START ON 6/13/2025] calcium gluconate IVPB  2 g Intravenous Once    [START ON 6/14/2025] calcium gluconate IVPB  2 g Intravenous Once    carvediloL  25 mg Oral BID    chlorthalidone  25 mg Oral Nightly    famotidine  20 mg Oral Daily    FLUoxetine  40 mg Oral Daily    heparin (porcine)  5,000 Units Subcutaneous Q8H    insulin aspart U-100  8 Units Subcutaneous TIDWM    linaCLOtide  72 mcg Oral Before breakfast    methIMAzole  5 mg Oral Daily    methylPREDNISolone injection (PEDS and ADULTS)  5 mg/kg Intravenous Once    mycophenolate  1,000 mg Oral BID    NIFEdipine  30 mg Oral BID    [START ON 6/13/2025] predniSONE  20 mg Oral Daily    sodium bicarbonate  1,300 mg Oral TID    sulfamethoxazole-trimethoprim 400-80mg  1 tablet Oral Daily    tacrolimus  3 mg Oral BID    valGANciclovir  450 mg Oral Daily    vitamin D  2,000 Units Oral Daily     Continuous Infusions:   insulin regular 1 units/mL infusion orderable (TRANSFER)  0.8 Units/hr Intravenous Continuous 0.8 mL/hr at 06/11/25 1254 0.8 Units/hr at 06/11/25 1254     PRN Meds:  Current Facility-Administered Medications:     acetaminophen, 650 mg, Oral, Q6H PRN    albuterol, 2 puff, Inhalation, Q6H PRN    dextrose 50%, 12.5 g, Intravenous, PRN    dextrose 50%, 25 g, Intravenous, PRN    glucagon (human recombinant), 1 mg, Intramuscular, PRN    glucose, 16 g, Oral, PRN    glucose, 24 g, Oral, PRN    insulin aspart U-100, 0-10 Units, Subcutaneous, AC + HS + 0200 PRN    loperamide, 2 mg, Oral, QID PRN    melatonin, 6 mg, Oral, Nightly PRN    ondansetron, 8 mg, Oral, Q6H PRN    sodium chloride 0.9%, 3 mL, Intravenous, PRN    Intake/Output - Last 3 Shifts         " 06/10 0700 06/11 0659 06/11 0700  06/12 0659 06/12 0700  06/13 0659    P.O. 960 720 480    Blood 2783 2641     IV Piggyback 100 100     Total Intake(mL/kg) 3843 (56.9) 3461 (53.2) 480 (7.4)    Urine (mL/kg/hr) 450      Stool  0     Blood 2758 2508     Total Output 3208 2508     Net +635 +953 +480           Unmeasured Urine Occurrence 1 x 6 x 1 x    Unmeasured Stool Occurrence 0 x 6 x              Review of Systems   Constitutional:  Positive for appetite change and fatigue.   HENT: Negative.     Cardiovascular:  Negative for leg swelling.   Gastrointestinal:  Negative for abdominal distention, abdominal pain, constipation and vomiting.   Genitourinary:  Negative for dysuria.   Skin:  Negative for wound.   Allergic/Immunologic: Positive for immunocompromised state.   Psychiatric/Behavioral:  Negative for agitation and confusion. The patient is nervous/anxious.       Objective:     Vital Signs (Most Recent):  Temp: 96.7 °F (35.9 °C) (06/12/25 1214)  Pulse: 76 (06/12/25 1301)  Resp: (!) 22 (06/12/25 1301)  BP: 113/61 (06/12/25 1301)  SpO2: 100 % (06/12/25 1301) Vital Signs (24h Range):  Temp:  [96.7 °F (35.9 °C)-98.8 °F (37.1 °C)] 96.7 °F (35.9 °C)  Pulse:  [66-87] 76  Resp:  [16-22] 22  SpO2:  [96 %-100 %] 100 %  BP: (103-145)/(53-86) 113/61     Weight: 64.9 kg (143 lb)  Height: 5' 4" (162.6 cm)  Body mass index is 24.55 kg/m².     Physical Exam  Vitals and nursing note reviewed.   Constitutional:       Appearance: Normal appearance.   Cardiovascular:      Rate and Rhythm: Normal rate.   Pulmonary:      Effort: Pulmonary effort is normal.   Abdominal:      General: Bowel sounds are normal.      Palpations: Abdomen is soft.      Tenderness: There is no abdominal tenderness.      Comments: Well healed kidney txp incision   Neurological:      Mental Status: She is alert and oriented to person, place, and time. Mental status is at baseline.   Psychiatric:         Mood and Affect: Mood normal.         Behavior: Behavior " "normal.          Laboratory:  CBC:   Recent Labs   Lab 06/10/25  1215 06/11/25  0823 06/12/25  1035   WBC 9.74 21.33* 22.19*   RBC 3.59* 3.62* 3.38*   HGB 10.7* 10.6* 9.8*   HCT 33.2* 34.1* 31.9*    266 216   MCV 93 94 94   MCH 29.8 29.3 29.0   MCHC 32.2 31.1* 30.7*     CMP:   Recent Labs   Lab 06/09/25  0840 06/10/25  1215 06/11/25  0823 06/12/25  1035   * 258* 355* 239*   CALCIUM 9.7 9.0 9.5 9.3   ALBUMIN 4.0  3.9 4.1 5.0 4.9   PROT 8.2  --   --   --     136 132* 136   K 4.1 3.8 4.7 4.2   CO2 19* 19* 18* 19*    107 104 110   BUN 23* 20 27* 37*   CREATININE 1.6* 1.5* 1.5* 1.6*   ALKPHOS 88  --   --   --    ALT 17  --   --   --    AST 18  --   --   --        Diagnostic Results:  None  Assessment/Plan:     * Antibody mediated rejection of kidney transplant  - Confirmed on biopsy 6/6. Class II DSA +  - Plan for SMP X 3 plus 5 PLEX, followed by IVIG and Velcade. SMP and PLEX started 6/10. Plan for SMP and PLEX #3 on 6/12  - Daily labs      Chronic diastolic heart failure  - Echo pending      Diarrhea  - reports previous issues with diarrhea prior to transplant d/t phos binders  - Was prescribed linzess in past by her PCP with some improvement, will order inpatient  - C diff neg  - Improving with Imodium     Anemia of chronic disease  - CBC stable on admit  - monitor daily      At risk for opportunistic infections  - OI prophylaxis per transplant protocol      Prophylactic immunotherapy  - Continue prograf, cellcept  - Check prograf level daily and adjust for therapeutic dosage. Monitor for toxic side effects       S/P kidney transplant  - see "acute rejection"      Hypertension  - continue home antihypertensives      Shortness of breath  - chronic in nature, follows with pulmonology outpt. Seen Jan 2025 by pulm, dyspnea at that time felt due to chronic diastolic heart failure and end-stage renal disease on hemodialysis   - Echo 6/11 unremarkable. EF 65-70 %, normal diastolic fxn   - SOB felt " d/t patient's anxiety.     Hyperthyroidism  - continue methimazole      Type 2 diabetes mellitus with chronic kidney disease on chronic dialysis, with long-term current use of insulin  - Endocrine consulted, appreciate assistance  - Insulin drip started 6/11          Discharge Planning:  Not suitable for discharge at this time    Medical decision making for this encounter includes review of pertinent labs and diagnostic studies, assessment and planning, discussions with consulting providers, discussion with patient/family, and participation in multidisciplinary rounds. Time spent caring for patient: 60 minutes    Britton Celestin NP  Kidney Transplant  [facility]

## 2025-06-12 NOTE — ASSESSMENT & PLAN NOTE
BG goal 140-180  T2DM. Hx of Kidney txp.  Treated for Rejection.    Continue Transition IV insulin infusion at 0.8 u/hr with stepdown parameters  Continue Novolog to 8 units TID with meals   Continue Moderate Dose Correction Scale  BG monitoring a/hs/0200  Diet Consistent Carbohydrate 2000 Calories (up to 75 gm per meal)    ** Please call Endocrine for any BG related issues **

## 2025-06-12 NOTE — SUBJECTIVE & OBJECTIVE
Subjective:   History of Present Illness:  Ms. Rocha is a 59 y.o. year old female with ESRD secondary to diabetic nephropathy now s/p DBD Kidney transplant 4/24/25. Received thymo induction. Progressed well post operatively with downtrending Cr. She takes mycophenolate mofetil, prednisone, and tacrolimus for maintenance immunosuppression. Post-transplant course as follows:  UTI: Urine cx 5/8 w/ E.coli ESBL. Completed course IV Ertapenem    Patient recently underwent a transplant kidney biopsy d/t elevated Cr. Biopsy 6/6/25 with 10 gloms 0 sclerosed. Minimal 5% fibrosis. Focal severe arteriosclerosis. Moderate micro circulatory changes with positive c4d suggestive of ABMR. No ACR or AVR. No viral changes. DSA Class II positive: DPA1*03:01(6758) DR7(3080), DQ2(6773). Discussed case with Dr. Dunbar, plan to treat ABMR with SMP and PLEX followed by IVIG and Velcade. Will consult Apheresis.     Hospital Course:  Patient admitted for treatment of biopsy proven ABMR. Apheresis consulted and PLEX started 6/10. Plan for 5 daily doses. Also given SMP X 3 daily doses. Endocrine consulted for blood sugar management, placed on insulin drip 6/11 d/t hyperglycemia.     Reports occasional SOB on admit, has history of diastolic heart failure but Echo 6/11 with no evidence of HF. EF 65-70 %. SOB felt d/t patient's underlying anxiety.     Interval note:  - NAEON. Receiving PLEX #3 today. Last dose of SMP to be given today. Remains on insulin drip.   - SOB improved. Pt states anxiety better today  - Plan for D/c Saturday after 5th and final PLEX    Past Medical, Surgical, Family, and Social History:   Unchanged from H&P.    Scheduled Meds:   [START ON 6/13/2025] albumin human 5%  125 g Intravenous Once    [START ON 6/14/2025] albumin human 5%  125 g Intravenous Once    aspirin  81 mg Oral Daily    atorvastatin  20 mg Oral Daily    calcitRIOL  0.25 mcg Oral Daily    [START ON 6/13/2025] calcium gluconate IVPB  2 g Intravenous Once     [START ON 6/14/2025] calcium gluconate IVPB  2 g Intravenous Once    carvediloL  25 mg Oral BID    chlorthalidone  25 mg Oral Nightly    famotidine  20 mg Oral Daily    FLUoxetine  40 mg Oral Daily    heparin (porcine)  5,000 Units Subcutaneous Q8H    insulin aspart U-100  8 Units Subcutaneous TIDWM    linaCLOtide  72 mcg Oral Before breakfast    methIMAzole  5 mg Oral Daily    methylPREDNISolone injection (PEDS and ADULTS)  5 mg/kg Intravenous Once    mycophenolate  1,000 mg Oral BID    NIFEdipine  30 mg Oral BID    [START ON 6/13/2025] predniSONE  20 mg Oral Daily    sodium bicarbonate  1,300 mg Oral TID    sulfamethoxazole-trimethoprim 400-80mg  1 tablet Oral Daily    tacrolimus  3 mg Oral BID    valGANciclovir  450 mg Oral Daily    vitamin D  2,000 Units Oral Daily     Continuous Infusions:   insulin regular 1 units/mL infusion orderable (TRANSFER)  0.8 Units/hr Intravenous Continuous 0.8 mL/hr at 06/11/25 1254 0.8 Units/hr at 06/11/25 1254     PRN Meds:  Current Facility-Administered Medications:     acetaminophen, 650 mg, Oral, Q6H PRN    albuterol, 2 puff, Inhalation, Q6H PRN    dextrose 50%, 12.5 g, Intravenous, PRN    dextrose 50%, 25 g, Intravenous, PRN    glucagon (human recombinant), 1 mg, Intramuscular, PRN    glucose, 16 g, Oral, PRN    glucose, 24 g, Oral, PRN    insulin aspart U-100, 0-10 Units, Subcutaneous, AC + HS + 0200 PRN    loperamide, 2 mg, Oral, QID PRN    melatonin, 6 mg, Oral, Nightly PRN    ondansetron, 8 mg, Oral, Q6H PRN    sodium chloride 0.9%, 3 mL, Intravenous, PRN    Intake/Output - Last 3 Shifts         06/10 0700  06/11 0659 06/11 0700 06/12 0659 06/12 0700  06/13 0659    P.O. 960 720 480    Blood 2783 2641     IV Piggyback 100 100     Total Intake(mL/kg) 3843 (56.9) 3461 (53.2) 480 (7.4)    Urine (mL/kg/hr) 450      Stool  0     Blood 2758 2508     Total Output 3208 2508     Net +635 +953 +480           Unmeasured Urine Occurrence 1 x 6 x 1 x    Unmeasured Stool Occurrence 0  "x 6 x              Review of Systems   Constitutional:  Positive for appetite change and fatigue.   HENT: Negative.     Cardiovascular:  Negative for leg swelling.   Gastrointestinal:  Negative for abdominal distention, abdominal pain, constipation and vomiting.   Genitourinary:  Negative for dysuria.   Skin:  Negative for wound.   Allergic/Immunologic: Positive for immunocompromised state.   Psychiatric/Behavioral:  Negative for agitation and confusion. The patient is nervous/anxious.       Objective:     Vital Signs (Most Recent):  Temp: 96.7 °F (35.9 °C) (06/12/25 1214)  Pulse: 76 (06/12/25 1301)  Resp: (!) 22 (06/12/25 1301)  BP: 113/61 (06/12/25 1301)  SpO2: 100 % (06/12/25 1301) Vital Signs (24h Range):  Temp:  [96.7 °F (35.9 °C)-98.8 °F (37.1 °C)] 96.7 °F (35.9 °C)  Pulse:  [66-87] 76  Resp:  [16-22] 22  SpO2:  [96 %-100 %] 100 %  BP: (103-145)/(53-86) 113/61     Weight: 64.9 kg (143 lb)  Height: 5' 4" (162.6 cm)  Body mass index is 24.55 kg/m².     Physical Exam  Vitals and nursing note reviewed.   Constitutional:       Appearance: Normal appearance.   Cardiovascular:      Rate and Rhythm: Normal rate.   Pulmonary:      Effort: Pulmonary effort is normal.   Abdominal:      General: Bowel sounds are normal.      Palpations: Abdomen is soft.      Tenderness: There is no abdominal tenderness.      Comments: Well healed kidney txp incision   Neurological:      Mental Status: She is alert and oriented to person, place, and time. Mental status is at baseline.   Psychiatric:         Mood and Affect: Mood normal.         Behavior: Behavior normal.          Laboratory:  CBC:   Recent Labs   Lab 06/10/25  1215 06/11/25  0823 06/12/25  1035   WBC 9.74 21.33* 22.19*   RBC 3.59* 3.62* 3.38*   HGB 10.7* 10.6* 9.8*   HCT 33.2* 34.1* 31.9*    266 216   MCV 93 94 94   MCH 29.8 29.3 29.0   MCHC 32.2 31.1* 30.7*     CMP:   Recent Labs   Lab 06/09/25  0840 06/10/25  1215 06/11/25  0823 06/12/25  1035   * 258* 355* " 239*   CALCIUM 9.7 9.0 9.5 9.3   ALBUMIN 4.0  3.9 4.1 5.0 4.9   PROT 8.2  --   --   --     136 132* 136   K 4.1 3.8 4.7 4.2   CO2 19* 19* 18* 19*    107 104 110   BUN 23* 20 27* 37*   CREATININE 1.6* 1.5* 1.5* 1.6*   ALKPHOS 88  --   --   --    ALT 17  --   --   --    AST 18  --   --   --        Diagnostic Results:  None

## 2025-06-12 NOTE — PROGRESS NOTES
"[facility] - [facility]  Endocrinology  Progress Note    Admit Date: 6/10/2025     Reason for Consult: Management of T2DM, Hyperglycemia     Surgical Procedure and Date: Kidney transplant 25    Diabetes diagnosis year: > 10 years ago     Home Diabetes Medications:  Novolog 5 units TID with meals, Lantus 13 units q HS, Tradjenta 5 mg once daily     Lab Results   Component Value Date    HGBA1C 5.4 2025       How often checking glucose at home? 4x daily   BG readings on regimen: variable low 100s-500  Hypoglycemia on the regimen?  Yes-once dropped into the 50s  Missed doses on regimen?  No       Diabetes Complications include:     Hyperglycemia     Complicating diabetes co morbidities:   ESRD and Glucocorticoid use     HPI:   Patient is a 59 y.o. female with a diagnosis of ESRD secondary to diabetic nephropathy now s/p DBD Kidney transplant 25. Patient recently underwent a transplant kidney biopsy d/t elevated Cr. Biopsy 25 with 10 gloms 0 sclerosed. Minimal 5% fibrosis. Focal severe arteriosclerosis. Moderate micro circulatory changes with positive c4d suggestive of ABMR. No ACR or AVR. No viral changes. Plan to treat ABMR with SMP and PLEX followed by IVIG and Velcade. Endocrinology consulted for management of T2DM.             Interval HPI:   No acute events overnight. Patient in room 77252/03737 A. Blood glucose stable. BG at and above goal on current insulin regimen (Transition Insulin Drip). Steroid use- Methylprednisolone  .      Renal function- Abnormal -     Vasopressors-  None     Diet Consistent Carbohydrate 2000 Calories (up to 75 gm per meal)     Eatin%  Nausea: No  Hypoglycemia and intervention: No  Fever: No  TPN and/or TF: No     /68 (BP Location: Right arm, Patient Position: Lying)   Pulse 66   Temp 98 °F (36.7 °C) (Oral)   Resp 18   Ht 5' 4" (1.626 m)   Wt 65.1 kg (143 lb 6.6 oz)   SpO2 96%   BMI 24.62 kg/m²     Labs Reviewed and Include    No results for " "input(s): "GLU", "CALCIUM", "ALBUMIN", "PROT", "NA", "K", "CO2", "CL", "BUN", "CREATININE", "ALKPHOS", "ALT", "AST", "BILITOT" in the last 24 hours.  Lab Results   Component Value Date    WBC 21.33 (H) 06/11/2025    HGB 10.6 (L) 06/11/2025    HCT 34.1 (L) 06/11/2025    MCV 94 06/11/2025     06/11/2025     No results for input(s): "TSH", "FREET4" in the last 168 hours.  Lab Results   Component Value Date    HGBA1C 5.4 04/24/2025       Nutritional status:   Body mass index is 24.62 kg/m².  Lab Results   Component Value Date    ALBUMIN 5.0 06/11/2025    ALBUMIN 4.1 06/10/2025    ALBUMIN 3.9 06/09/2025    ALBUMIN 4.0 06/09/2025     No results found for: "PREALBUMIN"    Estimated Creatinine Clearance: 35.7 mL/min (A) (based on SCr of 1.5 mg/dL (H)).    Accu-Checks  Recent Labs     06/10/25  1752 06/10/25  2004 06/10/25  2136 06/11/25  0328 06/11/25  0728 06/11/25  1254 06/11/25  1754 06/11/25  2204 06/12/25  0204 06/12/25  0835   POCTGLUCOSE 491* >500* 404* 319* 396* 130* 273* 224* 251* 192*       Current Medications and/or Treatments Impacting Glycemic Control  Immunotherapy:    Immunosuppressants           Stop Route Frequency     mycophenolate capsule 1,000 mg         -- Oral 2 times daily     tacrolimus capsule 3 mg         -- Oral 2 times daily          Steroids:   Hormones (From admission, onward)      Start     Stop Route Frequency Ordered    06/10/25 1137  melatonin tablet 6 mg         -- Oral Nightly PRN 06/10/25 1109          Pressors:    Autonomic Drugs (From admission, onward)      None          Hyperglycemia/Diabetes Medications:   Antihyperglycemics (From admission, onward)      Start     Stop Route Frequency Ordered    06/11/25 0900  insulin regular in 0.9 % NaCl 100 unit/100 mL (1 unit/mL) infusion        Question:  Enter initial dose (Units/hr):  Answer:  1    -- IV Continuous 06/11/25 0746    06/11/25 0845  insulin aspart U-100 pen 0-10 Units         -- SubQ Before meals, nightly and at 0200 PRN " 06/11/25 0746    06/11/25 0800  insulin aspart U-100 pen 8 Units         -- SubQ 3 times daily with meals 06/11/25 0746            ASSESSMENT and PLAN    Renal/  S/P kidney transplant  Managed per primary team  Optimize BG control        Immunology/Multi System  Prophylactic immunotherapy  May increase insulin resistance.         Endocrine  Type 2 diabetes mellitus with chronic kidney disease on chronic dialysis, with long-term current use of insulin  BG goal 140-180  T2DM. Hx of Kidney txp.  Treated for Rejection.    Continue Transition IV insulin infusion at 0.8 u/hr with stepdown parameters  Continue Novolog to 8 units TID with meals   Continue Moderate Dose Correction Scale  BG monitoring a/hs/0200  Diet Consistent Carbohydrate 2000 Calories (up to 75 gm per meal)    ** Please call Endocrine for any BG related issues **            Usman Rodriguez PA-C  Endocrinology  [facility]

## 2025-06-12 NOTE — PLAN OF CARE
Patient is AAOx3. Patient remains on an Insulin drip. Monitoring the patient's blood sugar AC/HS/0200. Patient received Plex 3/5 following by Steroids 3/3. Dietitian consulted to re-educate the patient on an ADA diet, patient has been noncompliant with her diet. Plan is for the patient to be discharge once she is off the insulin drip. Reminded the patient to call for assistance.

## 2025-06-12 NOTE — SUBJECTIVE & OBJECTIVE
"Interval HPI:   No acute events overnight. Patient in room 53510/22368 A. Blood glucose stable. BG at and above goal on current insulin regimen (Transition Insulin Drip). Steroid use- Methylprednisolone  .      Renal function- Abnormal -     Vasopressors-  None     Diet Consistent Carbohydrate 2000 Calories (up to 75 gm per meal)     Eatin%  Nausea: No  Hypoglycemia and intervention: No  Fever: No  TPN and/or TF: No     /68 (BP Location: Right arm, Patient Position: Lying)   Pulse 66   Temp 98 °F (36.7 °C) (Oral)   Resp 18   Ht 5' 4" (1.626 m)   Wt 65.1 kg (143 lb 6.6 oz)   SpO2 96%   BMI 24.62 kg/m²     Labs Reviewed and Include    No results for input(s): "GLU", "CALCIUM", "ALBUMIN", "PROT", "NA", "K", "CO2", "CL", "BUN", "CREATININE", "ALKPHOS", "ALT", "AST", "BILITOT" in the last 24 hours.  Lab Results   Component Value Date    WBC 21.33 (H) 2025    HGB 10.6 (L) 2025    HCT 34.1 (L) 2025    MCV 94 2025     2025     No results for input(s): "TSH", "FREET4" in the last 168 hours.  Lab Results   Component Value Date    HGBA1C 5.4 2025       Nutritional status:   Body mass index is 24.62 kg/m².  Lab Results   Component Value Date    ALBUMIN 5.0 2025    ALBUMIN 4.1 06/10/2025    ALBUMIN 3.9 2025    ALBUMIN 4.0 2025     No results found for: "PREALBUMIN"    Estimated Creatinine Clearance: 35.7 mL/min (A) (based on SCr of 1.5 mg/dL (H)).    Accu-Checks  Recent Labs     06/10/25  1752 06/10/25  2004 06/10/25  2136 25  0328 25  0728 25  1254 25  1754 25  2204 25  0204 25  0835   POCTGLUCOSE 491* >500* 404* 319* 396* 130* 273* 224* 251* 192*       Current Medications and/or Treatments Impacting Glycemic Control  Immunotherapy:    Immunosuppressants           Stop Route Frequency     mycophenolate capsule 1,000 mg         -- Oral 2 times daily     tacrolimus capsule 3 mg         -- Oral 2 times daily "          Steroids:   Hormones (From admission, onward)      Start     Stop Route Frequency Ordered    06/10/25 1137  melatonin tablet 6 mg         -- Oral Nightly PRN 06/10/25 1109          Pressors:    Autonomic Drugs (From admission, onward)      None          Hyperglycemia/Diabetes Medications:   Antihyperglycemics (From admission, onward)      Start     Stop Route Frequency Ordered    06/11/25 0900  insulin regular in 0.9 % NaCl 100 unit/100 mL (1 unit/mL) infusion        Question:  Enter initial dose (Units/hr):  Answer:  1    -- IV Continuous 06/11/25 0746    06/11/25 0845  insulin aspart U-100 pen 0-10 Units         -- SubQ Before meals, nightly and at 0200 PRN 06/11/25 0746    06/11/25 0800  insulin aspart U-100 pen 8 Units         -- SubQ 3 times daily with meals 06/11/25 0746

## 2025-06-13 LAB
ABO + RH BLD: NORMAL
ABO + RH BLD: NORMAL
ABSOLUTE EOSINOPHIL (OHS): 0 K/UL
ABSOLUTE MONOCYTE (OHS): 0.6 K/UL (ref 0.3–1)
ABSOLUTE NEUTROPHIL COUNT (OHS): 16.22 K/UL (ref 1.8–7.7)
ALBUMIN SERPL BCP-MCNC: 4.6 G/DL (ref 3.5–5.2)
ANION GAP (OHS): 8 MMOL/L (ref 8–16)
BASOPHILS # BLD AUTO: 0.02 K/UL
BASOPHILS NFR BLD AUTO: 0.1 %
BLD PROD TYP BPU: NORMAL
BLD PROD TYP BPU: NORMAL
BLOOD UNIT EXPIRATION DATE: NORMAL
BLOOD UNIT EXPIRATION DATE: NORMAL
BLOOD UNIT TYPE CODE: 5100
BLOOD UNIT TYPE CODE: 5100
BUN SERPL-MCNC: 41 MG/DL (ref 6–20)
CALCIUM SERPL-MCNC: 9.1 MG/DL (ref 8.7–10.5)
CHLORIDE SERPL-SCNC: 110 MMOL/L (ref 95–110)
CO2 SERPL-SCNC: 20 MMOL/L (ref 23–29)
CREAT SERPL-MCNC: 1.4 MG/DL (ref 0.5–1.4)
CROSSMATCH INTERPRETATION: NORMAL
CROSSMATCH INTERPRETATION: NORMAL
DISPENSE STATUS: NORMAL
DISPENSE STATUS: NORMAL
ERYTHROCYTE [DISTWIDTH] IN BLOOD BY AUTOMATED COUNT: 16.7 % (ref 11.5–14.5)
FIBRINOGEN PPP-MCNC: <70 MG/DL (ref 182–400)
GFR SERPLBLD CREATININE-BSD FMLA CKD-EPI: 44 ML/MIN/1.73/M2
GLUCOSE SERPL-MCNC: 192 MG/DL (ref 70–110)
HCT VFR BLD AUTO: 30 % (ref 37–48.5)
HGB BLD-MCNC: 9.3 GM/DL (ref 12–16)
IMM GRANULOCYTES # BLD AUTO: 0.87 K/UL (ref 0–0.04)
IMM GRANULOCYTES NFR BLD AUTO: 4.7 % (ref 0–0.5)
LYMPHOCYTES # BLD AUTO: 0.84 K/UL (ref 1–4.8)
MAGNESIUM SERPL-MCNC: 1.6 MG/DL (ref 1.6–2.6)
MCH RBC QN AUTO: 28.7 PG (ref 27–31)
MCHC RBC AUTO-ENTMCNC: 31 G/DL (ref 32–36)
MCV RBC AUTO: 93 FL (ref 82–98)
NUCLEATED RBC (/100WBC) (OHS): 0 /100 WBC
PHOSPHATE SERPL-MCNC: 2.4 MG/DL (ref 2.7–4.5)
PLATELET # BLD AUTO: 173 K/UL (ref 150–450)
PMV BLD AUTO: 12.7 FL (ref 9.2–12.9)
POCT GLUCOSE: 217 MG/DL (ref 70–110)
POCT GLUCOSE: 260 MG/DL (ref 70–110)
POCT GLUCOSE: 265 MG/DL (ref 70–110)
POCT GLUCOSE: 273 MG/DL (ref 70–110)
POCT GLUCOSE: 326 MG/DL (ref 70–110)
POCT GLUCOSE: 352 MG/DL (ref 70–110)
POTASSIUM SERPL-SCNC: 4.3 MMOL/L (ref 3.5–5.1)
RBC # BLD AUTO: 3.24 M/UL (ref 4–5.4)
RELATIVE EOSINOPHIL (OHS): 0 %
RELATIVE LYMPHOCYTE (OHS): 4.5 % (ref 18–48)
RELATIVE MONOCYTE (OHS): 3.2 % (ref 4–15)
RELATIVE NEUTROPHIL (OHS): 87.5 % (ref 38–73)
SODIUM SERPL-SCNC: 138 MMOL/L (ref 136–145)
TACROLIMUS BLD-MCNC: 8 NG/ML (ref 5–15)
UNIT NUMBER: NORMAL
UNIT NUMBER: NORMAL
WBC # BLD AUTO: 18.55 K/UL (ref 3.9–12.7)

## 2025-06-13 PROCEDURE — P9045 ALBUMIN (HUMAN), 5%, 250 ML: HCPCS | Mod: JZ,TB | Performed by: PATHOLOGY

## 2025-06-13 PROCEDURE — 36415 COLL VENOUS BLD VENIPUNCTURE: CPT | Performed by: CLINICAL NURSE SPECIALIST

## 2025-06-13 PROCEDURE — 83735 ASSAY OF MAGNESIUM: CPT | Performed by: CLINICAL NURSE SPECIALIST

## 2025-06-13 PROCEDURE — 80197 ASSAY OF TACROLIMUS: CPT | Performed by: CLINICAL NURSE SPECIALIST

## 2025-06-13 PROCEDURE — 25000003 PHARM REV CODE 250: Performed by: PATHOLOGY

## 2025-06-13 PROCEDURE — P9017 PLASMA 1 DONOR FRZ W/IN 8 HR: HCPCS | Performed by: PATHOLOGY

## 2025-06-13 PROCEDURE — 20600001 HC STEP DOWN PRIVATE ROOM

## 2025-06-13 PROCEDURE — 63600175 PHARM REV CODE 636 W HCPCS: Performed by: CLINICAL NURSE SPECIALIST

## 2025-06-13 PROCEDURE — 80069 RENAL FUNCTION PANEL: CPT | Performed by: CLINICAL NURSE SPECIALIST

## 2025-06-13 PROCEDURE — 25000003 PHARM REV CODE 250: Performed by: NURSE PRACTITIONER

## 2025-06-13 PROCEDURE — 25000003 PHARM REV CODE 250: Performed by: CLINICAL NURSE SPECIALIST

## 2025-06-13 PROCEDURE — 99233 SBSQ HOSP IP/OBS HIGH 50: CPT | Mod: ,,, | Performed by: NURSE PRACTITIONER

## 2025-06-13 PROCEDURE — 63600175 PHARM REV CODE 636 W HCPCS: Performed by: PHYSICIAN ASSISTANT

## 2025-06-13 PROCEDURE — 85025 COMPLETE CBC W/AUTO DIFF WBC: CPT | Performed by: CLINICAL NURSE SPECIALIST

## 2025-06-13 PROCEDURE — 85384 FIBRINOGEN ACTIVITY: CPT | Performed by: CLINICAL NURSE SPECIALIST

## 2025-06-13 PROCEDURE — 63600175 PHARM REV CODE 636 W HCPCS: Performed by: PATHOLOGY

## 2025-06-13 PROCEDURE — 36514 APHERESIS PLASMA: CPT

## 2025-06-13 PROCEDURE — 99232 SBSQ HOSP IP/OBS MODERATE 35: CPT | Mod: ,,, | Performed by: PHYSICIAN ASSISTANT

## 2025-06-13 RX ORDER — DICYCLOMINE HYDROCHLORIDE 10 MG/1
10 CAPSULE ORAL EVERY 6 HOURS PRN
Status: DISCONTINUED | OUTPATIENT
Start: 2025-06-13 | End: 2025-06-14 | Stop reason: HOSPADM

## 2025-06-13 RX ORDER — CALCIUM GLUCONATE 20 MG/ML
2 INJECTION, SOLUTION INTRAVENOUS ONCE
Status: COMPLETED | OUTPATIENT
Start: 2025-06-13 | End: 2025-06-13

## 2025-06-13 RX ORDER — DIPHENHYDRAMINE HYDROCHLORIDE 50 MG/ML
50 INJECTION, SOLUTION INTRAMUSCULAR; INTRAVENOUS ONCE
Status: COMPLETED | OUTPATIENT
Start: 2025-06-14 | End: 2025-06-14

## 2025-06-13 RX ORDER — INSULIN GLARGINE 100 [IU]/ML
17 INJECTION, SOLUTION SUBCUTANEOUS DAILY
Status: DISCONTINUED | OUTPATIENT
Start: 2025-06-13 | End: 2025-06-14 | Stop reason: HOSPADM

## 2025-06-13 RX ORDER — SULFAMETHOXAZOLE AND TRIMETHOPRIM 400; 80 MG/1; MG/1
1 TABLET ORAL DAILY
Qty: 30 TABLET | Refills: 4 | Status: SHIPPED | OUTPATIENT
Start: 2025-06-13 | End: 2025-10-24

## 2025-06-13 RX ORDER — ALBUMIN HUMAN 50 G/1000ML
100 SOLUTION INTRAVENOUS ONCE
Status: COMPLETED | OUTPATIENT
Start: 2025-06-13 | End: 2025-06-13

## 2025-06-13 RX ORDER — DIPHENHYDRAMINE HYDROCHLORIDE 50 MG/ML
50 INJECTION, SOLUTION INTRAMUSCULAR; INTRAVENOUS ONCE
Status: COMPLETED | OUTPATIENT
Start: 2025-06-13 | End: 2025-06-13

## 2025-06-13 RX ADMIN — INSULIN ASPART 6 UNITS: 100 INJECTION, SOLUTION INTRAVENOUS; SUBCUTANEOUS at 02:06

## 2025-06-13 RX ADMIN — INSULIN ASPART 4 UNITS: 100 INJECTION, SOLUTION INTRAVENOUS; SUBCUTANEOUS at 05:06

## 2025-06-13 RX ADMIN — NIFEDIPINE 30 MG: 30 TABLET, FILM COATED, EXTENDED RELEASE ORAL at 08:06

## 2025-06-13 RX ADMIN — INSULIN ASPART 8 UNITS: 100 INJECTION, SOLUTION INTRAVENOUS; SUBCUTANEOUS at 01:06

## 2025-06-13 RX ADMIN — FLUOXETINE HYDROCHLORIDE 40 MG: 20 CAPSULE ORAL at 08:06

## 2025-06-13 RX ADMIN — CALCITRIOL CAPSULES 0.25 MCG 0.25 MCG: 0.25 CAPSULE ORAL at 08:06

## 2025-06-13 RX ADMIN — ALBUMIN (HUMAN) 100 G: 12.5 SOLUTION INTRAVENOUS at 07:06

## 2025-06-13 RX ADMIN — HEPARIN SODIUM 5000 UNITS: 5000 INJECTION INTRAVENOUS; SUBCUTANEOUS at 09:06

## 2025-06-13 RX ADMIN — CARVEDILOL 25 MG: 12.5 TABLET, FILM COATED ORAL at 09:06

## 2025-06-13 RX ADMIN — VALGANCICLOVIR 450 MG: 450 TABLET, FILM COATED ORAL at 08:06

## 2025-06-13 RX ADMIN — HEPARIN SODIUM 5000 UNITS: 5000 INJECTION INTRAVENOUS; SUBCUTANEOUS at 03:06

## 2025-06-13 RX ADMIN — TACROLIMUS 3 MG: 1 CAPSULE ORAL at 05:06

## 2025-06-13 RX ADMIN — CALCIUM GLUCONATE 2 G: 20 INJECTION, SOLUTION INTRAVENOUS at 07:06

## 2025-06-13 RX ADMIN — MYCOPHENOLATE MOFETIL 1000 MG: 250 CAPSULE ORAL at 09:06

## 2025-06-13 RX ADMIN — INSULIN ASPART 8 UNITS: 100 INJECTION, SOLUTION INTRAVENOUS; SUBCUTANEOUS at 05:06

## 2025-06-13 RX ADMIN — SODIUM BICARBONATE 650 MG TABLET 1300 MG: at 09:06

## 2025-06-13 RX ADMIN — DIPHENHYDRAMINE HYDROCHLORIDE 50 MG: 50 INJECTION, SOLUTION INTRAMUSCULAR; INTRAVENOUS at 07:06

## 2025-06-13 RX ADMIN — CHLORTHALIDONE 25 MG: 25 TABLET ORAL at 09:06

## 2025-06-13 RX ADMIN — INSULIN GLARGINE 17 UNITS: 100 INJECTION, SOLUTION SUBCUTANEOUS at 01:06

## 2025-06-13 RX ADMIN — MYCOPHENOLATE MOFETIL 1000 MG: 250 CAPSULE ORAL at 08:06

## 2025-06-13 RX ADMIN — HEPARIN SODIUM 5000 UNITS: 5000 INJECTION INTRAVENOUS; SUBCUTANEOUS at 05:06

## 2025-06-13 RX ADMIN — METHIMAZOLE 5 MG: 5 TABLET ORAL at 09:06

## 2025-06-13 RX ADMIN — Medication 2000 UNITS: at 08:06

## 2025-06-13 RX ADMIN — INSULIN ASPART 8 UNITS: 100 INJECTION, SOLUTION INTRAVENOUS; SUBCUTANEOUS at 09:06

## 2025-06-13 RX ADMIN — DICYCLOMINE HYDROCHLORIDE 10 MG: 10 CAPSULE ORAL at 03:06

## 2025-06-13 RX ADMIN — SULFAMETHOXAZOLE AND TRIMETHOPRIM 1 TABLET: 400; 80 TABLET ORAL at 08:06

## 2025-06-13 RX ADMIN — LINACLOTIDE 72 MCG: 72 CAPSULE, GELATIN COATED ORAL at 05:06

## 2025-06-13 RX ADMIN — TACROLIMUS 3 MG: 1 CAPSULE ORAL at 08:06

## 2025-06-13 RX ADMIN — CARVEDILOL 25 MG: 12.5 TABLET, FILM COATED ORAL at 08:06

## 2025-06-13 RX ADMIN — Medication 6 MG: at 10:06

## 2025-06-13 RX ADMIN — SODIUM BICARBONATE 650 MG TABLET 1300 MG: at 03:06

## 2025-06-13 RX ADMIN — SODIUM BICARBONATE 650 MG TABLET 1300 MG: at 08:06

## 2025-06-13 RX ADMIN — INSULIN ASPART 4 UNITS: 100 INJECTION, SOLUTION INTRAVENOUS; SUBCUTANEOUS at 09:06

## 2025-06-13 RX ADMIN — DICYCLOMINE HYDROCHLORIDE 10 MG: 10 CAPSULE ORAL at 10:06

## 2025-06-13 RX ADMIN — NIFEDIPINE 30 MG: 30 TABLET, FILM COATED, EXTENDED RELEASE ORAL at 09:06

## 2025-06-13 RX ADMIN — ATORVASTATIN CALCIUM 20 MG: 20 TABLET, FILM COATED ORAL at 08:06

## 2025-06-13 RX ADMIN — FAMOTIDINE 20 MG: 20 TABLET, FILM COATED ORAL at 08:06

## 2025-06-13 RX ADMIN — ASPIRIN 81 MG CHEWABLE TABLET 81 MG: 81 TABLET CHEWABLE at 08:06

## 2025-06-13 RX ADMIN — PREDNISONE 20 MG: 20 TABLET ORAL at 08:06

## 2025-06-13 NOTE — SUBJECTIVE & OBJECTIVE
"Interval HPI:   No acute events overnight. Patient in room 17212/36010 A. Blood glucose stable. BG at goal on current insulin regimen (SSI, prandial, and basal insulin ). Steroid use- Prednisone .      Renal function- Abnormal -     Vasopressors-  None     Diet Consistent Carbohydrate 2000 Calories (up to 75 gm per meal)     Eatin%  Nausea: No  Hypoglycemia and intervention: No  Fever: No  TPN and/or TF: No     /65   Pulse 75   Temp 97.9 °F (36.6 °C) (Oral)   Resp 18   Ht 5' 4" (1.626 m)   Wt 67.1 kg (148 lb)   SpO2 100%   BMI 25.40 kg/m²     Labs Reviewed and Include    Recent Labs   Lab 25  0601   *   CALCIUM 9.1   ALBUMIN 4.6      K 4.3   CO2 20*      BUN 41*   CREATININE 1.4     Lab Results   Component Value Date    WBC 18.55 (H) 2025    HGB 9.3 (L) 2025    HCT 30.0 (L) 2025    MCV 93 2025     2025     No results for input(s): "TSH", "FREET4" in the last 168 hours.  Lab Results   Component Value Date    HGBA1C 5.4 2025       Nutritional status:   Body mass index is 25.4 kg/m².  Lab Results   Component Value Date    ALBUMIN 4.6 2025    ALBUMIN 4.9 2025    ALBUMIN 5.0 2025     No results found for: "PREALBUMIN"    Estimated Creatinine Clearance: 41.8 mL/min (based on SCr of 1.4 mg/dL).    Accu-Checks  Recent Labs     25  0728 25  1254 25  1754 25  2204 25  0204 25  0835 25  1304 25  1736 25  2214 25  0239   POCTGLUCOSE 396* 130* 273* 224* 251* 192* 202* 228* 273* 265*       Current Medications and/or Treatments Impacting Glycemic Control  Immunotherapy:    Immunosuppressants           Stop Route Frequency     mycophenolate capsule 1,000 mg         -- Oral 2 times daily     tacrolimus capsule 3 mg         -- Oral 2 times daily          Steroids:   Hormones (From admission, onward)      Start     Stop Route Frequency Ordered    25 0900  predniSONE " tablet 20 mg         -- Oral Daily 06/12/25 1030    06/10/25 1137  melatonin tablet 6 mg         -- Oral Nightly PRN 06/10/25 1109          Pressors:    Autonomic Drugs (From admission, onward)      None          Hyperglycemia/Diabetes Medications:   Antihyperglycemics (From admission, onward)      Start     Stop Route Frequency Ordered    06/11/25 0900  insulin regular in 0.9 % NaCl 100 unit/100 mL (1 unit/mL) infusion        Question:  Enter initial dose (Units/hr):  Answer:  0.8    -- IV Continuous 06/11/25 0746    06/11/25 0845  insulin aspart U-100 pen 0-10 Units         -- SubQ Before meals, nightly and at 0200 PRN 06/11/25 0746    06/11/25 0800  insulin aspart U-100 pen 8 Units         -- SubQ 3 times daily with meals 06/11/25 0746

## 2025-06-13 NOTE — PROGRESS NOTES
TPE #4     0335 Arrived to the patient room with equipment set up for treatment. Primary nurse Katherine HORVATH is here with patient,. Patient awake alert oriented and denies pain at this time. Vitals are stable remains on insulin drip managed by primary nurse and room air no resp distress .        Access: FELICIANO fistula +bruit +thrill successfully accessed with (2) 16 gauge dialysis needles per protocol positive for blood return x2. Flushed with 10 cc NS pushes and pulls easily. Needles visible, secured with guaze and tape, and patent. Treatment initiated at 0720 without any incident. See apheresis flowsheet for details of treatment.      Medications/Replacement Fluids:     2.L 5% albumin   2 Units FFP 551mls  2 GM calcium gluconate IVPB     Rinse back 0811 and treatment completed 0817 all blood returned. Needles removed sites held until hemostasis obtained site dressed with guaze and tape. YULIET Lobo notified treatment is completed vitals are stable and patient denies pain. Next treatment will be tomorrow. Patient left in her room in bed in stable condition call light in reach.

## 2025-06-13 NOTE — ASSESSMENT & PLAN NOTE
- kidney biopsy 6/6/25 with ABMR.   - DSA 5/26 with [facility] [id number].   - SMP x 3 (6/10-6/12), PLEX x 5 (6/10-6/14), IVIG and velcade to be completed outpatient.   - continue to monitor labs

## 2025-06-13 NOTE — ASSESSMENT & PLAN NOTE
- Continue prograf, cellcept, prednisone  - Check prograf level daily and adjust for therapeutic dosage. Monitor for toxic side effects

## 2025-06-13 NOTE — DISCHARGE SUMMARY
[facility] Stepdown  Kidney Transplant  Discharge Summary    Patient Name: Kiesha Rocha  MRN: [id number]  Admission Date: 6/10/2025  Hospital Length of Stay: 3 days  Discharge Date and Time: 06/14/2025 1:20 PM  Attending Physician: Toribio Dunbar Jr.*   Discharging Provider: Cat Simms DNP  Primary Care Provider: Laverne Azevedo NP    HPI:   Ms. Rocha is a 59 y.o. year old female with ESRD secondary to diabetic nephropathy now s/p DBD Kidney transplant 4/24/25. Received thymo induction. Progressed well post operatively with downtrending Cr. She takes mycophenolate mofetil, prednisone, and tacrolimus for maintenance immunosuppression. Post-transplant course as follows:  UTI: Urine cx 5/8 w/ E.coli ESBL. Completed course IV Ertapenem    Patient recently underwent a transplant kidney biopsy d/t elevated Cr. Biopsy 6/6/25 with 10 gloms 0 sclerosed. Minimal 5% fibrosis. Focal severe arteriosclerosis. Moderate micro circulatory changes with positive c4d suggestive of ABMR. No ACR or AVR. No viral changes. DSA Class II positive: DPA1*03:01(6758) DR7(3080), DQ2(1713). Discussed case with Dr. Dunbar, plan to treat ABMR with SMP and PLEX followed by IVIG and Velcade. Will consult Apheresis.   * No surgery found *     Hospital Course:    Patient admitted with ABMR on kidney biopsy 6/6/25. C/o diarrhea, Cdiff negative, suspect medication induced, improved with PRN imodium.     ABMR: kidney biopsy 6/6/25 with ABMR. DSA 5/26 with MFI [id number]. SMP x 3 (6/10-6/12), PLEX x 5 (6/10-6/14), IVIG and velcade to be completed outpatient (consents obtained).    SOB: c/o FULLER. TTE 6/11 stable, EF 65-70%. Spo2 stable on RA. Suspect SOB related to endurance and/or anxiety.     Patient is stable and ready for discharge. She will have repeat labs 6/16, clinic appointment scheduled 7/7.     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] 06/10/2025 Yes    Diarrhea [R19.7] 06/10/2025 Yes    Chronic diastolic heart failure [I50.32] 06/10/2025 Yes    Anemia of chronic disease [D63.8] 04/25/2025 Yes    At risk for opportunistic infections [Z91.89] 04/25/2025 Yes    Prophylactic immunotherapy [Z29.89] 04/25/2025 Not Applicable    S/P kidney transplant [Z94.0] 04/25/2025 Not Applicable    Adrenal cortical steroids causing adverse effect in therapeutic use [T38.0X5A] 04/25/2025 Yes    Hypertension [I10] 10/17/2024 Yes    Shortness of breath [R06.02] 05/05/2022 Yes    Hyperthyroidism [E05.90] 10/29/2019 Yes    Type 2 diabetes mellitus with chronic kidney disease on chronic dialysis, with long-term current use of insulin [E11.22, N18.6, Z99.2, Z79.4] 08/21/2019 Not Applicable      Problems Resolved During this Admission:     Treatments: SMP X3    Consults (From admission, onward)          Status Ordering Provider     Inpatient consult to Registered Dietitian/Nutritionist  Once        Provider:  (Not yet assigned)    Completed KEVIN JACOBO JR     Inpatient consult to [facility]  Once        Provider:  (Not yet assigned)    Completed TAE JULIEN     Inpatient consult to Endocrinology  Once        Provider:  (Not yet assigned)    Completed TAE JULIEN            Pending Diagnostic Studies:       None          Significant Diagnostic Studies: Labs: BMP:   Recent Labs   Lab 06/13/25  0601 06/14/25  0706   * 167*    138   K 4.3 4.1    107   CO2 20* 24   BUN 41* 37*   CREATININE 1.4 1.2   CALCIUM 9.1 9.3   MG 1.6 1.5*   , CMP   Recent Labs   Lab 06/13/25  0601 06/14/25  0706    138   K 4.3 4.1    107   CO2 20* 24   * 167*   BUN 41* 37*   CREATININE 1.4 1.2   CALCIUM 9.1 9.3   ALBUMIN 4.6 4.6   ANIONGAP 8 7*   , CBC   Recent Labs   Lab 06/13/25  0601 06/14/25  0706   WBC 18.55* 15.44*   HGB 9.3* 9.9*   HCT  "30.0* 30.6*    175   , and All labs within the past 24 hours have been reviewed    Discharged Condition: stable    Disposition:     Follow Up:    Patient Instructions:   No discharge procedures on file.  Medications:  Reconciled Home Medications:      Medication List        START taking these medications      dicyclomine 10 MG capsule  Commonly known as: BENTYL  Take 1 capsule (10 mg total) by mouth every 6 (six) hours as needed (abd cramps).     famotidine 20 MG tablet  Commonly known as: PEPCID  Take 1 tablet (20 mg total) by mouth once daily.     linaCLOtide 72 mcg Cap capsule  Commonly known as: LINZESS  Take 1 capsule (72 mcg total) by mouth before breakfast.            CHANGE how you take these medications      insulin aspart U-100 100 unit/mL (3 mL) Inpn pen  Commonly known as: NovoLOG  Inject 8 Units into the skin 3 (three) times daily. Plus low sliding scale. Max units per day: 54  What changed:   how much to take  additional instructions     predniSONE 5 MG tablet  Commonly known as: DELTASONE  Take by mouth daily: 20mg 6/13-6/19, 15mg 6/20-6/26, 10mg 6/27-7/3, 5mg 7/4-  Start taking on: June 13, 2025  What changed: See the new instructions.     sodium bicarbonate 650 MG tablet  Take 2 tablets (1,300 mg total) by mouth 3 (three) times daily.  What changed:   how much to take  when to take this            CONTINUE taking these medications      albuterol 90 mcg/actuation inhaler  Commonly known as: PROVENTIL/VENTOLIN HFA  Inhale 2 puffs into the lungs every 6 (six) hours as needed.     ASPIR-81 ORAL  Take 81 mg by mouth once daily. As of 5/13/25 On HOLD for kidney biopsy     atorvastatin 20 MG tablet  Commonly known as: LIPITOR  Take 1 tablet (20 mg total) by mouth once daily.     BD ULTRA-FINE SUJEY PEN NEEDLE 32 gauge x 5/32" Ndle  Generic drug: pen needle, diabetic  Inject 1 Needle into the skin 4 (four) times daily.     calcitRIOL 0.25 MCG Cap  Commonly known as: ROCALTROL  Take 1 capsule (0.25 mcg " total) by mouth once daily.     carvediloL 25 MG tablet  Commonly known as: COREG  Take 1 tablet (25 mg total) by mouth 2 (two) times daily.     chlorthalidone 25 MG Tab  Commonly known as: HYGROTEN  Take 1 tablet (25 mg total) by mouth nightly.     FLUoxetine 40 MG capsule  Take 1 capsule (40 mg total) by mouth once daily.     hydrOXYzine pamoate 25 MG Cap  Commonly known as: VISTARIL  Take 1 capsule (25 mg total) by mouth every 8 (eight) hours as needed (insomnia).     insulin glargine U-100 (Lantus) 100 unit/mL (3 mL) Inpn pen  Inject 13 Units into the skin every evening.     linaGLIPtin 5 mg Tab tablet  Commonly known as: TRADJENTA  Take 1 tablet (5 mg total) by mouth once daily.     methIMAzole 5 MG Tab  Commonly known as: TAPAZOLE  Take 1 tablet (5 mg total) by mouth once daily.     multivitamin Tab  Take 1 tablet by mouth once daily.     mycophenolate 250 mg Cap  Commonly known as: CELLCEPT  Take 4 capsules (1,000 mg total) by mouth 2 (two) times daily. Z94.0;Kidney transplant on 4/24/2025     NIFEdipine 30 MG (OSM) 24 hr tablet  Commonly known as: PROCARDIA-XL  Take 1 tablet (30 mg total) by mouth 2 (two) times a day.     sulfamethoxazole-trimethoprim 400-80mg 400-80 mg per tablet  Commonly known as: BACTRIM,SEPTRA  Take 1 tablet by mouth once daily. Stop 10/24/25     tacrolimus 0.5 MG Cap  Commonly known as: PROGRAF  Take 6 capsules (3 mg total) by mouth every 12 (twelve) hours. Z94.0;Kidney txp on 4/24/25     TRUE METRIX GLUCOSE METER Misc  Generic drug: blood-glucose meter  To check BG 3 times daily, to use with insurance preferred meter     TRUE METRIX GLUCOSE TEST STRIP Strp  Generic drug: blood sugar diagnostic  To check BG 3 times daily, to use with insurance preferred meter     TRUEPLUS LANCETS 33 gauge Misc  Generic drug: lancets  To check BG 3 times daily, to use with insurance preferred meter     valGANciclovir 450 mg Tab  Commonly known as: VALCYTE  Take 1 tablet (450 mg total) by mouth once  daily. Stop 7/24/25     vitamin D 1000 units Tab  Commonly known as: VITAMIN D3  Take 2 tablets (2,000 Units total) by mouth once daily.            STOP taking these medications      oxybutynin 5 MG Tab  Commonly known as: DITROPAN            Time spent caring for patient (Greater than 1/2 spent in direct face-to-face contact): > 30 minutes    Cat Simms, ROGERIO  Kidney Transplant  [facility] - Transplant Stepdown

## 2025-06-13 NOTE — PLAN OF CARE
PLEX #4 done this AM, plan for PLEX #5 tomorrow. Insulin gtt dc'd, lantus started. SSI and mealtime given per MAR. C/o abd cramping this afternoon, bentyl ordered and given per MAR. AAOx4, VSS on RA. Up independent in room. Remained free from falls/injuries. Call light and personal belongings with in reach. Plan for dc tomorrow after PLEX #5, will receive rest of ABMR treatment outpt.

## 2025-06-13 NOTE — PROCEDURES
"[facility] - [facility]plant Stepdown  Transfusion Medicine  Procedure Note    SUMMARY   Procedures  Date of Procedure: 6/13/2025     Procedure: Plasma Exchange    Provider: Ramón Lorenzana Jr, MD     Assisting Provider: None    Pre-Procedure Diagnosis: Antibody mediated rejection of kidney transplant    Post-Procedure Diagnosis: Antibody mediated rejection of kidney transplant    Follow-up Assessment: Kiesha Rocha is a 59 y.o. year old female with ESRD secondary to diabetic nephropathy now s/p DBD Kidney transplant 4/24/25. Received thymo induction. Progressed well post operatively with downtrending Cr. She takes mycophenolate mofetil, prednisone, and tacrolimus for maintenance immunosuppression. Patient recently underwent a transplant kidney biopsy d/t elevated Cr. Biopsy 6/6/25 with 10 gloms 0 sclerosed. Minimal 5% fibrosis. Focal severe arteriosclerosis. Moderate micro circulatory changes with positive c4d suggestive of ABMR. No ACR or AVR. No viral changes. DSA Class II positive: DPA1*03:01(6758) DR7(3080), DQ2(1713). Patient admitted for treatment of ABMR with SMP and PLEX, followed by IVIG and Velcade. Plasma exchange requested by Kidney Transplant Service to antibody mediated kidney transplant rejection.      Today's procedure (#4 of 5) was well tolerated and without complications. The final procedure is planned for tomorrow morning.  Fibrinogen is trending down, we added a unit of plasma to today's PLEX, and plan for the same tomorrow.     Pertinent Laboratory Data: Complete Blood Count:   Lab Results   Component Value Date    HGB 9.3 (L) 06/13/2025    HCT 30.0 (L) 06/13/2025     06/13/2025    WBC 18.55 (H) 06/13/2025     Lactate Dehydrogenase (LDH): No results found for: "LDH"  Basic Metabolic Panel:   Lab Results   Component Value Date     06/13/2025    K 4.3 06/13/2025     06/13/2025    CO2 20 (L) 06/13/2025     (H) 06/13/2025    BUN 41 (H) 06/13/2025    CREATININE 1.4 06/13/2025 "    CALCIUM 9.1 06/13/2025    ANIONGAP 8 06/13/2025    ESTGFRAFRICA 6.6 (A) 11/05/2020    EGFRNONAA 5.7 (A) 11/05/2020     Comprehensive Metabolic Panel:   Lab Results   Component Value Date     06/13/2025    K 4.3 06/13/2025     06/13/2025    CO2 20 (L) 06/13/2025     (H) 06/13/2025    BUN 41 (H) 06/13/2025    CREATININE 1.4 06/13/2025    CALCIUM 9.1 06/13/2025    PROT 8.2 06/09/2025    ALBUMIN 4.6 06/13/2025    BILITOT 0.6 06/09/2025    ALKPHOS 88 06/09/2025    AST 18 06/09/2025    ALT 17 06/09/2025    ANIONGAP 8 06/13/2025    EGFRNONAA 5.7 (A) 11/05/2020       Pertinent Medications: n/a    Review of patient's allergies indicates:   Allergen Reactions    Latex Itching    Penicillins Hives and Rash       Anesthesia: None     Technical Procedures Used: Plasma Exchange: Volume exchanged - 2.5 Liters; Replacement fluid - Albumin/Fresh Frozen Plasma; Number of procedures 4; Date of next procedure 6/14.    Description of the Findings of the Procedure:     Please see Apheresis Nurse flowsheet for details.    The patient was evaluated and all clinical and laboratory data relevant to the treatment was reviewed, and a decision was made to proceed with the Apheresis procedure.    I was available to the clinical staff throughout the procedure.    Significant Surgical Tasks Conducted by the Assistant(s): Not applicable    Complications: None    Estimated Blood Loss (EBL): None    Implants: None     Specimens: None

## 2025-06-13 NOTE — PROGRESS NOTES
Discharge Note:    SW met with pt in room to discuss discharge plan and to assess needs. Pt reports coping adequately at this time and presents as alert and oriented x4 and states is in high spirits. Pt did express concerns with dietary dept and SW/pt spoke with Ricki for clarification on pt's orders.     Pt scheduled to discharge to pt's home under the care of self with no needs at this time. SW reviewed discharge plan with pt. Pt aware of and involved in discharge plan. Pt's dtr to transport pt.  Pt denies having any concerns at this time as well.     Pt verbalized understanding and agreement with information reviewed, social work availability, and how to assess available resources as needed. SW remains available.

## 2025-06-13 NOTE — ASSESSMENT & PLAN NOTE
BG goal 140-180  T2DM. Hx of Kidney txp.  Treated for Rejection. Elevated this morning.    Disconitnue Transition IV insulin   Start Lantus 17 units daily (0.5 WBD)  Continue Novolog to 8 units TID with meals   Continue Moderate Dose Correction Scale  BG monitoring a/hs/0200  Diet Consistent Carbohydrate 2000 Calories (up to 75 gm per meal)    ** Please call Endocrine for any BG related issues **

## 2025-06-13 NOTE — PLAN OF CARE
Pt AAOx4, afebrile, VSS, stable on RA. Insulin gtt continued at 0.8 units/hr. ACHS/2AM accuchecks, PRN SSI administered as ordered. L UA fistula +/+. Plan to continue treatment of ABMR. Bed in lowest locked position, call light and personal items in reach, nonskids on, verbalized understanding to call for assistance.

## 2025-06-13 NOTE — PROGRESS NOTES
"[facility] - [facility]plant Stepdown  Endocrinology  Progress Note    Admit Date: 6/10/2025     Reason for Consult: Management of T2DM, Hyperglycemia     Surgical Procedure and Date: Kidney transplant 25    Diabetes diagnosis year: > 10 years ago     Home Diabetes Medications:  Novolog 5 units TID with meals, Lantus 13 units q HS, Tradjenta 5 mg once daily     Lab Results   Component Value Date    HGBA1C 5.4 2025       How often checking glucose at home? 4x daily   BG readings on regimen: variable low 100s-500  Hypoglycemia on the regimen?  Yes-once dropped into the 50s  Missed doses on regimen?  No       Diabetes Complications include:     Hyperglycemia     Complicating diabetes co morbidities:   ESRD and Glucocorticoid use     HPI:   Patient is a 59 y.o. female with a diagnosis of ESRD secondary to diabetic nephropathy now s/p DBD Kidney transplant 25. Patient recently underwent a transplant kidney biopsy d/t elevated Cr. Biopsy 25 with 10 gloms 0 sclerosed. Minimal 5% fibrosis. Focal severe arteriosclerosis. Moderate micro circulatory changes with positive c4d suggestive of ABMR. No ACR or AVR. No viral changes. Plan to treat ABMR with SMP and PLEX followed by IVIG and Velcade. Endocrinology consulted for management of T2DM.             Interval HPI:   No acute events overnight. Patient in room 85257/55800 A. Blood glucose stable. BG at goal on current insulin regimen (SSI, prandial, and basal insulin ). Steroid use- Prednisone .      Renal function- Abnormal -     Vasopressors-  None     Diet Consistent Carbohydrate 2000 Calories (up to 75 gm per meal)     Eatin%  Nausea: No  Hypoglycemia and intervention: No  Fever: No  TPN and/or TF: No     /65   Pulse 75   Temp 97.9 °F (36.6 °C) (Oral)   Resp 18   Ht 5' 4" (1.626 m)   Wt 67.1 kg (148 lb)   SpO2 100%   BMI 25.40 kg/m²     Labs Reviewed and Include    Recent Labs   Lab 25  0601   *   CALCIUM 9.1   ALBUMIN 4.6   NA " "138   K 4.3   CO2 20*      BUN 41*   CREATININE 1.4     Lab Results   Component Value Date    WBC 18.55 (H) 06/13/2025    HGB 9.3 (L) 06/13/2025    HCT 30.0 (L) 06/13/2025    MCV 93 06/13/2025     06/13/2025     No results for input(s): "TSH", "FREET4" in the last 168 hours.  Lab Results   Component Value Date    HGBA1C 5.4 04/24/2025       Nutritional status:   Body mass index is 25.4 kg/m².  Lab Results   Component Value Date    ALBUMIN 4.6 06/13/2025    ALBUMIN 4.9 06/12/2025    ALBUMIN 5.0 06/11/2025     No results found for: "PREALBUMIN"    Estimated Creatinine Clearance: 41.8 mL/min (based on SCr of 1.4 mg/dL).    Accu-Checks  Recent Labs     06/11/25  0728 06/11/25  1254 06/11/25  1754 06/11/25  2204 06/12/25  0204 06/12/25  0835 06/12/25  1304 06/12/25  1736 06/12/25  2214 06/13/25  0239   POCTGLUCOSE 396* 130* 273* 224* 251* 192* 202* 228* 273* 265*       Current Medications and/or Treatments Impacting Glycemic Control  Immunotherapy:    Immunosuppressants           Stop Route Frequency     mycophenolate capsule 1,000 mg         -- Oral 2 times daily     tacrolimus capsule 3 mg         -- Oral 2 times daily          Steroids:   Hormones (From admission, onward)      Start     Stop Route Frequency Ordered    06/13/25 0900  predniSONE tablet 20 mg         -- Oral Daily 06/12/25 1030    06/10/25 1137  melatonin tablet 6 mg         -- Oral Nightly PRN 06/10/25 1109          Pressors:    Autonomic Drugs (From admission, onward)      None          Hyperglycemia/Diabetes Medications:   Antihyperglycemics (From admission, onward)      Start     Stop Route Frequency Ordered    06/11/25 0900  insulin regular in 0.9 % NaCl 100 unit/100 mL (1 unit/mL) infusion        Question:  Enter initial dose (Units/hr):  Answer:  0.8    -- IV Continuous 06/11/25 0746    06/11/25 0845  insulin aspart U-100 pen 0-10 Units         -- SubQ Before meals, nightly and at 0200 PRN 06/11/25 0746    06/11/25 0800  insulin " aspart U-100 pen 8 Units         -- SubQ 3 times daily with meals 06/11/25 0746            ASSESSMENT and PLAN    Renal/  S/P kidney transplant  Managed per primary team  Optimize BG control        Immunology/Multi System  Prophylactic immunotherapy  May increase insulin resistance.         Endocrine  Type 2 diabetes mellitus with chronic kidney disease on chronic dialysis, with long-term current use of insulin  BG goal 140-180  T2DM. Hx of Kidney txp.  Treated for Rejection. Elevated this morning.    Disconitnue Transition IV insulin   Start Lantus 17 units daily (0.5 WBD)  Continue Novolog to 8 units TID with meals   Continue Moderate Dose Correction Scale  BG monitoring a/hs/0200  Diet Consistent Carbohydrate 2000 Calories (up to 75 gm per meal)    ** Please call Endocrine for any BG related issues **            Usman Rodriguez PA-C  Endocrinology  [facility] Transplant Stepdown

## 2025-06-13 NOTE — PROGRESS NOTES
[facility]  Kidney Transplant  Progress Note      Reason for Follow-up: Reassessment of Kidney Transplant - 4/24/2025  (#1) recipient and management of immunosuppression.    ORGAN: LEFT KIDNEY      Donor Type: Donation after Brain Death      Donor CMV Status: Positive  Donor HBcAB:Negative  Donor HCV Status:Positive  Donor HBV LORNE:   Donor HCV LORNE: Negative      Subjective:   History of Present Illness:  Ms. Rocha is a 59 y.o. year old female with ESRD secondary to diabetic nephropathy now s/p DBD Kidney transplant 4/24/25. Received thymo induction. Progressed well post operatively with downtrending Cr. She takes mycophenolate mofetil, prednisone, and tacrolimus for maintenance immunosuppression. Post-transplant course as follows:  UTI: Urine cx 5/8 w/ E.coli ESBL. Completed course IV Ertapenem    Patient recently underwent a transplant kidney biopsy d/t elevated Cr. Biopsy 6/6/25 with 10 gloms 0 sclerosed. Minimal 5% fibrosis. Focal severe arteriosclerosis. Moderate micro circulatory changes with positive c4d suggestive of ABMR. No ACR or AVR. No viral changes. DSA Class II positive: DPA1*03:01(6758) DR7(3080), DQ2(1713). Discussed case with Dr. Dunbar, plan to treat ABMR with SMP and PLEX followed by IVIG and Velcade. Will consult Apheresis.     Hospital Course:  Patient admitted with ABMR on kidney biopsy 6/6/25.  ABMR: kidney biopsy 6/6/25 with ABMR. DSA 5/26 with MFI [id number]. SMP x 3 (6/10-6/12), PLEX x 5 (6/10-6/14), IVIG and velcade to be completed outpatient.   SOB: c/o FULLER. TTE 6/11 stable, EF 65-70%. Spo2 stable on RA. Suspect SOB related to endurance and/or anxiety.     Interval History: no acute events overnight. Feels well. Reports diarrhea improved with PRN imodium. Cdiff negative 6/11. Cr down 1.4 from 1.6, encourage PO hydration. Kidney biopsy 6/6 with ABMR. PLEX #4 today, plan for #5 tomorrow then discharge home to complete ABMR treatment outpatient.     Past Medical,  Surgical, Family, and Social History:   Unchanged from H&P.    Scheduled Meds:   [START ON 6/14/2025] albumin human 5%  100 g Intravenous Once    aspirin  81 mg Oral Daily    atorvastatin  20 mg Oral Daily    calcitRIOL  0.25 mcg Oral Daily    [START ON 6/14/2025] calcium gluconate IVPB  2 g Intravenous Once    carvediloL  25 mg Oral BID    chlorthalidone  25 mg Oral Nightly    famotidine  20 mg Oral Daily    FLUoxetine  40 mg Oral Daily    heparin (porcine)  5,000 Units Subcutaneous Q8H    insulin aspart U-100  8 Units Subcutaneous TIDWM    linaCLOtide  72 mcg Oral Before breakfast    methIMAzole  5 mg Oral Daily    mycophenolate  1,000 mg Oral BID    NIFEdipine  30 mg Oral BID    predniSONE  20 mg Oral Daily    sodium bicarbonate  1,300 mg Oral TID    sulfamethoxazole-trimethoprim 400-80mg  1 tablet Oral Daily    tacrolimus  3 mg Oral BID    valGANciclovir  450 mg Oral Daily    vitamin D  2,000 Units Oral Daily     Continuous Infusions:   insulin regular 1 units/mL infusion orderable (TRANSFER)  0.8 Units/hr Intravenous Continuous 0.8 mL/hr at 06/11/25 1254 0.8 Units/hr at 06/11/25 1254     PRN Meds:  Current Facility-Administered Medications:     acetaminophen, 650 mg, Oral, Q6H PRN    albuterol, 2 puff, Inhalation, Q6H PRN    dextrose 50%, 12.5 g, Intravenous, PRN    dextrose 50%, 25 g, Intravenous, PRN    glucagon (human recombinant), 1 mg, Intramuscular, PRN    glucose, 16 g, Oral, PRN    glucose, 24 g, Oral, PRN    insulin aspart U-100, 0-10 Units, Subcutaneous, AC + HS + 0200 PRN    loperamide, 2 mg, Oral, QID PRN    melatonin, 6 mg, Oral, Nightly PRN    ondansetron, 8 mg, Oral, Q6H PRN    sodium chloride 0.9%, 3 mL, Intravenous, PRN    Intake/Output - Last 3 Shifts         06/11 0700  06/12 0659 06/12 0700 06/13 0659 06/13 0700  06/14 0659    P.O. 720 2440     I.V. (mL/kg) 5.1 (0.1) 18.3 (0.3)     Blood 2641 2671 2653    IV Piggyback 100 100     Total Intake(mL/kg) 3466.1 (53.3) 5229.3 (78.2) 2653 (39.5)  "   Urine (mL/kg/hr)  0 (0)     Stool 0 0     Blood 2508 2641 2624    Total Output 2508 2641 2624    Net +958.1 +2588.3 +29           Unmeasured Urine Occurrence 6 x 8 x     Unmeasured Stool Occurrence 6 x 0 x              Review of Systems   Constitutional:  Positive for appetite change and fatigue. Negative for chills and fever.   HENT: Negative.     Respiratory:  Negative for cough, chest tightness and shortness of breath.    Cardiovascular:  Negative for chest pain, palpitations and leg swelling.   Gastrointestinal:  Negative for abdominal distention, abdominal pain, constipation, diarrhea, nausea and vomiting.   Genitourinary:  Negative for difficulty urinating, dysuria, frequency and urgency.   Musculoskeletal:  Negative for back pain and neck pain.   Skin:  Negative for color change, pallor, rash and wound.   Allergic/Immunologic: Positive for immunocompromised state.   Neurological:  Negative for dizziness, weakness and headaches.   Psychiatric/Behavioral:  Negative for agitation, confusion and sleep disturbance. The patient is nervous/anxious.       Objective:     Vital Signs (Most Recent):  Temp: 97.9 °F (36.6 °C) (06/13/25 0825)  Pulse: 75 (06/13/25 0825)  Resp: 18 (06/13/25 0825)  BP: 136/65 (06/13/25 0825)  SpO2: 100 % (06/13/25 0825) Vital Signs (24h Range):  Temp:  [96.7 °F (35.9 °C)-99.2 °F (37.3 °C)] 97.9 °F (36.6 °C)  Pulse:  [68-81] 75  Resp:  [15-22] 18  SpO2:  [96 %-100 %] 100 %  BP: (113-151)/(57-77) 136/65     Weight: 67.1 kg (148 lb)  Height: 5' 4" (162.6 cm)  Body mass index is 25.4 kg/m².     Physical Exam  Vitals and nursing note reviewed.   Constitutional:       General: She is awake.      Appearance: Normal appearance.   Cardiovascular:      Rate and Rhythm: Normal rate and regular rhythm.      Pulses: Normal pulses.   Pulmonary:      Effort: Pulmonary effort is normal. No respiratory distress.      Breath sounds: Normal breath sounds. No decreased breath sounds, wheezing or rales. " "  Abdominal:      General: Bowel sounds are normal. There is no distension.      Palpations: Abdomen is soft.      Tenderness: There is no abdominal tenderness. There is no guarding.      Comments: Well healed kidney txp incision   Musculoskeletal:         General: Normal range of motion.   Skin:     General: Skin is warm and dry.   Neurological:      Mental Status: She is alert and oriented to person, place, and time. Mental status is at baseline.   Psychiatric:         Mood and Affect: Mood normal.         Behavior: Behavior normal. Behavior is cooperative.          Laboratory:  CBC:   Recent Labs   Lab 06/11/25  0823 06/12/25  1035 06/13/25  0601   WBC 21.33* 22.19* 18.55*   RBC 3.62* 3.38* 3.24*   HGB 10.6* 9.8* 9.3*   HCT 34.1* 31.9* 30.0*    216 173   MCV 94 94 93   MCH 29.3 29.0 28.7   MCHC 31.1* 30.7* 31.0*     CMP:   Recent Labs   Lab 06/09/25  0840 06/10/25  1215 06/11/25  0823 06/12/25  1035 06/13/25  0601   *   < > 355* 239* 192*   CALCIUM 9.7   < > 9.5 9.3 9.1   ALBUMIN 4.0  3.9   < > 5.0 4.9 4.6   PROT 8.2  --   --   --   --       < > 132* 136 138   K 4.1   < > 4.7 4.2 4.3   CO2 19*   < > 18* 19* 20*      < > 104 110 110   BUN 23*   < > 27* 37* 41*   CREATININE 1.6*   < > 1.5* 1.6* 1.4   ALKPHOS 88  --   --   --   --    ALT 17  --   --   --   --    AST 18  --   --   --   --     < > = values in this interval not displayed.     Coagulation: No results for input(s): "PT", "APTT" in the last 168 hours.  Labs within the past 24 hours have been reviewed.    Diagnostic Results:  US - Kidney: Results for orders placed during the hospital encounter of 05/14/25    US Transplant Kidney With Doppler    Narrative  EXAMINATION:  US TRANSPLANT KIDNEY WITH DOPPLER    CLINICAL HISTORY:  Elevated creatinine slowly trending down;Unspecified complication of kidney transplant    TECHNIQUE:  Real time gray scale and doppler ultrasound was performed over the patient's renal " "allograft.    COMPARISON:  Ultrasound from 04/24/2025    FINDINGS:  Renal allograft in the right lower quadrant.  The allograft measures 12.0 cm. Normal perfusion. No hydronephrosis.  Stent is present.    Fluid collection adjacent at the level of the incision measuring 14.0 x 3.3 x 6.9 cm.    Vasculature:    Resistive indices ranged from 0.86 to 0.88.    Main renal artery peak systolic velocity: 232cm/sec with normal waveform.    Renal artery/iliac ratio: 1.4.    The main renal vein is patent.    Impression  Elevated intraparenchymal resistive indices which may be seen with medical renal disease, rejection, or drug toxicity.    Fluid collection at the level of the incision.      Electronically signed by: Wilfredo Franklin MD  Date:    05/14/2025  Time:    15:51  Assessment/Plan:     * Antibody mediated rejection of kidney transplant  - kidney biopsy 6/6/25 with ABMR.   - DSA 5/26 with MFI [id number].   - SMP x 3 (6/10-6/12), PLEX x 5 (6/10-6/14), IVIG and velcade to be completed outpatient.   - continue to monitor labs      Chronic diastolic heart failure  - TTE 6/11 stable, EF 65-70%.      Diarrhea  - reports previous issues with diarrhea prior to transplant d/t phos binders  - Was prescribed linzess in past by her PCP with some improvement, will order inpatient  - C diff neg  - Improving with Imodium     Anemia of chronic disease  - CBC stable  - monitor daily      At risk for opportunistic infections  - OI prophylaxis per transplant protocol      Prophylactic immunotherapy  - Continue prograf, cellcept, prednisone  - Check prograf level daily and adjust for therapeutic dosage. Monitor for toxic side effects       S/P kidney transplant  - see "acute rejection"      Hypertension  - continue home antihypertensives      Shortness of breath  - chronic in nature, follows with pulmonology outpt. Seen Jan 2025 by pulm, dyspnea at that time felt due to chronic diastolic heart failure and end-stage renal disease on " hemodialysis   - Echo 6/11 unremarkable. EF 65-70 %, normal diastolic fxn   - SOB felt d/t patient's anxiety.     Hyperthyroidism  - continue methimazole      Type 2 diabetes mellitus with chronic kidney disease on chronic dialysis, with long-term current use of insulin  - Endocrine consulted, appreciate assistance  - Insulin drip started 6/11          Discharge Planning: not stable for dc at this time. Possible dc tomorrow.     Medical decision making for this encounter includes review of pertinent labs and diagnostic studies, assessment and planning, discussions with consulting providers, discussion with patient/family, and participation in multidisciplinary rounds. Time spent caring for patient: 90 minutes    Cat Simms DNP  Kidney Transplant  [facility]

## 2025-06-13 NOTE — SUBJECTIVE & OBJECTIVE
Subjective:   History of Present Illness:  Ms. Rocha is a 59 y.o. year old female with ESRD secondary to diabetic nephropathy now s/p DBD Kidney transplant 4/24/25. Received thymo induction. Progressed well post operatively with downtrending Cr. She takes mycophenolate mofetil, prednisone, and tacrolimus for maintenance immunosuppression. Post-transplant course as follows:  UTI: Urine cx 5/8 w/ E.coli ESBL. Completed course IV Ertapenem    Patient recently underwent a transplant kidney biopsy d/t elevated Cr. Biopsy 6/6/25 with 10 gloms 0 sclerosed. Minimal 5% fibrosis. Focal severe arteriosclerosis. Moderate micro circulatory changes with positive c4d suggestive of ABMR. No ACR or AVR. No viral changes. DSA Class II positive: DPA1*03:01(6758) DR7(3080), DQ2(1713). Discussed case with Dr. Dunbar, plan to treat ABMR with SMP and PLEX followed by IVIG and Velcade. Will consult Apheresis.     Hospital Course:  Patient admitted with ABMR on kidney biopsy 6/6/25.  ABMR: kidney biopsy 6/6/25 with ABMR. DSA 5/26 with MFI [id number]. SMP x 3 (6/10-6/12), PLEX x 5 (6/10-6/14), IVIG and velcade to be completed outpatient.   SOB: c/o FULLER. TTE 6/11 stable, EF 65-70%. Spo2 stable on RA. Suspect SOB related to endurance and/or anxiety.     Interval History: no acute events overnight. Feels well. Reports diarrhea improved with PRN imodium. Cdiff negative 6/11. Cr down 1.4 from 1.6, encourage PO hydration. Kidney biopsy 6/6 with ABMR. PLEX #4 today, plan for #5 tomorrow then discharge home to complete ABMR treatment outpatient.     Past Medical, Surgical, Family, and Social History:   Unchanged from H&P.    Scheduled Meds:   [START ON 6/14/2025] albumin human 5%  100 g Intravenous Once    aspirin  81 mg Oral Daily    atorvastatin  20 mg Oral Daily    calcitRIOL  0.25 mcg Oral Daily    [START ON 6/14/2025] calcium gluconate IVPB  2 g Intravenous Once    carvediloL  25 mg Oral BID    chlorthalidone  25 mg Oral Nightly     famotidine  20 mg Oral Daily    FLUoxetine  40 mg Oral Daily    heparin (porcine)  5,000 Units Subcutaneous Q8H    insulin aspart U-100  8 Units Subcutaneous TIDWM    linaCLOtide  72 mcg Oral Before breakfast    methIMAzole  5 mg Oral Daily    mycophenolate  1,000 mg Oral BID    NIFEdipine  30 mg Oral BID    predniSONE  20 mg Oral Daily    sodium bicarbonate  1,300 mg Oral TID    sulfamethoxazole-trimethoprim 400-80mg  1 tablet Oral Daily    tacrolimus  3 mg Oral BID    valGANciclovir  450 mg Oral Daily    vitamin D  2,000 Units Oral Daily     Continuous Infusions:   insulin regular 1 units/mL infusion orderable (TRANSFER)  0.8 Units/hr Intravenous Continuous 0.8 mL/hr at 06/11/25 1254 0.8 Units/hr at 06/11/25 1254     PRN Meds:  Current Facility-Administered Medications:     acetaminophen, 650 mg, Oral, Q6H PRN    albuterol, 2 puff, Inhalation, Q6H PRN    dextrose 50%, 12.5 g, Intravenous, PRN    dextrose 50%, 25 g, Intravenous, PRN    glucagon (human recombinant), 1 mg, Intramuscular, PRN    glucose, 16 g, Oral, PRN    glucose, 24 g, Oral, PRN    insulin aspart U-100, 0-10 Units, Subcutaneous, AC + HS + 0200 PRN    loperamide, 2 mg, Oral, QID PRN    melatonin, 6 mg, Oral, Nightly PRN    ondansetron, 8 mg, Oral, Q6H PRN    sodium chloride 0.9%, 3 mL, Intravenous, PRN    Intake/Output - Last 3 Shifts         06/11 0700  06/12 0659 06/12 0700 06/13 0659 06/13 0700 06/14 0659    P.O. 720 2440     I.V. (mL/kg) 5.1 (0.1) 18.3 (0.3)     Blood 2641 2671 2653    IV Piggyback 100 100     Total Intake(mL/kg) 3466.1 (53.3) 5229.3 (78.2) 2653 (39.5)    Urine (mL/kg/hr)  0 (0)     Stool 0 0     Blood 2508 2641 2624    Total Output 2508 2641 2624    Net +958.1 +2588.3 +29           Unmeasured Urine Occurrence 6 x 8 x     Unmeasured Stool Occurrence 6 x 0 x              Review of Systems   Constitutional:  Positive for appetite change and fatigue. Negative for chills and fever.   HENT: Negative.     Respiratory:  Negative for  "cough, chest tightness and shortness of breath.    Cardiovascular:  Negative for chest pain, palpitations and leg swelling.   Gastrointestinal:  Negative for abdominal distention, abdominal pain, constipation, diarrhea, nausea and vomiting.   Genitourinary:  Negative for difficulty urinating, dysuria, frequency and urgency.   Musculoskeletal:  Negative for back pain and neck pain.   Skin:  Negative for color change, pallor, rash and wound.   Allergic/Immunologic: Positive for immunocompromised state.   Neurological:  Negative for dizziness, weakness and headaches.   Psychiatric/Behavioral:  Negative for agitation, confusion and sleep disturbance. The patient is nervous/anxious.       Objective:     Vital Signs (Most Recent):  Temp: 97.9 °F (36.6 °C) (06/13/25 0825)  Pulse: 75 (06/13/25 0825)  Resp: 18 (06/13/25 0825)  BP: 136/65 (06/13/25 0825)  SpO2: 100 % (06/13/25 0825) Vital Signs (24h Range):  Temp:  [96.7 °F (35.9 °C)-99.2 °F (37.3 °C)] 97.9 °F (36.6 °C)  Pulse:  [68-81] 75  Resp:  [15-22] 18  SpO2:  [96 %-100 %] 100 %  BP: (113-151)/(57-77) 136/65     Weight: 67.1 kg (148 lb)  Height: 5' 4" (162.6 cm)  Body mass index is 25.4 kg/m².     Physical Exam  Vitals and nursing note reviewed.   Constitutional:       General: She is awake.      Appearance: Normal appearance.   Cardiovascular:      Rate and Rhythm: Normal rate and regular rhythm.      Pulses: Normal pulses.   Pulmonary:      Effort: Pulmonary effort is normal. No respiratory distress.      Breath sounds: Normal breath sounds. No decreased breath sounds, wheezing or rales.   Abdominal:      General: Bowel sounds are normal. There is no distension.      Palpations: Abdomen is soft.      Tenderness: There is no abdominal tenderness. There is no guarding.      Comments: Well healed kidney txp incision   Musculoskeletal:         General: Normal range of motion.   Skin:     General: Skin is warm and dry.   Neurological:      Mental Status: She is alert and " "oriented to person, place, and time. Mental status is at baseline.   Psychiatric:         Mood and Affect: Mood normal.         Behavior: Behavior normal. Behavior is cooperative.          Laboratory:  CBC:   Recent Labs   Lab 06/11/25  0823 06/12/25  1035 06/13/25  0601   WBC 21.33* 22.19* 18.55*   RBC 3.62* 3.38* 3.24*   HGB 10.6* 9.8* 9.3*   HCT 34.1* 31.9* 30.0*    216 173   MCV 94 94 93   MCH 29.3 29.0 28.7   MCHC 31.1* 30.7* 31.0*     CMP:   Recent Labs   Lab 06/09/25  0840 06/10/25  1215 06/11/25  0823 06/12/25  1035 06/13/25  0601   *   < > 355* 239* 192*   CALCIUM 9.7   < > 9.5 9.3 9.1   ALBUMIN 4.0  3.9   < > 5.0 4.9 4.6   PROT 8.2  --   --   --   --       < > 132* 136 138   K 4.1   < > 4.7 4.2 4.3   CO2 19*   < > 18* 19* 20*      < > 104 110 110   BUN 23*   < > 27* 37* 41*   CREATININE 1.6*   < > 1.5* 1.6* 1.4   ALKPHOS 88  --   --   --   --    ALT 17  --   --   --   --    AST 18  --   --   --   --     < > = values in this interval not displayed.     Coagulation: No results for input(s): "PT", "APTT" in the last 168 hours.  Labs within the past 24 hours have been reviewed.    Diagnostic Results:  US - Kidney: Results for orders placed during the hospital encounter of 05/14/25    US Transplant Kidney With Doppler    Narrative  EXAMINATION:  US TRANSPLANT KIDNEY WITH DOPPLER    CLINICAL HISTORY:  Elevated creatinine slowly trending down;Unspecified complication of kidney transplant    TECHNIQUE:  Real time gray scale and doppler ultrasound was performed over the patient's renal allograft.    COMPARISON:  Ultrasound from 04/24/2025    FINDINGS:  Renal allograft in the right lower quadrant.  The allograft measures 12.0 cm. Normal perfusion. No hydronephrosis.  Stent is present.    Fluid collection adjacent at the level of the incision measuring 14.0 x 3.3 x 6.9 cm.    Vasculature:    Resistive indices ranged from 0.86 to 0.88.    Main renal artery peak systolic velocity: 232cm/sec " with normal waveform.    Renal artery/iliac ratio: 1.4.    The main renal vein is patent.    Impression  Elevated intraparenchymal resistive indices which may be seen with medical renal disease, rejection, or drug toxicity.    Fluid collection at the level of the incision.      Electronically signed by: Wilfredo Franklin MD  Date:    05/14/2025  Time:    15:51

## 2025-06-14 VITALS
TEMPERATURE: 99 F | SYSTOLIC BLOOD PRESSURE: 141 MMHG | WEIGHT: 147.69 LBS | RESPIRATION RATE: 18 BRPM | DIASTOLIC BLOOD PRESSURE: 68 MMHG | OXYGEN SATURATION: 98 % | HEIGHT: 64 IN | HEART RATE: 63 BPM | BODY MASS INDEX: 25.21 KG/M2

## 2025-06-14 LAB
ABO + RH BLD: NORMAL
ABO + RH BLD: NORMAL
ABSOLUTE EOSINOPHIL (OHS): 0.01 K/UL
ABSOLUTE MONOCYTE (OHS): 1.14 K/UL (ref 0.3–1)
ABSOLUTE NEUTROPHIL COUNT (OHS): 12.19 K/UL (ref 1.8–7.7)
ALBUMIN SERPL BCP-MCNC: 4.6 G/DL (ref 3.5–5.2)
ANION GAP (OHS): 7 MMOL/L (ref 8–16)
BASOPHILS # BLD AUTO: 0.02 K/UL
BASOPHILS NFR BLD AUTO: 0.1 %
BLD PROD TYP BPU: NORMAL
BLD PROD TYP BPU: NORMAL
BLOOD UNIT EXPIRATION DATE: NORMAL
BLOOD UNIT EXPIRATION DATE: NORMAL
BLOOD UNIT TYPE CODE: 5100
BLOOD UNIT TYPE CODE: 5100
BUN SERPL-MCNC: 37 MG/DL (ref 6–20)
CALCIUM SERPL-MCNC: 9.3 MG/DL (ref 8.7–10.5)
CHLORIDE SERPL-SCNC: 107 MMOL/L (ref 95–110)
CO2 SERPL-SCNC: 24 MMOL/L (ref 23–29)
CREAT SERPL-MCNC: 1.2 MG/DL (ref 0.5–1.4)
CROSSMATCH INTERPRETATION: NORMAL
CROSSMATCH INTERPRETATION: NORMAL
DISPENSE STATUS: NORMAL
DISPENSE STATUS: NORMAL
ERYTHROCYTE [DISTWIDTH] IN BLOOD BY AUTOMATED COUNT: 17 % (ref 11.5–14.5)
FIBRINOGEN PPP-MCNC: 104 MG/DL (ref 182–400)
GFR SERPLBLD CREATININE-BSD FMLA CKD-EPI: 53 ML/MIN/1.73/M2
GLUCOSE SERPL-MCNC: 167 MG/DL (ref 70–110)
HCT VFR BLD AUTO: 30.6 % (ref 37–48.5)
HGB BLD-MCNC: 9.9 GM/DL (ref 12–16)
IMM GRANULOCYTES # BLD AUTO: 0.67 K/UL (ref 0–0.04)
IMM GRANULOCYTES NFR BLD AUTO: 4.3 % (ref 0–0.5)
LYMPHOCYTES # BLD AUTO: 1.41 K/UL (ref 1–4.8)
MAGNESIUM SERPL-MCNC: 1.5 MG/DL (ref 1.6–2.6)
MCH RBC QN AUTO: 29.9 PG (ref 27–31)
MCHC RBC AUTO-ENTMCNC: 32.4 G/DL (ref 32–36)
MCV RBC AUTO: 92 FL (ref 82–98)
NUCLEATED RBC (/100WBC) (OHS): 0 /100 WBC
PHOSPHATE SERPL-MCNC: 1.8 MG/DL (ref 2.7–4.5)
PLATELET # BLD AUTO: 175 K/UL (ref 150–450)
PMV BLD AUTO: 13 FL (ref 9.2–12.9)
POCT GLUCOSE: 172 MG/DL (ref 70–110)
POCT GLUCOSE: 189 MG/DL (ref 70–110)
POTASSIUM SERPL-SCNC: 4.1 MMOL/L (ref 3.5–5.1)
RBC # BLD AUTO: 3.31 M/UL (ref 4–5.4)
RELATIVE EOSINOPHIL (OHS): 0.1 %
RELATIVE LYMPHOCYTE (OHS): 9.1 % (ref 18–48)
RELATIVE MONOCYTE (OHS): 7.4 % (ref 4–15)
RELATIVE NEUTROPHIL (OHS): 79 % (ref 38–73)
SODIUM SERPL-SCNC: 138 MMOL/L (ref 136–145)
TACROLIMUS BLD-MCNC: 6.9 NG/ML (ref 5–15)
UNIT NUMBER: NORMAL
UNIT NUMBER: NORMAL
WBC # BLD AUTO: 15.44 K/UL (ref 3.9–12.7)

## 2025-06-14 PROCEDURE — 99232 SBSQ HOSP IP/OBS MODERATE 35: CPT | Mod: ,,, | Performed by: PHYSICIAN ASSISTANT

## 2025-06-14 PROCEDURE — P9045 ALBUMIN (HUMAN), 5%, 250 ML: HCPCS | Mod: JZ,TB | Performed by: PATHOLOGY

## 2025-06-14 PROCEDURE — 83735 ASSAY OF MAGNESIUM: CPT | Performed by: CLINICAL NURSE SPECIALIST

## 2025-06-14 PROCEDURE — P9017 PLASMA 1 DONOR FRZ W/IN 8 HR: HCPCS | Performed by: PATHOLOGY

## 2025-06-14 PROCEDURE — 36514 APHERESIS PLASMA: CPT

## 2025-06-14 PROCEDURE — 63600175 PHARM REV CODE 636 W HCPCS: Performed by: CLINICAL NURSE SPECIALIST

## 2025-06-14 PROCEDURE — 85025 COMPLETE CBC W/AUTO DIFF WBC: CPT | Performed by: CLINICAL NURSE SPECIALIST

## 2025-06-14 PROCEDURE — 1111F DSCHRG MED/CURRENT MED MERGE: CPT | Mod: CPTII,,, | Performed by: NURSE PRACTITIONER

## 2025-06-14 PROCEDURE — 63600175 PHARM REV CODE 636 W HCPCS: Mod: JZ,TB | Performed by: PATHOLOGY

## 2025-06-14 PROCEDURE — 85384 FIBRINOGEN ACTIVITY: CPT | Performed by: CLINICAL NURSE SPECIALIST

## 2025-06-14 PROCEDURE — 30233K1 TRANSFUSION OF NONAUTOLOGOUS FROZEN PLASMA INTO PERIPHERAL VEIN, PERCUTANEOUS APPROACH: ICD-10-PCS | Performed by: STUDENT IN AN ORGANIZED HEALTH CARE EDUCATION/TRAINING PROGRAM

## 2025-06-14 PROCEDURE — 25000003 PHARM REV CODE 250: Performed by: CLINICAL NURSE SPECIALIST

## 2025-06-14 PROCEDURE — 25000003 PHARM REV CODE 250: Performed by: PATHOLOGY

## 2025-06-14 PROCEDURE — 80069 RENAL FUNCTION PANEL: CPT | Performed by: CLINICAL NURSE SPECIALIST

## 2025-06-14 PROCEDURE — 99239 HOSP IP/OBS DSCHRG MGMT >30: CPT | Mod: ,,, | Performed by: NURSE PRACTITIONER

## 2025-06-14 PROCEDURE — 80197 ASSAY OF TACROLIMUS: CPT | Performed by: CLINICAL NURSE SPECIALIST

## 2025-06-14 PROCEDURE — 36415 COLL VENOUS BLD VENIPUNCTURE: CPT | Performed by: CLINICAL NURSE SPECIALIST

## 2025-06-14 RX ORDER — INSULIN ASPART 100 [IU]/ML
8 INJECTION, SOLUTION INTRAVENOUS; SUBCUTANEOUS 3 TIMES DAILY
Qty: 6 ML | Refills: 2 | Status: SHIPPED | OUTPATIENT
Start: 2025-06-14 | End: 2026-06-14

## 2025-06-14 RX ORDER — DICYCLOMINE HYDROCHLORIDE 10 MG/1
10 CAPSULE ORAL EVERY 6 HOURS PRN
Qty: 30 CAPSULE | Refills: 0 | Status: SHIPPED | OUTPATIENT
Start: 2025-06-14 | End: 2025-06-24

## 2025-06-14 RX ADMIN — DIPHENHYDRAMINE HYDROCHLORIDE 50 MG: 50 INJECTION, SOLUTION INTRAMUSCULAR; INTRAVENOUS at 10:06

## 2025-06-14 RX ADMIN — Medication 2000 UNITS: at 08:06

## 2025-06-14 RX ADMIN — FLUOXETINE HYDROCHLORIDE 40 MG: 20 CAPSULE ORAL at 08:06

## 2025-06-14 RX ADMIN — ASPIRIN 81 MG CHEWABLE TABLET 81 MG: 81 TABLET CHEWABLE at 08:06

## 2025-06-14 RX ADMIN — SULFAMETHOXAZOLE AND TRIMETHOPRIM 1 TABLET: 400; 80 TABLET ORAL at 09:06

## 2025-06-14 RX ADMIN — INSULIN GLARGINE 17 UNITS: 100 INJECTION, SOLUTION SUBCUTANEOUS at 08:06

## 2025-06-14 RX ADMIN — METHIMAZOLE 5 MG: 5 TABLET ORAL at 08:06

## 2025-06-14 RX ADMIN — INSULIN ASPART 2 UNITS: 100 INJECTION, SOLUTION INTRAVENOUS; SUBCUTANEOUS at 12:06

## 2025-06-14 RX ADMIN — VALGANCICLOVIR 450 MG: 450 TABLET, FILM COATED ORAL at 08:06

## 2025-06-14 RX ADMIN — INSULIN ASPART 2 UNITS: 100 INJECTION, SOLUTION INTRAVENOUS; SUBCUTANEOUS at 08:06

## 2025-06-14 RX ADMIN — PREDNISONE 20 MG: 20 TABLET ORAL at 08:06

## 2025-06-14 RX ADMIN — INSULIN ASPART 8 UNITS: 100 INJECTION, SOLUTION INTRAVENOUS; SUBCUTANEOUS at 12:06

## 2025-06-14 RX ADMIN — ALBUMIN (HUMAN) 100 G: 12.5 SOLUTION INTRAVENOUS at 10:06

## 2025-06-14 RX ADMIN — INSULIN ASPART 8 UNITS: 100 INJECTION, SOLUTION INTRAVENOUS; SUBCUTANEOUS at 08:06

## 2025-06-14 RX ADMIN — ATORVASTATIN CALCIUM 20 MG: 20 TABLET, FILM COATED ORAL at 08:06

## 2025-06-14 RX ADMIN — CARVEDILOL 25 MG: 12.5 TABLET, FILM COATED ORAL at 08:06

## 2025-06-14 RX ADMIN — MYCOPHENOLATE MOFETIL 1000 MG: 250 CAPSULE ORAL at 08:06

## 2025-06-14 RX ADMIN — FAMOTIDINE 20 MG: 20 TABLET, FILM COATED ORAL at 08:06

## 2025-06-14 RX ADMIN — LINACLOTIDE 72 MCG: 72 CAPSULE, GELATIN COATED ORAL at 05:06

## 2025-06-14 RX ADMIN — TACROLIMUS 3 MG: 1 CAPSULE ORAL at 08:06

## 2025-06-14 RX ADMIN — SODIUM BICARBONATE 650 MG TABLET 1300 MG: at 08:06

## 2025-06-14 RX ADMIN — NIFEDIPINE 30 MG: 30 TABLET, FILM COATED, EXTENDED RELEASE ORAL at 08:06

## 2025-06-14 RX ADMIN — CALCITRIOL CAPSULES 0.25 MCG 0.25 MCG: 0.25 CAPSULE ORAL at 12:06

## 2025-06-14 RX ADMIN — CALCIUM GLUCONATE 2 G: 20 INJECTION, SOLUTION INTRAVENOUS at 10:06

## 2025-06-14 NOTE — SUBJECTIVE & OBJECTIVE
"Interval HPI:   No acute events overnight. Patient in room 51065/45299 A. Blood glucose stable. BG at goal on current insulin regimen (SSI, prandial, and basal insulin ). Steroid use- Prednisone .   Renal function- Abnormal -     Vasopressors-  None      Diet Consistent Carbohydrate 2000 Calories (up to 75 gm per meal)      Eatin%  Nausea: No  Hypoglycemia and intervention: No  Fever: No  TPN and/or TF: No        BP (!) 141/68 (BP Location: Right arm, Patient Position: Sitting)   Pulse 63   Temp 98.7 °F (37.1 °C) (Oral)   Resp 18   Ht 5' 4" (1.626 m)   Wt 67 kg (147 lb 11.3 oz)   SpO2 98%   BMI 25.35 kg/m²     Labs Reviewed and Include    Recent Labs   Lab 25  0706   *   CALCIUM 9.3   ALBUMIN 4.6      K 4.1   CO2 24      BUN 37*   CREATININE 1.2     Lab Results   Component Value Date    WBC 15.44 (H) 2025    HGB 9.9 (L) 2025    HCT 30.6 (L) 2025    MCV 92 2025     2025     No results for input(s): "TSH", "FREET4" in the last 168 hours.  Lab Results   Component Value Date    HGBA1C 5.4 2025       Nutritional status:   Body mass index is 25.35 kg/m².  Lab Results   Component Value Date    ALBUMIN 4.6 2025    ALBUMIN 4.6 2025    ALBUMIN 4.9 2025     No results found for: "PREALBUMIN"    Estimated Creatinine Clearance: 48.7 mL/min (based on SCr of 1.2 mg/dL).    Accu-Checks  Recent Labs     25  0835 25  1304 25  1736 25  2214 25  0239 25  0933 25  1306 25  1745 25  2121 25  0842   POCTGLUCOSE 192* 202* 228* 273* 265* 352* 326* 260* 217* 189*       Current Medications and/or Treatments Impacting Glycemic Control  Immunotherapy:    Immunosuppressants           Stop Route Frequency     mycophenolate capsule 1,000 mg         -- Oral 2 times daily     tacrolimus capsule 3 mg         -- Oral 2 times daily          Steroids:   Hormones (From admission, onward)      " Start     Stop Route Frequency Ordered    06/13/25 0900  predniSONE tablet 20 mg         -- Oral Daily 06/12/25 1030    06/10/25 1137  melatonin tablet 6 mg         -- Oral Nightly PRN 06/10/25 1109          Pressors:    Autonomic Drugs (From admission, onward)      None          Hyperglycemia/Diabetes Medications:   Antihyperglycemics (From admission, onward)      Start     Stop Route Frequency Ordered    06/13/25 1430  insulin glargine U-100 (Lantus) pen 17 Units         -- SubQ Daily 06/13/25 1317    06/11/25 0845  insulin aspart U-100 pen 0-10 Units         -- SubQ Before meals, nightly and at 0200 PRN 06/11/25 0746    06/11/25 0800  insulin aspart U-100 pen 8 Units         -- SubQ 3 times daily with meals 06/11/25 0746

## 2025-06-14 NOTE — PROGRESS NOTES
"Discharge Medication Note:    Hospital Course:  Admitted overnight for treatment of ABMR. Treated with SMP (6/10-6/12) and plan for PLEX x5, IVIG x2 and Velcade x4 as outpatient. Blood sugar regimen adjusted.  Velcade and IVIG TP sill pending.         Met with Kiesha Rocha at discharge to review discharge medications and to update the blue medication card.           Medication List        START taking these medications      dicyclomine 10 MG capsule  Commonly known as: BENTYL  Take 1 capsule (10 mg total) by mouth every 6 (six) hours as needed (abd cramps).     famotidine 20 MG tablet  Commonly known as: PEPCID  Take 1 tablet (20 mg total) by mouth once daily.     linaCLOtide 72 mcg Cap capsule  Commonly known as: LINZESS  Take 1 capsule (72 mcg total) by mouth before breakfast.            CHANGE how you take these medications      insulin aspart U-100 100 unit/mL (3 mL) Inpn pen  Commonly known as: NovoLOG  Inject 8 Units into the skin 3 (three) times daily. Plus low sliding scale. Max units per day: 54  What changed:   how much to take  additional instructions     predniSONE 5 MG tablet  Commonly known as: DELTASONE  Take by mouth daily: 20mg 6/13-6/19, 15mg 6/20-6/26, 10mg 6/27-7/3, 5mg 7/4-  Start taking on: June 13, 2025  What changed: See the new instructions.     sodium bicarbonate 650 MG tablet  Take 2 tablets (1,300 mg total) by mouth 3 (three) times daily.  What changed:   how much to take  when to take this            CONTINUE taking these medications      albuterol 90 mcg/actuation inhaler  Commonly known as: PROVENTIL/VENTOLIN HFA     ASPIR-81 ORAL     atorvastatin 20 MG tablet  Commonly known as: LIPITOR  Take 1 tablet (20 mg total) by mouth once daily.     BD ULTRA-FINE SUJEY PEN NEEDLE 32 gauge x 5/32" Ndle  Generic drug: pen needle, diabetic  Inject 1 Needle into the skin 4 (four) times daily.     calcitRIOL 0.25 MCG Cap  Commonly known as: ROCALTROL  Take 1 capsule (0.25 mcg total) by mouth once " daily.     carvediloL 25 MG tablet  Commonly known as: COREG  Take 1 tablet (25 mg total) by mouth 2 (two) times daily.     chlorthalidone 25 MG Tab  Commonly known as: HYGROTEN  Take 1 tablet (25 mg total) by mouth nightly.     FLUoxetine 40 MG capsule  Take 1 capsule (40 mg total) by mouth once daily.     hydrOXYzine pamoate 25 MG Cap  Commonly known as: VISTARIL  Take 1 capsule (25 mg total) by mouth every 8 (eight) hours as needed (insomnia).     insulin glargine U-100 (Lantus) 100 unit/mL (3 mL) Inpn pen  Inject 13 Units into the skin every evening.     linaGLIPtin 5 mg Tab tablet  Commonly known as: TRADJENTA  Take 1 tablet (5 mg total) by mouth once daily.     methIMAzole 5 MG Tab  Commonly known as: TAPAZOLE  Take 1 tablet (5 mg total) by mouth once daily.     multivitamin Tab  Take 1 tablet by mouth once daily.     mycophenolate 250 mg Cap  Commonly known as: CELLCEPT  Take 4 capsules (1,000 mg total) by mouth 2 (two) times daily. Z94.0;Kidney transplant on 4/24/2025     NIFEdipine 30 MG (OSM) 24 hr tablet  Commonly known as: PROCARDIA-XL  Take 1 tablet (30 mg total) by mouth 2 (two) times a day.     sulfamethoxazole-trimethoprim 400-80mg 400-80 mg per tablet  Commonly known as: BACTRIM,SEPTRA  Take 1 tablet by mouth once daily. Stop 10/24/25     tacrolimus 0.5 MG Cap  Commonly known as: PROGRAF  Take 6 capsules (3 mg total) by mouth every 12 (twelve) hours. Z94.0;Kidney txp on 4/24/25     TRUE METRIX GLUCOSE METER Misc  Generic drug: blood-glucose meter  To check BG 3 times daily, to use with insurance preferred meter     TRUE METRIX GLUCOSE TEST STRIP Strp  Generic drug: blood sugar diagnostic  To check BG 3 times daily, to use with insurance preferred meter     TRUEPLUS LANCETS 33 gauge Misc  Generic drug: lancets  To check BG 3 times daily, to use with insurance preferred meter     valGANciclovir 450 mg Tab  Commonly known as: VALCYTE  Take 1 tablet (450 mg total) by mouth once daily. Stop 7/24/25      vitamin D 1000 units Tab  Commonly known as: VITAMIN D3  Take 2 tablets (2,000 Units total) by mouth once daily.            STOP taking these medications      oxybutynin 5 MG Tab  Commonly known as: DITROPAN               Where to Get Your Medications        These medications were sent to [facility] - [place] - [address]  [address] [address] [facility] [address]      Phone: [phone]   insulin aspart U-100 100 unit/mL (3 mL) Inpn pen       These medications were sent to [facility]  [address] [address]      Hours: Always Open Phone: [phone]   dicyclomine 10 MG capsule  linaCLOtide 72 mcg Cap capsule  predniSONE 5 MG tablet  sodium bicarbonate 650 MG tablet  sulfamethoxazole-trimethoprim 400-80mg 400-80 mg per tablet            The following medications have been placed on HOLD and should be restarted in the outpatient setting (when appropriate): none    Kiesha Rocha verbalized understanding and had the opportunity to ask questions.

## 2025-06-14 NOTE — PROGRESS NOTES
PLEX 5/5 completed prior to dc.    DC instructions reviewed with pt, verbalized understanding. Pt to  meds [place] [facility]. PIV dc'd. Pt off of floor via wheelchair, NAD.

## 2025-06-14 NOTE — ASSESSMENT & PLAN NOTE
BG goal 140-180  T2DM. Hx of Kidney txp.  Treated for Rejection.  Blood sugar improved this morning.    Continue Lantus 17 units daily    Continue Novolog to 8 units TID with meals   Continue Moderate Dose Correction Scale  BG monitoring a/hs/0200  Diet Consistent Carbohydrate 2000 Calories (up to 75 gm per meal)    ** Please call Endocrine for any BG related issues **

## 2025-06-14 NOTE — PROGRESS NOTES
"[facility] - Transplant Stepdown  Endocrinology  Progress Note    Admit Date: 6/10/2025     Reason for Consult: Management of T2DM, Hyperglycemia     Surgical Procedure and Date: Kidney transplant 25    Diabetes diagnosis year: > 10 years ago     Home Diabetes Medications:  Novolog 5 units TID with meals, Lantus 13 units q HS, Tradjenta 5 mg once daily     Lab Results   Component Value Date    HGBA1C 5.4 2025       How often checking glucose at home? 4x daily   BG readings on regimen: variable low 100s-500  Hypoglycemia on the regimen?  Yes-once dropped into the 50s  Missed doses on regimen?  No       Diabetes Complications include:     Hyperglycemia     Complicating diabetes co morbidities:   ESRD and Glucocorticoid use     HPI:   Patient is a 59 y.o. female with a diagnosis of ESRD secondary to diabetic nephropathy now s/p DBD Kidney transplant 25. Patient recently underwent a transplant kidney biopsy d/t elevated Cr. Biopsy 25 with 10 gloms 0 sclerosed. Minimal 5% fibrosis. Focal severe arteriosclerosis. Moderate micro circulatory changes with positive c4d suggestive of ABMR. No ACR or AVR. No viral changes. Plan to treat ABMR with SMP and PLEX followed by IVIG and Velcade. Endocrinology consulted for management of T2DM.             Interval HPI:   No acute events overnight. Patient in room 40367/13659 A. Blood glucose stable. BG at goal on current insulin regimen (SSI, prandial, and basal insulin ). Steroid use- Prednisone .   Renal function- Abnormal -     Vasopressors-  None      Diet Consistent Carbohydrate 2000 Calories (up to 75 gm per meal)      Eatin%  Nausea: No  Hypoglycemia and intervention: No  Fever: No  TPN and/or TF: No        BP (!) 141/68 (BP Location: Right arm, Patient Position: Sitting)   Pulse 63   Temp 98.7 °F (37.1 °C) (Oral)   Resp 18   Ht 5' 4" (1.626 m)   Wt 67 kg (147 lb 11.3 oz)   SpO2 98%   BMI 25.35 kg/m²     Labs Reviewed and Include    Recent Labs " RN called patient and notified per Dr. Best, \"1.  I do not think she will need an IVC filter placed for an interventional radiology outpatient procedure.  2.  The patient probably needs to hold the Eliquis at least the morning of the procedure and possibly the night before.  That should actually be up to interventional radiology.  She can go back on the Eliquis once the procedure is done.\"  Patient verbalized understanding.    "  Lab 06/14/25  0706   *   CALCIUM 9.3   ALBUMIN 4.6      K 4.1   CO2 24      BUN 37*   CREATININE 1.2     Lab Results   Component Value Date    WBC 15.44 (H) 06/14/2025    HGB 9.9 (L) 06/14/2025    HCT 30.6 (L) 06/14/2025    MCV 92 06/14/2025     06/14/2025     No results for input(s): "TSH", "FREET4" in the last 168 hours.  Lab Results   Component Value Date    HGBA1C 5.4 04/24/2025       Nutritional status:   Body mass index is 25.35 kg/m².  Lab Results   Component Value Date    ALBUMIN 4.6 06/14/2025    ALBUMIN 4.6 06/13/2025    ALBUMIN 4.9 06/12/2025     No results found for: "PREALBUMIN"    Estimated Creatinine Clearance: 48.7 mL/min (based on SCr of 1.2 mg/dL).    Accu-Checks  Recent Labs     06/12/25  0835 06/12/25  1304 06/12/25  1736 06/12/25  2214 06/13/25  0239 06/13/25  0933 06/13/25  1306 06/13/25  1745 06/13/25  2121 06/14/25  0842   POCTGLUCOSE 192* 202* 228* 273* 265* 352* 326* 260* 217* 189*       Current Medications and/or Treatments Impacting Glycemic Control  Immunotherapy:    Immunosuppressants           Stop Route Frequency     mycophenolate capsule 1,000 mg         -- Oral 2 times daily     tacrolimus capsule 3 mg         -- Oral 2 times daily          Steroids:   Hormones (From admission, onward)      Start     Stop Route Frequency Ordered    06/13/25 0900  predniSONE tablet 20 mg         -- Oral Daily 06/12/25 1030    06/10/25 1137  melatonin tablet 6 mg         -- Oral Nightly PRN 06/10/25 1109          Pressors:    Autonomic Drugs (From admission, onward)      None          Hyperglycemia/Diabetes Medications:   Antihyperglycemics (From admission, onward)      Start     Stop Route Frequency Ordered    06/13/25 1430  insulin glargine U-100 (Lantus) pen 17 Units         -- SubQ Daily 06/13/25 1317    06/11/25 0845  insulin aspart U-100 pen 0-10 Units         -- SubQ Before meals, nightly and at 0200 PRN 06/11/25 0746    06/11/25 0800  insulin aspart U-100 pen 8 " Units         -- SubQ 3 times daily with meals 06/11/25 0746            ASSESSMENT and PLAN    Renal/  S/P kidney transplant  Managed per primary team  Optimize BG control        Immunology/Multi System  Prophylactic immunotherapy  May increase insulin resistance.         Endocrine  Type 2 diabetes mellitus with chronic kidney disease on chronic dialysis, with long-term current use of insulin  BG goal 140-180  T2DM. Hx of Kidney txp.  Treated for Rejection.  Blood sugar improved this morning.    Continue Lantus 17 units daily    Continue Novolog to 8 units TID with meals   Continue Moderate Dose Correction Scale  BG monitoring a/hs/0200  Diet Consistent Carbohydrate 2000 Calories (up to 75 gm per meal)    ** Please call Endocrine for any BG related issues **  Notified of potential discharge.  Recommend the below regimen  Lantus 13 units daily  Novolog 8 units with meals  Add correction scale if needed.  Blood sugar 180 to 230 add 2 units  Blood sugar 231 to 280 add 4 units  Blood sugar 281 to 330 add 6 units  Blood sugar 331 to 380 add 8 units  Blood sugar greater than 380 add 10 units  Continue Tradjenta 5 mg          Usman Rodriguez PA-C  Endocrinology  [facility] - Trans[facility]

## 2025-06-14 NOTE — PROCEDURES
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 year old female with ESRD secondary to diabetic nephropathy now s/p DBD Kidney transplant 4/24/25. Received thymo induction. Progressed well post operatively with downtrending Cr. She takes mycophenolate mofetil, prednisone, and tacrolimus for maintenance immunosuppression. Patient recently underwent a transplant kidney biopsy d/t elevated Cr. Biopsy 6/6/25 with 10 gloms 0 sclerosed. Minimal 5% fibrosis. Focal severe arteriosclerosis. Moderate micro circulatory changes with positive c4d suggestive of ABMR. No ACR or AVR. No viral changes. DSA Class II positive: DPA1*03:01(6758) DR7(3080), DQ2(1713). Patient admitted for treatment of ABMR with SMP and PLEX, followed by IVIG and Velcade. Plasma exchange requested by Kidney Transplant Service to antibody mediated kidney transplant rejection.      Today's procedure (#5 of 5) was well tolerated and without complications. She is scheduled for discharge.  Fibrinogen was trending down, we added a unit of plasma to today's PLEX,    Pertinent Laboratory Data: Complete Blood Count:   Lab Results   Component Value Date    HGB 9.9 (L) 06/14/2025    HCT 30.6 (L) 06/14/2025     06/14/2025    WBC 15.44 (H) 06/14/2025     Lactate Dehydrogenase (LDH): No results found for: "LDH"  Basic Metabolic Panel:   Lab Results   Component Value Date     06/14/2025    K 4.1 06/14/2025     06/14/2025    CO2 24 06/14/2025     (H) 06/14/2025    BUN 37 (H) 06/14/2025    CREATININE 1.2 06/14/2025    CALCIUM 9.3 06/14/2025    ANIONGAP 7 (L) 06/14/2025    " ESTGFRAFRICA 6.6 (A) 11/05/2020    EGFRNONAA 5.7 (A) 11/05/2020     Comprehensive Metabolic Panel:   Lab Results   Component Value Date     06/14/2025    K 4.1 06/14/2025     06/14/2025    CO2 24 06/14/2025     (H) 06/14/2025    BUN 37 (H) 06/14/2025    CREATININE 1.2 06/14/2025    CALCIUM 9.3 06/14/2025    PROT 8.2 06/09/2025    ALBUMIN 4.6 06/14/2025    BILITOT 0.6 06/09/2025    ALKPHOS 88 06/09/2025    AST 18 06/09/2025    ALT 17 06/09/2025    ANIONGAP 7 (L) 06/14/2025    EGFRNONAA 5.7 (A) 11/05/2020       Pertinent Medications: n/a    Review of patient's allergies indicates:   Allergen Reactions    Latex Itching    Penicillins Hives and Rash       Anesthesia: None     Technical Procedures Used: Plasma Exchange: Volume exchanged - 2.5 Liters; Replacement fluid - Albumin/Fresh Frozen Plasma; Number of procedures 5; Date of next procedure n/a.    Description of the Findings of the Procedure:     Please see Apheresis Nurse flowsheet for details.    The patient was evaluated and all clinical and laboratory data relevant to the treatment was reviewed, and a decision was made to proceed with the Apheresis procedure.    I was available to the clinical staff throughout the procedure.    Significant Surgical Tasks Conducted by the Assistant(s): Not applicable    Complications: None    Estimated Blood Loss (EBL): None    Implants: None     Specimens: None

## 2025-06-14 NOTE — PLAN OF CARE
Pt aaox4 vswnl and c/o abd cramping and Bentyl given with moderate relief. Bed in low position and callbell within reach. Accu ck at [phone]. Insulin gtt d/c dayshift. Pt ambulates independently. Plan for Plex @5 of 5. IVIG and Valcade planned for outpt. FELICIANO + + .

## 2025-06-15 ENCOUNTER — TELEPHONE (OUTPATIENT)
Dept: TRANSPLANT | Facility: CLINIC | Age: 60
End: 2025-06-15
Payer: MEDICARE

## 2025-06-16 ENCOUNTER — LAB VISIT (OUTPATIENT)
Dept: LAB | Facility: HOSPITAL | Age: 60
End: 2025-06-16
Attending: STUDENT IN AN ORGANIZED HEALTH CARE EDUCATION/TRAINING PROGRAM
Payer: MEDICARE

## 2025-06-16 DIAGNOSIS — Z94.0 IMMUNOSUPPRESSIVE MANAGEMENT ENCOUNTER FOLLOWING KIDNEY TRANSPLANT: ICD-10-CM

## 2025-06-16 DIAGNOSIS — Z79.899 IMMUNOSUPPRESSIVE MANAGEMENT ENCOUNTER FOLLOWING KIDNEY TRANSPLANT: ICD-10-CM

## 2025-06-16 DIAGNOSIS — T86.19 RECEIVED KIDNEY FROM DONOR WITH HEPATITIS C: ICD-10-CM

## 2025-06-16 DIAGNOSIS — E55.9 VITAMIN D DEFICIENCY: ICD-10-CM

## 2025-06-16 DIAGNOSIS — Z94.0 KIDNEY REPLACED BY TRANSPLANT: ICD-10-CM

## 2025-06-16 LAB
ABSOLUTE NEUTROPHIL MANUAL (OHS): 10 K/UL
ALBUMIN SERPL BCP-MCNC: 4.4 G/DL (ref 3.5–5.2)
ALBUMIN SERPL BCP-MCNC: 4.6 G/DL (ref 3.5–5.2)
ALP SERPL-CCNC: 45 UNIT/L (ref 40–150)
ALT SERPL W/O P-5'-P-CCNC: 14 UNIT/L (ref 10–44)
ANION GAP (OHS): 11 MMOL/L (ref 8–16)
AST SERPL-CCNC: 17 UNIT/L (ref 11–45)
BILIRUB DIRECT SERPL-MCNC: 0.5 MG/DL (ref 0.1–0.3)
BILIRUB SERPL-MCNC: 1.7 MG/DL (ref 0.1–1)
BUN SERPL-MCNC: 36 MG/DL (ref 6–20)
CALCIUM SERPL-MCNC: 9.5 MG/DL (ref 8.7–10.5)
CHLORIDE SERPL-SCNC: 106 MMOL/L (ref 95–110)
CO2 SERPL-SCNC: 24 MMOL/L (ref 23–29)
CREAT SERPL-MCNC: 1.2 MG/DL (ref 0.5–1.4)
ERYTHROCYTE [DISTWIDTH] IN BLOOD BY AUTOMATED COUNT: 17 % (ref 11.5–14.5)
GFR SERPLBLD CREATININE-BSD FMLA CKD-EPI: 53 ML/MIN/1.73/M2
GLUCOSE SERPL-MCNC: 161 MG/DL (ref 70–110)
HCT VFR BLD AUTO: 30.1 % (ref 37–48.5)
HCV AB SERPL QL IA: NORMAL
HGB BLD-MCNC: 9.7 GM/DL (ref 12–16)
HYPOCHROMIA BLD QL SMEAR: ABNORMAL
LYMPHOCYTES NFR BLD MANUAL: 9 % (ref 18–48)
MAGNESIUM SERPL-MCNC: 1.7 MG/DL (ref 1.6–2.6)
MCH RBC QN AUTO: 30.2 PG (ref 27–31)
MCHC RBC AUTO-ENTMCNC: 32.2 G/DL (ref 32–36)
MCV RBC AUTO: 94 FL (ref 82–98)
MONOCYTES NFR BLD MANUAL: 3 % (ref 4–15)
NEUTROPHILS NFR BLD MANUAL: 88 % (ref 38–73)
NUCLEATED RBC (/100WBC) (OHS): 0 /100 WBC
PHOSPHATE SERPL-MCNC: 2.1 MG/DL (ref 2.7–4.5)
PLATELET # BLD AUTO: 175 K/UL (ref 150–450)
PLATELET BLD QL SMEAR: ABNORMAL
PMV BLD AUTO: 12.8 FL (ref 9.2–12.9)
POTASSIUM SERPL-SCNC: 3.8 MMOL/L (ref 3.5–5.1)
PROT SERPL-MCNC: 6 GM/DL (ref 6–8.4)
RBC # BLD AUTO: 3.21 M/UL (ref 4–5.4)
SODIUM SERPL-SCNC: 141 MMOL/L (ref 136–145)
STOMATOCYTES BLD QL SMEAR: PRESENT
WBC # BLD AUTO: 11.36 K/UL (ref 3.9–12.7)

## 2025-06-16 PROCEDURE — 83735 ASSAY OF MAGNESIUM: CPT

## 2025-06-16 PROCEDURE — 86803 HEPATITIS C AB TEST: CPT

## 2025-06-16 PROCEDURE — 80069 RENAL FUNCTION PANEL: CPT | Mod: PO

## 2025-06-16 PROCEDURE — 85025 COMPLETE CBC W/AUTO DIFF WBC: CPT | Mod: PO

## 2025-06-16 PROCEDURE — 82248 BILIRUBIN DIRECT: CPT

## 2025-06-16 PROCEDURE — 87522 HEPATITIS C REVRS TRNSCRPJ: CPT

## 2025-06-16 PROCEDURE — 80197 ASSAY OF TACROLIMUS: CPT

## 2025-06-16 PROCEDURE — 36415 COLL VENOUS BLD VENIPUNCTURE: CPT | Mod: PO

## 2025-06-16 RX ORDER — CHOLECALCIFEROL (VITAMIN D3) 25 MCG
2000 TABLET ORAL DAILY
Qty: 180 TABLET | Refills: 3 | Status: CANCELLED | OUTPATIENT
Start: 2025-06-04 | End: 2026-06-04

## 2025-06-16 NOTE — PROGRESS NOTES
2.5L PLEX completed by [facility] RN (Karina Willoughby). Replacement fluid consists of 2 L 5% Albumin and 2 units of FFP.  See flowsheet for details.

## 2025-06-17 ENCOUNTER — TELEPHONE (OUTPATIENT)
Dept: TRANSPLANT | Facility: CLINIC | Age: 60
End: 2025-06-17
Payer: MEDICARE

## 2025-06-17 ENCOUNTER — TELEPHONE (OUTPATIENT)
Dept: INFUSION THERAPY | Facility: HOSPITAL | Age: 60
End: 2025-06-17
Payer: MEDICARE

## 2025-06-17 ENCOUNTER — RESULTS FOLLOW-UP (OUTPATIENT)
Dept: TRANSPLANT | Facility: HOSPITAL | Age: 60
End: 2025-06-17

## 2025-06-17 ENCOUNTER — PATIENT MESSAGE (OUTPATIENT)
Dept: INFUSION THERAPY | Facility: HOSPITAL | Age: 60
End: 2025-06-17
Payer: MEDICARE

## 2025-06-17 LAB — TACROLIMUS BLD-MCNC: 8.3 NG/ML (ref 5–15)

## 2025-06-17 NOTE — TELEPHONE ENCOUNTER
Copied from CRM #[id number]. Topic: General Inquiry - Return Call  >> Jun 17, 2025 12:45 PM Mateo wrote:  Type:  Patient Returning Call    Who Called:  pt   Who Left Message for Patient:  Natacha  Does the patient know what this is regarding?:  yes  Best Call Back Number:  [phone]  Additional Information:

## 2025-06-17 NOTE — TELEPHONE ENCOUNTER
Copied from CRM #[id number]. Topic: General Inquiry - Return Call  >> Jun 17, 2025  1:29 PM Renuka wrote:  Returning a Missed Call    Caller: Kiesha    Returning call to: Jessenia     Caller can be reached @: [phone] (M)        Nature of the call: Pt missed a call from Jessenia. Would like a call back when avail

## 2025-06-17 NOTE — TELEPHONE ENCOUNTER
Spoke to pt via phone, confirmed plan for labs tomorrow.  Pt voices no complaints or concerns at present time.

## 2025-06-17 NOTE — TELEPHONE ENCOUNTER
Tried calling the patient to schedule but no voice mail set up so I sent the patient a portal message for her to call to schedule her infusions

## 2025-06-17 NOTE — TELEPHONE ENCOUNTER
Notified patient's daughter Tere of patient's approval for remainder of treatments for rejection IVIG & Velcade. Informed daughter someone will contact her from the infusion center with the appointments. Daughter verbalized understanding.     Contacted [place] infusion center to get patient scheduled for treatment.

## 2025-06-18 ENCOUNTER — OFFICE VISIT (OUTPATIENT)
Dept: ENDOCRINOLOGY | Facility: CLINIC | Age: 60
End: 2025-06-18
Payer: MEDICARE

## 2025-06-18 ENCOUNTER — DOCUMENTATION ONLY (OUTPATIENT)
Dept: CARDIOLOGY | Facility: CLINIC | Age: 60
End: 2025-06-18
Payer: MEDICARE

## 2025-06-18 ENCOUNTER — EPISODE CHANGES (OUTPATIENT)
Dept: CARDIOLOGY | Facility: CLINIC | Age: 60
End: 2025-06-18

## 2025-06-18 VITALS
WEIGHT: 148.69 LBS | BODY MASS INDEX: 25.38 KG/M2 | OXYGEN SATURATION: 97 % | SYSTOLIC BLOOD PRESSURE: 131 MMHG | HEIGHT: 64 IN | DIASTOLIC BLOOD PRESSURE: 75 MMHG | HEART RATE: 83 BPM

## 2025-06-18 DIAGNOSIS — Z99.2 TYPE 2 DIABETES MELLITUS WITH CHRONIC KIDNEY DISEASE ON CHRONIC DIALYSIS, WITH LONG-TERM CURRENT USE OF INSULIN: ICD-10-CM

## 2025-06-18 DIAGNOSIS — N18.5 DIABETES MELLITUS DUE TO UNDERLYING CONDITION WITH STAGE 5 CHRONIC KIDNEY DISEASE: ICD-10-CM

## 2025-06-18 DIAGNOSIS — E21.3 HYPERPARATHYROIDISM: ICD-10-CM

## 2025-06-18 DIAGNOSIS — E04.2 MULTIPLE THYROID NODULES: ICD-10-CM

## 2025-06-18 DIAGNOSIS — Z78.0 POSTMENOPAUSAL: ICD-10-CM

## 2025-06-18 DIAGNOSIS — E11.22 TYPE 2 DIABETES MELLITUS WITH CHRONIC KIDNEY DISEASE ON CHRONIC DIALYSIS, WITH LONG-TERM CURRENT USE OF INSULIN: ICD-10-CM

## 2025-06-18 DIAGNOSIS — E08.22 DIABETES MELLITUS DUE TO UNDERLYING CONDITION WITH STAGE 5 CHRONIC KIDNEY DISEASE: ICD-10-CM

## 2025-06-18 DIAGNOSIS — Z79.4 TYPE 2 DIABETES MELLITUS WITH CHRONIC KIDNEY DISEASE ON CHRONIC DIALYSIS, WITH LONG-TERM CURRENT USE OF INSULIN: ICD-10-CM

## 2025-06-18 DIAGNOSIS — N18.6 TYPE 2 DIABETES MELLITUS WITH CHRONIC KIDNEY DISEASE ON CHRONIC DIALYSIS, WITH LONG-TERM CURRENT USE OF INSULIN: ICD-10-CM

## 2025-06-18 DIAGNOSIS — Z13.820 OSTEOPOROSIS SCREENING: Primary | ICD-10-CM

## 2025-06-18 DIAGNOSIS — E05.90 HYPERTHYROIDISM: ICD-10-CM

## 2025-06-18 PROCEDURE — 99999 PR PBB SHADOW E&M-EST. PATIENT-LVL V: CPT | Mod: PBBFAC,,,

## 2025-06-18 NOTE — ASSESSMENT & PLAN NOTE
-- Plan repeat labs today  -- Cause-specific treatment options and associated risks discussed with patient.   -- Reviewed possible options of methimazole, I131 therapy and surgery   -- Discussed that if we proceed with i131 treatment to follow a low iodine diet for 1 week prior. Handout provided.   Graves' disease:   Extensive counseling today regarding the diagnosis of Graves' disease, the various options for treatment including thionamide, surgery, or I-131 therapy. We discussed the pros and cons of each treatment option, including contraindications to GERARD if considering pregnancy in next 6-12 months. Reviewed that with methimazole, the course is ~18 months of treatment and then evaluate for remission. Surgery is most definitive, I-131 takes 2-6 months. Would need lifelong thyroid hormone after surgery or I-131 Rx.   Reviewed extra-systemic manifestations of Graves' including orbitopathy. Reviewed that cigarette smoking is a known risk factor for thyroid associated orbitopathy and advised to be watchful and avoid smoke.   Thionamide monitoring: I have reviewed the risk of agranulocytosis that can occur in 1 of 500 patients who are taking either PTU or methimazole. I have also reviewed the even rarer risk of hepatic dysfunction. A baseline CBC with differential and Liver function tests are available.   Continue Methimazole 5 mg daily  Repeat DXA now

## 2025-06-18 NOTE — ASSESSMENT & PLAN NOTE
-- Reviewed goals of therapy are to get the best control we can without hypoglycemia  -- Labs prior to follow up.  -- A1c at goal <7%.  -- Reviewed logs/CGM:  No blood sugar logs to review.  Request BG logs be sent via portal.  Dexcom ordered for better evaluation of BG trends and management.  Diabetes education ordered to assist in setting up CGM.  Instructed patient to start administering mealtime insulin prior to meals.   -- Reviewed patient's current insulin regimen. Clarified proper insulin dose and timing in relation to meals, etc. Insulin injection sites and proper rotation instructed.    -- Advised frequent self blood glucose monitoring.  Patient encouraged to document glucose results and bring them to every clinic visit    -- Hypoglycemia precautions discussed.  -- Call for Bg repeatedly < 70 or > 180.   -- Close adherence to lifestyle changes recommended.   -- Periodic follow ups for eye evaluations, foot care and dental care suggested.  -- Encouraged exercise per ADA guidelines.    -- Encouraged dietary modifications including but not limited to DM diet, low carb snacks, marrying carbohydrates with proteins.  Medication Regimen:   Continue Lantus 13 units nightly  Continue Novolog 8 units with meal

## 2025-06-18 NOTE — PROGRESS NOTES
Heart Failure [facility] ([facility]) Team notified of pt referral via Ambulatory Referral to Heart Failure Transitional Care ([id number]).    Patient screened today6/18 by provider and RN for enrollment to program.      Pt was deemed not a candidate for enrollment at this time related to patient current admission diagnosis/ problem will not benefit from the Heart Failure Transitional Care Program.    Pt will require additional follow up planning per primary team.     If pt status, diagnosis, or treatment plan changes , please place AMB referral to Heart Failure Transitional Care Clinic ([id number]) for HFTCC enrollment re-evalution.

## 2025-06-18 NOTE — PROGRESS NOTES
Subjective:      Patient ID: Kiesha Rocha is a 59 y.o. female.    Chief Complaint: Hyperthyroidism     History of Present Illness  59 y.o.  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 Last visit May 2022    With regards to hyperthyroidism,     Lab Results   Component Value Date    TSH 1.145 10/04/2023    [id number] 57 (L) 09/11/2019    A3IAAGF 6.1 09/11/2019    FREET4 0.94 10/04/2023     Current Symptoms:   Reports Palpitations, Weight loss, Diarrhea, Hair loss, dry skin, Tremor, and Anxiety  Denies Brittle nails    Biotin Use: Denies    Current Medication:  Methimazole 5 mg daily    Any recent (3-6 months) iodine or contrast?  Denies    Smoke: Denies    Thyroid tenderness?   Denies    Fever/Sore Throat: Denies    Graves Eye Clinical Activity: 3/7=Active  Eye pain?-yes at times  Eye pain with movement?-yes at times  Eyelid erythema?- yes at times   Erythema of conjunctiva?yes at times  Caruncle inflammation?yes at times  Eyelid swelling?yes at times    Smoke-denies    With regards to thyroid nodule:    Thyroid US 2/2022:   COMPARISON:  None     FINDINGS:  THYROID GLAND has a homogenous echotexture.  Vascularity is normal.     RIGHT LOBE  Right lobe measures: 4.8 x 1.7 x 1.9 cm  1. Few subcentimeter simple cysts ranging in size from 2-4 mm.     ISTHMUS  Isthmus measures: 0.3 cm in AP dimension.     LEFT LOBE  Left lobe measures: 4.5 x 1.5 x 1.9 cm.  1. Few subcentimeter simple cysts ranging in size from 2-4 mm.     LYMPH NODES:  Real-time survey of the bilateral central and lateral neck was performed.  It did not reveal any abnormal appearing lymph nodes.     Impression:  1.  Thyroid gland is normal in size with homogenous echotexture.  2.  Scattered subcentimeter simple cysts in both  lobes.    FNA: Denies    Signs or Symptoms:   Difficulty breathing: Reports SOB since transplant  Difficulty swallowing: Denies  Voice Changes: (+)  FH of thyroid cancer: Denies  Personal history of radiation treatment or exposure:  Denies    Lab Results   Component Value Date    TSH 1.145 10/04/2023    Y1AXIVO 57 (L) 09/11/2019    M9KRMMA 6.1 09/11/2019    FREET4 0.94 10/04/2023     10/5/2022 NM scan  COMPARISON:  None.     FINDINGS:  The 24 hour uptake is elevated at 34.9 % (normal 10-30%).  Normal in size and contour with normal distribution of radiotracer throughout both lobes.  No evidence of hyper or hypofunctioning nodules.     Impression:  1. Elevated 24 hour radioiodine uptake by the thyroid, findings could represent Grave's disease. Correlate findings with clinical lab values.  2. Normal scintigraphic appearance of the thyroid.    Last Bone Mineral Density 10/2023:  COMPARISON:  No Comparisons     FINDINGS:  Lumbar spine (L1-L4):               BMD is 0.965 g/cm2, T-score is -0.7, and Z-score is -0.4.  Total hip:                                BMD is 0.921 g/cm2, T-score is -0.2, and Z-score is -0.1.  Femoral neck:                          BMD is 0.816 g/cm2, T-score is -0.3, and Z-score is -0.1.  Distal 1/3 radius:                      Not applicable     FRAX:  3.1% risk of a major osteoporotic fracture in the next 10 years.  0.1% risk of hip fracture in the next 10 years.     Impression:  *Normal bone mineral density with wide variation in BMD between L2 and L3 vertebral bodies.    Regarding Diabetes Mellitus:    Currently on Prednisone 20 mg with intent to titrate down to 5 mg daily for a year for transplant status     Diagnosed with Type 2 DM >25 years ago during pregnancy  Family History of Type 2 DM: mother and her maternal aunts  Known complications:  DKA/HHS: denies  RN: + 10/2022  PN: +  Nephropathy: ESRD with transplant 4/25/2025  Gastroparesis: denies but always feels full  CAD: denies  "    Current Regimen:  Lantus 13 units nightly  Novolog 8 units with meals    She is changing needle every time. She is rotating injection sites - gives in arm and abdomen.    Missed doses - denies    Other medications tried:  Metformin    Has been on dexcom G6 in the past but did not like  3 times a day testing via fingerstick  Log reviewed: none (Patient did not bring log to clinic visit today)   Breakfast 100 - 118   Lunch  200 - 250s   Dinner  200s  Hypoglycemic event - Reports low blood sugars due to taking insulin after meals and occasionally in the morning  Knows how to correct with 15 grams of carbs- juice, coke, or a peppermint.     Dietary recall: She feels that she is not always following diabetic diet.   Eats 2-3 meals a day  Snacks throughout the day:  Jello, pudding, fruit cups  Drinks:  diet juice, diet sodas, water  Exercise - Active around the house, walks when not to hot    Education - last visit: 2/2022    Denies: History of frequent UTI/yeast infections, recent DKA, ketogenic diet, diuretics, history of pancreatitis, recurrent gallstones, daily alcohol use, MEN syndrome, pancreatic tumors, or gastroparesis        Diabetes Management Status  Statin: Taking - Atorvastatin 20 mg daily  ACE/ARB: Not taking  Lab Results   Component Value Date    HGBA1C 5.4 04/24/2025    HGBA1C 4.8 09/03/2024    HGBA1C 6.2 (H) 10/04/2023     Screening or Prevention Patient's value Goal Complete/Controlled?   HgA1C Testing and Control   Lab Results   Component Value Date    HGBA1C 5.4 04/24/2025      Annually/Less than 8% Yes   Lipid profile : 09/03/2024 Annually No   LDL control Lab Results   Component Value Date    LDLCALC 28 09/03/2024    Annually/Less than 100 mg/dl  Yes   Nephropathy screening No results found for: "LABMICR"  Lab Results   Component Value Date    PROTEINUA Trace (A) 05/08/2025    Annually No   Blood pressure BP Readings from Last 1 Encounters:   06/18/25 131/75    Less than 140/90 No   Dilated " "retinal exam : 10/06/2022 Annually Yes   Foot exam   : 10/04/2023 Annually No     BP Readings from Last 3 Encounters:   06/18/25 131/75   06/14/25 (!) 141/68   06/06/25 133/61     Wt Readings from Last 10 Encounters:   06/18/25 67.4 kg (148 lb 11.2 oz)   06/14/25 67 kg (147 lb 11.3 oz)   06/06/25 66.7 kg (147 lb)   06/04/25 67.6 kg (149 lb 0.5 oz)   05/21/25 68.6 kg (151 lb 5.5 oz)   05/21/25 68.6 kg (151 lb 3.8 oz)   05/16/25 66.7 kg (147 lb)   05/14/25 67.2 kg (148 lb 2.4 oz)   05/02/25 67.3 kg (148 lb 5.9 oz)   04/28/25 73.6 kg (162 lb 4.1 oz)     Review of Systems:  as above    Objective:   /75 (BP Location: Right arm, Patient Position: Sitting)   Pulse 83   Ht 5' 4" (1.626 m)   Wt 67.4 kg (148 lb 11.2 oz)   SpO2 97%   BMI 25.52 kg/m²     Physical Exam  Constitutional:       Appearance: She is well-developed.   HENT:      Head: Normocephalic.   Eyes:      Conjunctiva/sclera: Conjunctivae normal.   Pulmonary:      Effort: Pulmonary effort is normal.   Musculoskeletal:         General: Normal range of motion.   Skin:     General: Skin is warm.   Neurological:      Mental Status: She is alert and oriented to person, place, and time.       Injection sites are without edema or erythema. No lipo hypertropthy or atrophy    Diabetes Foot Exam:   Denies sores or lesions to bilateral feet  Shoes appropriate  DM foot exam next time    BP Readings from Last 1 Encounters:   06/18/25 131/75      Wt Readings from Last 1 Encounters:   06/18/25 1306 67.4 kg (148 lb 11.2 oz)     Body mass index is 25.52 kg/m².    Lab Review:   Lab Results   Component Value Date    HGBA1C 5.4 04/24/2025     Lab Results   Component Value Date    CHOL 118 09/03/2024    HDL 49 09/03/2024    LDLCALC 28 09/03/2024    TRIG 205 (H) 09/03/2024    CHOLHDL 2.4 09/03/2024     Lab Results   Component Value Date     06/16/2025    K 3.8 06/16/2025     06/16/2025    CO2 24 06/16/2025     (H) 06/16/2025    BUN 36 (H) 06/16/2025    " CREATININE 1.2 06/16/2025    CALCIUM 9.5 06/16/2025    PROT 6.0 06/16/2025    ALBUMIN 4.4 06/16/2025    ALBUMIN 4.6 06/16/2025    BILITOT 1.7 (H) 06/16/2025    ALKPHOS 45 06/16/2025    AST 17 06/16/2025    ALT 14 06/16/2025    ANIONGAP 11 06/16/2025    ESTGFRAFRICA 6.6 (A) 11/05/2020    EGFRNONAA 5.7 (A) 11/05/2020    TSH 1.145 10/04/2023       Assessment and Plan     Type 2 diabetes mellitus with chronic kidney disease on chronic dialysis, with long-term current use of insulin  -- Reviewed goals of therapy are to get the best control we can without hypoglycemia  -- Labs prior to follow up.  -- A1c at goal <7%.  -- Reviewed logs/CGM:  No blood sugar logs to review.  Request BG logs be sent via portal.  Dexcom ordered for better evaluation of BG trends and management.  Diabetes education ordered to assist in setting up CGM.  Instructed patient to start administering mealtime insulin prior to meals.   -- Reviewed patient's current insulin regimen. Clarified proper insulin dose and timing in relation to meals, etc. Insulin injection sites and proper rotation instructed.    -- Advised frequent self blood glucose monitoring.  Patient encouraged to document glucose results and bring them to every clinic visit    -- Hypoglycemia precautions discussed.  -- Call for Bg repeatedly < 70 or > 180.   -- Close adherence to lifestyle changes recommended.   -- Periodic follow ups for eye evaluations, foot care and dental care suggested.  -- Encouraged exercise per ADA guidelines.    -- Encouraged dietary modifications including but not limited to DM diet, low carb snacks, marrying carbohydrates with proteins.  Medication Regimen:   Continue Lantus 13 units nightly  Continue Novolog 8 units with meal    Hyperthyroidism  -- Plan repeat labs today  -- Cause-specific treatment options and associated risks discussed with patient.   -- Reviewed possible options of methimazole, I131 therapy and surgery   -- Discussed that if we proceed  with i131 treatment to follow a low iodine diet for 1 week prior. Handout provided.   Graves' disease:   Extensive counseling today regarding the diagnosis of Graves' disease, the various options for treatment including thionamide, surgery, or I-131 therapy. We discussed the pros and cons of each treatment option, including contraindications to GERARD if considering pregnancy in next 6-12 months. Reviewed that with methimazole, the course is ~18 months of treatment and then evaluate for remission. Surgery is most definitive, I-131 takes 2-6 months. Would need lifelong thyroid hormone after surgery or I-131 Rx.   Reviewed extra-systemic manifestations of Graves' including orbitopathy. Reviewed that cigarette smoking is a known risk factor for thyroid associated orbitopathy and advised to be watchful and avoid smoke.   Thionamide monitoring: I have reviewed the risk of agranulocytosis that can occur in 1 of 500 patients who are taking either PTU or methimazole. I have also reviewed the even rarer risk of hepatic dysfunction. A baseline CBC with differential and Liver function tests are available.   Continue Methimazole 5 mg daily  Repeat DXA now    Multiple thyroid nodules  Per previous thyroid ultrasound 2/2022 scattered sub centimeter simple cyst nodules in bilateral lobes with no follow-up required.  Repeat thyroid ultrasound due to change in clinical symptoms      Follow up in about 3 months (around 9/18/2025).    ROSA MARIA Polanco  [facility]     Case discussed with Dr. Edouard  Recommendations were discussed with the patient in detail  The patient verbalized understanding and agrees with the plan outlined as above.     Visit today included increased complexity associated with the care of the problems addressed and managing the longitudinal care of the patient due to the serious and/or complex managed problems.

## 2025-06-18 NOTE — ASSESSMENT & PLAN NOTE
Per previous thyroid ultrasound 2/2022 scattered sub centimeter simple cyst nodules in bilateral lobes with no follow-up required.  Repeat thyroid ultrasound due to change in clinical symptoms

## 2025-06-18 NOTE — PATIENT INSTRUCTIONS
With regards to thyroid:  Labs today  Continue Methimazole 5 mg daily  Repeat Thyroid ultrasound  Repeat Bone Density    With regard to diabetes;  Send blood sugar logs  Start Dexcom  Diabetes education on the NS once Dexcom arrives  Start taking insulin before to meals    Medication Regimen:  Continue Lantus 13 units nightly  Continue Novolog 8 units with meals      DEXCOM:  The time in range we strive for on the Dexcom is at least 70% or greater so we do have room to improve.  I can set up a repeat visit with you virtually in the near future if you like otherwise we do have some in person endocrine docs in [place] if you prefer in person.  I believe there was discussion about you having possible interest in an insulin pump at one point as well.  I am not sure if that continues to be an interest or not.  If so it may be better to get that next follow up visit in person with someone in the clinic as a few of those docs also have stand alone insulin pump clinics if you ever get put on a pump in the future.  Just let me know.       I am also not sure if you were able to see education since you started using the Dexcom and if not please follow the below steps to be sure we can set up your Dexcom for remote monitoring of data.      Dexcom Sharing Instructions:     To set up the Dexcom G7/G6 riddhi's Share feature to share your glucose data with others, you can do the following:     Open your Dexcom sensor riddhi on your smartphone  Tap the Connections Tab  Tap [facility] and tap Continue  Enter the [facility] Code when prompted.   Code:  [email or web address]    Confirm clinic details and tap Done when finished.  [facility] will remain an active connection in the Connections Tab of your Dexcom CGM Riddhi and you can use it to stop sharing and/or share with other [facility].        [email or web address]    Dexcom Start-up Video:  How to download  and set up Dexcom G7 CGM clotilde  Dexcom

## 2025-06-23 ENCOUNTER — RESULTS FOLLOW-UP (OUTPATIENT)
Dept: TRANSPLANT | Facility: CLINIC | Age: 60
End: 2025-06-23

## 2025-06-23 ENCOUNTER — LAB VISIT (OUTPATIENT)
Dept: LAB | Facility: HOSPITAL | Age: 60
End: 2025-06-23
Attending: STUDENT IN AN ORGANIZED HEALTH CARE EDUCATION/TRAINING PROGRAM
Payer: MEDICARE

## 2025-06-23 ENCOUNTER — INFUSION (OUTPATIENT)
Dept: INFUSION THERAPY | Facility: HOSPITAL | Age: 60
End: 2025-06-23
Attending: STUDENT IN AN ORGANIZED HEALTH CARE EDUCATION/TRAINING PROGRAM
Payer: MEDICARE

## 2025-06-23 VITALS
HEART RATE: 83 BPM | OXYGEN SATURATION: 100 % | WEIGHT: 156.31 LBS | RESPIRATION RATE: 16 BRPM | TEMPERATURE: 98 F | BODY MASS INDEX: 26.69 KG/M2 | SYSTOLIC BLOOD PRESSURE: 129 MMHG | DIASTOLIC BLOOD PRESSURE: 71 MMHG | HEIGHT: 64 IN

## 2025-06-23 DIAGNOSIS — T86.19 RECEIVED KIDNEY FROM DONOR WITH HEPATITIS C: ICD-10-CM

## 2025-06-23 DIAGNOSIS — Z94.0 KIDNEY REPLACED BY TRANSPLANT: ICD-10-CM

## 2025-06-23 DIAGNOSIS — T86.10 COMPLICATION OF TRANSPLANTED KIDNEY, UNSPECIFIED COMPLICATION: ICD-10-CM

## 2025-06-23 DIAGNOSIS — T86.11 ANTIBODY MEDIATED REJECTION OF KIDNEY TRANSPLANT: Primary | ICD-10-CM

## 2025-06-23 DIAGNOSIS — Z94.0 IMMUNOSUPPRESSIVE MANAGEMENT ENCOUNTER FOLLOWING KIDNEY TRANSPLANT: ICD-10-CM

## 2025-06-23 DIAGNOSIS — Z79.899 IMMUNOSUPPRESSIVE MANAGEMENT ENCOUNTER FOLLOWING KIDNEY TRANSPLANT: ICD-10-CM

## 2025-06-23 DIAGNOSIS — E05.90 HYPERTHYROIDISM: ICD-10-CM

## 2025-06-23 LAB
ABSOLUTE NEUTROPHIL MANUAL (OHS): 5 K/UL
ALBUMIN SERPL BCP-MCNC: 4.2 G/DL (ref 3.5–5.2)
ALP SERPL-CCNC: 73 UNIT/L (ref 40–150)
ALT SERPL W/O P-5'-P-CCNC: 26 UNIT/L (ref 10–44)
ANION GAP (OHS): 13 MMOL/L (ref 8–16)
ANISOCYTOSIS BLD QL SMEAR: SLIGHT
AST SERPL-CCNC: 21 UNIT/L (ref 11–45)
BASOPHILS NFR BLD MANUAL: 1 %
BILIRUB DIRECT SERPL-MCNC: 0.3 MG/DL (ref 0.1–0.3)
BILIRUB SERPL-MCNC: 0.9 MG/DL (ref 0.1–1)
BUN SERPL-MCNC: 29 MG/DL (ref 6–20)
CALCIUM SERPL-MCNC: 9.4 MG/DL (ref 8.7–10.5)
CHLORIDE SERPL-SCNC: 102 MMOL/L (ref 95–110)
CO2 SERPL-SCNC: 23 MMOL/L (ref 23–29)
CREAT SERPL-MCNC: 1.5 MG/DL (ref 0.5–1.4)
EOSINOPHIL NFR BLD MANUAL: 2 % (ref 0–8)
ERYTHROCYTE [DISTWIDTH] IN BLOOD BY AUTOMATED COUNT: 16.6 % (ref 11.5–14.5)
GFR SERPLBLD CREATININE-BSD FMLA CKD-EPI: 40 ML/MIN/1.73/M2
GLUCOSE SERPL-MCNC: 216 MG/DL (ref 70–110)
HCT VFR BLD AUTO: 31.6 % (ref 37–48.5)
HCV AB SERPL QL IA: NORMAL
HGB BLD-MCNC: 9.8 GM/DL (ref 12–16)
LYMPHOCYTES NFR BLD MANUAL: 25 % (ref 18–48)
MCH RBC QN AUTO: 30.6 PG (ref 27–31)
MCHC RBC AUTO-ENTMCNC: 31 G/DL (ref 32–36)
MCV RBC AUTO: 99 FL (ref 82–98)
MONOCYTES NFR BLD MANUAL: 8 % (ref 4–15)
NEUTROPHILS NFR BLD MANUAL: 64 % (ref 38–73)
NUCLEATED RBC (/100WBC) (OHS): 0 /100 WBC
PHOSPHATE SERPL-MCNC: 3.4 MG/DL (ref 2.7–4.5)
PLATELET # BLD AUTO: 234 K/UL (ref 150–450)
PLATELET BLD QL SMEAR: NORMAL
PMV BLD AUTO: 12.9 FL (ref 9.2–12.9)
POIKILOCYTOSIS BLD QL SMEAR: SLIGHT
POTASSIUM SERPL-SCNC: 4.4 MMOL/L (ref 3.5–5.1)
PROT SERPL-MCNC: 6.8 GM/DL (ref 6–8.4)
RBC # BLD AUTO: 3.2 M/UL (ref 4–5.4)
SODIUM SERPL-SCNC: 138 MMOL/L (ref 136–145)
STOMATOCYTES BLD QL SMEAR: PRESENT
T4 FREE SERPL-MCNC: 0.99 NG/DL (ref 0.71–1.51)
TSH SERPL-ACNC: 1.21 UIU/ML (ref 0.4–4)
WBC # BLD AUTO: 7.87 K/UL (ref 3.9–12.7)

## 2025-06-23 PROCEDURE — 96401 CHEMO ANTI-NEOPL SQ/IM: CPT | Mod: PN

## 2025-06-23 PROCEDURE — 63600175 PHARM REV CODE 636 W HCPCS: Mod: PN | Performed by: STUDENT IN AN ORGANIZED HEALTH CARE EDUCATION/TRAINING PROGRAM

## 2025-06-23 PROCEDURE — 85027 COMPLETE CBC AUTOMATED: CPT | Mod: PO

## 2025-06-23 PROCEDURE — 87799 DETECT AGENT NOS DNA QUANT: CPT

## 2025-06-23 PROCEDURE — 87522 HEPATITIS C REVRS TRNSCRPJ: CPT

## 2025-06-23 PROCEDURE — 84100 ASSAY OF PHOSPHORUS: CPT

## 2025-06-23 PROCEDURE — 82248 BILIRUBIN DIRECT: CPT

## 2025-06-23 PROCEDURE — 80053 COMPREHEN METABOLIC PANEL: CPT

## 2025-06-23 PROCEDURE — 36415 COLL VENOUS BLD VENIPUNCTURE: CPT | Mod: PO

## 2025-06-23 PROCEDURE — 84443 ASSAY THYROID STIM HORMONE: CPT

## 2025-06-23 PROCEDURE — 80197 ASSAY OF TACROLIMUS: CPT

## 2025-06-23 PROCEDURE — 86803 HEPATITIS C AB TEST: CPT

## 2025-06-23 PROCEDURE — 84439 ASSAY OF FREE THYROXINE: CPT

## 2025-06-23 RX ORDER — BORTEZOMIB 3.5 MG/1
1.3 INJECTION, POWDER, LYOPHILIZED, FOR SOLUTION INTRAVENOUS; SUBCUTANEOUS
Status: CANCELLED | OUTPATIENT
Start: 2025-06-24

## 2025-06-23 RX ORDER — SODIUM CHLORIDE 0.9 % (FLUSH) 0.9 %
10 SYRINGE (ML) INJECTION
Status: CANCELLED | OUTPATIENT
Start: 2025-06-24

## 2025-06-23 RX ORDER — HEPARIN 100 UNIT/ML
500 SYRINGE INTRAVENOUS
Status: CANCELLED | OUTPATIENT
Start: 2025-06-24

## 2025-06-23 RX ORDER — BORTEZOMIB 3.5 MG/1
1.3 INJECTION, POWDER, LYOPHILIZED, FOR SOLUTION INTRAVENOUS; SUBCUTANEOUS
Status: CANCELLED | OUTPATIENT
Start: 2025-06-26

## 2025-06-23 RX ORDER — SODIUM CHLORIDE 0.9 % (FLUSH) 0.9 %
10 SYRINGE (ML) INJECTION
Status: DISCONTINUED | OUTPATIENT
Start: 2025-06-23 | End: 2025-06-23 | Stop reason: HOSPADM

## 2025-06-23 RX ORDER — BORTEZOMIB 3.5 MG/1
1.3 INJECTION, POWDER, LYOPHILIZED, FOR SOLUTION INTRAVENOUS; SUBCUTANEOUS
Status: DISCONTINUED | OUTPATIENT
Start: 2025-06-23 | End: 2025-06-23

## 2025-06-23 RX ORDER — BORTEZOMIB 3.5 MG/1
1.3 INJECTION, POWDER, LYOPHILIZED, FOR SOLUTION INTRAVENOUS; SUBCUTANEOUS
Status: COMPLETED | OUTPATIENT
Start: 2025-06-23 | End: 2025-06-23

## 2025-06-23 RX ADMIN — BORTEZOMIB 2.25 MG: 3.5 INJECTION, POWDER, LYOPHILIZED, FOR SOLUTION INTRAVENOUS; SUBCUTANEOUS at 11:06

## 2025-06-23 NOTE — PLAN OF CARE
Problem: Adult Inpatient Plan of Care  Goal: Plan of Care Review  Outcome: Progressing  Flowsheets (Taken 6/23/2025 5284)  Plan of Care Reviewed With: patient   -Patient tolerated her Velcade injection well. No new c/o voiced throughout treatment. Patient observed 15 min- post injection. Schedule printed and reviewed with patient. Patient left clinic in no acute distress.

## 2025-06-23 NOTE — PLAN OF CARE
Problem: Adult Inpatient Plan of Care  Goal: Patient-Specific Goal (Individualized)  Outcome: Progressing  Flowsheets (Taken 6/23/2025 0940)  Individualized Care Needs: blanket, personal phone, pillow  Anxieties, Fears or Concerns: frustation with the amount of appts and traveling to [place]  Patient/Family-Specific Goals (Include Timeframe): to tolerate tx     Problem: Fatigue  Goal: Improved Activity Tolerance  Outcome: Progressing  Intervention: Promote Improved Energy  Flowsheets (Taken 6/23/2025 0940)  Activity Management: Ambulated -L4  Environmental Support: calm environment promoted   -Patient arrived to clinic for first Velcade injection. Patient had labs done on 6/16/25 and labs done today. MD gave okay to proceed with labs from 6/16/2025.

## 2025-06-24 LAB — TACROLIMUS BLD-MCNC: 7.9 NG/ML (ref 5–15)

## 2025-06-25 ENCOUNTER — INFUSION (OUTPATIENT)
Dept: INFUSION THERAPY | Facility: HOSPITAL | Age: 60
End: 2025-06-25
Attending: STUDENT IN AN ORGANIZED HEALTH CARE EDUCATION/TRAINING PROGRAM
Payer: MEDICARE

## 2025-06-25 VITALS
BODY MASS INDEX: 26.69 KG/M2 | OXYGEN SATURATION: 98 % | RESPIRATION RATE: 18 BRPM | HEIGHT: 64 IN | TEMPERATURE: 98 F | DIASTOLIC BLOOD PRESSURE: 60 MMHG | HEART RATE: 83 BPM | SYSTOLIC BLOOD PRESSURE: 110 MMHG | WEIGHT: 156.31 LBS

## 2025-06-25 DIAGNOSIS — T86.11 ANTIBODY MEDIATED REJECTION OF KIDNEY TRANSPLANT: ICD-10-CM

## 2025-06-25 DIAGNOSIS — Z91.89 AT RISK FOR OPPORTUNISTIC INFECTIONS: Primary | ICD-10-CM

## 2025-06-25 LAB — HCV RNA SERPL NAA+PROBE-ACNC: NORMAL IU/ML

## 2025-06-25 PROCEDURE — 25000003 PHARM REV CODE 250: Mod: PN | Performed by: STUDENT IN AN ORGANIZED HEALTH CARE EDUCATION/TRAINING PROGRAM

## 2025-06-25 PROCEDURE — 96366 THER/PROPH/DIAG IV INF ADDON: CPT | Mod: PN

## 2025-06-25 PROCEDURE — 96365 THER/PROPH/DIAG IV INF INIT: CPT | Mod: PN

## 2025-06-25 PROCEDURE — 63600175 PHARM REV CODE 636 W HCPCS: Mod: JZ,TB,PN | Performed by: STUDENT IN AN ORGANIZED HEALTH CARE EDUCATION/TRAINING PROGRAM

## 2025-06-25 RX ORDER — DIPHENHYDRAMINE HCL 25 MG
25 CAPSULE ORAL
Status: CANCELLED | OUTPATIENT
Start: 2025-06-26

## 2025-06-25 RX ORDER — EPINEPHRINE 0.3 MG/.3ML
0.3 INJECTION SUBCUTANEOUS ONCE AS NEEDED
Status: CANCELLED | OUTPATIENT
Start: 2025-06-26

## 2025-06-25 RX ORDER — SODIUM CHLORIDE 0.9 % (FLUSH) 0.9 %
10 SYRINGE (ML) INJECTION
Status: CANCELLED | OUTPATIENT
Start: 2025-06-30

## 2025-06-25 RX ORDER — BORTEZOMIB 3.5 MG/1
1.3 INJECTION, POWDER, LYOPHILIZED, FOR SOLUTION INTRAVENOUS; SUBCUTANEOUS
Status: CANCELLED | OUTPATIENT
Start: 2025-06-30

## 2025-06-25 RX ORDER — ACETAMINOPHEN 500 MG
500 TABLET ORAL
Status: CANCELLED | OUTPATIENT
Start: 2025-06-26

## 2025-06-25 RX ORDER — HEPARIN 100 UNIT/ML
500 SYRINGE INTRAVENOUS
Status: CANCELLED | OUTPATIENT
Start: 2025-06-30

## 2025-06-25 RX ORDER — ACETAMINOPHEN 500 MG
500 TABLET ORAL
Status: DISCONTINUED | OUTPATIENT
Start: 2025-06-25 | End: 2025-06-25 | Stop reason: HOSPADM

## 2025-06-25 RX ORDER — SODIUM CHLORIDE 0.9 % (FLUSH) 0.9 %
10 SYRINGE (ML) INJECTION
Status: CANCELLED | OUTPATIENT
Start: 2025-06-26

## 2025-06-25 RX ORDER — DIPHENHYDRAMINE HCL 25 MG
25 CAPSULE ORAL
Status: DISCONTINUED | OUTPATIENT
Start: 2025-06-25 | End: 2025-06-25 | Stop reason: HOSPADM

## 2025-06-25 RX ORDER — HEPARIN 100 UNIT/ML
500 SYRINGE INTRAVENOUS
Status: CANCELLED | OUTPATIENT
Start: 2025-06-26

## 2025-06-25 RX ORDER — DIPHENHYDRAMINE HYDROCHLORIDE 50 MG/ML
25 INJECTION, SOLUTION INTRAMUSCULAR; INTRAVENOUS
Status: CANCELLED | OUTPATIENT
Start: 2025-06-26

## 2025-06-25 RX ORDER — DIPHENHYDRAMINE HYDROCHLORIDE 50 MG/ML
50 INJECTION, SOLUTION INTRAMUSCULAR; INTRAVENOUS ONCE AS NEEDED
Status: CANCELLED | OUTPATIENT
Start: 2025-06-26

## 2025-06-25 RX ADMIN — SODIUM CHLORIDE: 9 INJECTION, SOLUTION INTRAVENOUS at 11:06

## 2025-06-25 RX ADMIN — SODIUM CHLORIDE 250 ML: 9 INJECTION, SOLUTION INTRAVENOUS at 10:06

## 2025-06-25 RX ADMIN — DIPHENHYDRAMINE HYDROCHLORIDE 25 MG: 25 CAPSULE ORAL at 10:06

## 2025-06-25 RX ADMIN — ACETAMINOPHEN 500 MG: 500 TABLET ORAL at 10:06

## 2025-06-25 RX ADMIN — HUMAN IMMUNOGLOBULIN G 55 G: 40 LIQUID INTRAVENOUS at 10:06

## 2025-06-26 ENCOUNTER — INFUSION (OUTPATIENT)
Dept: INFUSION THERAPY | Facility: HOSPITAL | Age: 60
End: 2025-06-26
Attending: STUDENT IN AN ORGANIZED HEALTH CARE EDUCATION/TRAINING PROGRAM
Payer: MEDICARE

## 2025-06-26 VITALS
RESPIRATION RATE: 16 BRPM | HEIGHT: 64 IN | HEART RATE: 85 BPM | WEIGHT: 156.94 LBS | OXYGEN SATURATION: 100 % | SYSTOLIC BLOOD PRESSURE: 145 MMHG | TEMPERATURE: 99 F | DIASTOLIC BLOOD PRESSURE: 78 MMHG | BODY MASS INDEX: 26.79 KG/M2

## 2025-06-26 DIAGNOSIS — Z91.89 AT RISK FOR OPPORTUNISTIC INFECTIONS: Primary | ICD-10-CM

## 2025-06-26 DIAGNOSIS — T86.11 ANTIBODY MEDIATED REJECTION OF KIDNEY TRANSPLANT: ICD-10-CM

## 2025-06-26 LAB
W BK VIRUS DNA, QUALITATIVE, PLASMA: NOT DETECTED
W BK VIRUS DNA, QUANTITATIVE, PLASMA: <125 COPIES/ML
W LOG BK VIRUS DNA, PLASMA: <2.1 LOG (10) COPIES/ML

## 2025-06-26 PROCEDURE — 25000003 PHARM REV CODE 250: Mod: PN | Performed by: STUDENT IN AN ORGANIZED HEALTH CARE EDUCATION/TRAINING PROGRAM

## 2025-06-26 PROCEDURE — 63600175 PHARM REV CODE 636 W HCPCS: Mod: PN | Performed by: INTERNAL MEDICINE

## 2025-06-26 PROCEDURE — 96401 CHEMO ANTI-NEOPL SQ/IM: CPT | Mod: PN

## 2025-06-26 PROCEDURE — 63600175 PHARM REV CODE 636 W HCPCS: Mod: JZ,TB,PN | Performed by: STUDENT IN AN ORGANIZED HEALTH CARE EDUCATION/TRAINING PROGRAM

## 2025-06-26 PROCEDURE — 96365 THER/PROPH/DIAG IV INF INIT: CPT | Mod: PN

## 2025-06-26 PROCEDURE — 96366 THER/PROPH/DIAG IV INF ADDON: CPT | Mod: PN

## 2025-06-26 RX ORDER — EPINEPHRINE 0.3 MG/.3ML
0.3 INJECTION SUBCUTANEOUS ONCE AS NEEDED
OUTPATIENT
Start: 2025-06-27

## 2025-06-26 RX ORDER — DIPHENHYDRAMINE HCL 25 MG
25 CAPSULE ORAL
Status: DISCONTINUED | OUTPATIENT
Start: 2025-06-26 | End: 2025-06-26 | Stop reason: HOSPADM

## 2025-06-26 RX ORDER — SODIUM CHLORIDE 0.9 % (FLUSH) 0.9 %
10 SYRINGE (ML) INJECTION
OUTPATIENT
Start: 2025-06-30

## 2025-06-26 RX ORDER — ACETAMINOPHEN 500 MG
500 TABLET ORAL
Status: CANCELLED | OUTPATIENT
Start: 2025-06-27

## 2025-06-26 RX ORDER — ACETAMINOPHEN 500 MG
500 TABLET ORAL
Status: DISCONTINUED | OUTPATIENT
Start: 2025-06-26 | End: 2025-06-26 | Stop reason: HOSPADM

## 2025-06-26 RX ORDER — DIPHENHYDRAMINE HYDROCHLORIDE 50 MG/ML
50 INJECTION, SOLUTION INTRAMUSCULAR; INTRAVENOUS ONCE AS NEEDED
OUTPATIENT
Start: 2025-06-27

## 2025-06-26 RX ORDER — DIPHENHYDRAMINE HYDROCHLORIDE 50 MG/ML
25 INJECTION, SOLUTION INTRAMUSCULAR; INTRAVENOUS
OUTPATIENT
Start: 2025-06-27

## 2025-06-26 RX ORDER — BORTEZOMIB 3.5 MG/1
1.3 INJECTION, POWDER, LYOPHILIZED, FOR SOLUTION INTRAVENOUS; SUBCUTANEOUS
OUTPATIENT
Start: 2025-06-30

## 2025-06-26 RX ORDER — HEPARIN 100 UNIT/ML
500 SYRINGE INTRAVENOUS
OUTPATIENT
Start: 2025-06-27

## 2025-06-26 RX ORDER — BORTEZOMIB 3.5 MG/1
1.3 INJECTION, POWDER, LYOPHILIZED, FOR SOLUTION INTRAVENOUS; SUBCUTANEOUS
Status: COMPLETED | OUTPATIENT
Start: 2025-06-26 | End: 2025-06-26

## 2025-06-26 RX ORDER — HEPARIN 100 UNIT/ML
500 SYRINGE INTRAVENOUS
OUTPATIENT
Start: 2025-06-30

## 2025-06-26 RX ORDER — SODIUM CHLORIDE 9 MG/ML
250 INJECTION, SOLUTION INTRAVENOUS ONCE
Status: DISCONTINUED | OUTPATIENT
Start: 2025-06-26 | End: 2025-06-26 | Stop reason: HOSPADM

## 2025-06-26 RX ORDER — SODIUM CHLORIDE 0.9 % (FLUSH) 0.9 %
10 SYRINGE (ML) INJECTION
OUTPATIENT
Start: 2025-06-27

## 2025-06-26 RX ORDER — DIPHENHYDRAMINE HCL 25 MG
25 CAPSULE ORAL
Status: CANCELLED | OUTPATIENT
Start: 2025-06-27

## 2025-06-26 RX ADMIN — ACETAMINOPHEN 500 MG: 500 TABLET ORAL at 09:06

## 2025-06-26 RX ADMIN — SODIUM CHLORIDE: 9 INJECTION, SOLUTION INTRAVENOUS at 09:06

## 2025-06-26 RX ADMIN — DIPHENHYDRAMINE HYDROCHLORIDE 25 MG: 25 CAPSULE ORAL at 09:06

## 2025-06-26 RX ADMIN — HUMAN IMMUNOGLOBULIN G 55 G: 40 LIQUID INTRAVENOUS at 10:06

## 2025-06-26 RX ADMIN — BORTEZOMIB 2.25 MG: 3.5 INJECTION, POWDER, LYOPHILIZED, FOR SOLUTION INTRAVENOUS; SUBCUTANEOUS at 02:06

## 2025-06-26 NOTE — PLAN OF CARE
Problem: Adult Inpatient Plan of Care  Goal: Plan of Care Review  Outcome: Progressing  Flowsheets (Taken 6/26/2025 1514)  Plan of Care Reviewed With: patient  Goal: Patient-Specific Goal (Individualized)  Outcome: Progressing  Flowsheets (Taken 6/26/2025 1514)  Individualized Care Needs: blanket, lights dimmed, tv, snacks  Anxieties, Fears or Concerns: none  Patient/Family-Specific Goals (Include Timeframe): no reaction to tx     Problem: Fatigue  Goal: Improved Activity Tolerance  Outcome: Progressing  Intervention: Promote Improved Energy  Flowsheets (Taken 6/26/2025 1514)  Fatigue Management: paced activity encouraged  Sleep/Rest Enhancement: regular sleep/rest pattern promoted  Activity Management: Ambulated in jensen -   Environmental Support:   calm environment promoted   distractions minimized   Pt tolerated IVIG and Velcade well.  No s/s of reaction noted.  Instructed to call MD with any problems.

## 2025-06-27 ENCOUNTER — RESULTS FOLLOW-UP (OUTPATIENT)
Dept: ENDOCRINOLOGY | Facility: CLINIC | Age: 60
End: 2025-06-27

## 2025-06-30 ENCOUNTER — INFUSION (OUTPATIENT)
Dept: INFUSION THERAPY | Facility: HOSPITAL | Age: 60
End: 2025-06-30
Attending: STUDENT IN AN ORGANIZED HEALTH CARE EDUCATION/TRAINING PROGRAM
Payer: MEDICARE

## 2025-06-30 VITALS
HEIGHT: 64 IN | RESPIRATION RATE: 16 BRPM | WEIGHT: 152.13 LBS | SYSTOLIC BLOOD PRESSURE: 151 MMHG | OXYGEN SATURATION: 99 % | BODY MASS INDEX: 25.97 KG/M2 | HEART RATE: 92 BPM | TEMPERATURE: 98 F | DIASTOLIC BLOOD PRESSURE: 73 MMHG

## 2025-06-30 DIAGNOSIS — T86.11 ANTIBODY MEDIATED REJECTION OF KIDNEY TRANSPLANT: Primary | ICD-10-CM

## 2025-06-30 PROCEDURE — 96401 CHEMO ANTI-NEOPL SQ/IM: CPT | Mod: PN

## 2025-06-30 PROCEDURE — 63600175 PHARM REV CODE 636 W HCPCS: Mod: PN | Performed by: INTERNAL MEDICINE

## 2025-06-30 RX ORDER — HEPARIN 100 UNIT/ML
500 SYRINGE INTRAVENOUS
Status: CANCELLED | OUTPATIENT
Start: 2025-07-02

## 2025-06-30 RX ORDER — SODIUM CHLORIDE 0.9 % (FLUSH) 0.9 %
10 SYRINGE (ML) INJECTION
Status: CANCELLED | OUTPATIENT
Start: 2025-07-02

## 2025-06-30 RX ORDER — BORTEZOMIB 3.5 MG/1
1.3 INJECTION, POWDER, LYOPHILIZED, FOR SOLUTION INTRAVENOUS; SUBCUTANEOUS
Status: CANCELLED | OUTPATIENT
Start: 2025-07-02

## 2025-06-30 RX ORDER — BORTEZOMIB 3.5 MG/1
1.3 INJECTION, POWDER, LYOPHILIZED, FOR SOLUTION INTRAVENOUS; SUBCUTANEOUS
Status: COMPLETED | OUTPATIENT
Start: 2025-06-30 | End: 2025-06-30

## 2025-06-30 RX ORDER — SODIUM CHLORIDE 0.9 % (FLUSH) 0.9 %
10 SYRINGE (ML) INJECTION
Status: DISCONTINUED | OUTPATIENT
Start: 2025-06-30 | End: 2025-06-30 | Stop reason: HOSPADM

## 2025-06-30 RX ADMIN — BORTEZOMIB 2.25 MG: 3.5 INJECTION, POWDER, LYOPHILIZED, FOR SOLUTION INTRAVENOUS; SUBCUTANEOUS at 10:06

## 2025-06-30 NOTE — PLAN OF CARE
Problem: Adult Inpatient Plan of Care  Goal: Plan of Care Review  Outcome: Progressing  Flowsheets (Taken 6/30/2025 1015)  Plan of Care Reviewed With: patient  Goal: Patient-Specific Goal (Individualized)  Outcome: Progressing  Flowsheets (Taken 6/30/2025 1015)  Individualized Care Needs: Recliner, warm blanket, pillow, snacks, tv, education, converation  Anxieties, Fears or Concerns: Im so tired  Patient/Family-Specific Goals (Include Timeframe): Free of S/S of reaction with treatment.     Problem: Fatigue  Goal: Improved Activity Tolerance  Outcome: Progressing  Intervention: Promote Improved Energy  Flowsheets (Taken 6/30/2025 1015)  Fatigue Management:   frequent rest breaks encouraged   paced activity encouraged  Sleep/Rest Enhancement: regular sleep/rest pattern promoted  Activity Management: Ambulated -L4  Environmental Support: rest periods encouraged    Patient to Infusion for Velcade. Labs collected prior to appointment. Treatment plan reviewed with patient. VSS. Tolerated infusion. Provided with copy of upcoming appointment schedule. Escorted to the front lobby in no distress for discharge to home.

## 2025-07-01 ENCOUNTER — RESULTS FOLLOW-UP (OUTPATIENT)
Dept: TRANSPLANT | Facility: HOSPITAL | Age: 60
End: 2025-07-01

## 2025-07-03 ENCOUNTER — INFUSION (OUTPATIENT)
Dept: INFUSION THERAPY | Facility: HOSPITAL | Age: 60
End: 2025-07-03
Attending: STUDENT IN AN ORGANIZED HEALTH CARE EDUCATION/TRAINING PROGRAM
Payer: MEDICARE

## 2025-07-03 ENCOUNTER — RESULTS FOLLOW-UP (OUTPATIENT)
Dept: TRANSPLANT | Facility: HOSPITAL | Age: 60
End: 2025-07-03

## 2025-07-03 ENCOUNTER — LAB VISIT (OUTPATIENT)
Dept: LAB | Facility: HOSPITAL | Age: 60
End: 2025-07-03
Attending: STUDENT IN AN ORGANIZED HEALTH CARE EDUCATION/TRAINING PROGRAM
Payer: MEDICARE

## 2025-07-03 VITALS
BODY MASS INDEX: 25.97 KG/M2 | TEMPERATURE: 98 F | SYSTOLIC BLOOD PRESSURE: 149 MMHG | WEIGHT: 152.13 LBS | HEART RATE: 92 BPM | HEIGHT: 64 IN | DIASTOLIC BLOOD PRESSURE: 79 MMHG | RESPIRATION RATE: 18 BRPM | OXYGEN SATURATION: 99 %

## 2025-07-03 DIAGNOSIS — Z94.0 KIDNEY REPLACED BY TRANSPLANT: ICD-10-CM

## 2025-07-03 DIAGNOSIS — T86.11 ANTIBODY MEDIATED REJECTION OF KIDNEY TRANSPLANT: Primary | ICD-10-CM

## 2025-07-03 LAB
ABSOLUTE EOSINOPHIL (OHS): 0.04 K/UL
ABSOLUTE MONOCYTE (OHS): 0.4 K/UL (ref 0.3–1)
ABSOLUTE NEUTROPHIL COUNT (OHS): 2.37 K/UL (ref 1.8–7.7)
ALBUMIN SERPL BCP-MCNC: 3.3 G/DL (ref 3.5–5.2)
ANION GAP (OHS): 9 MMOL/L (ref 8–16)
BASOPHILS # BLD AUTO: 0.02 K/UL
BASOPHILS NFR BLD AUTO: 0.5 %
BUN SERPL-MCNC: 26 MG/DL (ref 6–20)
CALCIUM SERPL-MCNC: 9.5 MG/DL (ref 8.7–10.5)
CHLORIDE SERPL-SCNC: 103 MMOL/L (ref 95–110)
CO2 SERPL-SCNC: 25 MMOL/L (ref 23–29)
CREAT SERPL-MCNC: 1.5 MG/DL (ref 0.5–1.4)
ERYTHROCYTE [DISTWIDTH] IN BLOOD BY AUTOMATED COUNT: 14.9 % (ref 11.5–14.5)
GFR SERPLBLD CREATININE-BSD FMLA CKD-EPI: 40 ML/MIN/1.73/M2
GLUCOSE SERPL-MCNC: 195 MG/DL (ref 70–110)
HCT VFR BLD AUTO: 30.3 % (ref 37–48.5)
HGB BLD-MCNC: 9.7 GM/DL (ref 12–16)
IMM GRANULOCYTES # BLD AUTO: 0.08 K/UL (ref 0–0.04)
IMM GRANULOCYTES NFR BLD AUTO: 2.1 % (ref 0–0.5)
LYMPHOCYTES # BLD AUTO: 0.9 K/UL (ref 1–4.8)
MCH RBC QN AUTO: 30.9 PG (ref 27–31)
MCHC RBC AUTO-ENTMCNC: 32 G/DL (ref 32–36)
MCV RBC AUTO: 97 FL (ref 82–98)
NUCLEATED RBC (/100WBC) (OHS): 0 /100 WBC
PHOSPHATE SERPL-MCNC: 2.6 MG/DL (ref 2.7–4.5)
PLATELET # BLD AUTO: 153 K/UL (ref 150–450)
PMV BLD AUTO: 12.1 FL (ref 9.2–12.9)
POTASSIUM SERPL-SCNC: 4.9 MMOL/L (ref 3.5–5.1)
RBC # BLD AUTO: 3.14 M/UL (ref 4–5.4)
RELATIVE EOSINOPHIL (OHS): 1 %
RELATIVE LYMPHOCYTE (OHS): 23.6 % (ref 18–48)
RELATIVE MONOCYTE (OHS): 10.5 % (ref 4–15)
RELATIVE NEUTROPHIL (OHS): 62.3 % (ref 38–73)
SODIUM SERPL-SCNC: 137 MMOL/L (ref 136–145)
WBC # BLD AUTO: 3.81 K/UL (ref 3.9–12.7)

## 2025-07-03 PROCEDURE — 36415 COLL VENOUS BLD VENIPUNCTURE: CPT | Mod: PN

## 2025-07-03 PROCEDURE — 96401 CHEMO ANTI-NEOPL SQ/IM: CPT | Mod: PN

## 2025-07-03 PROCEDURE — 85025 COMPLETE CBC W/AUTO DIFF WBC: CPT | Mod: PN

## 2025-07-03 PROCEDURE — 82374 ASSAY BLOOD CARBON DIOXIDE: CPT | Mod: PN

## 2025-07-03 PROCEDURE — 63600175 PHARM REV CODE 636 W HCPCS: Mod: PN | Performed by: INTERNAL MEDICINE

## 2025-07-03 RX ORDER — BORTEZOMIB 3.5 MG/1
1.3 INJECTION, POWDER, LYOPHILIZED, FOR SOLUTION INTRAVENOUS; SUBCUTANEOUS
Status: COMPLETED | OUTPATIENT
Start: 2025-07-03 | End: 2025-07-03

## 2025-07-03 RX ORDER — HEPARIN 100 UNIT/ML
500 SYRINGE INTRAVENOUS
OUTPATIENT
Start: 2025-07-07

## 2025-07-03 RX ORDER — SODIUM CHLORIDE 0.9 % (FLUSH) 0.9 %
10 SYRINGE (ML) INJECTION
OUTPATIENT
Start: 2025-07-07

## 2025-07-03 RX ORDER — BORTEZOMIB 3.5 MG/1
1.3 INJECTION, POWDER, LYOPHILIZED, FOR SOLUTION INTRAVENOUS; SUBCUTANEOUS
Status: CANCELLED | OUTPATIENT
Start: 2025-07-07

## 2025-07-03 RX ORDER — SODIUM CHLORIDE 0.9 % (FLUSH) 0.9 %
10 SYRINGE (ML) INJECTION
Status: DISCONTINUED | OUTPATIENT
Start: 2025-07-03 | End: 2025-07-03 | Stop reason: HOSPADM

## 2025-07-03 RX ADMIN — BORTEZOMIB 2.25 MG: 3.5 INJECTION, POWDER, LYOPHILIZED, FOR SOLUTION INTRAVENOUS; SUBCUTANEOUS at 10:07

## 2025-07-03 NOTE — PROGRESS NOTES
Kidney Post-Transplant Assessment    Referring Physician: Lilia Malin  Current Nephrologist: Lilia Malin    ORGAN: LEFT KIDNEY  Donor Type: donation after brain death  PHS Increased Risk: no  Cold Ischemia: 866 mins  Induction Medications: thymo    Subjective:     CC:  Reassessment of renal allograft function and management of chronic immunosuppression.    HPI:  Ms. Rocha is a 59 y.o. year old Black or  female who received a donation after brain death kidney transplant on 4/24/25. Her most recent creatinine is 1.5. She takes mycophenolate mofetil, prednisone, and tacrolimus for maintenance immunosuppression. Her post transplant course has been uncomplicated to date.    5/26/25 DSA (-,  DPA1*03:01(6758) DR7(3080), DQ2(1713) )  5/26/25 Allosure  0.48    Kidney Biopsy-6/6/25- 10 gloms 0 sclerosed. 5% fibrosis. Focal severe arteriosclerosis. Moderate micro circulatory changes, c4d+ Suggestive of ABMR. No ACR or AVR. No viral changes.  DSA Class II+: DPA1*03:01(6758) DR7(3080), DQ2(1713).  Rejection Tx: SMP, PLEX x 5 (6/10-6/14/25)  IVIG x2, and Velcade x4 scheduled     Hospitalization/ ED visits  None    Interval HX:  Reports doing well.   Blood sugar 118 when waking up  Blood pressure 130/80 when waking up  No log book to review  Intake 2L  UOP good uop  /67 in clinic  Peripheral edema: none  Weight stable  Appetite good  fx assessment : ambulating well  Lab /diagnostic results reviewed with patient today.   All questions answered    Current Medications[1]      Past Medical History:   Diagnosis Date    Anemia     Anxiety     Diabetes mellitus     Disorder of kidney and ureter     Encounter for blood transfusion     GERD (gastroesophageal reflux disease)     Hydronephrosis     Hyperlipidemia     Hypertension     Hyperthyroidism     Obesity     Thyroid disease          Review of Systems   Constitutional:  Negative for appetite change, chills, fatigue and fever.   HENT:  Negative for  "trouble swallowing.    Respiratory:  Negative for cough, chest tightness, shortness of breath and wheezing.    Cardiovascular:  Negative for chest pain, palpitations and leg swelling.   Gastrointestinal:  Positive for abdominal distention. Negative for abdominal pain, constipation, diarrhea and nausea.   Genitourinary:  Negative for difficulty urinating, frequency and urgency.   Musculoskeletal:  Negative for arthralgias and myalgias.   Skin:  Negative for rash.   Allergic/Immunologic: Positive for immunocompromised state.   Neurological:  Negative for dizziness, weakness, light-headedness and headaches.   Psychiatric/Behavioral:  Negative for sleep disturbance. The patient is nervous/anxious.        Objective:   Blood pressure 138/67, pulse 75, temperature 97.2 °F (36.2 °C), temperature source Tympanic, resp. rate 18, height 5' 5" (1.651 m), weight 70.9 kg (156 lb 4.9 oz), SpO2 100%.body mass index is 26.01 kg/m².    Physical Exam  Constitutional:       General: She is not in acute distress.     Appearance: She is well-developed. She is not diaphoretic.   Cardiovascular:      Rate and Rhythm: Normal rate and regular rhythm.      Heart sounds: Normal heart sounds.   Pulmonary:      Effort: Pulmonary effort is normal.      Breath sounds: Normal breath sounds.   Abdominal:      General: Bowel sounds are normal.      Palpations: Abdomen is soft.   Musculoskeletal:         General: No tenderness. Normal range of motion.   Skin:     General: Skin is warm and dry.      Findings: No rash.      Nails: There is no clubbing.   Neurological:      Mental Status: She is alert and oriented to person, place, and time.   Psychiatric:         Behavior: Behavior normal.         Labs:  Lab Results   Component Value Date    WBC 3.81 (L) 07/03/2025    HGB 9.7 (L) 07/03/2025    HCT 30.3 (L) 07/03/2025     07/03/2025    K 4.9 07/03/2025     07/03/2025    CO2 25 07/03/2025    BUN 26 (H) 07/03/2025    CREATININE 1.5 (H) " "07/03/2025    EGFRNORACEVR 40 (L) 07/03/2025    GLUCOSE 162 (H) 12/31/2024    CALCIUM 9.5 07/03/2025    PHOS 2.6 (L) 07/03/2025    MG 2.0 06/30/2025    ALBUMIN 3.3 (L) 07/03/2025    AST 21 06/23/2025    ALT 26 06/23/2025    UTPCR 0.08 06/23/2025    .6 (H) 04/28/2025    TACROLIMUS 7.8 06/30/2025       No results found for: "EXTANC", "EXTWBC", "EXTSEGS", "EXTPLATELETS", "EXTHEMOGLOBI", "EXTHEMATOCRI", "EXTCREATININ", "EXTSODIUM", "EXTPOTASSIUM", "EXTBUN", "EXTCO2", "EXTCALCIUM", "EXTPHOSPHORU", "EXTGLUCOSE", "EXTALBUMIN", "EXTAST", "EXTALT", "EXTBILITOTAL", "EXTLIPASE", "EXTAMYLASE"    No results found for: "EXTCYCLOSLVL", "EXTSIROLIMUS", "EXTTACROLVL", "EXTPROTCRE", "EXTPTHINTACT", "EXTPROTEINUA", "EXTWBCUA", "EXTRBCUA"    Labs were reviewed with the patient    Assessment:     1. S/P kidney transplant    2. Antibody mediated rejection of kidney transplant    3. Immunodeficiency due to drug therapy    4. Type 2 diabetes mellitus with stage 3a chronic kidney disease, with long-term current use of insulin        Plan:   Acute rejection of transplant--completed treatment Cr remains 1.5 with slight increase recently. Continue good hydration and ongoing monitor of transplant labs  Patient will let us know if she want to convert her 7/23 to virtual  Continue current immunosuppression medication and monitor for toxicities.       1. CKD stage: will continue follow up as per our center guidelines. patient to continue close follow up with the local General nephrologist. Education provided in appropriate fluid intake, potassium intake. Continue with oral hydration.      2. Immunosuppression: Prograf trough pending. Continue Prograf 1.5/1.5, [id number] Mg BID, and Prednisone QD  Lab Results   Component Value Date    TACROLIMUS 7.8 06/30/2025    TACROLIMUS 7.9 06/23/2025    TACROLIMUS 8.3 06/16/2025   Will closely monitor for toxicities, education provided about adherence to medicines and need to communicate any side effect to " the transplant nurse or physician.    3. Allograft Function:stable at baseline for the patient. Continue follow up as per our guidelines and with the local General nephrologist. Communication will be sent today.  Lab Results   Component Value Date    CREATININE 1.5 (H) 07/03/2025    CREATININE 1.3 06/30/2025    CREATININE 1.5 (H) 06/23/2025      Latest Reference Range & Units POD 70,  Kidney-Post 1 Year  07/03/25   Creatinine 0.5 - 1.4 mg/dL 1.5 (H)   eGFR >60 mL/min/1.73/m2 40 (L) [1]   Glucose 70 - 110 mg/dL 195 (H)     Lab Results   Component Value Date    HGBA1C 5.4 04/24/2025   Diabetes tradjenta, Novolog 8units TIDM, Lantus 13units nightly      4. Hypertension management:  Continue with home blood pressure monitoring, low salt and healthy life discussed with the patient.  BP Readings from Last 3 Encounters:   07/03/25 (!) 149/79   06/30/25 (!) 151/73   06/26/25 (!) 145/78   Coreg 25m,g BID, nifedipine 30mg BID, chlorthalidone 25mg nightly    5. Metabolic Bone Disease/Secondary Hyperparathyroidism:calcium and phosphorus level discussed with the patient, patient will continue follow up with the general nephrologist for management of metabolic bone disease calcium and phosphorus as per our center protocol. Will monitor PTH, Vit D level, calcium.   Lab Results   Component Value Date    .6 (H) 04/28/2025    CALCIUM 9.5 07/03/2025    PHOS 2.6 (L) 07/03/2025    PHOS 3.2 06/30/2025    PHOS 3.4 06/23/2025       6. Electrolytes: reviewed with the patient, essentially within the normal range no need for acute changes today, will monitor as per our center guidelines.  Lab Results   Component Value Date     07/03/2025    K 4.9 07/03/2025     07/03/2025    CO2 25 07/03/2025    CO2 23 06/30/2025    CO2 23 06/23/2025   NaBicarb 1300mg BID    7. Anemia: will continue monitoring as per our center guidelines. No indication for acute intervention today.  Lab Results   Component Value Date    WBC 3.81 (L)  "07/03/2025    HGB 9.7 (L) 07/03/2025    HCT 30.3 (L) 07/03/2025    MCV 97 07/03/2025     07/03/2025       8.Proteinuria: will continue with pr/cr ratio as per our center guidelines  Lab Results   Component Value Date    PROTEINURINE 7 06/23/2025    CREATRANDUR 87.0 06/23/2025    UTPCR 0.08 06/23/2025    UTPCR 0.26 (H) 05/22/2025    UTPCR 0.41 (H) 05/08/2025        9. BK virus infection screening: will continue with urine or blood PCR as per our guidelines to prevent BK virus viremia and allograft dysfunction  No results found for: "BKVIRUSDNAUR", "BKQUANTURINE", "BKVIRUSLOG", "BKVIRUSURINE"      10. Weight education: provided during the clinic visit.  There is no height or weight on file to calculate BMI.     11.Patient safety education regarding immunosuppression including prophylaxis posttransplant for CMV, PCP : Education provided about vaccination and prevention of infections.    12.  Cytopenias: no significant cytopenias will monitor as per our guidelines. Medicine list reviewed including potential causes of drug-induced cytopenias  Lab Results   Component Value Date    WBC 3.81 (L) 07/03/2025    HGB 9.7 (L) 07/03/2025    HCT 30.3 (L) 07/03/2025    MCV 97 07/03/2025     07/03/2025       13. Post-transplant Prophylaxis; CMV Infection, PJP and Candida mucosistis and other indicated for this particular patient.         Exercise: reminded Gael of the importance of regular exercise for weight management, blood sugar and blood pressure management.  I also explained exercise has been shown to improve cardiovascular health, energy level, and sleep hygiene.  Lastly, I advised him that cardiovascular complications are leading cause of death for renal transplant recipients, and regular exercise can help lower this risk.      Follow-up:   Clinic: return to transplant clinic weekly for the first month after transplant; every 2 weeks during months 2-3; then at 6-, 9-, 12-, 18-, 24-, and 36- months " post-transplant to reassess for complications from immunosuppression toxicity and monitor for rejection.  Annually thereafter.    Labs: since patient remains at high risk for rejection and drug-related complications that warrant close monitoring, labs will be ordered as follows: continue twice weekly CBC, renal panel, and drug level for first month; then same labs once weekly through 3rd month post-transplant.  Urine for UA and protein/creatinine ratio monthly.  Serum BK - PCR at 1-, 3-, 6-, 9-, 12-, 18-, 24-, 36-, months post-transplant.  Hepatic panel at 1-, 2-, 3-, 6-, 9-, 12-, 18-, 24-, and 36- months post-transplant.    Bridget Betts NP       Education:   Material provided to the patient.  Patient reminded to call with any health changes since these can be early signs of significant complications.  Also, I advised the patient to be sure any new medications or changes of old medications are discussed with either a pharmacist or physician knowledgeable with transplant to avoid rejection/drug toxicity related to significant drug interactions.    Patient advised that it is recommended that all transplanted patients, and their close contacts and household members receive Covid vaccination.                   [1]   Current Outpatient Medications:     albuterol (PROVENTIL/VENTOLIN HFA) 90 mcg/actuation inhaler, Inhale 2 puffs into the lungs every 6 (six) hours as needed., Disp: , Rfl:     aspirin (ASPIR-81 ORAL), Take 81 mg by mouth once daily. As of 5/13/25 On HOLD for kidney biopsy, Disp: , Rfl:     atorvastatin (LIPITOR) 20 MG tablet, Take 1 tablet (20 mg total) by mouth once daily., Disp: 90 tablet, Rfl: 3    blood sugar diagnostic Strp, To check BG 3 times daily, to use with insurance preferred meter, Disp: 100 strip, Rfl: 11    blood-glucose meter Misc, To check BG 3 times daily, to use with insurance preferred meter, Disp: 1 each, Rfl: 0    calcitRIOL (ROCALTROL) 0.25 MCG Cap, Take 1 capsule (0.25 mcg total)  "by mouth once daily., Disp: 90 capsule, Rfl: 3    carvediloL (COREG) 25 MG tablet, Take 1 tablet (25 mg total) by mouth 2 (two) times daily., Disp: 180 tablet, Rfl: 3    chlorthalidone (HYGROTEN) 25 MG Tab, Take 1 tablet (25 mg total) by mouth nightly., Disp: 30 tablet, Rfl: 11    famotidine (PEPCID) 20 MG tablet, Take 1 tablet (20 mg total) by mouth once daily., Disp: 30 tablet, Rfl: 0    FLUoxetine 40 MG capsule, Take 1 capsule (40 mg total) by mouth once daily., Disp: 30 capsule, Rfl: 5    hydrOXYzine pamoate (VISTARIL) 25 MG Cap, Take 1 capsule (25 mg total) by mouth every 8 (eight) hours as needed (insomnia)., Disp: 60 capsule, Rfl: 0    insulin aspart U-100 (NOVOLOG) 100 unit/mL (3 mL) InPn pen, Inject 8 Units into the skin 3 (three) times daily. Plus low sliding scale. Max units per day: 54, Disp: 6 mL, Rfl: 2    insulin glargine U-100, Lantus, 100 unit/mL (3 mL) SubQ InPn pen, Inject 13 Units into the skin every evening., Disp: 6 mL, Rfl: 2    lancets 33 gauge Misc, To check BG 3 times daily, to use with insurance preferred meter, Disp: 100 each, Rfl: 11    linaCLOtide (LINZESS) 72 mcg Cap capsule, Take 1 capsule (72 mcg total) by mouth before breakfast., Disp: 30 capsule, Rfl: 5    linaGLIPtin (TRADJENTA) 5 mg Tab tablet, Take 1 tablet (5 mg total) by mouth once daily., Disp: 90 tablet, Rfl: 3    methIMAzole (TAPAZOLE) 5 MG Tab, Take 1 tablet (5 mg total) by mouth once daily., Disp: 90 tablet, Rfl: 3    multivitamin Tab, Take 1 tablet by mouth once daily., Disp: , Rfl:     mycophenolate (CELLCEPT) 250 mg Cap, Take 4 capsules (1,000 mg total) by mouth 2 (two) times daily. Z94.0;Kidney transplant on 4/24/2025, Disp: 240 capsule, Rfl: 11    NIFEdipine (PROCARDIA-XL) 30 MG (OSM) 24 hr tablet, Take 1 tablet (30 mg total) by mouth 2 (two) times a day., Disp: 180 tablet, Rfl: 3    pen needle, diabetic 32 gauge x 5/32" Ndle, Inject 1 Needle into the skin 4 (four) times daily., Disp: 100 each, Rfl: 11    predniSONE " (DELTASONE) 5 MG tablet, Take by mouth daily: 20mg 6/13-6/19, 15mg 6/20-6/26, 10mg 6/27-7/3, 5mg 7/4-, Disp: 90 tablet, Rfl: 11    sodium bicarbonate 650 MG tablet, Take 2 tablets (1,300 mg total) by mouth 3 (three) times daily., Disp: 180 tablet, Rfl: 11    sulfamethoxazole-trimethoprim 400-80mg (BACTRIM,SEPTRA) 400-80 mg per tablet, Take 1 tablet by mouth once daily. Stop 10/24/25, Disp: 30 tablet, Rfl: 4    tacrolimus (PROGRAF) 0.5 MG Cap, Take 6 capsules (3 mg total) by mouth every 12 (twelve) hours. Z94.0;Kidney txp on 4/24/25, Disp: 360 capsule, Rfl: 11    valGANciclovir (VALCYTE) 450 mg Tab, Take 1 tablet (450 mg total) by mouth once daily. Stop 7/24/25, Disp: 30 tablet, Rfl: 2    vitamin D (VITAMIN D3) 1000 units Tab, Take 2 tablets (2,000 Units total) by mouth once daily., Disp: 180 tablet, Rfl: 3  No current facility-administered medications for this visit.    Facility-Administered Medications Ordered in Other Visits:     0.9%  NaCl infusion, 20 mL/hr, Intravenous, Continuous, Shashi Krishnan MD, Last Rate: 150 mL/hr at 06/12/19 1205, Rate Change at 06/12/19 1205    mupirocin 2 % ointment, , Nasal, On Call Procedure, Bautista Vasques MD, Given at 06/12/19 6671

## 2025-07-07 ENCOUNTER — OFFICE VISIT (OUTPATIENT)
Dept: TRANSPLANT | Facility: CLINIC | Age: 60
End: 2025-07-07
Payer: MEDICARE

## 2025-07-07 ENCOUNTER — RESULTS FOLLOW-UP (OUTPATIENT)
Dept: TRANSPLANT | Facility: HOSPITAL | Age: 60
End: 2025-07-07
Payer: MEDICARE

## 2025-07-07 ENCOUNTER — LAB VISIT (OUTPATIENT)
Dept: LAB | Facility: HOSPITAL | Age: 60
End: 2025-07-07
Payer: MEDICARE

## 2025-07-07 VITALS
BODY MASS INDEX: 26.04 KG/M2 | SYSTOLIC BLOOD PRESSURE: 138 MMHG | DIASTOLIC BLOOD PRESSURE: 67 MMHG | TEMPERATURE: 97 F | HEART RATE: 75 BPM | HEIGHT: 65 IN | OXYGEN SATURATION: 100 % | WEIGHT: 156.31 LBS | RESPIRATION RATE: 18 BRPM

## 2025-07-07 DIAGNOSIS — N18.31 TYPE 2 DIABETES MELLITUS WITH STAGE 3A CHRONIC KIDNEY DISEASE, WITH LONG-TERM CURRENT USE OF INSULIN: ICD-10-CM

## 2025-07-07 DIAGNOSIS — Z94.0 KIDNEY REPLACED BY TRANSPLANT: ICD-10-CM

## 2025-07-07 DIAGNOSIS — Z94.0 S/P KIDNEY TRANSPLANT: Primary | ICD-10-CM

## 2025-07-07 DIAGNOSIS — T86.11 ANTIBODY MEDIATED REJECTION OF KIDNEY TRANSPLANT: ICD-10-CM

## 2025-07-07 DIAGNOSIS — Z79.899 IMMUNOSUPPRESSIVE MANAGEMENT ENCOUNTER FOLLOWING KIDNEY TRANSPLANT: ICD-10-CM

## 2025-07-07 DIAGNOSIS — D84.821 IMMUNODEFICIENCY DUE TO DRUG THERAPY: ICD-10-CM

## 2025-07-07 DIAGNOSIS — Z79.899 IMMUNODEFICIENCY DUE TO DRUG THERAPY: ICD-10-CM

## 2025-07-07 DIAGNOSIS — Z94.0 IMMUNOSUPPRESSIVE MANAGEMENT ENCOUNTER FOLLOWING KIDNEY TRANSPLANT: ICD-10-CM

## 2025-07-07 DIAGNOSIS — E11.22 TYPE 2 DIABETES MELLITUS WITH STAGE 3A CHRONIC KIDNEY DISEASE, WITH LONG-TERM CURRENT USE OF INSULIN: ICD-10-CM

## 2025-07-07 DIAGNOSIS — Z79.4 TYPE 2 DIABETES MELLITUS WITH STAGE 3A CHRONIC KIDNEY DISEASE, WITH LONG-TERM CURRENT USE OF INSULIN: ICD-10-CM

## 2025-07-07 PROBLEM — N18.6 ESRD ON DIALYSIS: Status: RESOLVED | Noted: 2019-06-12 | Resolved: 2025-07-07

## 2025-07-07 PROBLEM — Z76.82 PATIENT ON WAITING LIST FOR KIDNEY TRANSPLANT: Status: RESOLVED | Noted: 2023-06-16 | Resolved: 2025-07-07

## 2025-07-07 PROBLEM — N18.30 TYPE 2 DIABETES MELLITUS WITH STAGE 3 CHRONIC KIDNEY DISEASE, WITH LONG-TERM CURRENT USE OF INSULIN: Status: ACTIVE | Noted: 2019-08-21

## 2025-07-07 PROBLEM — Z99.2 ESRD ON DIALYSIS: Status: RESOLVED | Noted: 2019-06-12 | Resolved: 2025-07-07

## 2025-07-07 LAB
ABSOLUTE NEUTROPHIL MANUAL (OHS): 2.9 K/UL
ALBUMIN SERPL BCP-MCNC: 3.7 G/DL (ref 3.5–5.2)
ANION GAP (OHS): 9 MMOL/L (ref 8–16)
BUN SERPL-MCNC: 35 MG/DL (ref 6–20)
CALCIUM SERPL-MCNC: 9.6 MG/DL (ref 8.7–10.5)
CHLORIDE SERPL-SCNC: 102 MMOL/L (ref 95–110)
CO2 SERPL-SCNC: 25 MMOL/L (ref 23–29)
CREAT SERPL-MCNC: 1.6 MG/DL (ref 0.5–1.4)
ERYTHROCYTE [DISTWIDTH] IN BLOOD BY AUTOMATED COUNT: 14.8 % (ref 11.5–14.5)
GFR SERPLBLD CREATININE-BSD FMLA CKD-EPI: 37 ML/MIN/1.73/M2
GLUCOSE SERPL-MCNC: 203 MG/DL (ref 70–110)
HCT VFR BLD AUTO: 31.8 % (ref 37–48.5)
HGB BLD-MCNC: 9.8 GM/DL (ref 12–16)
LYMPHOCYTES NFR BLD MANUAL: 22 % (ref 18–48)
MAGNESIUM SERPL-MCNC: 1.9 MG/DL (ref 1.6–2.6)
MCH RBC QN AUTO: 30.9 PG (ref 27–31)
MCHC RBC AUTO-ENTMCNC: 30.8 G/DL (ref 32–36)
MCV RBC AUTO: 100 FL (ref 82–98)
MONOCYTES NFR BLD MANUAL: 6 % (ref 4–15)
NEUTROPHILS NFR BLD MANUAL: 72 % (ref 38–73)
NUCLEATED RBC (/100WBC) (OHS): 1 /100 WBC
PHOSPHATE SERPL-MCNC: 3.2 MG/DL (ref 2.7–4.5)
PLATELET # BLD AUTO: 138 K/UL (ref 150–450)
PMV BLD AUTO: 14 FL (ref 9.2–12.9)
POTASSIUM SERPL-SCNC: 5 MMOL/L (ref 3.5–5.1)
RBC # BLD AUTO: 3.17 M/UL (ref 4–5.4)
SODIUM SERPL-SCNC: 136 MMOL/L (ref 136–145)
TACROLIMUS BLD-MCNC: 21.8 NG/ML (ref 5–15)
WBC # BLD AUTO: 3.97 K/UL (ref 3.9–12.7)

## 2025-07-07 PROCEDURE — 80069 RENAL FUNCTION PANEL: CPT

## 2025-07-07 PROCEDURE — 99999 PR PBB SHADOW E&M-EST. PATIENT-LVL IV: CPT | Mod: PBBFAC,,, | Performed by: NURSE PRACTITIONER

## 2025-07-07 PROCEDURE — 80197 ASSAY OF TACROLIMUS: CPT

## 2025-07-07 PROCEDURE — 36415 COLL VENOUS BLD VENIPUNCTURE: CPT

## 2025-07-07 PROCEDURE — 83735 ASSAY OF MAGNESIUM: CPT

## 2025-07-07 PROCEDURE — 85025 COMPLETE CBC W/AUTO DIFF WBC: CPT

## 2025-07-07 NOTE — LETTER
July 7, 2025        Lilia Malin  [address] [address] [address]  Phone: [phone]  Fax: [phone]             [facility] Trans[facility] 1st Fl  [address]  [facility] [address]  Phone: [phone]   Patient: Kiesha Rocha   MR Number: [id number]   YOB: 1965   Date of Visit: 7/7/2025       Dear Dr. Lilia Malin    Thank you for referring Kiesha Rocha to me for evaluation. Attached you will find relevant portions of my assessment and plan of care.    If you have questions, please do not hesitate to call me. I look forward to following Kiesha Rocha along with you.    Sincerely,    Bridget Betts, NP    Enclosure    If you would like to receive this communication electronically, please contact [email or web address] or [phone] to request [employer] Link access.    [employer] Link is a tool which provides read-only access to select patient information with whom you have a relationship. Its easy to use and provides real time access to review your patients record including encounter summaries, notes, results, and demographic information.    If you feel you have received this communication in error or would no longer like to receive these types of communications, please e-mail [email or web address]

## 2025-07-08 ENCOUNTER — PATIENT MESSAGE (OUTPATIENT)
Dept: TRANSPLANT | Facility: CLINIC | Age: 60
End: 2025-07-08
Payer: MEDICARE

## 2025-07-08 NOTE — TELEPHONE ENCOUNTER
Attempted to contact patient & her daughter to find out if she took her prograf prior to lab appointment yesterday but was unsuccessful. Message sent to patient's daughter via [employer]. Waiting for reply.     Prince Carranza,     I just tried to call you about Ms. Pop's prograf level. Her level is elevated 21.8. Did she make a mistake & took her prograf yesterday before having her labs drawn? Just let me know. She'll need to repeat her labs next Mon 7/14.     Thanks,     Jessenia    ----- Message from Jessenia Lee sent at 7/7/2025  5:30 PM CDT -----    ----- Message -----  From: Toribio Dunbar Jr., MD  Sent: 7/7/2025   4:04 PM CDT  To: [facility] Post-Kidney Transplant Clinical    I have to assume not a true tac trough. Repeat in 1 week. Other labs acceptable.  ----- Message -----  From: Lab, Background User  Sent: 7/7/2025  10:43 AM CDT  To: Toribio Dunbar Jr., MD

## 2025-07-14 ENCOUNTER — LAB VISIT (OUTPATIENT)
Dept: LAB | Facility: HOSPITAL | Age: 60
End: 2025-07-14
Attending: STUDENT IN AN ORGANIZED HEALTH CARE EDUCATION/TRAINING PROGRAM
Payer: MEDICARE

## 2025-07-14 DIAGNOSIS — Z94.0 IMMUNOSUPPRESSIVE MANAGEMENT ENCOUNTER FOLLOWING KIDNEY TRANSPLANT: ICD-10-CM

## 2025-07-14 DIAGNOSIS — Z79.899 IMMUNOSUPPRESSIVE MANAGEMENT ENCOUNTER FOLLOWING KIDNEY TRANSPLANT: ICD-10-CM

## 2025-07-14 DIAGNOSIS — Z94.0 KIDNEY REPLACED BY TRANSPLANT: ICD-10-CM

## 2025-07-14 LAB
ABSOLUTE EOSINOPHIL (OHS): 0.06 K/UL
ABSOLUTE MONOCYTE (OHS): 0.45 K/UL (ref 0.3–1)
ABSOLUTE NEUTROPHIL COUNT (OHS): 3.01 K/UL (ref 1.8–7.7)
ALBUMIN SERPL BCP-MCNC: 3.9 G/DL (ref 3.5–5.2)
ANION GAP (OHS): 9 MMOL/L (ref 8–16)
BASOPHILS # BLD AUTO: 0.04 K/UL
BASOPHILS NFR BLD AUTO: 0.8 %
BUN SERPL-MCNC: 25 MG/DL (ref 6–20)
CALCIUM SERPL-MCNC: 9.6 MG/DL (ref 8.7–10.5)
CHLORIDE SERPL-SCNC: 105 MMOL/L (ref 95–110)
CO2 SERPL-SCNC: 26 MMOL/L (ref 23–29)
CREAT SERPL-MCNC: 1.6 MG/DL (ref 0.5–1.4)
ERYTHROCYTE [DISTWIDTH] IN BLOOD BY AUTOMATED COUNT: 14.6 % (ref 11.5–14.5)
GFR SERPLBLD CREATININE-BSD FMLA CKD-EPI: 37 ML/MIN/1.73/M2
GLUCOSE SERPL-MCNC: 156 MG/DL (ref 70–110)
HCT VFR BLD AUTO: 35.7 % (ref 37–48.5)
HGB BLD-MCNC: 10.8 GM/DL (ref 12–16)
IMM GRANULOCYTES # BLD AUTO: 0.18 K/UL (ref 0–0.04)
IMM GRANULOCYTES NFR BLD AUTO: 3.8 % (ref 0–0.5)
LYMPHOCYTES # BLD AUTO: 1.05 K/UL (ref 1–4.8)
MAGNESIUM SERPL-MCNC: 1.9 MG/DL (ref 1.6–2.6)
MCH RBC QN AUTO: 30.9 PG (ref 27–31)
MCHC RBC AUTO-ENTMCNC: 30.3 G/DL (ref 32–36)
MCV RBC AUTO: 102 FL (ref 82–98)
NUCLEATED RBC (/100WBC) (OHS): 0 /100 WBC
PHOSPHATE SERPL-MCNC: 3 MG/DL (ref 2.7–4.5)
PLATELET # BLD AUTO: 300 K/UL (ref 150–450)
PMV BLD AUTO: 12.3 FL (ref 9.2–12.9)
POTASSIUM SERPL-SCNC: 4.3 MMOL/L (ref 3.5–5.1)
RBC # BLD AUTO: 3.5 M/UL (ref 4–5.4)
RELATIVE EOSINOPHIL (OHS): 1.3 %
RELATIVE LYMPHOCYTE (OHS): 21.9 % (ref 18–48)
RELATIVE MONOCYTE (OHS): 9.4 % (ref 4–15)
RELATIVE NEUTROPHIL (OHS): 62.8 % (ref 38–73)
SODIUM SERPL-SCNC: 140 MMOL/L (ref 136–145)
WBC # BLD AUTO: 4.79 K/UL (ref 3.9–12.7)

## 2025-07-14 PROCEDURE — 80197 ASSAY OF TACROLIMUS: CPT

## 2025-07-14 PROCEDURE — 83735 ASSAY OF MAGNESIUM: CPT

## 2025-07-14 PROCEDURE — 80069 RENAL FUNCTION PANEL: CPT

## 2025-07-14 PROCEDURE — 85025 COMPLETE CBC W/AUTO DIFF WBC: CPT

## 2025-07-14 PROCEDURE — 36415 COLL VENOUS BLD VENIPUNCTURE: CPT | Mod: PO

## 2025-07-15 LAB — TACROLIMUS BLD-MCNC: 6.5 NG/ML (ref 5–15)

## 2025-07-21 ENCOUNTER — LAB VISIT (OUTPATIENT)
Dept: LAB | Facility: HOSPITAL | Age: 60
End: 2025-07-21
Attending: STUDENT IN AN ORGANIZED HEALTH CARE EDUCATION/TRAINING PROGRAM
Payer: MEDICARE

## 2025-07-21 DIAGNOSIS — Z94.0 KIDNEY REPLACED BY TRANSPLANT: ICD-10-CM

## 2025-07-21 DIAGNOSIS — Z79.899 IMMUNOSUPPRESSIVE MANAGEMENT ENCOUNTER FOLLOWING KIDNEY TRANSPLANT: ICD-10-CM

## 2025-07-21 DIAGNOSIS — Z94.0 IMMUNOSUPPRESSIVE MANAGEMENT ENCOUNTER FOLLOWING KIDNEY TRANSPLANT: ICD-10-CM

## 2025-07-21 LAB
ABSOLUTE EOSINOPHIL (OHS): 0.04 K/UL
ABSOLUTE MONOCYTE (OHS): 0.43 K/UL (ref 0.3–1)
ABSOLUTE NEUTROPHIL COUNT (OHS): 2.91 K/UL (ref 1.8–7.7)
ALBUMIN SERPL BCP-MCNC: 4 G/DL (ref 3.5–5.2)
ANION GAP (OHS): 9 MMOL/L (ref 8–16)
BASOPHILS # BLD AUTO: 0.04 K/UL
BASOPHILS NFR BLD AUTO: 0.9 %
BUN SERPL-MCNC: 23 MG/DL (ref 6–20)
CALCIUM SERPL-MCNC: 9.7 MG/DL (ref 8.7–10.5)
CHLORIDE SERPL-SCNC: 104 MMOL/L (ref 95–110)
CO2 SERPL-SCNC: 24 MMOL/L (ref 23–29)
CREAT SERPL-MCNC: 1.6 MG/DL (ref 0.5–1.4)
ERYTHROCYTE [DISTWIDTH] IN BLOOD BY AUTOMATED COUNT: 13.9 % (ref 11.5–14.5)
GFR SERPLBLD CREATININE-BSD FMLA CKD-EPI: 37 ML/MIN/1.73/M2
GLUCOSE SERPL-MCNC: 231 MG/DL (ref 70–110)
HCT VFR BLD AUTO: 36.6 % (ref 37–48.5)
HGB BLD-MCNC: 11.4 GM/DL (ref 12–16)
IMM GRANULOCYTES # BLD AUTO: 0.07 K/UL (ref 0–0.04)
IMM GRANULOCYTES NFR BLD AUTO: 1.6 % (ref 0–0.5)
LYMPHOCYTES # BLD AUTO: 0.95 K/UL (ref 1–4.8)
MAGNESIUM SERPL-MCNC: 1.7 MG/DL (ref 1.6–2.6)
MCH RBC QN AUTO: 31 PG (ref 27–31)
MCHC RBC AUTO-ENTMCNC: 31.1 G/DL (ref 32–36)
MCV RBC AUTO: 100 FL (ref 82–98)
NUCLEATED RBC (/100WBC) (OHS): 0 /100 WBC
PHOSPHATE SERPL-MCNC: 2.7 MG/DL (ref 2.7–4.5)
PLATELET # BLD AUTO: 286 K/UL (ref 150–450)
PMV BLD AUTO: 12.1 FL (ref 9.2–12.9)
POTASSIUM SERPL-SCNC: 4.5 MMOL/L (ref 3.5–5.1)
RBC # BLD AUTO: 3.68 M/UL (ref 4–5.4)
RELATIVE EOSINOPHIL (OHS): 0.9 %
RELATIVE LYMPHOCYTE (OHS): 21.4 % (ref 18–48)
RELATIVE MONOCYTE (OHS): 9.7 % (ref 4–15)
RELATIVE NEUTROPHIL (OHS): 65.5 % (ref 38–73)
SODIUM SERPL-SCNC: 137 MMOL/L (ref 136–145)
WBC # BLD AUTO: 4.44 K/UL (ref 3.9–12.7)

## 2025-07-21 PROCEDURE — 83735 ASSAY OF MAGNESIUM: CPT

## 2025-07-21 PROCEDURE — 85025 COMPLETE CBC W/AUTO DIFF WBC: CPT

## 2025-07-21 PROCEDURE — 36415 COLL VENOUS BLD VENIPUNCTURE: CPT | Mod: PO

## 2025-07-21 PROCEDURE — 80197 ASSAY OF TACROLIMUS: CPT

## 2025-07-21 PROCEDURE — 80069 RENAL FUNCTION PANEL: CPT

## 2025-07-22 ENCOUNTER — RESULTS FOLLOW-UP (OUTPATIENT)
Dept: TRANSPLANT | Facility: CLINIC | Age: 60
End: 2025-07-22
Payer: MEDICARE

## 2025-07-22 LAB — TACROLIMUS BLD-MCNC: 8.9 NG/ML (ref 5–15)

## 2025-07-22 NOTE — PROGRESS NOTES
Kidney Post-Transplant Assessment    Referring Physician: Lilia Malin  Current Nephrologist: Lilia Malin    ORGAN: LEFT KIDNEY  Donor Type: donation after brain death  PHS Increased Risk: no  Cold Ischemia: 866 mins  Induction Medications: thymo    Subjective:     CC:  Reassessment of renal allograft function and management of chronic immunosuppression.    HPI:  Ms. Rocha is a 59 y.o. year old Black or  female who received a donation after brain death kidney transplant on 4/24/25. Her most recent creatinine is 1.5. She takes mycophenolate mofetil, prednisone, and tacrolimus for maintenance immunosuppression. Her post transplant course has been uncomplicated to date.    5/26/25 DSA (-,  DPA1*03:01(6758) DR7(3080), DQ2(1713) )  5/26/25 Allosure  0.48    Kidney Biopsy-6/6/25- 10 gloms 0 sclerosed. 5% fibrosis. Focal severe arteriosclerosis. Moderate micro circulatory changes, c4d+ Suggestive of ABMR. No ACR or AVR. No viral changes.  DSA Class II+: DPA1*03:01(6758) DR7(3080), DQ2(1713).  Rejection Tx: SMP, PLEX x 5 (6/10-6/14/25)  IVIG x2, and Velcade x4 scheduled     Hospitalization/ ED visits  None    Interval HX:  Reports doing well.   Blood sugar good most of the time. Does have intermittent lows. She is working with endocrinology. She is about to start on Dexcom. She eats and drink boost TIDM.   Reports diarrhea. Last check stool 6/2025 was neg for Cdiff. Reports notice increase in loose stool since drinking blood TDM. She reports she is drinking it dur to low Blood sugars in the morning.    Educated on eating her nutrition vs drinking her nutrition.     No log book to review  Intake 2-3L  UOP good uop  /57 in clinic  Peripheral edema: none  Weight stable  Appetite good  fx assessment : ambulating well  Lab /diagnostic results reviewed with patient today.   All questions answered    Current Medications[1]      Past Medical History:   Diagnosis Date    Anemia     Anxiety      "Diabetes mellitus     Disorder of kidney and ureter     Encounter for blood transfusion     GERD (gastroesophageal reflux disease)     Hydronephrosis     Hyperlipidemia     Hypertension     Hyperthyroidism     Obesity     Thyroid disease          Review of Systems   Constitutional:  Negative for appetite change, chills, fatigue and fever.   HENT:  Negative for trouble swallowing.    Respiratory:  Negative for cough, chest tightness, shortness of breath and wheezing.    Cardiovascular:  Negative for chest pain, palpitations and leg swelling.   Gastrointestinal:  Positive for abdominal distention. Negative for abdominal pain, constipation, diarrhea and nausea.   Genitourinary:  Negative for difficulty urinating, frequency and urgency.   Musculoskeletal:  Negative for arthralgias and myalgias.   Skin:  Negative for rash.   Allergic/Immunologic: Positive for immunocompromised state.   Neurological:  Negative for dizziness, weakness, light-headedness and headaches.   Psychiatric/Behavioral:  Negative for sleep disturbance. The patient is nervous/anxious.        Objective:   Blood pressure (!) 123/57, pulse 94, temperature 97.7 °F (36.5 °C), temperature source Temporal, resp. rate (!) 22, height 5' 5" (1.651 m), weight 69.8 kg (153 lb 14.1 oz), SpO2 98%.body mass index is 25.61 kg/m².    Physical Exam  Constitutional:       General: She is not in acute distress.     Appearance: She is well-developed. She is not diaphoretic.   Cardiovascular:      Rate and Rhythm: Normal rate and regular rhythm.      Heart sounds: Normal heart sounds.   Pulmonary:      Effort: Pulmonary effort is normal.      Breath sounds: Normal breath sounds.   Abdominal:      General: Bowel sounds are normal.      Palpations: Abdomen is soft.   Musculoskeletal:         General: No tenderness. Normal range of motion.   Skin:     General: Skin is warm and dry.      Findings: No rash.      Nails: There is no clubbing.   Neurological:      Mental Status: " "She is alert and oriented to person, place, and time.   Psychiatric:         Behavior: Behavior normal.         Labs:  Lab Results   Component Value Date    WBC 4.44 07/21/2025    HGB 11.4 (L) 07/21/2025    HCT 36.6 (L) 07/21/2025     07/21/2025    K 4.5 07/21/2025     07/21/2025    CO2 24 07/21/2025    BUN 23 (H) 07/21/2025    CREATININE 1.6 (H) 07/21/2025    EGFRNORACEVR 37 (L) 07/21/2025    GLUCOSE 153 (H) 07/08/2025    CALCIUM 9.7 07/21/2025    PHOS 2.7 07/21/2025    MG 1.7 07/21/2025    ALBUMIN 4.0 07/21/2025    AST 21 06/23/2025    ALT 26 06/23/2025    UTPCR 0.08 06/23/2025    .6 (H) 04/28/2025    TACROLIMUS 8.9 07/21/2025       No results found for: "EXTANC", "EXTWBC", "EXTSEGS", "EXTPLATELETS", "EXTHEMOGLOBI", "EXTHEMATOCRI", "EXTCREATININ", "EXTSODIUM", "EXTPOTASSIUM", "EXTBUN", "EXTCO2", "EXTCALCIUM", "EXTPHOSPHORU", "EXTGLUCOSE", "EXTALBUMIN", "EXTAST", "EXTALT", "EXTBILITOTAL", "EXTLIPASE", "EXTAMYLASE"    No results found for: "EXTCYCLOSLVL", "EXTSIROLIMUS", "EXTTACROLVL", "EXTPROTCRE", "EXTPTHINTACT", "EXTPROTEINUA", "EXTWBCUA", "EXTRBCUA"    Labs were reviewed with the patient    Assessment:     1. S/P kidney transplant    2. Antibody mediated rejection of kidney transplant    3. Hypertension, unspecified type    4. Type 2 diabetes mellitus with stage 3a chronic kidney disease, with long-term current use of insulin        Plan:   Acute rejection of transplant--completed treatment Cr remains 1.6 with slight increase recently. Continue good hydration and ongoing monitor of transplant labs  Continue current immunosuppression medication and monitor for toxicities.     Educated on eating her nutrition vs drinking her nutrition    Instructed patient to get back in with endocrine to work on blood sugars.         1. CKD stage: will continue follow up as per our center guidelines. patient to continue close follow up with the local General nephrologist. Education provided in appropriate fluid " intake, potassium intake. Continue with oral hydration.      2. Immunosuppression: Prograf trough 8.9. Continue Prograf 3/3, [id number] Mg BID, and Prednisone QD  Lab Results   Component Value Date    TACROLIMUS 8.9 07/21/2025    TACROLIMUS 6.5 07/14/2025    TACROLIMUS 21.8 (H) 07/07/2025   Will closely monitor for toxicities, education provided about adherence to medicines and need to communicate any side effect to the transplant nurse or physician.    3. Allograft Function:stable at baseline for the patient. Continue follow up as per our guidelines and with the local General nephrologist. Communication will be sent today.  Lab Results   Component Value Date    CREATININE 1.6 (H) 07/21/2025    CREATININE 1.6 (H) 07/14/2025    CREATININE 1.54 (H) 07/08/2025      Latest Reference Range & Units POD 70,  Kidney-Post 1 Year  07/03/25   Creatinine 0.5 - 1.4 mg/dL 1.5 (H)   eGFR >60 mL/min/1.73/m2 40 (L) [1]   Glucose 70 - 110 mg/dL 195 (H)     Lab Results   Component Value Date    HGBA1C 5.4 04/24/2025   Diabetes tradjenta, Novolog 8units TIDM, Lantus 13units nightly      4. Hypertension management:  Continue with home blood pressure monitoring, low salt and healthy life discussed with the patient.  BP Readings from Last 3 Encounters:   07/07/25 138/67   07/03/25 (!) 149/79   06/30/25 (!) 151/73   Coreg 25m,g BID, nifedipine 30mg BID, chlorthalidone 25mg nightly    5. Metabolic Bone Disease/Secondary Hyperparathyroidism:calcium and phosphorus level discussed with the patient, patient will continue follow up with the general nephrologist for management of metabolic bone disease calcium and phosphorus as per our center protocol. Will monitor PTH, Vit D level, calcium.   Lab Results   Component Value Date    .6 (H) 04/28/2025    CALCIUM 9.7 07/21/2025    PHOS 2.7 07/21/2025    PHOS 3.0 07/14/2025    PHOS 3.2 07/07/2025   Calcitriol 0.25mcg daily     6. Electrolytes: reviewed with the patient, essentially within the  "normal range no need for acute changes today, will monitor as per our center guidelines.  Lab Results   Component Value Date     07/21/2025    K 4.5 07/21/2025     07/21/2025    CO2 24 07/21/2025    CO2 26 07/14/2025    CO2 26 07/08/2025   NaBicarb 1300mg BID    7. Anemia: will continue monitoring as per our center guidelines. No indication for acute intervention today.  Lab Results   Component Value Date    WBC 4.44 07/21/2025    HGB 11.4 (L) 07/21/2025    HCT 36.6 (L) 07/21/2025     (H) 07/21/2025     07/21/2025       8.Proteinuria: will continue with pr/cr ratio as per our center guidelines  Lab Results   Component Value Date    PROTEINURINE 7 06/23/2025    CREATRANDUR 87.0 06/23/2025    UTPCR 0.08 06/23/2025    UTPCR 0.26 (H) 05/22/2025    UTPCR 0.41 (H) 05/08/2025        9. BK virus infection screening: will continue with urine or blood PCR as per our guidelines to prevent BK virus viremia and allograft dysfunction  No results found for: "BKVIRUSDNAUR", "BKQUANTURINE", "BKVIRUSLOG", "BKVIRUSURINE"      10. Weight education: provided during the clinic visit.  There is no height or weight on file to calculate BMI.     11.Patient safety education regarding immunosuppression including prophylaxis posttransplant for CMV, PCP : Education provided about vaccination and prevention of infections.    12.  Cytopenias: no significant cytopenias will monitor as per our guidelines. Medicine list reviewed including potential causes of drug-induced cytopenias  Lab Results   Component Value Date    WBC 4.44 07/21/2025    HGB 11.4 (L) 07/21/2025    HCT 36.6 (L) 07/21/2025     (H) 07/21/2025     07/21/2025       13. Post-transplant Prophylaxis; CMV Infection, PJP and Candida mucosistis and other indicated for this particular patient.         Exercise: reminded Gael of the importance of regular exercise for weight management, blood sugar and blood pressure management.  I also explained " exercise has been shown to improve cardiovascular health, energy level, and sleep hygiene.  Lastly, I advised him that cardiovascular complications are leading cause of death for renal transplant recipients, and regular exercise can help lower this risk.      Follow-up:   Clinic: return to transplant clinic weekly for the first month after transplant; every 2 weeks during months 2-3; then at 6-, 9-, 12-, 18-, 24-, and 36- months post-transplant to reassess for complications from immunosuppression toxicity and monitor for rejection.  Annually thereafter.    Labs: since patient remains at high risk for rejection and drug-related complications that warrant close monitoring, labs will be ordered as follows: continue twice weekly CBC, renal panel, and drug level for first month; then same labs once weekly through 3rd month post-transplant.  Urine for UA and protein/creatinine ratio monthly.  Serum BK - PCR at 1-, 3-, 6-, 9-, 12-, 18-, 24-, 36-, months post-transplant.  Hepatic panel at 1-, 2-, 3-, 6-, 9-, 12-, 18-, 24-, and 36- months post-transplant.    Bridget Betts NP       Education:   Material provided to the patient.  Patient reminded to call with any health changes since these can be early signs of significant complications.  Also, I advised the patient to be sure any new medications or changes of old medications are discussed with either a pharmacist or physician knowledgeable with transplant to avoid rejection/drug toxicity related to significant drug interactions.    Patient advised that it is recommended that all transplanted patients, and their close contacts and household members receive Covid vaccination.                     [1]   Current Outpatient Medications:     albuterol (PROVENTIL/VENTOLIN HFA) 90 mcg/actuation inhaler, Inhale 2 puffs into the lungs every 6 (six) hours as needed., Disp: , Rfl:     aspirin (ASPIR-81 ORAL), Take 81 mg by mouth once daily. As of 5/13/25 On HOLD for kidney biopsy,  Disp: , Rfl:     atorvastatin (LIPITOR) 20 MG tablet, Take 1 tablet (20 mg total) by mouth once daily., Disp: 90 tablet, Rfl: 3    blood sugar diagnostic Strp, To check BG 3 times daily, to use with insurance preferred meter, Disp: 100 strip, Rfl: 11    blood-glucose meter Misc, To check BG 3 times daily, to use with insurance preferred meter, Disp: 1 each, Rfl: 0    calcitRIOL (ROCALTROL) 0.25 MCG Cap, Take 1 capsule (0.25 mcg total) by mouth once daily., Disp: 90 capsule, Rfl: 3    carvediloL (COREG) 25 MG tablet, Take 1 tablet (25 mg total) by mouth 2 (two) times daily., Disp: 180 tablet, Rfl: 3    chlorthalidone (HYGROTEN) 25 MG Tab, Take 1 tablet (25 mg total) by mouth nightly., Disp: 30 tablet, Rfl: 11    famotidine (PEPCID) 20 MG tablet, Take 1 tablet (20 mg total) by mouth once daily., Disp: 30 tablet, Rfl: 0    FLUoxetine 40 MG capsule, Take 1 capsule (40 mg total) by mouth once daily., Disp: 30 capsule, Rfl: 5    hydrOXYzine pamoate (VISTARIL) 25 MG Cap, Take 1 capsule (25 mg total) by mouth every 8 (eight) hours as needed (insomnia)., Disp: 60 capsule, Rfl: 0    insulin aspart U-100 (NOVOLOG) 100 unit/mL (3 mL) InPn pen, Inject 8 Units into the skin 3 (three) times daily. Plus low sliding scale. Max units per day: 54, Disp: 6 mL, Rfl: 2    insulin glargine U-100, Lantus, 100 unit/mL (3 mL) SubQ InPn pen, Inject 13 Units into the skin every evening., Disp: 6 mL, Rfl: 2    lancets 33 gauge Misc, To check BG 3 times daily, to use with insurance preferred meter, Disp: 100 each, Rfl: 11    linaCLOtide (LINZESS) 72 mcg Cap capsule, Take 1 capsule (72 mcg total) by mouth before breakfast., Disp: 30 capsule, Rfl: 5    linaGLIPtin (TRADJENTA) 5 mg Tab tablet, Take 1 tablet (5 mg total) by mouth once daily., Disp: 90 tablet, Rfl: 3    methIMAzole (TAPAZOLE) 5 MG Tab, Take 1 tablet (5 mg total) by mouth once daily., Disp: 90 tablet, Rfl: 3    multivitamin Tab, Take 1 tablet by mouth once daily., Disp: , Rfl:      "mycophenolate (CELLCEPT) 250 mg Cap, Take 4 capsules (1,000 mg total) by mouth 2 (two) times daily. Z94.0;Kidney transplant on 4/24/2025, Disp: 240 capsule, Rfl: 11    NIFEdipine (PROCARDIA-XL) 30 MG (OSM) 24 hr tablet, Take 1 tablet (30 mg total) by mouth 2 (two) times a day., Disp: 180 tablet, Rfl: 3    pen needle, diabetic 32 gauge x 5/32" Ndle, Inject 1 Needle into the skin 4 (four) times daily., Disp: 100 each, Rfl: 11    predniSONE (DELTASONE) 5 MG tablet, Take by mouth daily: 20mg 6/13-6/19, 15mg 6/20-6/26, 10mg 6/27-7/3, 5mg 7/4-, Disp: 90 tablet, Rfl: 11    sodium bicarbonate 650 MG tablet, Take 2 tablets (1,300 mg total) by mouth 3 (three) times daily., Disp: 180 tablet, Rfl: 11    sulfamethoxazole-trimethoprim 400-80mg (BACTRIM,SEPTRA) 400-80 mg per tablet, Take 1 tablet by mouth once daily. Stop 10/24/25, Disp: 30 tablet, Rfl: 4    tacrolimus (PROGRAF) 0.5 MG Cap, Take 6 capsules (3 mg total) by mouth every 12 (twelve) hours. Z94.0;Kidney txp on 4/24/25, Disp: 360 capsule, Rfl: 11    valGANciclovir (VALCYTE) 450 mg Tab, Take 1 tablet (450 mg total) by mouth once daily. Stop 7/24/25, Disp: 30 tablet, Rfl: 2    vitamin D (VITAMIN D3) 1000 units Tab, Take 2 tablets (2,000 Units total) by mouth once daily., Disp: 180 tablet, Rfl: 3  No current facility-administered medications for this visit.    Facility-Administered Medications Ordered in Other Visits:     0.9%  NaCl infusion, 20 mL/hr, Intravenous, Continuous, Shashi Krishnan MD, Last Rate: 150 mL/hr at 06/12/19 1205, Rate Change at 06/12/19 1205    mupirocin 2 % ointment, , Nasal, On Call Procedure, Bautista Vasques MD, Given at 06/12/19 0947    "

## 2025-07-23 ENCOUNTER — OFFICE VISIT (OUTPATIENT)
Dept: TRANSPLANT | Facility: CLINIC | Age: 60
End: 2025-07-23
Payer: MEDICARE

## 2025-07-23 VITALS
DIASTOLIC BLOOD PRESSURE: 57 MMHG | TEMPERATURE: 98 F | HEIGHT: 65 IN | OXYGEN SATURATION: 98 % | RESPIRATION RATE: 22 BRPM | BODY MASS INDEX: 25.64 KG/M2 | WEIGHT: 153.88 LBS | SYSTOLIC BLOOD PRESSURE: 123 MMHG | HEART RATE: 94 BPM

## 2025-07-23 DIAGNOSIS — E11.22 TYPE 2 DIABETES MELLITUS WITH STAGE 3A CHRONIC KIDNEY DISEASE, WITH LONG-TERM CURRENT USE OF INSULIN: ICD-10-CM

## 2025-07-23 DIAGNOSIS — N18.31 TYPE 2 DIABETES MELLITUS WITH STAGE 3A CHRONIC KIDNEY DISEASE, WITH LONG-TERM CURRENT USE OF INSULIN: ICD-10-CM

## 2025-07-23 DIAGNOSIS — Z79.4 TYPE 2 DIABETES MELLITUS WITH STAGE 3A CHRONIC KIDNEY DISEASE, WITH LONG-TERM CURRENT USE OF INSULIN: ICD-10-CM

## 2025-07-23 DIAGNOSIS — Z94.0 S/P KIDNEY TRANSPLANT: Primary | ICD-10-CM

## 2025-07-23 DIAGNOSIS — T86.11 ANTIBODY MEDIATED REJECTION OF KIDNEY TRANSPLANT: ICD-10-CM

## 2025-07-23 DIAGNOSIS — I10 HYPERTENSION, UNSPECIFIED TYPE: ICD-10-CM

## 2025-07-23 PROCEDURE — 3066F NEPHROPATHY DOC TX: CPT | Mod: CPTII,S$GLB,, | Performed by: NURSE PRACTITIONER

## 2025-07-23 PROCEDURE — 3044F HG A1C LEVEL LT 7.0%: CPT | Mod: CPTII,S$GLB,, | Performed by: NURSE PRACTITIONER

## 2025-07-23 PROCEDURE — 1159F MED LIST DOCD IN RCRD: CPT | Mod: CPTII,S$GLB,, | Performed by: NURSE PRACTITIONER

## 2025-07-23 PROCEDURE — 3078F DIAST BP <80 MM HG: CPT | Mod: CPTII,S$GLB,, | Performed by: NURSE PRACTITIONER

## 2025-07-23 PROCEDURE — 99999 PR PBB SHADOW E&M-EST. PATIENT-LVL V: CPT | Mod: PBBFAC,,, | Performed by: NURSE PRACTITIONER

## 2025-07-23 PROCEDURE — 3074F SYST BP LT 130 MM HG: CPT | Mod: CPTII,S$GLB,, | Performed by: NURSE PRACTITIONER

## 2025-07-23 PROCEDURE — 99215 OFFICE O/P EST HI 40 MIN: CPT | Mod: S$GLB,,, | Performed by: NURSE PRACTITIONER

## 2025-07-23 PROCEDURE — 3008F BODY MASS INDEX DOCD: CPT | Mod: CPTII,S$GLB,, | Performed by: NURSE PRACTITIONER

## 2025-07-23 NOTE — LETTER
July 23, 2025        Lilia Malin  [address] [address] [address]  Phone: [phone]  Fax: [phone]             [facility] Transplant 1st Fl  [address]  [facility] [address]  Phone: [phone]   Patient: Kiesha Rocha   MR Number: [id number]   YOB: 1965   Date of Visit: 7/23/2025       Dear Dr. Lilia Malin    Thank you for referring Kiesha Rocha to me for evaluation. Attached you will find relevant portions of my assessment and plan of care.    If you have questions, please do not hesitate to call me. I look forward to following Kiesha Rocha along with you.    Sincerely,    Bridget Betts, NP    Enclosure    If you would like to receive this communication electronically, please contact [email or web address] or [phone] to request [facility] [employer] access.    [facility] is a tool which provides read-only access to select patient information with whom you have a relationship. Its easy to use and provides real time access to review your patients record including encounter summaries, notes, results, and demographic information.    If you feel you have received this communication in error or would no longer like to receive these types of communications, please e-mail [email or web address]

## 2025-07-28 ENCOUNTER — LAB VISIT (OUTPATIENT)
Dept: LAB | Facility: HOSPITAL | Age: 60
End: 2025-07-28
Attending: STUDENT IN AN ORGANIZED HEALTH CARE EDUCATION/TRAINING PROGRAM
Payer: MEDICARE

## 2025-07-28 DIAGNOSIS — E83.52 HYPERCALCEMIA: ICD-10-CM

## 2025-07-28 DIAGNOSIS — T86.19 RECEIVED KIDNEY FROM DONOR WITH HEPATITIS C: ICD-10-CM

## 2025-07-28 DIAGNOSIS — T38.0X5A STEROID-INDUCED HYPERLIPIDEMIA: ICD-10-CM

## 2025-07-28 DIAGNOSIS — Z79.899 IMMUNOSUPPRESSIVE MANAGEMENT ENCOUNTER FOLLOWING KIDNEY TRANSPLANT: ICD-10-CM

## 2025-07-28 DIAGNOSIS — Z94.0 KIDNEY REPLACED BY TRANSPLANT: ICD-10-CM

## 2025-07-28 DIAGNOSIS — T86.10 COMPLICATION OF TRANSPLANTED KIDNEY, UNSPECIFIED COMPLICATION: ICD-10-CM

## 2025-07-28 DIAGNOSIS — Z94.0 IMMUNOSUPPRESSIVE MANAGEMENT ENCOUNTER FOLLOWING KIDNEY TRANSPLANT: ICD-10-CM

## 2025-07-28 DIAGNOSIS — E78.49 STEROID-INDUCED HYPERLIPIDEMIA: ICD-10-CM

## 2025-07-28 LAB
ABSOLUTE NEUTROPHIL MANUAL (OHS): 6.6 K/UL
ALBUMIN SERPL BCP-MCNC: 4 G/DL (ref 3.5–5.2)
ALP SERPL-CCNC: 70 UNIT/L (ref 40–150)
ALT SERPL W/O P-5'-P-CCNC: 20 UNIT/L (ref 0–55)
ANION GAP (OHS): 13 MMOL/L (ref 8–16)
ANISOCYTOSIS BLD QL SMEAR: SLIGHT
AST SERPL-CCNC: 28 UNIT/L (ref 0–50)
BASOPHILS NFR BLD MANUAL: 2 %
BILIRUB DIRECT SERPL-MCNC: 0.4 MG/DL (ref 0.1–0.3)
BILIRUB SERPL-MCNC: 1.2 MG/DL (ref 0.1–1)
BUN SERPL-MCNC: 31 MG/DL (ref 6–20)
CALCIUM SERPL-MCNC: 9.8 MG/DL (ref 8.7–10.5)
CHLORIDE SERPL-SCNC: 99 MMOL/L (ref 95–110)
CHOLEST SERPL-MCNC: 162 MG/DL (ref 120–199)
CHOLEST/HDLC SERPL: 2.4 {RATIO} (ref 2–5)
CO2 SERPL-SCNC: 23 MMOL/L (ref 23–29)
CREAT SERPL-MCNC: 1.7 MG/DL (ref 0.5–1.4)
ERYTHROCYTE [DISTWIDTH] IN BLOOD BY AUTOMATED COUNT: 13.8 % (ref 11.5–14.5)
GFR SERPLBLD CREATININE-BSD FMLA CKD-EPI: 35 ML/MIN/1.73/M2
GLUCOSE SERPL-MCNC: 314 MG/DL (ref 70–110)
HCT VFR BLD AUTO: 37.7 % (ref 37–48.5)
HDLC SERPL-MCNC: 67 MG/DL (ref 40–75)
HDLC SERPL: 41.4 % (ref 20–50)
HGB BLD-MCNC: 12.2 GM/DL (ref 12–16)
LDLC SERPL CALC-MCNC: 63 MG/DL (ref 63–159)
LYMPHOCYTES NFR BLD MANUAL: 6 % (ref 18–48)
MAGNESIUM SERPL-MCNC: 1.5 MG/DL (ref 1.6–2.6)
MCH RBC QN AUTO: 31.4 PG (ref 27–31)
MCHC RBC AUTO-ENTMCNC: 32.4 G/DL (ref 32–36)
MCV RBC AUTO: 97 FL (ref 82–98)
MONOCYTES NFR BLD MANUAL: 11 % (ref 4–15)
NEUTROPHILS NFR BLD MANUAL: 81 % (ref 38–73)
NONHDLC SERPL-MCNC: 95 MG/DL
NUCLEATED RBC (/100WBC) (OHS): 0 /100 WBC
PHOSPHATE SERPL-MCNC: 2.2 MG/DL (ref 2.7–4.5)
PLATELET # BLD AUTO: 196 K/UL (ref 150–450)
PMV BLD AUTO: 13.3 FL (ref 9.2–12.9)
POTASSIUM SERPL-SCNC: 3.9 MMOL/L (ref 3.5–5.1)
PROT SERPL-MCNC: 8.1 GM/DL (ref 6–8.4)
PTH-INTACT SERPL-MCNC: 158.3 PG/ML (ref 9–77)
RBC # BLD AUTO: 3.89 M/UL (ref 4–5.4)
SODIUM SERPL-SCNC: 135 MMOL/L (ref 136–145)
TACROLIMUS BLD-MCNC: 11.1 NG/ML (ref 5–15)
TRIGL SERPL-MCNC: 160 MG/DL (ref 30–150)
WBC # BLD AUTO: 8.14 K/UL (ref 3.9–12.7)

## 2025-07-28 PROCEDURE — 82040 ASSAY OF SERUM ALBUMIN: CPT

## 2025-07-28 PROCEDURE — 83970 ASSAY OF PARATHORMONE: CPT

## 2025-07-28 PROCEDURE — 80197 ASSAY OF TACROLIMUS: CPT

## 2025-07-28 PROCEDURE — 36415 COLL VENOUS BLD VENIPUNCTURE: CPT | Mod: PO

## 2025-07-28 PROCEDURE — 87799 DETECT AGENT NOS DNA QUANT: CPT

## 2025-07-28 PROCEDURE — 85025 COMPLETE CBC W/AUTO DIFF WBC: CPT

## 2025-07-28 PROCEDURE — 80061 LIPID PANEL: CPT

## 2025-07-28 PROCEDURE — 84100 ASSAY OF PHOSPHORUS: CPT

## 2025-07-28 PROCEDURE — 83735 ASSAY OF MAGNESIUM: CPT

## 2025-07-28 PROCEDURE — 80053 COMPREHEN METABOLIC PANEL: CPT

## 2025-07-28 NOTE — TELEPHONE ENCOUNTER
Notified patient of Dr. Dunbar's review of 7/28/25 lab results via [facility].      Prince Carranza,      Dr. Dunbar reviewed Ms. Pop's lab results. Her prograf level 11.1 is higher than it should be. If the level is a true 12 hour level, please decrease the prograf dose to 3mg in the AM & 2mg in the PM.      Dr. Dunbar has placed an order for Ms. Pop to get a follow-up kidney ultrasound and kidney biopsy within 2 weeks as follow-up from her treatment for kidney rejection. Someone from Interventional Radiology will give you a call this week to schedule the kidney biopsy & our Transplant  will contact you tomorrow to schedule the kidney ultrasound.      Lastly, Ms. Pop's urine results show possible signs of a urinary tract infection. Let us know if she's experiencing symptoms of a urinary tract infections (symptoms like burning or pain when urinating, foul odor of urine, fever or abdominal pain). If she's experiencing any of the symptoms Dr. Dunbar will start her on an antibiotic so let me know.      Ren,      Jessenia             ----- Message from Toribio Dunbar MD sent at 7/28/2025  1:50 PM CDT -----  Kidney ultrasound, DSA allosure and kidney biopsy within 2 weeks to follow up her last rejection. She needs to work on her glucose. If true tac trough, decrease dose from 3/3 to 3/2 and repeat at   beginning of next week with the DSA and allosure.  ----- Message -----  From: Lab, Background User  Sent: 7/28/2025  12:15 PM CDT  To: Toribio Dunbar Jr., MD            ----- Message from Toribio Dunbar MD sent at 7/28/2025  9:39 AM CDT -----  If she has uti symptoms, start nitrofurantoin 100 bid for 7 days  ----- Message -----  From: Lab, Background User  Sent: 7/28/2025   8:59 AM CDT  To: Toribio Dunbar Jr., MD

## 2025-07-28 NOTE — TELEPHONE ENCOUNTER
----- Message from Toribio Dunbar MD sent at 7/28/2025  9:39 AM CDT -----  If she has uti symptoms, start nitrofurantoin 100 bid for 7 days  ----- Message -----  From: Lab, Background User  Sent: 7/28/2025   8:59 AM CDT  To: Toribio Dunbar Jr., MD

## 2025-07-29 ENCOUNTER — HOSPITAL ENCOUNTER (OUTPATIENT)
Dept: PREADMISSION TESTING | Facility: HOSPITAL | Age: 60
Discharge: HOME | End: 2025-07-29
Attending: STUDENT IN AN ORGANIZED HEALTH CARE EDUCATION/TRAINING PROGRAM
Payer: MEDICARE

## 2025-07-29 DIAGNOSIS — Z94.0 S/P KIDNEY TRANSPLANT: Primary | ICD-10-CM

## 2025-07-29 DIAGNOSIS — E11.22 TYPE 2 DIABETES MELLITUS WITH STAGE 3A CHRONIC KIDNEY DISEASE, WITH LONG-TERM CURRENT USE OF INSULIN: Primary | ICD-10-CM

## 2025-07-29 DIAGNOSIS — N18.31 TYPE 2 DIABETES MELLITUS WITH STAGE 3A CHRONIC KIDNEY DISEASE, WITH LONG-TERM CURRENT USE OF INSULIN: Primary | ICD-10-CM

## 2025-07-29 DIAGNOSIS — Z79.4 TYPE 2 DIABETES MELLITUS WITH STAGE 3A CHRONIC KIDNEY DISEASE, WITH LONG-TERM CURRENT USE OF INSULIN: Primary | ICD-10-CM

## 2025-07-30 ENCOUNTER — HOSPITAL ENCOUNTER (OUTPATIENT)
Dept: INTERVENTIONAL RADIOLOGY/VASCULAR | Facility: HOSPITAL | Age: 60
Discharge: HOME | End: 2025-07-30
Attending: STUDENT IN AN ORGANIZED HEALTH CARE EDUCATION/TRAINING PROGRAM
Payer: MEDICARE

## 2025-07-30 VITALS
OXYGEN SATURATION: 100 % | DIASTOLIC BLOOD PRESSURE: 60 MMHG | SYSTOLIC BLOOD PRESSURE: 112 MMHG | TEMPERATURE: 99 F | HEART RATE: 85 BPM | RESPIRATION RATE: 16 BRPM

## 2025-07-30 DIAGNOSIS — E11.22 TYPE 2 DIABETES MELLITUS WITH STAGE 3A CHRONIC KIDNEY DISEASE, WITH LONG-TERM CURRENT USE OF INSULIN: ICD-10-CM

## 2025-07-30 DIAGNOSIS — Z79.4 TYPE 2 DIABETES MELLITUS WITH STAGE 3A CHRONIC KIDNEY DISEASE, WITH LONG-TERM CURRENT USE OF INSULIN: ICD-10-CM

## 2025-07-30 DIAGNOSIS — N18.31 TYPE 2 DIABETES MELLITUS WITH STAGE 3A CHRONIC KIDNEY DISEASE, WITH LONG-TERM CURRENT USE OF INSULIN: ICD-10-CM

## 2025-07-30 DIAGNOSIS — T86.10 COMPLICATION OF TRANSPLANTED KIDNEY, UNSPECIFIED COMPLICATION: ICD-10-CM

## 2025-07-30 LAB
INR PPP: 1 (ref 0.8–1.2)
POCT GLUCOSE: 242 MG/DL (ref 70–110)
PROTHROMBIN TIME: 11.2 SECONDS (ref 9–12.5)

## 2025-07-30 PROCEDURE — 63600175 PHARM REV CODE 636 W HCPCS: Performed by: INTERNAL MEDICINE

## 2025-07-30 PROCEDURE — 85610 PROTHROMBIN TIME: CPT | Performed by: INTERNAL MEDICINE

## 2025-07-30 RX ORDER — MIDAZOLAM HYDROCHLORIDE 2 MG/2ML
INJECTION, SOLUTION INTRAMUSCULAR; INTRAVENOUS
Status: DISCONTINUED | OUTPATIENT
Start: 2025-07-30 | End: 2025-07-31 | Stop reason: HOSPADM

## 2025-07-30 RX ORDER — LIDOCAINE HYDROCHLORIDE 10 MG/ML
INJECTION, SOLUTION INFILTRATION; PERINEURAL
Status: DISCONTINUED | OUTPATIENT
Start: 2025-07-30 | End: 2025-07-31 | Stop reason: HOSPADM

## 2025-07-30 RX ORDER — FENTANYL CITRATE 50 UG/ML
INJECTION, SOLUTION INTRAMUSCULAR; INTRAVENOUS
Status: DISCONTINUED | OUTPATIENT
Start: 2025-07-30 | End: 2025-07-31 | Stop reason: HOSPADM

## 2025-07-30 RX ADMIN — MIDAZOLAM HYDROCHLORIDE 0.5 MG: 1 INJECTION, SOLUTION INTRAMUSCULAR; INTRAVENOUS at 11:07

## 2025-07-30 RX ADMIN — FENTANYL CITRATE 50 MCG: 50 INJECTION, SOLUTION INTRAMUSCULAR; INTRAVENOUS at 11:07

## 2025-07-30 RX ADMIN — FENTANYL CITRATE 25 MCG: 50 INJECTION, SOLUTION INTRAMUSCULAR; INTRAVENOUS at 11:07

## 2025-07-30 RX ADMIN — LIDOCAINE HYDROCHLORIDE 10 ML: 10 INJECTION, SOLUTION INFILTRATION; PERINEURAL at 11:07

## 2025-07-30 RX ADMIN — MIDAZOLAM HYDROCHLORIDE 1 MG: 1 INJECTION, SOLUTION INTRAMUSCULAR; INTRAVENOUS at 11:07

## 2025-07-30 NOTE — DISCHARGE INSTRUCTIONS
KIDNEY BIOPSY DISCHARGE EDUCATION:     Information:   A kidney (renal) biopsy is a procedure in which a biopsy needle is used to remove small amounts of kidney tissue to evaluate for kidney function and/or if there is a kidney mass to evaluate if it is cancerous versus benign (non-cancerous).      What should I expect after the Kidney Biopsy?    You may have some blood in your urine after the procedure, this should resolve in a few days.    There may be some soreness around the biopsy site.    You may return to work after 24 hours unless your primary doctor instructs you otherwise.    You do not have any diet restrictions because of this procedure but should continue any that were given to you by any other doctors.    Continue all previously prescribed medications with the exception of Coumadin/warfarin which should be resumed after 24 hours. Pradaxa, Xarelto, Eliquis or Savaysa can be resumed after 48 hours.      Bathing & Wound Care:    You may shower after 24 hours; do not tub bath or submerge in water for 3 days (bath tub, hot tub, swimming pool, river or any other body of water).    Dressing to stay on 2-3 days (clean and dry)    If the biopsy site(s) become red, tender, swollen, or starts to drain, contact us.    Avoid heavy lifting and strenuous activity for 3 days.      Follow-up visit information:   Your ordering physician will typically get your biopsy results in three to five business days. If you do not hear from the ordering physician in 7 business days, please call his/her office to set up an appointment to review the results. Follow up with Interventional Radiology is not usually necessary.         Occasionally, a situation will require prompt attention and an emergency room visit is necessary:    Sudden chest pain, shortness of breath, or fainting    Increasing blood in your urine    Increasing redness, swelling or drainage from the biopsy site    Increasing pain not relieved by medication     Bleeding or drainage from the needle site that is saturating the dressing    You have shaking, chills and/or a temperature over 100.3°F    New, sudden difficulty breathing    Drop in blood pressure, and/or light-headed feeling     Interventional Radiology Clinic   For complications   [phone]. Monday - Friday, 8:00 am - 4:00 pm    [phone] After hours and on holidays. Ask to speak with the interventional radiologist on call.      For Scheduling   [phone] Monday - Friday, 8:00 am - 4:00 pm

## 2025-07-30 NOTE — Clinical Note
Right: Abdomen and Pelvis.   Skin prep dry before draping.  Prepped by: Simone Moore RT 7/30/2025 11:42 AM.

## 2025-08-01 ENCOUNTER — PATIENT MESSAGE (OUTPATIENT)
Dept: TRANSPLANT | Facility: HOSPITAL | Age: 60
End: 2025-08-01
Payer: MEDICARE

## 2025-08-01 ENCOUNTER — TELEPHONE (OUTPATIENT)
Dept: TRANSPLANT | Facility: HOSPITAL | Age: 60
End: 2025-08-01
Payer: MEDICARE

## 2025-08-01 NOTE — TELEPHONE ENCOUNTER
Kidney biopsy:  Small sample, mostly cortical medullary junction.  SEVERE ACUTE PYELONEPHRITIS WITH MICROABSCESSES.  Only 2 glomeruli, 10% IF.  No T CMR, C4d neg, no AVR based on evaluation of 2 arteries.    UA from 07/28/2025 showed 30 WBCs, consistent with diagnosis of complicated urinary tract infection/pyelonephritis.    I called Kiesha to see if she is having any s/s of UTI - no answer. Mssg sent via [employer].    Recommend she submit clean-catch midstream urine culture ASAP.

## 2025-08-04 ENCOUNTER — LAB VISIT (OUTPATIENT)
Dept: LAB | Facility: HOSPITAL | Age: 60
End: 2025-08-04
Attending: STUDENT IN AN ORGANIZED HEALTH CARE EDUCATION/TRAINING PROGRAM
Payer: MEDICARE

## 2025-08-04 ENCOUNTER — PATIENT MESSAGE (OUTPATIENT)
Dept: TRANSPLANT | Facility: CLINIC | Age: 60
End: 2025-08-04
Payer: MEDICARE

## 2025-08-04 DIAGNOSIS — T86.10 COMPLICATION OF TRANSPLANTED KIDNEY, UNSPECIFIED COMPLICATION: ICD-10-CM

## 2025-08-04 DIAGNOSIS — Z94.0 KIDNEY REPLACED BY TRANSPLANT: ICD-10-CM

## 2025-08-04 DIAGNOSIS — Z94.0 IMMUNOSUPPRESSIVE MANAGEMENT ENCOUNTER FOLLOWING KIDNEY TRANSPLANT: ICD-10-CM

## 2025-08-04 DIAGNOSIS — Z79.899 IMMUNOSUPPRESSIVE MANAGEMENT ENCOUNTER FOLLOWING KIDNEY TRANSPLANT: ICD-10-CM

## 2025-08-04 LAB
ABSOLUTE NEUTROPHIL MANUAL (OHS): 5.9 K/UL
ALBUMIN SERPL BCP-MCNC: 3 G/DL (ref 3.5–5.2)
ANION GAP (OHS): 13 MMOL/L (ref 8–16)
BASOPHILS NFR BLD MANUAL: 2 %
BUN SERPL-MCNC: 31 MG/DL (ref 6–20)
CALCIUM SERPL-MCNC: 9.8 MG/DL (ref 8.7–10.5)
CHLORIDE SERPL-SCNC: 100 MMOL/L (ref 95–110)
CO2 SERPL-SCNC: 24 MMOL/L (ref 23–29)
CREAT SERPL-MCNC: 1.8 MG/DL (ref 0.5–1.4)
EOSINOPHIL NFR BLD MANUAL: 8 % (ref 0–8)
ERYTHROCYTE [DISTWIDTH] IN BLOOD BY AUTOMATED COUNT: 13.6 % (ref 11.5–14.5)
GFR SERPLBLD CREATININE-BSD FMLA CKD-EPI: 33 ML/MIN/1.73/M2
GLUCOSE SERPL-MCNC: 100 MG/DL (ref 70–110)
HCT VFR BLD AUTO: 33.9 % (ref 37–48.5)
HGB BLD-MCNC: 10.8 GM/DL (ref 12–16)
LYMPHOCYTES NFR BLD MANUAL: 10 % (ref 18–48)
MAGNESIUM SERPL-MCNC: 2 MG/DL (ref 1.6–2.6)
MCH RBC QN AUTO: 30.9 PG (ref 27–31)
MCHC RBC AUTO-ENTMCNC: 31.9 G/DL (ref 32–36)
MCV RBC AUTO: 97 FL (ref 82–98)
MONOCYTES NFR BLD MANUAL: 13 % (ref 4–15)
NEUTROPHILS NFR BLD MANUAL: 67 % (ref 38–73)
NUCLEATED RBC (/100WBC) (OHS): 0 /100 WBC
PHOSPHATE SERPL-MCNC: 2.2 MG/DL (ref 2.7–4.5)
PLATELET # BLD AUTO: 277 K/UL (ref 150–450)
PMV BLD AUTO: 12.3 FL (ref 9.2–12.9)
POTASSIUM SERPL-SCNC: 3.4 MMOL/L (ref 3.5–5.1)
RBC # BLD AUTO: 3.5 M/UL (ref 4–5.4)
SODIUM SERPL-SCNC: 137 MMOL/L (ref 136–145)
WBC # BLD AUTO: 8.79 K/UL (ref 3.9–12.7)

## 2025-08-04 PROCEDURE — 85025 COMPLETE CBC W/AUTO DIFF WBC: CPT

## 2025-08-04 PROCEDURE — 86977 RBC SERUM PRETX INCUBJ/INHIB: CPT | Performed by: STUDENT IN AN ORGANIZED HEALTH CARE EDUCATION/TRAINING PROGRAM

## 2025-08-04 PROCEDURE — 86832 HLA CLASS I HIGH DEFIN QUAL: CPT | Performed by: STUDENT IN AN ORGANIZED HEALTH CARE EDUCATION/TRAINING PROGRAM

## 2025-08-04 PROCEDURE — 36415 COLL VENOUS BLD VENIPUNCTURE: CPT | Mod: PO

## 2025-08-04 PROCEDURE — 83735 ASSAY OF MAGNESIUM: CPT

## 2025-08-04 PROCEDURE — 86833 HLA CLASS II HIGH DEFIN QUAL: CPT | Performed by: STUDENT IN AN ORGANIZED HEALTH CARE EDUCATION/TRAINING PROGRAM

## 2025-08-04 PROCEDURE — 80069 RENAL FUNCTION PANEL: CPT

## 2025-08-04 PROCEDURE — 80197 ASSAY OF TACROLIMUS: CPT

## 2025-08-05 ENCOUNTER — RESULTS FOLLOW-UP (OUTPATIENT)
Dept: TRANSPLANT | Facility: CLINIC | Age: 60
End: 2025-08-05
Payer: MEDICARE

## 2025-08-05 ENCOUNTER — LAB VISIT (OUTPATIENT)
Dept: LAB | Facility: HOSPITAL | Age: 60
End: 2025-08-05
Attending: STUDENT IN AN ORGANIZED HEALTH CARE EDUCATION/TRAINING PROGRAM
Payer: MEDICARE

## 2025-08-05 DIAGNOSIS — Z94.0 KIDNEY REPLACED BY TRANSPLANT: Primary | ICD-10-CM

## 2025-08-05 DIAGNOSIS — N39.0 URINARY TRACT INFECTION WITHOUT HEMATURIA, SITE UNSPECIFIED: ICD-10-CM

## 2025-08-05 LAB
BILIRUB UR QL STRIP.AUTO: NEGATIVE
CLARITY UR: CLEAR
CLASS I ANTIBODY COMMENTS - LUMINEX: NORMAL
CLASS II ANTIBODY COMMENTS - LUMINEX: NORMAL
COLOR UR AUTO: YELLOW
DSA1 TESTING DATE: NORMAL
DSA12 TESTING DATE: NORMAL
DSA2 TESTING DATE: NORMAL
GLUCOSE UR QL STRIP: NEGATIVE
HGB UR QL STRIP: NEGATIVE
KETONES UR QL STRIP: NEGATIVE
LEUKOCYTE ESTERASE UR QL STRIP: NEGATIVE
NITRITE UR QL STRIP: NEGATIVE
PH UR STRIP: 6 [PH]
PROT UR QL STRIP: ABNORMAL
SERUM COLLECTION DT - LUMINEX CLASS I: NORMAL
SERUM COLLECTION DT - LUMINEX CLASS II: NORMAL
SP GR UR STRIP: 1.01
TACROLIMUS BLD-MCNC: 5.3 NG/ML (ref 5–15)

## 2025-08-05 PROCEDURE — 81003 URINALYSIS AUTO W/O SCOPE: CPT | Mod: PO

## 2025-08-05 RX ORDER — TACROLIMUS 0.5 MG/1
2.5 CAPSULE ORAL EVERY 12 HOURS
Qty: 300 CAPSULE | Refills: 11 | Status: SHIPPED | OUTPATIENT
Start: 2025-08-05 | End: 2026-08-05

## 2025-08-05 NOTE — TELEPHONE ENCOUNTER
Notified patient of Dr. Dunbar's review of 8/4/25 lab results via [facility].     Hi Ms. Kiesha & Tere,     Dr. Dunbar reviewed your 8/4/25 lab results. Your prograf level 5.3 is now lower than it should be. He wants you to increase your prograf dose to 2.5mg (5 of the 0.5mg capsules) in the AM & 2.5mg (5 of the 0.5mg capsules) in the PM starting with tonight's dose.     We'll recheck your labs as scheduled next week.     Thanks,     Jessenia     ----- Message from Toribio Dunbar MD sent at 8/5/2025 11:46 AM CDT -----  If true tac trough, change dose from 3/2 to 2.5/2.5 and repeat trough in 1 week. Biopsy from 7/30 pending, DSA allosure pending  ----- Message -----  From: Lab, Background User  Sent: 8/4/2025   7:11 PM CDT  To: Toribio Dunbar Jr., MD

## 2025-08-06 ENCOUNTER — OFFICE VISIT (OUTPATIENT)
Dept: TRANSPLANT | Facility: CLINIC | Age: 60
End: 2025-08-06
Payer: MEDICARE

## 2025-08-06 VITALS
OXYGEN SATURATION: 98 % | WEIGHT: 148.56 LBS | RESPIRATION RATE: 16 BRPM | HEART RATE: 80 BPM | SYSTOLIC BLOOD PRESSURE: 103 MMHG | TEMPERATURE: 97 F | HEIGHT: 65 IN | BODY MASS INDEX: 24.75 KG/M2 | DIASTOLIC BLOOD PRESSURE: 54 MMHG

## 2025-08-06 DIAGNOSIS — T86.11 ANTIBODY MEDIATED REJECTION OF KIDNEY TRANSPLANT: ICD-10-CM

## 2025-08-06 DIAGNOSIS — E11.22 TYPE 2 DIABETES MELLITUS WITH STAGE 3A CHRONIC KIDNEY DISEASE, WITH LONG-TERM CURRENT USE OF INSULIN: ICD-10-CM

## 2025-08-06 DIAGNOSIS — Z29.89 PROPHYLACTIC IMMUNOTHERAPY: ICD-10-CM

## 2025-08-06 DIAGNOSIS — D84.821 IMMUNODEFICIENCY DUE TO DRUG THERAPY: ICD-10-CM

## 2025-08-06 DIAGNOSIS — I10 HYPERTENSION, UNSPECIFIED TYPE: ICD-10-CM

## 2025-08-06 DIAGNOSIS — Z79.899 IMMUNODEFICIENCY DUE TO DRUG THERAPY: ICD-10-CM

## 2025-08-06 DIAGNOSIS — Z91.89 AT RISK FOR OPPORTUNISTIC INFECTIONS: ICD-10-CM

## 2025-08-06 DIAGNOSIS — Z79.4 TYPE 2 DIABETES MELLITUS WITH STAGE 3A CHRONIC KIDNEY DISEASE, WITH LONG-TERM CURRENT USE OF INSULIN: ICD-10-CM

## 2025-08-06 DIAGNOSIS — N18.31 TYPE 2 DIABETES MELLITUS WITH STAGE 3A CHRONIC KIDNEY DISEASE, WITH LONG-TERM CURRENT USE OF INSULIN: ICD-10-CM

## 2025-08-06 DIAGNOSIS — Z94.0 S/P KIDNEY TRANSPLANT: Primary | ICD-10-CM

## 2025-08-06 LAB
C ALLOSURE SCORE: 0.16 %
C CAREDX_ORDER_ID: NORMAL
C CENTER_ORDER_ID: NORMAL
C CLIENT SPECIMEN ID: NORMAL
C DONOR RELATION: NORMAL
C NOTES: NORMAL
C RELATIVE CHANGE VALUE: NORMAL
C TEST COMMENTS: NORMAL
C TIME POST TX: NORMAL
C TRANSPLANTED ORGAN: NORMAL
C TX DATE: NORMAL
C WP_ORDER_ID: NORMAL

## 2025-08-06 PROCEDURE — 1159F MED LIST DOCD IN RCRD: CPT | Mod: CPTII,S$GLB,, | Performed by: STUDENT IN AN ORGANIZED HEALTH CARE EDUCATION/TRAINING PROGRAM

## 2025-08-06 PROCEDURE — 1160F RVW MEDS BY RX/DR IN RCRD: CPT | Mod: CPTII,S$GLB,, | Performed by: STUDENT IN AN ORGANIZED HEALTH CARE EDUCATION/TRAINING PROGRAM

## 2025-08-06 PROCEDURE — 3078F DIAST BP <80 MM HG: CPT | Mod: CPTII,S$GLB,, | Performed by: STUDENT IN AN ORGANIZED HEALTH CARE EDUCATION/TRAINING PROGRAM

## 2025-08-06 PROCEDURE — 3074F SYST BP LT 130 MM HG: CPT | Mod: CPTII,S$GLB,, | Performed by: STUDENT IN AN ORGANIZED HEALTH CARE EDUCATION/TRAINING PROGRAM

## 2025-08-06 PROCEDURE — 3044F HG A1C LEVEL LT 7.0%: CPT | Mod: CPTII,S$GLB,, | Performed by: STUDENT IN AN ORGANIZED HEALTH CARE EDUCATION/TRAINING PROGRAM

## 2025-08-06 PROCEDURE — 99215 OFFICE O/P EST HI 40 MIN: CPT | Mod: S$GLB,,, | Performed by: STUDENT IN AN ORGANIZED HEALTH CARE EDUCATION/TRAINING PROGRAM

## 2025-08-06 PROCEDURE — 99999 PR PBB SHADOW E&M-EST. PATIENT-LVL III: CPT | Mod: PBBFAC,,, | Performed by: STUDENT IN AN ORGANIZED HEALTH CARE EDUCATION/TRAINING PROGRAM

## 2025-08-06 PROCEDURE — 3066F NEPHROPATHY DOC TX: CPT | Mod: CPTII,S$GLB,, | Performed by: STUDENT IN AN ORGANIZED HEALTH CARE EDUCATION/TRAINING PROGRAM

## 2025-08-06 PROCEDURE — G2211 COMPLEX E/M VISIT ADD ON: HCPCS | Mod: S$GLB,,, | Performed by: STUDENT IN AN ORGANIZED HEALTH CARE EDUCATION/TRAINING PROGRAM

## 2025-08-06 PROCEDURE — 3008F BODY MASS INDEX DOCD: CPT | Mod: CPTII,S$GLB,, | Performed by: STUDENT IN AN ORGANIZED HEALTH CARE EDUCATION/TRAINING PROGRAM

## 2025-08-06 RX ORDER — SODIUM BICARBONATE 650 MG/1
1300 TABLET ORAL 2 TIMES DAILY
Qty: 120 TABLET | Refills: 11 | Status: SHIPPED | OUTPATIENT
Start: 2025-08-06 | End: 2026-08-06

## 2025-08-06 NOTE — LETTER
August 6, 2025        Lilia Malin  [address] [address] [address]  Phone: [phone]  Fax: [phone]             [facility] 1st Fl  [address]  [place] [address]  Phone: [phone]   Patient: Kiesha Rocha   MR Number: [id number]   YOB: 1965   Date of Visit: 8/6/2025       Dear Dr. Lilia Malin    Thank you for referring Kiesha Rocha to me for evaluation. Attached you will find relevant portions of my assessment and plan of care.    If you have questions, please do not hesitate to call me. I look forward to following Kiesha Rocha along with you.    Sincerely,    Toribio Dunbar Jr., MD    Enclosure    If you would like to receive this communication electronically, please contact [email or web address] or [phone] to request [employer] [facility] access.    [employer] [facility] [employer] is a tool which provides read-only access to select patient information with whom you have a relationship. Its easy to use and provides real time access to review your patients record including encounter summaries, notes, results, and demographic information.    If you feel you have received this communication in error or would no longer like to receive these types of communications, please e-mail [email or web address]

## 2025-08-06 NOTE — PROGRESS NOTES
Post-Transplant Assessment    Referring Physician: Lliia Malin  Current Nephrologist: Lilia Malin    ORGAN: LEFT KIDNEY  Donor Type: donation after brain death  PHS Increased Risk: no  Cold Ischemia: 866 mins  Induction Medications: thymo    Chief Complaint   Patient presents with    Chronic Renal Failure    Kidney Transplant Reassessment     Presents to the clinic today for medical conditions listed below.  Problem Noted   S/P Kidney Transplant 4/25/2025      ESKD from DM: HD 2019 LUE AVF; EFs 50-60 G2DD;  DBDKT 4/24/25 KDPI 92 CIT 15 0-37 DSA 0 (hist DR7) induced with thymo. OR notable for peritoneal window. 1 stent. 0 drains. 2 day cash. Urinating    5/26/25 DSA DPA1,DR7,DQ2    5/26/25 cfDNA 0.48    6/6/25 Bx ABMR; MPP, PLEX, IVIG, bortezomib     Immunodeficiency Due to Drug Therapy 4/25/2025    On tac, MMF, pred  Monitoring for toxicity       Today:  History of Present Illness    Kiesha presents today with weakness and leg spasms. She reports progressive weakness in legs causing significant mobility difficulties with difficulty walking short distances and standing. She experiences leg spasms and unpredictable weakness that impacts daily functioning. She describes intermittent dizziness and stays in bed all day during symptomatic periods. Symptoms fluctuate in intensity and she can anticipate symptom onset. She also reports recent voice changes of unclear etiology and back and shoulder pain noted this morning. She has a history of kidney transplant with previous antibody-mediated rejection. Recent biopsy showed no active rejection. She has diabetes with blood sugar fluctuations, occasionally spiking to 400, which she attributes to steroid treatment. Most recent blood sugar was 100. She has neuropathy in her foot and rheumatoid arthritis. Multiple specialists have indicated her conditions are currently stable. She is currently taking Nitrofurantoin for 7 days for urinary tract infection, sodium  bicarbonate, and Tacrolimus 2.5 mg twice daily. She endorses recent fever. She denies current fevers and is uncertain about the underlying cause of her weakness symptoms.      ROS:  General: +fever  Musculoskeletal: +joint pain, +muscle weakness, +difficulty walking, +muscle spasms, +back pain  Neurological: +dizziness, +nerve pain         Physical Exam :  Vitals:    08/06/25 1354   BP: (!) 103/54   Pulse: 80   Resp: 16   Temp: 97.3 °F (36.3 °C)     Physical Exam    General: Well-appearing.  Lungs: Normal respiratory effort. No respiratory distress.  Extremities: No edema lower extremities.  Musculoskeletal: Difficulty walking.         Last 3 sets of metabolic panel, blood count, drug levels reviewed; Most recent discharge reviewed; Most recent imaging of the kidneys reviewed and noted when available  1. S/P kidney transplant    2. At risk for opportunistic infections    3. Immunodeficiency due to drug therapy    4. Type 2 diabetes mellitus with stage 3a chronic kidney disease, with long-term current use of insulin    5. Hypertension, unspecified type    6. Antibody mediated rejection of kidney transplant    7. Prophylactic immunotherapy      These problems pose a threat to patient's life or bodily function    Assessment & Plan    Z94.0 Kidney transplant status  T86.11 Kidney transplant rejection  N10 Acute pyelonephritis  N39.0 Urinary tract infection, site not specified  E11.65 Type 2 diabetes mellitus with hyperglycemia  M06.9 Rheumatoid arthritis, unspecified  R26.81 Unsteadiness on feet  R25.2 Cramp and spasm  R49.9 Unspecified voice and resonance disorder  M54.9 Dorsalgia, unspecified  Z79.52 Long term (current) use of systemic steroids  R53.1 Weakness    PLAN SUMMARY:  - Decreased sodium bicarbonate to 1300 mg twice daily  - Adjusted Tacrolimus to 2.5 mg twice daily  - Continued Mycophenolate and Prednisone  - Continued insulin  - Ordered repeat kidney biopsy in 2-3 weeks  - Prescribed new antibiotic course  (likely Ciprofloxacin) for persistent UTI  - Recommend specific exercises: standing from seated position, gripping exercises, short walks, standing on one leg  - Follow up in 2-3 weeks for repeat kidney biopsy  - Contact office if weakness persists for 3-4 months for potential rheumatology or neurology referral    KIDNEY TRANSPLANT STATUS AND REJECTION:  - Had rejection previously, treated with Plex, IVIG, and Bortezomib (Velcade) shots.  - Recent biopsy showed no rejection.  - Antibodies/proteins in blood have decreased since rejection episode.  - Creatinine at 1.8, aiming for 1.5-1.6.  - Adjusted Tacrolimus to 2.5 mg twice daily from previous 3 mg AM / 2 mg PM.  - Ordered repeat kidney biopsy in 2-3 weeks to ensure infection resolution and assess overall kidney status.  - Continued Mycophenolate.  - Continued Prednisone.    URINARY TRACT INFECTION AND PYELONEPHRITIS:  - Recent biopsy revealed pyelonephritis.  - Started Nitrofurantoin for UTI about a week ago and ordered another antibiotic course (likely Ciprofloxacin, to be picked up from [facility] pharmacy) due to persistent infection.    WEAKNESS AND UNSTEADINESS:  - Experiencing weakness; if persistent after 3-4 months, consider referral to rheumatology or neurology.  - Explained importance of building muscle strength through specific exercises.  - Kiesha to perform standing exercises from seated position without using arms, 10 times, 3 times daily.  - Kiesha to practice standing on one leg, then the other.  - Kiesha to do gripping exercises to build strength.  - Recommend starting with short walks around the apartment/house, daily, gradually increasing to 2-3 times daily.    TYPE 2 DIABETES MELLITUS:  - Blood sugar well-controlled (last reading 100).  - Discussed impact of steroids on glucose levels.  - Continued insulin.    MEDICATION MANAGEMENT:  - Clarified relative importance of different medications (e.g., Prograf, mycophenolate, prednisone,  "insulin as crucial; sodium bicarbonate as less critical).  - Decreased sodium bicarbonate from 1300 mg 3 times daily to twice daily.    FOLLOW-UP:  - Follow up in 2-3 weeks for repeat kidney biopsy.  - Contact the office if weakness persists for 3-4 months for potential referral to rheumatologist or neurologist.         Immediate changes:    Repeat kidney biopsy in 2 weeks with repeat DSA and allosure and ultrasound    Stop chlorthalidone now that K+ is low and BP was low, ask her to monitor her blood pressures for a week after stopping the chlorthalidone and repeat labs the same week of the biopsy (I did not tell her this plan in clinic, please communicate this to her)    1. CKD stage:   Will continue follow up as per our center guidelines. patient to continue close follow up with the local General nephrologist. Education provided in appropriate fluid intake, potassium intake. Continue with oral hydration.    2. Immunosuppression:   Will closely monitor for toxicities, education provided about adherence to medicines and need to communicate any side effect to the transplant nurse or physician.  Lab Results   Component Value Date    TACROLIMUS 5.3 08/04/2025    TACROLIMUS 11.1 07/28/2025    TACROLIMUS 8.9 07/21/2025     No results found for: "CYCLOSPORINE"    3. Allograft Function:  Stable at baseline for the patient. Continue follow up as per our guidelines and with the local General nephrologist. Communication will be sent today.  Lab Results   Component Value Date    CREATININE 1.8 (H) 08/04/2025    CREATININE 1.7 (H) 07/28/2025    CREATININE 1.6 (H) 07/21/2025     No results found for: "AMYLASE", "LIPASE"    4. Hypertension management:    Continue with home blood pressure monitoring, low salt and healthy life discussed with the patient    5. Metabolic Bone Disease/Secondary Hyperparathyroidism:   Calcium and phosphorus level discussed with the patient, patient will continue follow up with the general nephrologist " "for management of metabolic bone disease. Calcium and phosphorus as per our center protocol.  Lab Results   Component Value Date    .3 (H) 07/28/2025    CALCIUM 9.8 08/04/2025    PHOS 2.2 (L) 08/04/2025    PHOS 2.2 (L) 07/28/2025    PHOS 2.7 07/21/2025       6. Electrolytes:   Reviewed with the patient, essentially within the normal range no need for acute changes today, will monitor as per our center guidelines.  Lab Results   Component Value Date     08/04/2025    K 3.4 (L) 08/04/2025     08/04/2025    CO2 24 08/04/2025    CO2 23 07/28/2025    CO2 24 07/21/2025       7. Anemia:   Will continue monitoring as per our center guidelines. No indication for acute intervention today.  Lab Results   Component Value Date    WBC 8.79 08/04/2025    HGB 10.8 (L) 08/04/2025    HCT 33.9 (L) 08/04/2025    MCV 97 08/04/2025     08/04/2025       8.Proteinuria:   Will continue with pr/cr ratio as per our center guidelines  Lab Results   Component Value Date    PROTEINURINE 29 (H) 07/28/2025    CREATRANDUR 106.0 07/28/2025    UTPCR 0.27 (H) 07/28/2025    UTPCR 0.08 06/23/2025    UTPCR 0.26 (H) 05/22/2025        9. BK virus infection screening:   Will continue with urine or blood PCR as per our guidelines to prevent BK virus viremia and allograft dysfunction  No results found for: "BKVIRUSDNAUR", "BKQUANTURINE", "BKVIRUSLOG", "BKVIRUSURINE"      10. Weight education:   Provided during the clinic visit.  Body mass index is 24.73 kg/m².     11.Patient safety education regarding immunosuppression including prophylaxis posttransplant for CMV, PCP :   Education provided about vaccination and prevention of infections.    12.  Cytopenias:   No significant cytopenias will monitor as per our guidelines. Medicine list reviewed including potential causes of drug-induced cytopenias  Lab Results   Component Value Date    WBC 8.79 08/04/2025    HGB 10.8 (L) 08/04/2025    HCT 33.9 (L) 08/04/2025    MCV 97 08/04/2025    PLT " 277 08/04/2025       13. Post-transplant Prophylaxis: CMV Infection, PJP and Candida mucosistis and other indicated for this particular patient.     I spent a total of 28 minutes on the day of the visit.      No orders of the defined types were placed in this encounter.    Medications Discontinued During This Encounter   Medication Reason    sodium bicarbonate 650 MG tablet       Future Appointments   Date Time Provider Department Center   8/11/2025  1:20 PM LAB, [facility] LAB [place]   8/15/2025 11:00 AM [facility] OIC-US1 MASTER [facility]   8/18/2025 12:40 PM LAB, [facility] LAB [place]   8/25/2025  7:50 AM LAB, [facility] LAB [place]   8/25/2025  8:00 AM SPECIMEN, [facility] LAB [place]   9/2/2025 12:40 PM LAB, [facility] LAB [place]   10/15/2025 10:30 AM [facility] ENDO US1 M[facility]JANE [facility] ENDCNUS [facility]   10/15/2025 11:00 [facility]   10/22/2025  3:00 PM Lissette Lozano, FNP-C [facility] ENDODIA [facility]       Follow-up:   Clinic: return to transplant clinic weekly for the first month after transplant; every 2 weeks during months 2-3; then at 6-, 9-, 12-, 18-, 24-, and 36- months post-transplant to reassess for complications from immunosuppression toxicity and monitor for rejection.  Annually thereafter.    Labs: since patient remains at high risk for rejection and drug-related complications that warrant close monitoring, labs will be ordered as follows: continue twice weekly CBC, renal panel, and drug level for first month; then same labs once weekly through 3rd month post-transplant.  Urine for UA and protein/creatinine ratio monthly.  Serum BK - PCR at 1-, 3-, 6-, 9-, 12-, 18-, 24-, 36- 48-, and 60 months post-transplant.  Hepatic panel at 1-, 2-, 3-, 6-, 9-, 12-, 18-, 24-, and 36- months post-transplant.    Toribio Dunbar Jr., MD       Education:   Material provided to the patient.  Patient reminded to call with any health changes since these can be early  signs of significant complications.  Also, I advised the patient to be sure any new medications or changes of old medications are discussed with either a pharmacist or physician knowledgeable with transplant to avoid rejection/drug toxicity related to significant drug interactions.    Patient advised that it is recommended that all transplanted patients, and their close contacts and household members receive Covid vaccination.    Visit today included increased complexity associated with the care of the episodic problem kidney transplant addressed and managing the longitudinal care of the patient due to the serious and/or complex managed problem(s) kidney transplant, CKD, HTN, secondary hyperparathyroidism, and anemia of chronic disease.    UNOS Patient Status  Functional Status: 70% - Cares for self: unable to carry on normal activity or active work  Physical Capacity: Limited Mobility

## 2025-08-11 ENCOUNTER — LAB VISIT (OUTPATIENT)
Dept: LAB | Facility: HOSPITAL | Age: 60
End: 2025-08-11
Attending: STUDENT IN AN ORGANIZED HEALTH CARE EDUCATION/TRAINING PROGRAM
Payer: MEDICARE

## 2025-08-11 DIAGNOSIS — Z94.0 IMMUNOSUPPRESSIVE MANAGEMENT ENCOUNTER FOLLOWING KIDNEY TRANSPLANT: ICD-10-CM

## 2025-08-11 DIAGNOSIS — Z79.899 IMMUNOSUPPRESSIVE MANAGEMENT ENCOUNTER FOLLOWING KIDNEY TRANSPLANT: ICD-10-CM

## 2025-08-11 DIAGNOSIS — Z94.0 KIDNEY REPLACED BY TRANSPLANT: ICD-10-CM

## 2025-08-11 LAB
ABSOLUTE NEUTROPHIL MANUAL (OHS): 5.8 K/UL (ref 1.8–7.7)
ALBUMIN SERPL BCP-MCNC: 2.6 G/DL (ref 3.5–5.2)
ANION GAP (OHS): 12 MMOL/L (ref 8–16)
ANISOCYTOSIS BLD QL SMEAR: SLIGHT
BUN SERPL-MCNC: 24 MG/DL (ref 6–20)
CALCIUM SERPL-MCNC: 10.1 MG/DL (ref 8.7–10.5)
CHLORIDE SERPL-SCNC: 98 MMOL/L (ref 95–110)
CO2 SERPL-SCNC: 25 MMOL/L (ref 23–29)
CREAT SERPL-MCNC: 1.7 MG/DL (ref 0.5–1.4)
EOSINOPHIL NFR BLD MANUAL: 2 % (ref 0–8)
ERYTHROCYTE [DISTWIDTH] IN BLOOD BY AUTOMATED COUNT: 13.2 % (ref 11.5–14.5)
GFR SERPLBLD CREATININE-BSD FMLA CKD-EPI: 35 ML/MIN/1.73/M2
GLUCOSE SERPL-MCNC: 191 MG/DL (ref 70–110)
HCT VFR BLD AUTO: 30.3 % (ref 37–48.5)
HGB BLD-MCNC: 9.8 GM/DL (ref 12–16)
HYPOCHROMIA BLD QL SMEAR: ABNORMAL
LYMPHOCYTES NFR BLD MANUAL: 11 % (ref 18–48)
MAGNESIUM SERPL-MCNC: 1.9 MG/DL (ref 1.6–2.6)
MCH RBC QN AUTO: 30.5 PG (ref 27–31)
MCHC RBC AUTO-ENTMCNC: 32.3 G/DL (ref 32–36)
MCV RBC AUTO: 94 FL (ref 82–98)
METAMYELOCYTES NFR BLD MANUAL: 3 %
MONOCYTES NFR BLD MANUAL: 5 % (ref 4–15)
MYELOCYTES NFR BLD MANUAL: 1 %
NEUTROPHILS NFR BLD MANUAL: 78 % (ref 38–73)
NUCLEATED RBC (/100WBC) (OHS): 0 /100 WBC
PHOSPHATE SERPL-MCNC: 1.9 MG/DL (ref 2.7–4.5)
PLATELET # BLD AUTO: 464 K/UL (ref 150–450)
PLATELET BLD QL SMEAR: ABNORMAL
PMV BLD AUTO: 10.9 FL (ref 9.2–12.9)
POTASSIUM SERPL-SCNC: 3.3 MMOL/L (ref 3.5–5.1)
RBC # BLD AUTO: 3.21 M/UL (ref 4–5.4)
SODIUM SERPL-SCNC: 135 MMOL/L (ref 136–145)
WBC # BLD AUTO: 7.4 K/UL (ref 3.9–12.7)

## 2025-08-11 PROCEDURE — 36415 COLL VENOUS BLD VENIPUNCTURE: CPT | Mod: PO

## 2025-08-11 PROCEDURE — 83735 ASSAY OF MAGNESIUM: CPT

## 2025-08-11 PROCEDURE — 82565 ASSAY OF CREATININE: CPT | Mod: PO

## 2025-08-11 PROCEDURE — 80197 ASSAY OF TACROLIMUS: CPT

## 2025-08-11 PROCEDURE — 85025 COMPLETE CBC W/AUTO DIFF WBC: CPT | Mod: PO

## 2025-08-12 ENCOUNTER — RESULTS FOLLOW-UP (OUTPATIENT)
Dept: TRANSPLANT | Facility: CLINIC | Age: 60
End: 2025-08-12
Payer: MEDICARE

## 2025-08-12 ENCOUNTER — TELEPHONE (OUTPATIENT)
Dept: INTERVENTIONAL RADIOLOGY/VASCULAR | Facility: HOSPITAL | Age: 60
End: 2025-08-12
Payer: MEDICARE

## 2025-08-12 DIAGNOSIS — E83.39 HYPOPHOSPHATEMIA: ICD-10-CM

## 2025-08-12 DIAGNOSIS — Z94.0 KIDNEY REPLACED BY TRANSPLANT: ICD-10-CM

## 2025-08-12 DIAGNOSIS — N39.0 URINARY TRACT INFECTION WITHOUT HEMATURIA, SITE UNSPECIFIED: Primary | ICD-10-CM

## 2025-08-12 LAB — TACROLIMUS BLD-MCNC: 5.1 NG/ML (ref 5–15)

## 2025-08-14 ENCOUNTER — TELEPHONE (OUTPATIENT)
Dept: INTERVENTIONAL RADIOLOGY/VASCULAR | Facility: HOSPITAL | Age: 60
End: 2025-08-14
Payer: MEDICARE

## 2025-08-15 ENCOUNTER — HOSPITAL ENCOUNTER (OUTPATIENT)
Dept: RADIOLOGY | Facility: HOSPITAL | Age: 60
Discharge: HOME | End: 2025-08-15
Attending: STUDENT IN AN ORGANIZED HEALTH CARE EDUCATION/TRAINING PROGRAM
Payer: MEDICARE

## 2025-08-15 DIAGNOSIS — T86.10 COMPLICATION OF TRANSPLANTED KIDNEY, UNSPECIFIED COMPLICATION: ICD-10-CM

## 2025-08-15 PROCEDURE — 76776 US EXAM K TRANSPL W/DOPPLER: CPT | Mod: TC

## 2025-08-15 RX ORDER — TACROLIMUS 0.5 MG/1
CAPSULE ORAL
Qty: 330 CAPSULE | Refills: 11 | Status: SHIPPED | OUTPATIENT
Start: 2025-08-15 | End: 2026-08-15

## 2025-08-20 ENCOUNTER — TELEPHONE (OUTPATIENT)
Dept: INTERVENTIONAL RADIOLOGY/VASCULAR | Facility: HOSPITAL | Age: 60
End: 2025-08-20
Payer: MEDICARE

## 2025-08-20 ENCOUNTER — LAB VISIT (OUTPATIENT)
Dept: LAB | Facility: HOSPITAL | Age: 60
End: 2025-08-20
Attending: STUDENT IN AN ORGANIZED HEALTH CARE EDUCATION/TRAINING PROGRAM
Payer: MEDICARE

## 2025-08-20 DIAGNOSIS — Z79.899 IMMUNOSUPPRESSIVE MANAGEMENT ENCOUNTER FOLLOWING KIDNEY TRANSPLANT: ICD-10-CM

## 2025-08-20 DIAGNOSIS — Z94.0 IMMUNOSUPPRESSIVE MANAGEMENT ENCOUNTER FOLLOWING KIDNEY TRANSPLANT: ICD-10-CM

## 2025-08-20 DIAGNOSIS — Z94.0 KIDNEY REPLACED BY TRANSPLANT: ICD-10-CM

## 2025-08-20 DIAGNOSIS — T86.19 RECEIVED KIDNEY FROM DONOR WITH HEPATITIS C: Primary | ICD-10-CM

## 2025-08-20 LAB
ABSOLUTE NEUTROPHIL MANUAL (OHS): 7.6 K/UL (ref 1.8–7.7)
ALBUMIN SERPL BCP-MCNC: 3 G/DL (ref 3.5–5.2)
ANION GAP (OHS): 9 MMOL/L (ref 8–16)
BUN SERPL-MCNC: 25 MG/DL (ref 6–20)
CALCIUM SERPL-MCNC: 10.3 MG/DL (ref 8.7–10.5)
CHLORIDE SERPL-SCNC: 107 MMOL/L (ref 95–110)
CO2 SERPL-SCNC: 22 MMOL/L (ref 23–29)
CREAT SERPL-MCNC: 1.5 MG/DL (ref 0.5–1.4)
EOSINOPHIL NFR BLD MANUAL: 1 % (ref 0–8)
ERYTHROCYTE [DISTWIDTH] IN BLOOD BY AUTOMATED COUNT: 13.2 % (ref 11.5–14.5)
GFR SERPLBLD CREATININE-BSD FMLA CKD-EPI: 40 ML/MIN/1.73/M2
GLUCOSE SERPL-MCNC: 160 MG/DL (ref 70–110)
HCT VFR BLD AUTO: 33.8 % (ref 37–48.5)
HGB BLD-MCNC: 10.6 GM/DL (ref 12–16)
HYPOCHROMIA BLD QL SMEAR: ABNORMAL
LYMPHOCYTES NFR BLD MANUAL: 10 % (ref 18–48)
MAGNESIUM SERPL-MCNC: 2 MG/DL (ref 1.6–2.6)
MCH RBC QN AUTO: 30.8 PG (ref 27–31)
MCHC RBC AUTO-ENTMCNC: 31.4 G/DL (ref 32–36)
MCV RBC AUTO: 98 FL (ref 82–98)
MONOCYTES NFR BLD MANUAL: 10 % (ref 4–15)
NEUTROPHILS NFR BLD MANUAL: 79 % (ref 38–73)
NUCLEATED RBC (/100WBC) (OHS): 0 /100 WBC
PHOSPHATE SERPL-MCNC: 3.1 MG/DL (ref 2.7–4.5)
PLATELET # BLD AUTO: 513 K/UL (ref 150–450)
PLATELET BLD QL SMEAR: ABNORMAL
PMV BLD AUTO: 10.5 FL (ref 9.2–12.9)
POTASSIUM SERPL-SCNC: 5.1 MMOL/L (ref 3.5–5.1)
RBC # BLD AUTO: 3.44 M/UL (ref 4–5.4)
SODIUM SERPL-SCNC: 138 MMOL/L (ref 136–145)
WBC # BLD AUTO: 9.64 K/UL (ref 3.9–12.7)

## 2025-08-20 PROCEDURE — 85025 COMPLETE CBC W/AUTO DIFF WBC: CPT | Mod: PO

## 2025-08-20 PROCEDURE — 87522 HEPATITIS C REVRS TRNSCRPJ: CPT

## 2025-08-20 PROCEDURE — 36415 COLL VENOUS BLD VENIPUNCTURE: CPT | Mod: PO

## 2025-08-20 PROCEDURE — 80197 ASSAY OF TACROLIMUS: CPT

## 2025-08-20 PROCEDURE — 83735 ASSAY OF MAGNESIUM: CPT

## 2025-08-20 PROCEDURE — 82565 ASSAY OF CREATININE: CPT | Mod: PO

## 2025-08-21 ENCOUNTER — RESULTS FOLLOW-UP (OUTPATIENT)
Dept: TRANSPLANT | Facility: HOSPITAL | Age: 60
End: 2025-08-21
Payer: MEDICARE

## 2025-08-21 DIAGNOSIS — Z94.0 KIDNEY REPLACED BY TRANSPLANT: Primary | ICD-10-CM

## 2025-08-21 LAB
HCV RNA SERPL NAA+PROBE-LOG IU: NOT DETECTED {LOG_IU}/ML
TACROLIMUS BLD-MCNC: 4.6 NG/ML (ref 5–15)

## 2025-08-21 RX ORDER — TACROLIMUS 0.5 MG/1
4 CAPSULE ORAL EVERY 12 HOURS
Qty: 480 CAPSULE | Refills: 11 | Status: SHIPPED | OUTPATIENT
Start: 2025-08-21 | End: 2026-08-21

## 2025-08-26 ENCOUNTER — TELEPHONE (OUTPATIENT)
Dept: TRANSPLANT | Facility: CLINIC | Age: 60
End: 2025-08-26
Payer: MEDICARE

## 2025-09-02 ENCOUNTER — RESULTS FOLLOW-UP (OUTPATIENT)
Dept: TRANSPLANT | Facility: CLINIC | Age: 60
End: 2025-09-02
Payer: MEDICARE

## 2025-09-02 DIAGNOSIS — N39.0 RECURRENT UTI: Primary | ICD-10-CM

## 2025-09-04 DIAGNOSIS — N39.0 URINARY TRACT INFECTION WITHOUT HEMATURIA, SITE UNSPECIFIED: Primary | ICD-10-CM

## 2025-09-04 RX ORDER — NITROFURANTOIN 25; 75 MG/1; MG/1
100 CAPSULE ORAL 2 TIMES DAILY
Qty: 14 CAPSULE | Refills: 0 | Status: SHIPPED | OUTPATIENT
Start: 2025-09-05 | End: 2025-09-12

## (undated) DEVICE — STAPLER SKIN PROXIMATE WIDE

## (undated) DEVICE — CLIPPER BLADE MOD 4406 (CAREF)

## (undated) DEVICE — SUT 1 36IN PDS II VIO MONO

## (undated) DEVICE — SPONGE COTTON TRAY 4X4IN

## (undated) DEVICE — NDL VIZISHOT 2 FLEX 22G

## (undated) DEVICE — SUT PDS BV 6-0

## (undated) DEVICE — PLUG CATHETER STERILE FOLEY

## (undated) DEVICE — CONTAINER SPECIMEN STRL 4OZ

## (undated) DEVICE — SET IRR URLGY 2LINE UNIV SPIKE

## (undated) DEVICE — SUT PROLENE 6-0 BV-1 30IN

## (undated) DEVICE — LUBRICANT SURGILUBE 2 OZ

## (undated) DEVICE — SYR SLIP TIP 20CC

## (undated) DEVICE — DRESSING ABSRBNT ISLAND 3.6X8

## (undated) DEVICE — SUT 3-0 12-18IN SILK

## (undated) DEVICE — SUT 4-0 12-18IN SILK BLACK

## (undated) DEVICE — NDL ASPIRATING VIZISHOT 20-40M

## (undated) DEVICE — ELECTRODE MEGADYNE RETURN DUAL

## (undated) DEVICE — SUT SILK 2-0 STRANDS 30IN

## (undated) DEVICE — Device

## (undated) DEVICE — CONNECTOR SWIVEL

## (undated) DEVICE — TOWEL OR XRAY WHITE 17X26IN

## (undated) DEVICE — PACK SET UP CONVERTORS

## (undated) DEVICE — STOCKINETTE 2INX36

## (undated) DEVICE — PACK ECLIPSE SET-UP W/O DRAPE

## (undated) DEVICE — HEMOSTAT SURGICEL NU-KNIT 6X9

## (undated) DEVICE — TRAY CATH 1-LYR URIMTR 16FR

## (undated) DEVICE — DECANTER FLUID TRNSF WHITE 9IN

## (undated) DEVICE — CLIP SPRING 6MM

## (undated) DEVICE — SYR 10CC LUER LOCK

## (undated) DEVICE — BOWL STERILE LARGE 32OZ

## (undated) DEVICE — DRAPE SLUSH WARMER WITH DISC

## (undated) DEVICE — PUNCH AORTIC 4.8MM

## (undated) DEVICE — FOLEY BLLN 20FR 3WAY 5CC

## (undated) DEVICE — ADHESIVE DERMABOND ADVANCED

## (undated) DEVICE — CYTOSPIN COLLECTION FLUID BLT

## (undated) DEVICE — CATH BRONCHOSCOPE F/BF

## (undated) DEVICE — SYR ONLY LUER LOCK 20CC

## (undated) DEVICE — ADAPTER SWIVEL

## (undated) DEVICE — TIP YANKAUERS BULB NO VENT

## (undated) DEVICE — SYS LABEL CORRECT MED

## (undated) DEVICE — SUT ETHILON 3-0 PS2 18 BLK

## (undated) DEVICE — KIT ANTIFOG W/SPONG & FLUID

## (undated) DEVICE — GOWN POLY REINF BRTH SLV XL

## (undated) DEVICE — PENCIL GOLF STERILE

## (undated) DEVICE — PENCIL ROCKER SWITCH 10FT CORD

## (undated) DEVICE — SUT PROLENE 5-0 36IN C-1

## (undated) DEVICE — BAG ION VISION PROBE

## (undated) DEVICE — ADAPTER VISION PROBE & SUCTION

## (undated) DEVICE — SUT 2-0 12-18IN SILK

## (undated) DEVICE — SUT SILK 3-0 STRANDS 30IN

## (undated) DEVICE — DRAPE THREE-QTR REINF 53X77IN

## (undated) DEVICE — ALCOHOL BLEND 95%

## (undated) DEVICE — PACK KIDNEY TRANSPLANT CUSTOM

## (undated) DEVICE — INSERTS STEALTH FIBRA SIZE 1.

## (undated) DEVICE — DRESSING TRANS 6X8 TEGADERM

## (undated) DEVICE — PUNCH AORTIC 4.0MM 6/CASE